# Patient Record
Sex: MALE | Race: WHITE | NOT HISPANIC OR LATINO | Employment: OTHER | URBAN - NONMETROPOLITAN AREA
[De-identification: names, ages, dates, MRNs, and addresses within clinical notes are randomized per-mention and may not be internally consistent; named-entity substitution may affect disease eponyms.]

---

## 2017-01-03 ENCOUNTER — OFFICE VISIT (OUTPATIENT)
Dept: CARDIOLOGY | Facility: CLINIC | Age: 47
End: 2017-01-03

## 2017-01-03 VITALS — DIASTOLIC BLOOD PRESSURE: 78 MMHG | OXYGEN SATURATION: 96 % | SYSTOLIC BLOOD PRESSURE: 120 MMHG | HEART RATE: 60 BPM

## 2017-01-03 DIAGNOSIS — I10 ESSENTIAL HYPERTENSION: Primary | ICD-10-CM

## 2017-01-03 DIAGNOSIS — R55 SYNCOPE, UNSPECIFIED SYNCOPE TYPE: ICD-10-CM

## 2017-01-03 DIAGNOSIS — I47.20 VT (VENTRICULAR TACHYCARDIA) (HCC): ICD-10-CM

## 2017-01-03 DIAGNOSIS — I42.1 HYPERTROPHIC OBSTRUCTIVE CARDIOMYOPATHY (HCC): ICD-10-CM

## 2017-01-03 PROCEDURE — 99213 OFFICE O/P EST LOW 20 MIN: CPT | Performed by: INTERNAL MEDICINE

## 2017-01-03 RX ORDER — CHLORAL HYDRATE 500 MG
1000 CAPSULE ORAL 2 TIMES DAILY WITH MEALS
COMMUNITY
End: 2018-03-13 | Stop reason: ALTCHOICE

## 2017-01-03 NOTE — PROGRESS NOTES
Subjective:       Sergio Phan is a 46 y.o. male who here for follow up    CC  Follow-up for the cardiomyopathy*ventricular tachycardia  HPI  46-year-old white male with history of sudden cardiac death, and apical cardiomyopathy, AICD, had a pacemaker/AICD checked recently was found to have 2 episodes of nonsustained ventricular tachycardia, terminated on its own with no AICD shock    Patient denies any chest pains or tightness in chest no heaviness with a pressure sensation     Problem List Items Addressed This Visit        Cardiovascular and Mediastinum    Hypertension - Primary    Hypertrophic obstructive cardiomyopathy    Syncope    VT (ventricular tachycardia)        Previous treatments/evaluations include: ASA and beta blocker. Cardiac risk factors: advanced age (older than 55 for men, 65 for women) and hypertension.    The following portions of the patient's history were reviewed and updated as appropriate: allergies, current medications, past family history, past medical history, past social history, past surgical history and problem list.    Past Medical History   Diagnosis Date   • Anxiety    • Asthma    • Chest pain    • COPD (chronic obstructive pulmonary disease)    • Depression    • Diabetes mellitus      TYPE II   • Fatigue    • HOCM (hypertrophic obstructive cardiomyopathy)    • Hypertension    • Hypertriglyceridemia    • Pancreatitis    • Syncope     reports that he has been smoking Cigarettes.  He has a 17.50 pack-year smoking history. He has never used smokeless tobacco. He reports that he does not drink alcohol or use illicit drugs.  Family History   Problem Relation Age of Onset   • Heart block Mother    • Kidney disease Mother    • Diabetes Father    • Diabetes Maternal Grandmother    • Diabetes Maternal Grandfather    • COPD Maternal Grandfather    • Asthma Maternal Grandfather        Review of Systems  Constitutional: No wt loss, fever, fatigue  Gastrointestinal: No nausea, abdominal  pain  Behavioral/Psych: No insomnia or anxiety   Cardiovascular no cp  Objective:       Physical Exam             Physical Exam  Visit Vitals   • /78 (BP Location: Left arm, Patient Position: Sitting)   • Pulse 60   • SpO2 96%     General appearance: alert, appears stated age and cooperative, oriented x 3  Neck: no JVD and supple, symmetrical, trachea midline  Lungs: clear to auscultation bilaterally  Heart:Normal PMI,  S1, S2 normal, no murmur, rub or gallop  Extremities: normal range of motion, no cyanosis or edema,  Pulses: 2+ and symmetric  Skin: Skin color, texture, turgor normal. No rashes or lesions  Psych:  Pleasant and cooperative        Cardiographics  @Procedures    Echocardiogram:    · Left ventricular wall thickness is consistent with moderate-to-severe predominantly apical hypertrophy.  · All left ventricular wall segments contract normally.  · Left ventricular function is normal.  · Left ventricular function is normal. Calculated EF = 60%.  · There is no evidence of pericardial effusion.    Current Outpatient Prescriptions:   •  Omega-3 Fatty Acids (FISH OIL) 1000 MG capsule capsule, Take 1,000 mg by mouth 2 (Two) Times a Day With Meals., Disp: , Rfl:   •  albuterol (PROVENTIL HFA;VENTOLIN HFA) 108 (90 BASE) MCG/ACT inhaler, Inhale 2 puffs every 4 (four) hours as needed for wheezing., Disp: , Rfl:   •  albuterol (PROVENTIL) (2.5 MG/3ML) 0.083% nebulizer solution, Take 2.5 mg by nebulization every 4 (four) hours as needed for wheezing., Disp: , Rfl:   •  allopurinol (ZYLOPRIM) 300 MG tablet, 300 mg daily., Disp: , Rfl: 12  •  amLODIPine (NORVASC) 10 MG tablet, Take  by mouth daily., Disp: , Rfl:   •  aspirin 81 MG tablet, Take  by mouth daily., Disp: , Rfl:   •  atorvastatin (LIPITOR) 20 MG tablet, Take 60 mg by mouth daily., Disp: , Rfl:   •  budesonide-formoterol (SYMBICORT) 160-4.5 MCG/ACT inhaler, Inhale 2 puffs 2 (two) times a day., Disp: , Rfl:   •  calcitriol (ROCALTROL) 0.25 MCG capsule,  Take 0.25 mcg by mouth daily., Disp: , Rfl:   •  DULoxetine (CYMBALTA) 30 MG capsule, Take  by mouth daily., Disp: , Rfl:   •  FLUTICASONE FUROATE IN, Inhale 2 puffs daily., Disp: , Rfl:   •  furosemide (LASIX) 20 MG tablet, Take 20 mg by mouth 2 (two) times a day., Disp: , Rfl:   •  gabapentin (NEURONTIN) 800 MG tablet, Take 800 mg by mouth 3 (three) times a day., Disp: , Rfl:   •  glimepiride (AMARYL) 2 MG tablet, Take 2 mg by mouth every morning before breakfast., Disp: , Rfl:   •  hydrALAZINE (APRESOLINE) 50 MG tablet, Take 50 mg by mouth 4 (four) times a day., Disp: , Rfl:   •  lisinopril (PRINIVIL,ZESTRIL) 20 MG tablet, Take 1 tablet by mouth daily., Disp: 30 tablet, Rfl: 1  •  methocarbamol (ROBAXIN) 750 MG tablet, Take 750 mg by mouth 4 (four) times a day as needed for muscle spasms., Disp: , Rfl:   •  metoprolol tartrate (LOPRESSOR) 25 MG tablet, Take 1 tablet by mouth every 12 (twelve) hours., Disp: 60 tablet, Rfl: 1  •  omeprazole (PriLOSEC) 40 MG capsule, Take 1 capsule by mouth daily., Disp: 30 capsule, Rfl: 1  •  promethazine (PHENERGAN) 25 MG tablet, Take 25 mg by mouth every 6 (six) hours as needed for nausea or vomiting., Disp: , Rfl:   •  spironolactone (ALDACTONE) 50 MG tablet, Take 1 tablet by mouth daily., Disp: 30 tablet, Rfl: 1  •  tiotropium (SPIRIVA) 18 MCG per inhalation capsule, Place 1 capsule into inhaler and inhale 1 (one) time daily., Disp: , Rfl:   •  Vitamin E 400 UNITS tablet, Take 400 mg by mouth daily., Disp: , Rfl:    Assessment:        Patient Active Problem List   Diagnosis   • Cardiac defibrillator in place   • Dizziness   • Fatigue   • Hypertension   • Hypertrophic obstructive cardiomyopathy   • Hypertriglyceridemia   • Kidney disorder   • Type 2 diabetes mellitus   • Syncope   • Vitamin D deficiency   • Weakness   • Idiopathic acute pancreatitis               Plan:         46-year-old male with known history of apical cardiomyopathy with AICD has couple of nonsustained  ventricular tachycardia remains asymptomatic    Blood pressure well-controlled    ICD-10-CM ICD-9-CM   1. Essential hypertension I10 401.9   2. Hypertrophic obstructive cardiomyopathy I42.1 425.11   3. Syncope, unspecified syncope type R55 780.2   4. VT (ventricular tachycardia) I47.2 427.1   still have vt    See us 6 months    COUNSELING:    Sergio Clifton was given to patient for the following topics: diagnostic results, risk factor reductions, impressions, risks and benefits of treatment options and importance of treatment compliance .       SMOKING COUNSELING:    Ready to quit: No  Counseling given: Yes      EMR Dragon/Transcription disclaimer:   Much of this encounter note is an electronic transcription/translation of spoken language to printed text. The electronic translation of spoken language may permit erroneous, or at times, nonsensical words or phrases to be inadvertently transcribed; Although I have reviewed the note for such errors, some may still exist.

## 2017-02-13 ENCOUNTER — OFFICE (OUTPATIENT)
Dept: URBAN - METROPOLITAN AREA CLINIC 71 | Facility: CLINIC | Age: 47
End: 2017-02-13
Payer: MEDICARE

## 2017-02-13 ENCOUNTER — TRANSCRIBE ORDERS (OUTPATIENT)
Dept: ADMINISTRATIVE | Facility: HOSPITAL | Age: 47
End: 2017-02-13

## 2017-02-13 VITALS — WEIGHT: 220 LBS | DIASTOLIC BLOOD PRESSURE: 20 MMHG | SYSTOLIC BLOOD PRESSURE: 130 MMHG | HEART RATE: 64 BPM

## 2017-02-13 DIAGNOSIS — K21.00 REFLUX ESOPHAGITIS: ICD-10-CM

## 2017-02-13 DIAGNOSIS — K86.3 PSEUDOCYST OF PANCREAS: ICD-10-CM

## 2017-02-13 DIAGNOSIS — K21.0 GASTRO-ESOPHAGEAL REFLUX DISEASE WITH ESOPHAGITIS: ICD-10-CM

## 2017-02-13 DIAGNOSIS — E78.1 PURE HYPERGLYCERIDEMIA: ICD-10-CM

## 2017-02-13 DIAGNOSIS — K86.3 PSEUDOCYST OF PANCREAS: Primary | ICD-10-CM

## 2017-02-13 DIAGNOSIS — E78.1 HYPERTRIGLYCERIDEMIA: ICD-10-CM

## 2017-02-13 PROCEDURE — 99213 OFFICE O/P EST LOW 20 MIN: CPT

## 2017-02-22 ENCOUNTER — HOSPITAL ENCOUNTER (OUTPATIENT)
Dept: CT IMAGING | Facility: HOSPITAL | Age: 47
Discharge: HOME | End: 2017-02-22
Attending: INTERNAL MEDICINE | Admitting: INTERNAL MEDICINE

## 2017-02-22 DIAGNOSIS — E78.1 HYPERTRIGLYCERIDEMIA: ICD-10-CM

## 2017-02-22 DIAGNOSIS — K86.3 PSEUDOCYST OF PANCREAS: ICD-10-CM

## 2017-02-22 DIAGNOSIS — K21.00 REFLUX ESOPHAGITIS: ICD-10-CM

## 2017-02-22 PROCEDURE — 74150 CT ABDOMEN W/O CONTRAST: CPT

## 2017-03-19 ENCOUNTER — HOSPITAL ENCOUNTER (INPATIENT)
Facility: HOSPITAL | Age: 47
LOS: 7 days | Discharge: HOME HEALTH | End: 2017-03-26
Attending: EMERGENCY MEDICINE | Admitting: INTERNAL MEDICINE

## 2017-03-19 ENCOUNTER — APPOINTMENT (OUTPATIENT)
Dept: CT IMAGING | Facility: HOSPITAL | Age: 47
End: 2017-03-19

## 2017-03-19 DIAGNOSIS — K85.90 ACUTE PANCREATITIS, UNSPECIFIED COMPLICATION STATUS, UNSPECIFIED PANCREATITIS TYPE: Primary | ICD-10-CM

## 2017-03-19 LAB
ALBUMIN SERPL-MCNC: 4.1 G/DL (ref 3.5–5.2)
ALBUMIN/GLOB SERPL: 1.4 G/DL
ALP SERPL-CCNC: 71 U/L (ref 40–129)
ALT SERPL W P-5'-P-CCNC: 24 U/L (ref 5–41)
AMYLASE SERPL-CCNC: 52 U/L (ref 28–100)
ANION GAP SERPL CALCULATED.3IONS-SCNC: 16.7 MMOL/L
AST SERPL-CCNC: 16 U/L (ref 5–40)
BACTERIA UR QL AUTO: ABNORMAL /HPF
BASOPHILS # BLD AUTO: 0.03 10*3/MM3 (ref 0–0.2)
BASOPHILS NFR BLD AUTO: 0.3 % (ref 0–2)
BILIRUB SERPL-MCNC: 0.8 MG/DL (ref 0.2–1.2)
BILIRUB UR QL STRIP: ABNORMAL
BUN BLD-MCNC: 31 MG/DL (ref 6–20)
BUN/CREAT SERPL: 10.5 (ref 7–25)
CALCIUM SPEC-SCNC: 9.7 MG/DL (ref 8.6–10.5)
CHLORIDE SERPL-SCNC: 95 MMOL/L (ref 98–107)
CLARITY UR: CLEAR
CO2 SERPL-SCNC: 21.3 MMOL/L (ref 22–29)
COLOR UR: ABNORMAL
CREAT BLD-MCNC: 2.95 MG/DL (ref 0.76–1.27)
D-LACTATE SERPL-SCNC: 2.2 MMOL/L (ref 0.5–2)
D-LACTATE SERPL-SCNC: 2.4 MMOL/L (ref 0.5–2)
DEPRECATED RDW RBC AUTO: 39.5 FL (ref 37–54)
EOSINOPHIL # BLD AUTO: 0.03 10*3/MM3 (ref 0.1–0.3)
EOSINOPHIL NFR BLD AUTO: 0.3 % (ref 0–4)
ERYTHROCYTE [DISTWIDTH] IN BLOOD BY AUTOMATED COUNT: 13 % (ref 11.5–14.5)
GFR SERPL CREATININE-BSD FRML MDRD: 23 ML/MIN/1.73
GLOBULIN UR ELPH-MCNC: 3 GM/DL
GLUCOSE BLD-MCNC: 211 MG/DL (ref 65–99)
GLUCOSE UR STRIP-MCNC: ABNORMAL MG/DL
HCT VFR BLD AUTO: 51.1 % (ref 42–52)
HGB BLD-MCNC: 17.8 G/DL (ref 14–18)
HGB UR QL STRIP.AUTO: ABNORMAL
HYALINE CASTS UR QL AUTO: ABNORMAL /LPF
IMM GRANULOCYTES # BLD: 0.02 10*3/MM3 (ref 0–0.03)
IMM GRANULOCYTES NFR BLD: 0.2 % (ref 0–0.5)
KETONES UR QL STRIP: NEGATIVE
LEUKOCYTE ESTERASE UR QL STRIP.AUTO: NEGATIVE
LIPASE SERPL-CCNC: 69 U/L (ref 13–60)
LYMPHOCYTES # BLD AUTO: 0.96 10*3/MM3 (ref 0.6–4.8)
LYMPHOCYTES NFR BLD AUTO: 11.1 % (ref 20–45)
MCH RBC QN AUTO: 29.4 PG (ref 27–31)
MCHC RBC AUTO-ENTMCNC: 34.8 G/DL (ref 31–37)
MCV RBC AUTO: 84.3 FL (ref 80–94)
MONOCYTES # BLD AUTO: 0.5 10*3/MM3 (ref 0–1)
MONOCYTES NFR BLD AUTO: 5.8 % (ref 3–8)
NEUTROPHILS # BLD AUTO: 7.08 10*3/MM3 (ref 1.5–8.3)
NEUTROPHILS NFR BLD AUTO: 82.3 % (ref 45–70)
NITRITE UR QL STRIP: NEGATIVE
NRBC BLD MANUAL-RTO: 0 /100 WBC (ref 0–0)
PH UR STRIP.AUTO: 6 [PH] (ref 4.5–8)
PLATELET # BLD AUTO: 206 10*3/MM3 (ref 140–500)
PMV BLD AUTO: 11.1 FL (ref 7.4–10.4)
POTASSIUM BLD-SCNC: 4.3 MMOL/L (ref 3.5–5.2)
PROT SERPL-MCNC: 7.1 G/DL (ref 6–8.5)
PROT UR QL STRIP: ABNORMAL
RBC # BLD AUTO: 6.06 10*6/MM3 (ref 4.7–6.1)
RBC # UR: ABNORMAL /HPF
REF LAB TEST METHOD: ABNORMAL
SODIUM BLD-SCNC: 133 MMOL/L (ref 136–145)
SP GR UR STRIP: 1.02 (ref 1–1.03)
SQUAMOUS #/AREA URNS HPF: ABNORMAL /HPF
UROBILINOGEN UR QL STRIP: ABNORMAL
WBC CASTS #/AREA URNS LPF: ABNORMAL /LPF
WBC NRBC COR # BLD: 8.62 10*3/MM3 (ref 4.8–10.8)
WBC UR QL AUTO: ABNORMAL /HPF

## 2017-03-19 PROCEDURE — 80053 COMPREHEN METABOLIC PANEL: CPT | Performed by: EMERGENCY MEDICINE

## 2017-03-19 PROCEDURE — 74176 CT ABD & PELVIS W/O CONTRAST: CPT

## 2017-03-19 PROCEDURE — 83690 ASSAY OF LIPASE: CPT | Performed by: EMERGENCY MEDICINE

## 2017-03-19 PROCEDURE — 82962 GLUCOSE BLOOD TEST: CPT

## 2017-03-19 PROCEDURE — 99283 EMERGENCY DEPT VISIT LOW MDM: CPT

## 2017-03-19 PROCEDURE — 83605 ASSAY OF LACTIC ACID: CPT | Performed by: EMERGENCY MEDICINE

## 2017-03-19 PROCEDURE — 81001 URINALYSIS AUTO W/SCOPE: CPT | Performed by: EMERGENCY MEDICINE

## 2017-03-19 PROCEDURE — 25010000002 HYDROMORPHONE PER 4 MG: Performed by: EMERGENCY MEDICINE

## 2017-03-19 PROCEDURE — 85025 COMPLETE CBC W/AUTO DIFF WBC: CPT | Performed by: EMERGENCY MEDICINE

## 2017-03-19 PROCEDURE — 99284 EMERGENCY DEPT VISIT MOD MDM: CPT | Performed by: EMERGENCY MEDICINE

## 2017-03-19 PROCEDURE — 25010000002 ONDANSETRON PER 1 MG: Performed by: EMERGENCY MEDICINE

## 2017-03-19 PROCEDURE — 82150 ASSAY OF AMYLASE: CPT | Performed by: EMERGENCY MEDICINE

## 2017-03-19 PROCEDURE — 87086 URINE CULTURE/COLONY COUNT: CPT | Performed by: EMERGENCY MEDICINE

## 2017-03-19 RX ORDER — ONDANSETRON 2 MG/ML
4 INJECTION INTRAMUSCULAR; INTRAVENOUS ONCE
Status: COMPLETED | OUTPATIENT
Start: 2017-03-19 | End: 2017-03-19

## 2017-03-19 RX ORDER — IPRATROPIUM BROMIDE AND ALBUTEROL SULFATE 2.5; .5 MG/3ML; MG/3ML
3 SOLUTION RESPIRATORY (INHALATION)
Status: DISCONTINUED | OUTPATIENT
Start: 2017-03-20 | End: 2017-03-20

## 2017-03-19 RX ORDER — PANTOPRAZOLE SODIUM 40 MG/10ML
40 INJECTION, POWDER, LYOPHILIZED, FOR SOLUTION INTRAVENOUS
Status: DISCONTINUED | OUTPATIENT
Start: 2017-03-20 | End: 2017-03-26 | Stop reason: HOSPADM

## 2017-03-19 RX ORDER — BUDESONIDE AND FORMOTEROL FUMARATE DIHYDRATE 80; 4.5 UG/1; UG/1
2 AEROSOL RESPIRATORY (INHALATION)
Status: DISCONTINUED | OUTPATIENT
Start: 2017-03-20 | End: 2017-03-26 | Stop reason: HOSPADM

## 2017-03-19 RX ORDER — ONDANSETRON 2 MG/ML
4 INJECTION INTRAMUSCULAR; INTRAVENOUS EVERY 6 HOURS PRN
Status: DISCONTINUED | OUTPATIENT
Start: 2017-03-19 | End: 2017-03-26 | Stop reason: HOSPADM

## 2017-03-19 RX ORDER — DEXTROSE AND SODIUM CHLORIDE 5; .45 G/100ML; G/100ML
100 INJECTION, SOLUTION INTRAVENOUS CONTINUOUS
Status: DISCONTINUED | OUTPATIENT
Start: 2017-03-20 | End: 2017-03-20

## 2017-03-19 RX ORDER — ACETAMINOPHEN 650 MG/1
650 SUPPOSITORY RECTAL EVERY 4 HOURS PRN
Status: DISCONTINUED | OUTPATIENT
Start: 2017-03-19 | End: 2017-03-26 | Stop reason: HOSPADM

## 2017-03-19 RX ADMIN — HYDROMORPHONE HYDROCHLORIDE 1 MG: 1 INJECTION, SOLUTION INTRAMUSCULAR; INTRAVENOUS; SUBCUTANEOUS at 20:50

## 2017-03-19 RX ADMIN — ONDANSETRON 4 MG: 2 INJECTION, SOLUTION INTRAMUSCULAR; INTRAVENOUS at 18:47

## 2017-03-19 RX ADMIN — HYDROMORPHONE HYDROCHLORIDE 1 MG: 1 INJECTION, SOLUTION INTRAMUSCULAR; INTRAVENOUS; SUBCUTANEOUS at 18:50

## 2017-03-19 RX ADMIN — SODIUM CHLORIDE 1000 ML: 9 INJECTION, SOLUTION INTRAVENOUS at 18:45

## 2017-03-19 NOTE — ED PROVIDER NOTES
Subjective   History of Present Illness  History of Present Illness    Chief complaint: Abdominal pain    Location: Epigastric and right upper quadrant    Quality/Severity:  Severe, sharp    Timing/Onset/Duration: Gradual onset since Thursday night    Modifying Factors: Eating makes it worse, not eating seems to make it better    Associated Symptoms: The patient denies any headache.  The patient's had a subjective fever.  No chills.  No cough sore throat earache or nasal congestion.  No chest pain or shortness of breath.  No diarrhea.  No burning when he urinates.    Narrative: This 46-year-old white male presents with abdominal pain that started Thursday night.  It was gradual in onset and is getting worse.  The patient decreased by mouth intake on Friday and Saturday, eating bouillon only, and still has worsening pain.  Patient had a subjective fever.  No chills.  No cough sore throat earache or nasal congestion.  No chest pain or shortness of breath.  No diarrhea or burning when he urinates.  Patient has a history of pancreatitis related to increasing triglycerides.  Patient's had a cholecystectomy, hernia repair, defibrillator pacemaker placed, and an appendectomy.  Patient developed pancreatitis after cardiac arrest in June 2015.    PCP:  Helder García    GI:  Daren      Review of Systems   Constitutional: Positive for fever. Negative for chills.   HENT: Negative for ear pain and sore throat.    Eyes: Negative for discharge and redness.   Respiratory: Negative for cough, chest tightness, shortness of breath, wheezing and stridor.    Cardiovascular: Negative for chest pain.   Gastrointestinal: Positive for abdominal pain, nausea and vomiting. Negative for blood in stool, constipation and diarrhea.   Genitourinary: Negative for decreased urine volume, dysuria, flank pain, frequency, hematuria and urgency.   Musculoskeletal: Negative for arthralgias, back pain, neck pain and neck stiffness.   Skin: Negative for  rash.   Neurological: Negative for dizziness, speech difficulty, weakness, light-headedness, numbness and headaches.   Hematological: Negative for adenopathy.   Psychiatric/Behavioral: Negative.  Negative for agitation and confusion.        Medication List      ASK your doctor about these medications          * albuterol (2.5 MG/3ML) 0.083% nebulizer solution   Commonly known as:  PROVENTIL       * albuterol 108 (90 BASE) MCG/ACT inhaler   Commonly known as:  PROVENTIL HFA;VENTOLIN HFA       allopurinol 300 MG tablet   Commonly known as:  ZYLOPRIM       amLODIPine 10 MG tablet   Commonly known as:  NORVASC       aspirin 81 MG tablet       atorvastatin 20 MG tablet   Commonly known as:  LIPITOR       budesonide-formoterol 160-4.5 MCG/ACT inhaler   Commonly known as:  SYMBICORT       calcitriol 0.25 MCG capsule   Commonly known as:  ROCALTROL       DULoxetine 30 MG capsule   Commonly known as:  CYMBALTA       fish oil 1000 MG capsule capsule       FLUTICASONE FUROATE IN       furosemide 20 MG tablet   Commonly known as:  LASIX       gabapentin 800 MG tablet   Commonly known as:  NEURONTIN       glimepiride 2 MG tablet   Commonly known as:  AMARYL       hydrALAZINE 50 MG tablet   Commonly known as:  APRESOLINE       lisinopril 20 MG tablet   Commonly known as:  PRINIVIL,ZESTRIL   Take 1 tablet by mouth daily.       methocarbamol 750 MG tablet   Commonly known as:  ROBAXIN       metoprolol tartrate 25 MG tablet   Commonly known as:  LOPRESSOR   Take 1 tablet by mouth every 12 (twelve) hours.       omeprazole 40 MG capsule   Commonly known as:  priLOSEC   Take 1 capsule by mouth daily.       promethazine 25 MG tablet   Commonly known as:  PHENERGAN       spironolactone 50 MG tablet   Commonly known as:  ALDACTONE   Take 1 tablet by mouth daily.       tiotropium 18 MCG per inhalation capsule   Commonly known as:  SPIRIVA       Vitamin E 400 UNITS tablet       * Notice:  This list has 2 medication(s) that are the same as  other   medications prescribed for you. Read the directions carefully, and ask   your doctor or other care provider to review them with you.        Past Medical History   Diagnosis Date   • Anxiety    • Asthma    • Chest pain    • COPD (chronic obstructive pulmonary disease)    • Depression    • Diabetes mellitus      TYPE II   • Fatigue    • HOCM (hypertrophic obstructive cardiomyopathy)    • Hypertension    • Hypertriglyceridemia    • Pancreatitis    • Syncope        No Known Allergies    Past Surgical History   Procedure Laterality Date   • Cardiac catheterization     • Insert / replace / remove pacemaker       [facility]   • Cholecystectomy     • Laparoscopic appendectomy     • Cardiac defibrillator placement     • Other surgical history       NO REMOVAL OF APPENDIX  PATIENT HAS A APPENDIX   • Umbilical hernia repair     • Endoscopy N/A 8/30/2016     Procedure: ESOPHAGOGASTRODUODENOSCOPY;  Surgeon: Irineo Mercedes MD;  Location: [facility];  Service:        Family History   Problem Relation Age of Onset   • Heart block Mother    • Kidney disease Mother    • Diabetes Father    • Diabetes Maternal Grandmother    • Diabetes Maternal Grandfather    • COPD Maternal Grandfather    • Asthma Maternal Grandfather        Social History     Social History   • Marital status:      Spouse name: N/A   • Number of children: N/A   • Years of education: N/A     Social History Main Topics   • Smoking status: Current Some Day Smoker     Packs/day: 0.50     Years: 35.00     Types: Cigarettes   • Smokeless tobacco: Never Used      Comment: 1/2 pack per week    • Alcohol use No   • Drug use: No   • Sexual activity: Defer     Other Topics Concern   • None     Social History Narrative           Objective   Physical Exam   Constitutional: He is oriented to person, place, and time. He appears well-developed and well-nourished. No distress.   ED Triage Vitals:  Temp: 98 °F (36.7 °C) (03/19/17 1751)  Heart Rate: 93 (03/19/17  1751)  Resp: 18 (03/19/17 1751)  BP: 139/94 (03/19/17 1751)  SpO2: 100 % (03/19/17 1751)  Temp src: Oral (03/19/17 1751)  Heart Rate Source: n/a  Patient Position: Sitting (03/19/17 1751)  BP Location: Right arm (03/19/17 1751)  FiO2 (%): n/a    The patient's vitals were reviewed by me.  Unless otherwise noted they are within normal limits.     HENT:   Head: Normocephalic and atraumatic.   Right Ear: External ear normal.   Left Ear: External ear normal.   Nose: Nose normal.   Dry mucous membranes   Eyes: Conjunctivae and EOM are normal. Pupils are equal, round, and reactive to light. Right eye exhibits no discharge. Left eye exhibits no discharge.   Neck: Normal range of motion. Neck supple. No JVD present. No tracheal deviation present. No thyromegaly present.   Cardiovascular: Normal rate, regular rhythm, normal heart sounds and intact distal pulses.  Exam reveals no gallop and no friction rub.    No murmur heard.  Pulmonary/Chest: Effort normal and breath sounds normal. No stridor. No respiratory distress. He has no wheezes. He has no rales. He exhibits no tenderness.   Abdominal: Soft. Bowel sounds are normal. He exhibits no distension and no mass. There is tenderness (moderate right upper quadrant and mild epigastric pain.). There is no rebound and no guarding. No hernia.   Musculoskeletal: Normal range of motion. He exhibits no edema or deformity.   Lymphadenopathy:     He has no cervical adenopathy.   Neurological: He is alert and oriented to person, place, and time.   Skin: Skin is warm and dry. No rash noted. He is not diaphoretic. No erythema. No pallor.   Psychiatric: His behavior is normal.   Nursing note and vitals reviewed.      Procedures         ED Course  ED Course   Comment By Time   The urinalysis was reviewed by me.  The urine microscopic shows 3-5 RBCs, 3-5 WBCs, 1+ bacteria.  The neutrophil percentage is 82%.  The venous lactate is 2.4.  The lipase is 69.  The serum glucose is 211.  The BUN 31.   The creatinine is 2.95.  The sodium is 133.  The chloride's 95.  The CO2 is 21.3.  The GFR is 23.  Is greater than 300 protein in the urine.  Laboratory values are otherwise unremarkable. Ramesh Arroyo MD 03/19 2137      11:11 PM, 03/19/17:  Patient was reassessed.  He does not complain of any pain currently.  His vital signs were reviewed and are stable.  Abdominal exam: Soft nontender no masses positive bowel sounds.    11:12 PM, 03/19/17:  Patient's diagnosis of acute on chronic pancreatitis was discussed with him.  I will be to bring the patient in for gut rest.  He will have his pain managed.  All of his questions were answered the patient will be admitted in stable condition.    11:12 PM, 03/19/17:  I spoke with , on-call for the hospitalist, he will admit the patient.            MDM  No orders to display     Labs Reviewed   CBC WITH AUTO DIFFERENTIAL - Abnormal; Notable for the following:        Result Value    MPV 11.1 (*)     Neutrophil % 82.3 (*)     Lymphocyte % 11.1 (*)     Eosinophils, Absolute 0.03 (*)     All other components within normal limits   COMPREHENSIVE METABOLIC PANEL   URINALYSIS W/ CULTURE IF INDICATED   LACTIC ACID, PLASMA   AMYLASE   LIPASE   CBC AND DIFFERENTIAL    Narrative:     The following orders were created for panel order CBC & Differential.  Procedure                               Abnormality         Status                     ---------                               -----------         ------                     CBC Auto Differential[[id number]]         Abnormal            Final result                 Please view results for these tests on the individual orders.     Ct Abdomen Without Contrast    Result Date: 2/22/2017  Narrative: INDICATION: Pancreatic pseudocyst. Reflux esophagitis. Hypertriglyceridemia. Chronic pancreatitis.  TECHNIQUE: CT of the abdomen without contrast. Coronal and sagittal reconstructions were obtained.  Radiation dose reduction techniques were  utilized, including automated exposure control and exposure modulation based on body size.  COMPARISON: CT abdomen and pelvis dated 08/24/2016  FINDINGS: Abdomen: The pancreatic pseudocysts along the anterior aspect of the pancreatic neck and pancreatic body has resolved. A pseudocyst along the undersurface of the greater curvature the stomach decreased measuring 2.2 x 1.6 cm, compared to 3.5 x 2.8 cm previously.  The pancreatic head and uncinate process is mildly edematous. Correlate for any evidence of an acute pancreatitis. There are some small peripancreatic lymph nodes which are fairly similar to the prior study.  There is background hepatic steatosis. The gallbladder surgically absent. No intrahepatic or extrahepatic biliary dilatation.  The spleen is normal in size. The adrenal glands and kidneys are unchanged. The left kidney is asymmetrically atrophic. There is areas of renal cortical scarring in the right kidney. No hydronephrosis. The bowel is not dilated. The appendix is normal. There are scattered colonic diverticula.  The abdominal aorta is normal in caliber.  No acute osseous abnormalities      Impression:  1. Mildly edematous pancreatic head and uncinate process. Please correlate any evidence of an acute pancreatitis. 2. The pancreatic pseudocyst along the anterior aspect of the pancreatic neck and body has resolved. A small pseudocyst along the greater curvature of the stomach has significantly improved. 3. Hepatic steatosis.  This report was finalized on 2/22/2017 10:38 AM by Dr. Reyes Copeland MD.        Final diagnoses:   None         ED Medications:  Medications   HYDROmorphone (DILAUDID) injection 1 mg (not administered)   ondansetron (ZOFRAN) injection 4 mg (not administered)   sodium chloride 0.9 % bolus 1,000 mL (not administered)       New Medications:     Medication List      ASK your doctor about these medications          * albuterol (2.5 MG/3ML) 0.083% nebulizer solution   Commonly known  as:  PROVENTIL       * albuterol 108 (90 BASE) MCG/ACT inhaler   Commonly known as:  PROVENTIL HFA;VENTOLIN HFA       allopurinol 300 MG tablet   Commonly known as:  ZYLOPRIM       amLODIPine 10 MG tablet   Commonly known as:  NORVASC       aspirin 81 MG tablet       atorvastatin 20 MG tablet   Commonly known as:  LIPITOR       budesonide-formoterol 160-4.5 MCG/ACT inhaler   Commonly known as:  SYMBICORT       calcitriol 0.25 MCG capsule   Commonly known as:  ROCALTROL       DULoxetine 30 MG capsule   Commonly known as:  CYMBALTA       fish oil 1000 MG capsule capsule       FLUTICASONE FUROATE IN       furosemide 20 MG tablet   Commonly known as:  LASIX       gabapentin 800 MG tablet   Commonly known as:  NEURONTIN       glimepiride 2 MG tablet   Commonly known as:  AMARYL       hydrALAZINE 50 MG tablet   Commonly known as:  APRESOLINE       lisinopril 20 MG tablet   Commonly known as:  PRINIVIL,ZESTRIL   Take 1 tablet by mouth daily.       methocarbamol 750 MG tablet   Commonly known as:  ROBAXIN       metoprolol tartrate 25 MG tablet   Commonly known as:  LOPRESSOR   Take 1 tablet by mouth every 12 (twelve) hours.       omeprazole 40 MG capsule   Commonly known as:  priLOSEC   Take 1 capsule by mouth daily.       promethazine 25 MG tablet   Commonly known as:  PHENERGAN       spironolactone 50 MG tablet   Commonly known as:  ALDACTONE   Take 1 tablet by mouth daily.       tiotropium 18 MCG per inhalation capsule   Commonly known as:  SPIRIVA       Vitamin E 400 UNITS tablet       * Notice:  This list has 2 medication(s) that are the same as other   medications prescribed for you. Read the directions carefully, and ask   your doctor or other care provider to review them with you.        Stopped Medications:     Medication List      ASK your doctor about these medications          * albuterol (2.5 MG/3ML) 0.083% nebulizer solution   Commonly known as:  PROVENTIL       * albuterol 108 (90 BASE) MCG/ACT inhaler    Commonly known as:  PROVENTIL HFA;VENTOLIN HFA       allopurinol 300 MG tablet   Commonly known as:  ZYLOPRIM       amLODIPine 10 MG tablet   Commonly known as:  NORVASC       aspirin 81 MG tablet       atorvastatin 20 MG tablet   Commonly known as:  LIPITOR       budesonide-formoterol 160-4.5 MCG/ACT inhaler   Commonly known as:  SYMBICORT       calcitriol 0.25 MCG capsule   Commonly known as:  ROCALTROL       DULoxetine 30 MG capsule   Commonly known as:  CYMBALTA       fish oil 1000 MG capsule capsule       FLUTICASONE FUROATE IN       furosemide 20 MG tablet   Commonly known as:  LASIX       gabapentin 800 MG tablet   Commonly known as:  NEURONTIN       glimepiride 2 MG tablet   Commonly known as:  AMARYL       hydrALAZINE 50 MG tablet   Commonly known as:  APRESOLINE       lisinopril 20 MG tablet   Commonly known as:  PRINIVIL,ZESTRIL   Take 1 tablet by mouth daily.       methocarbamol 750 MG tablet   Commonly known as:  ROBAXIN       metoprolol tartrate 25 MG tablet   Commonly known as:  LOPRESSOR   Take 1 tablet by mouth every 12 (twelve) hours.       omeprazole 40 MG capsule   Commonly known as:  priLOSEC   Take 1 capsule by mouth daily.       promethazine 25 MG tablet   Commonly known as:  PHENERGAN       spironolactone 50 MG tablet   Commonly known as:  ALDACTONE   Take 1 tablet by mouth daily.       tiotropium 18 MCG per inhalation capsule   Commonly known as:  SPIRIVA       Vitamin E 400 UNITS tablet       * Notice:  This list has 2 medication(s) that are the same as other   medications prescribed for you. Read the directions carefully, and ask   your doctor or other care provider to review them with you.          Final diagnoses:   Acute pancreatitis, unspecified complication status, unspecified pancreatitis type            Ramesh Arroyo MD  03/19/17 [id number]

## 2017-03-20 ENCOUNTER — ON CAMPUS - OUTPATIENT (OUTPATIENT)
Dept: URBAN - METROPOLITAN AREA HOSPITAL 27 | Facility: HOSPITAL | Age: 47
End: 2017-03-20
Payer: COMMERCIAL

## 2017-03-20 ENCOUNTER — ON CAMPUS - OUTPATIENT (OUTPATIENT)
Dept: URBAN - METROPOLITAN AREA HOSPITAL 28 | Facility: HOSPITAL | Age: 47
End: 2017-03-20
Payer: COMMERCIAL

## 2017-03-20 DIAGNOSIS — K85.90 ACUTE PANCREATITIS WITHOUT NECROSIS OR INFECTION, UNSPECIFIE: ICD-10-CM

## 2017-03-20 LAB
ALBUMIN SERPL-MCNC: 3.4 G/DL (ref 3.5–5.2)
ALBUMIN/GLOB SERPL: 1.3 G/DL
ALP SERPL-CCNC: 59 U/L (ref 40–129)
ALT SERPL W P-5'-P-CCNC: 19 U/L (ref 5–41)
AMYLASE SERPL-CCNC: 54 U/L (ref 28–100)
ANION GAP SERPL CALCULATED.3IONS-SCNC: 12 MMOL/L
AST SERPL-CCNC: 16 U/L (ref 5–40)
BASOPHILS # BLD AUTO: 0.02 10*3/MM3 (ref 0–0.2)
BASOPHILS NFR BLD AUTO: 0.3 % (ref 0–2)
BILIRUB SERPL-MCNC: 0.5 MG/DL (ref 0.2–1.2)
BUN BLD-MCNC: 32 MG/DL (ref 6–20)
BUN/CREAT SERPL: 11 (ref 7–25)
CALCIUM SPEC-SCNC: 8.8 MG/DL (ref 8.6–10.5)
CHLORIDE SERPL-SCNC: 101 MMOL/L (ref 98–107)
CHOLEST SERPL-MCNC: 119 MG/DL (ref 0–200)
CO2 SERPL-SCNC: 25 MMOL/L (ref 22–29)
CREAT BLD-MCNC: 2.92 MG/DL (ref 0.76–1.27)
DEPRECATED RDW RBC AUTO: 41.8 FL (ref 37–54)
EOSINOPHIL # BLD AUTO: 0.14 10*3/MM3 (ref 0.1–0.3)
EOSINOPHIL NFR BLD AUTO: 1.8 % (ref 0–4)
ERYTHROCYTE [DISTWIDTH] IN BLOOD BY AUTOMATED COUNT: 13.2 % (ref 11.5–14.5)
GFR SERPL CREATININE-BSD FRML MDRD: 23 ML/MIN/1.73
GLOBULIN UR ELPH-MCNC: 2.7 GM/DL
GLUCOSE BLD-MCNC: 151 MG/DL (ref 65–99)
GLUCOSE BLDC GLUCOMTR-MCNC: 152 MG/DL (ref 70–130)
GLUCOSE BLDC GLUCOMTR-MCNC: 168 MG/DL (ref 70–130)
GLUCOSE BLDC GLUCOMTR-MCNC: 171 MG/DL (ref 70–130)
GLUCOSE BLDC GLUCOMTR-MCNC: 175 MG/DL (ref 70–130)
HBA1C MFR BLD: 7.9 % (ref 4.8–5.6)
HCT VFR BLD AUTO: 46.2 % (ref 42–52)
HDLC SERPL-MCNC: 27 MG/DL (ref 40–60)
HGB BLD-MCNC: 15.4 G/DL (ref 14–18)
IMM GRANULOCYTES # BLD: 0.01 10*3/MM3 (ref 0–0.03)
IMM GRANULOCYTES NFR BLD: 0.1 % (ref 0–0.5)
LDLC SERPL CALC-MCNC: 26 MG/DL (ref 0–100)
LDLC/HDLC SERPL: 0.97 {RATIO}
LIPASE SERPL-CCNC: 90 U/L (ref 13–60)
LYMPHOCYTES # BLD AUTO: 1.76 10*3/MM3 (ref 0.6–4.8)
LYMPHOCYTES NFR BLD AUTO: 22.4 % (ref 20–45)
MCH RBC QN AUTO: 29.1 PG (ref 27–31)
MCHC RBC AUTO-ENTMCNC: 33.3 G/DL (ref 31–37)
MCV RBC AUTO: 87.2 FL (ref 80–94)
MONOCYTES # BLD AUTO: 0.86 10*3/MM3 (ref 0–1)
MONOCYTES NFR BLD AUTO: 11 % (ref 3–8)
NEUTROPHILS # BLD AUTO: 5.06 10*3/MM3 (ref 1.5–8.3)
NEUTROPHILS NFR BLD AUTO: 64.4 % (ref 45–70)
NRBC BLD MANUAL-RTO: 0 /100 WBC (ref 0–0)
PLATELET # BLD AUTO: 175 10*3/MM3 (ref 140–500)
PMV BLD AUTO: 11.3 FL (ref 7.4–10.4)
POTASSIUM BLD-SCNC: 4.1 MMOL/L (ref 3.5–5.2)
PROT SERPL-MCNC: 6.1 G/DL (ref 6–8.5)
RBC # BLD AUTO: 5.3 10*6/MM3 (ref 4.7–6.1)
SODIUM BLD-SCNC: 138 MMOL/L (ref 136–145)
TRIGL SERPL-MCNC: 329 MG/DL (ref 0–150)
TROPONIN T SERPL-MCNC: 0.01 NG/ML (ref 0–0.03)
TROPONIN T SERPL-MCNC: 0.02 NG/ML (ref 0–0.03)
TROPONIN T SERPL-MCNC: <0.01 NG/ML (ref 0–0.03)
TSH SERPL DL<=0.05 MIU/L-ACNC: 1.66 MIU/ML (ref 0.27–4.2)
VLDLC SERPL-MCNC: 65.8 MG/DL (ref 8–32)
WBC NRBC COR # BLD: 7.85 10*3/MM3 (ref 4.8–10.8)

## 2017-03-20 PROCEDURE — 94799 UNLISTED PULMONARY SVC/PX: CPT

## 2017-03-20 PROCEDURE — 99203 OFFICE O/P NEW LOW 30 MIN: CPT

## 2017-03-20 PROCEDURE — 80061 LIPID PANEL: CPT | Performed by: INTERNAL MEDICINE

## 2017-03-20 PROCEDURE — 99214 OFFICE O/P EST MOD 30 MIN: CPT

## 2017-03-20 PROCEDURE — 82962 GLUCOSE BLOOD TEST: CPT

## 2017-03-20 PROCEDURE — 82150 ASSAY OF AMYLASE: CPT | Performed by: INTERNAL MEDICINE

## 2017-03-20 PROCEDURE — 84443 ASSAY THYROID STIM HORMONE: CPT | Performed by: NURSE PRACTITIONER

## 2017-03-20 PROCEDURE — 25010000002 HYDROMORPHONE PER 4 MG: Performed by: INTERNAL MEDICINE

## 2017-03-20 PROCEDURE — 83690 ASSAY OF LIPASE: CPT | Performed by: INTERNAL MEDICINE

## 2017-03-20 PROCEDURE — 80053 COMPREHEN METABOLIC PANEL: CPT | Performed by: INTERNAL MEDICINE

## 2017-03-20 PROCEDURE — 84484 ASSAY OF TROPONIN QUANT: CPT | Performed by: INTERNAL MEDICINE

## 2017-03-20 PROCEDURE — 94640 AIRWAY INHALATION TREATMENT: CPT

## 2017-03-20 PROCEDURE — 93005 ELECTROCARDIOGRAM TRACING: CPT | Performed by: NURSE PRACTITIONER

## 2017-03-20 PROCEDURE — 93010 ELECTROCARDIOGRAM REPORT: CPT | Performed by: INTERNAL MEDICINE

## 2017-03-20 PROCEDURE — 25010000002 ONDANSETRON PER 1 MG: Performed by: INTERNAL MEDICINE

## 2017-03-20 PROCEDURE — 83036 HEMOGLOBIN GLYCOSYLATED A1C: CPT | Performed by: INTERNAL MEDICINE

## 2017-03-20 PROCEDURE — 99222 1ST HOSP IP/OBS MODERATE 55: CPT

## 2017-03-20 PROCEDURE — 86301 IMMUNOASSAY TUMOR CA 19-9: CPT | Performed by: INTERNAL MEDICINE

## 2017-03-20 PROCEDURE — 99221 1ST HOSP IP/OBS SF/LOW 40: CPT | Performed by: NURSE PRACTITIONER

## 2017-03-20 PROCEDURE — 82787 IGG 1 2 3 OR 4 EACH: CPT | Performed by: INTERNAL MEDICINE

## 2017-03-20 PROCEDURE — 85025 COMPLETE CBC W/AUTO DIFF WBC: CPT | Performed by: INTERNAL MEDICINE

## 2017-03-20 RX ORDER — NICOTINE 21 MG/24HR
1 PATCH, TRANSDERMAL 24 HOURS TRANSDERMAL EVERY 24 HOURS
Status: DISCONTINUED | OUTPATIENT
Start: 2017-03-20 | End: 2017-03-26 | Stop reason: HOSPADM

## 2017-03-20 RX ORDER — ROSUVASTATIN CALCIUM 40 MG/1
40 TABLET, COATED ORAL DAILY
COMMUNITY

## 2017-03-20 RX ORDER — ERGOCALCIFEROL (VITAMIN D2) 10 MCG
400 TABLET ORAL WEEKLY
COMMUNITY
End: 2018-08-21 | Stop reason: ALTCHOICE

## 2017-03-20 RX ORDER — IPRATROPIUM BROMIDE AND ALBUTEROL SULFATE 2.5; .5 MG/3ML; MG/3ML
3 SOLUTION RESPIRATORY (INHALATION) EVERY 4 HOURS PRN
Status: DISCONTINUED | OUTPATIENT
Start: 2017-03-20 | End: 2017-03-26 | Stop reason: HOSPADM

## 2017-03-20 RX ORDER — SODIUM CHLORIDE 9 MG/ML
75 INJECTION, SOLUTION INTRAVENOUS CONTINUOUS
Status: DISCONTINUED | OUTPATIENT
Start: 2017-03-20 | End: 2017-03-25

## 2017-03-20 RX ORDER — NICOTINE POLACRILEX 4 MG
15 LOZENGE BUCCAL
Status: DISCONTINUED | OUTPATIENT
Start: 2017-03-20 | End: 2017-03-26 | Stop reason: HOSPADM

## 2017-03-20 RX ORDER — DEXTROSE AND SODIUM CHLORIDE 5; .45 G/100ML; G/100ML
INJECTION, SOLUTION INTRAVENOUS
Status: COMPLETED
Start: 2017-03-20 | End: 2017-03-20

## 2017-03-20 RX ORDER — HYDRALAZINE HYDROCHLORIDE 20 MG/ML
20 INJECTION INTRAMUSCULAR; INTRAVENOUS EVERY 6 HOURS PRN
Status: DISCONTINUED | OUTPATIENT
Start: 2017-03-20 | End: 2017-03-22

## 2017-03-20 RX ORDER — DEXTROSE MONOHYDRATE 25 G/50ML
25 INJECTION, SOLUTION INTRAVENOUS
Status: DISCONTINUED | OUTPATIENT
Start: 2017-03-20 | End: 2017-03-26 | Stop reason: HOSPADM

## 2017-03-20 RX ORDER — FENOFIBRATE 145 MG/1
145 TABLET, COATED ORAL DAILY
COMMUNITY
End: 2017-03-26 | Stop reason: HOSPADM

## 2017-03-20 RX ORDER — ENALAPRILAT 2.5 MG/2ML
1.25 INJECTION INTRAVENOUS EVERY 6 HOURS
Status: DISCONTINUED | OUTPATIENT
Start: 2017-03-20 | End: 2017-03-20

## 2017-03-20 RX ORDER — BISOPROLOL FUMARATE 10 MG/1
10 TABLET, FILM COATED ORAL DAILY
COMMUNITY

## 2017-03-20 RX ADMIN — ONDANSETRON 4 MG: 2 INJECTION, SOLUTION INTRAMUSCULAR; INTRAVENOUS at 08:32

## 2017-03-20 RX ADMIN — DEXTROSE AND SODIUM CHLORIDE 100 ML/HR: 5; .45 INJECTION, SOLUTION INTRAVENOUS at 00:17

## 2017-03-20 RX ADMIN — IPRATROPIUM BROMIDE AND ALBUTEROL SULFATE 3 ML: .5; 3 SOLUTION RESPIRATORY (INHALATION) at 20:20

## 2017-03-20 RX ADMIN — PANTOPRAZOLE SODIUM 40 MG: 40 INJECTION, POWDER, FOR SOLUTION INTRAVENOUS at 06:21

## 2017-03-20 RX ADMIN — BUDESONIDE AND FORMOTEROL FUMARATE DIHYDRATE 2 PUFF: 80; 4.5 AEROSOL RESPIRATORY (INHALATION) at 20:18

## 2017-03-20 RX ADMIN — HYDROMORPHONE HYDROCHLORIDE 1 MG: 1 INJECTION, SOLUTION INTRAMUSCULAR; INTRAVENOUS; SUBCUTANEOUS at 13:29

## 2017-03-20 RX ADMIN — BUDESONIDE AND FORMOTEROL FUMARATE DIHYDRATE 2 PUFF: 80; 4.5 AEROSOL RESPIRATORY (INHALATION) at 08:13

## 2017-03-20 RX ADMIN — DEXTROSE AND SODIUM CHLORIDE 100 ML/HR: 5; 450 INJECTION, SOLUTION INTRAVENOUS at 00:17

## 2017-03-20 RX ADMIN — HYDROMORPHONE HYDROCHLORIDE 1 MG: 1 INJECTION, SOLUTION INTRAMUSCULAR; INTRAVENOUS; SUBCUTANEOUS at 17:33

## 2017-03-20 RX ADMIN — NICOTINE 1 PATCH: 14 PATCH, EXTENDED RELEASE TRANSDERMAL at 11:43

## 2017-03-20 RX ADMIN — HYDROMORPHONE HYDROCHLORIDE 1 MG: 1 INJECTION, SOLUTION INTRAMUSCULAR; INTRAVENOUS; SUBCUTANEOUS at 21:50

## 2017-03-20 RX ADMIN — HYDROMORPHONE HYDROCHLORIDE 1 MG: 1 INJECTION, SOLUTION INTRAMUSCULAR; INTRAVENOUS; SUBCUTANEOUS at 00:18

## 2017-03-20 RX ADMIN — IPRATROPIUM BROMIDE AND ALBUTEROL SULFATE 3 ML: .5; 3 SOLUTION RESPIRATORY (INHALATION) at 11:52

## 2017-03-20 RX ADMIN — SODIUM CHLORIDE 100 ML/HR: 9 INJECTION, SOLUTION INTRAVENOUS at 20:20

## 2017-03-20 RX ADMIN — METOPROLOL TARTRATE 2.5 MG: 5 INJECTION INTRAVENOUS at 11:42

## 2017-03-20 RX ADMIN — HYDROMORPHONE HYDROCHLORIDE 1 MG: 1 INJECTION, SOLUTION INTRAMUSCULAR; INTRAVENOUS; SUBCUTANEOUS at 08:36

## 2017-03-20 RX ADMIN — METOPROLOL TARTRATE 2.5 MG: 5 INJECTION INTRAVENOUS at 17:31

## 2017-03-20 RX ADMIN — IPRATROPIUM BROMIDE AND ALBUTEROL SULFATE 3 ML: .5; 3 SOLUTION RESPIRATORY (INHALATION) at 08:05

## 2017-03-20 RX ADMIN — ONDANSETRON 4 MG: 2 INJECTION, SOLUTION INTRAMUSCULAR; INTRAVENOUS at 17:27

## 2017-03-20 RX ADMIN — IPRATROPIUM BROMIDE AND ALBUTEROL SULFATE 3 ML: .5; 3 SOLUTION RESPIRATORY (INHALATION) at 15:44

## 2017-03-20 RX ADMIN — ONDANSETRON 4 MG: 2 INJECTION, SOLUTION INTRAMUSCULAR; INTRAVENOUS at 00:28

## 2017-03-20 RX ADMIN — ENALAPRILAT 1.25 MG: 2.5 INJECTION INTRAVENOUS at 06:30

## 2017-03-20 RX ADMIN — HYDROMORPHONE HYDROCHLORIDE 1 MG: 1 INJECTION, SOLUTION INTRAMUSCULAR; INTRAVENOUS; SUBCUTANEOUS at 04:26

## 2017-03-20 NOTE — CONSULTS
Patient Care Team:  ROBSON Dimas as PCP - General  ROBSON Dimas as PCP - Family Medicine    CHIEF COMPLAINT: pancreatitis    HISTORY OF PRESENT ILLNESS:    47yo WM  w CKD and history of AMI/Pacer/AICD at time of Mary in 2015. Has been followed since last episode of Pancreatitis 8/2016 and his CT ois improved however acute cahanges and symptoms of pain and Nausea vomiting argue for AP, however his labs aree normal when viewed from his CKD and mild elevation of his Lipase. Will check  as well as IgG 4 and will pursue OP EUS since MRI is precluded by his pacer.      Past Medical History   Diagnosis Date   • Anxiety    • Asthma    • Chest pain    • COPD (chronic obstructive pulmonary disease)    • Depression    • Diabetes mellitus      TYPE II   • Fatigue    • Gout    • HOCM (hypertrophic obstructive cardiomyopathy)    • Hypertension    • Hypertriglyceridemia    • Pancreatitis    • Syncope      Past Surgical History   Procedure Laterality Date   • Cardiac catheterization     • Insert / replace / remove pacemaker       [facility]   • Cholecystectomy     • Laparoscopic appendectomy     • Cardiac defibrillator placement     • Other surgical history       NO REMOVAL OF APPENDIX  PATIENT HAS A APPENDIX   • Umbilical hernia repair     • Endoscopy N/A 8/30/2016     Procedure: ESOPHAGOGASTRODUODENOSCOPY;  Surgeon: Irineo Mercedes MD;  Location: [facility];  Service:      Family History   Problem Relation Age of Onset   • Heart block Mother    • Kidney disease Mother    • Diabetes Father    • Diabetes Maternal Grandmother    • Diabetes Maternal Grandfather    • COPD Maternal Grandfather    • Asthma Maternal Grandfather      Social History   Substance Use Topics   • Smoking status: Current Some Day Smoker     Packs/day: 0.25     Years: 35.00     Types: Cigarettes   • Smokeless tobacco: Never Used      Comment: 1/2 pack per week    • Alcohol use No     Prescriptions Prior to Admission   Medication Sig  Dispense Refill Last Dose   • albuterol (PROVENTIL HFA;VENTOLIN HFA) 108 (90 BASE) MCG/ACT inhaler Inhale 2 puffs every 4 (four) hours as needed for wheezing.      • allopurinol (ZYLOPRIM) 300 MG tablet 300 mg daily.  12    • amLODIPine (NORVASC) 10 MG tablet Take  by mouth daily.   8/23/2016 at Unknown time   • aspirin 81 MG tablet Take  by mouth daily.   Past Week at Unknown time   • atorvastatin (LIPITOR) 20 MG tablet Take 60 mg by mouth daily.   Past Week at Unknown time   • bisoprolol (ZEBeta) 5 MG tablet Take 5 mg by mouth Daily.      • budesonide-formoterol (SYMBICORT) 160-4.5 MCG/ACT inhaler Inhale 2 puffs 2 (two) times a day.   Past Week at Unknown time   • calcitriol (ROCALTROL) 0.25 MCG capsule Take 0.25 mcg by mouth daily.   Past Week at Unknown time   • DULoxetine (CYMBALTA) 30 MG capsule Take  by mouth daily.   Past Week at Unknown time   • Ergocalciferol (VITAMIN D2) 400 UNITS tablet Take 400 Units by mouth 1 (One) Time Per Week.      • fenofibrate (TRICOR) 145 MG tablet Take 145 mg by mouth Daily.      • FLUTICASONE FUROATE IN Inhale 2 puffs daily.   Past Week at Unknown time   • furosemide (LASIX) 20 MG tablet Take 20 mg by mouth 2 (two) times a day.   Past Week at Unknown time   • gabapentin (NEURONTIN) 800 MG tablet Take 800 mg by mouth 3 (three) times a day.   Past Week at Unknown time   • glimepiride (AMARYL) 2 MG tablet Take 2 mg by mouth 2 (Two) Times a Day Before Meals.   8/23/2016 at Unknown time   • hydrALAZINE (APRESOLINE) 50 MG tablet Take 50 mg by mouth 4 (four) times a day.      • lisinopril (PRINIVIL,ZESTRIL) 20 MG tablet Take 1 tablet by mouth daily. 30 tablet 1    • methocarbamol (ROBAXIN) 750 MG tablet Take 750 mg by mouth 3 (Three) Times a Day.      • metoprolol tartrate (LOPRESSOR) 25 MG tablet Take 1 tablet by mouth every 12 (twelve) hours. 60 tablet 1    • Omega-3 Fatty Acids (FISH OIL) 1000 MG capsule capsule Take 1,000 mg by mouth 2 (Two) Times a Day With Meals.      •  "omeprazole (PriLOSEC) 40 MG capsule Take 1 capsule by mouth daily. 30 capsule 1    • promethazine (PHENERGAN) 25 MG tablet Take 25 mg by mouth every 6 (six) hours as needed for nausea or vomiting.   8/24/2016 at Unknown time   • tiotropium (SPIRIVA) 18 MCG per inhalation capsule Place 1 capsule into inhaler and inhale 1 (one) time daily.   Past Week at Unknown time   • albuterol (PROVENTIL) (2.5 MG/3ML) 0.083% nebulizer solution Take 2.5 mg by nebulization every 4 (four) hours as needed for wheezing.      • spironolactone (ALDACTONE) 50 MG tablet Take 1 tablet by mouth daily. 30 tablet 1    • Vitamin E 400 UNITS tablet Take 400 mg by mouth daily.   Past Week at Unknown time     Allergies:  Review of patient's allergies indicates no known allergies.    REVIEW OF SYSTEMS:  Please see the above history of present illness for pertinent positives and negatives.  The remainder of the patient's systems have been reviewed and are negative.     Vital Signs  Temp:  [97.1 °F (36.2 °C)-98.7 °F (37.1 °C)] 97.1 °F (36.2 °C)  Heart Rate:  [61-93] 67  Resp:  [18-20] 20  BP: (119-167)/() 119/75    Flowsheet Rows         First Filed Value    Admission Height  72\" (182.9 cm) Documented at 03/19/2017 1751    Admission Weight  218 lb (98.9 kg) Documented at 03/19/2017 1751           Physical Exam:  Physical Exam   Constitutional: Patient appears well-developed and well-nourished and in no acute distress   HEENT:   Head: Normocephalic and atraumatic.   Eyes:  Pupils are equal, round, and reactive to light. EOM are intact. Sclera are anicteric and non-injected.  Mouth and Throat: Patient has moist mucous membranes. Oropharynx is clear of any erythema or exudate.     Neck: Neck supple. No JVD present. No thyromegaly present. No lymphadenopathy present.  Cardiovascular: Regular rate, regular rhythm, S1 normal and S2 normal.  Exam reveals no gallop and no friction rub.  No murmur heard.  Pulmonary/Chest: Lungs are clear to auscultation " bilaterally. No respiratory distress. No wheezes. No rhonchi. No rales.   Abdominal: Soft. Bowel sounds are normal. No distension and no mass. There is no hepatosplenomegaly. There is no tenderness.   Musculoskeletal: Normal Muscle tone  Extremities: No edema. Pulses are palpable in all 4 extremities.  Neurological: Patient is alert and oriented to person, place, and time. Cranial nerves II-XII are grossly intact with no focal deficits.  Skin: Skin is warm. No rash noted. Nails show no clubbing.  No cyanosis or erythema.     Results Review:    I reviewed the patient's new clinical results.  Lab Results (most recent)     Procedure Component Value Units Date/Time    CBC & Differential [[id number]] Collected:  03/19/17 1815    Specimen:  Blood Updated:  03/19/17 1824    Narrative:       The following orders were created for panel order CBC & Differential.  Procedure                               Abnormality         Status                     ---------                               -----------         ------                     CBC Auto Differential[[id number]]         Abnormal            Final result                 Please view results for these tests on the individual orders.    CBC Auto Differential [[id number]]  (Abnormal) Collected:  03/19/17 1815    Specimen:  Blood Updated:  03/19/17 1824     WBC 8.62 10*3/mm3      RBC 6.06 10*6/mm3      Hemoglobin 17.8 g/dL      Hematocrit 51.1 %      MCV 84.3 fL      MCH 29.4 pg      MCHC 34.8 g/dL      RDW 13.0 %      RDW-SD 39.5 fl      MPV 11.1 (H) fL      Platelets 206 10*3/mm3      Neutrophil % 82.3 (H) %      Lymphocyte % 11.1 (L) %      Monocyte % 5.8 %      Eosinophil % 0.3 %      Basophil % 0.3 %      Immature Grans % 0.2 %      Neutrophils, Absolute 7.08 10*3/mm3      Lymphocytes, Absolute 0.96 10*3/mm3      Monocytes, Absolute 0.50 10*3/mm3      Eosinophils, Absolute 0.03 (L) 10*3/mm3      Basophils, Absolute 0.03 10*3/mm3      Immature Grans, Absolute 0.02 10*3/mm3       nRBC 0.0 /100 WBC     Comprehensive Metabolic Panel [[id number]]  (Abnormal) Collected:  03/19/17 1815    Specimen:  Blood Updated:  03/19/17 1843     Glucose 211 (H) mg/dL      BUN 31 (H) mg/dL      Creatinine 2.95 (H) mg/dL      Sodium 133 (L) mmol/L      Potassium 4.3 mmol/L      Chloride 95 (L) mmol/L      CO2 21.3 (L) mmol/L      Calcium 9.7 mg/dL      Total Protein 7.1 g/dL      Albumin 4.10 g/dL      ALT (SGPT) 24 U/L      AST (SGOT) 16 U/L      Alkaline Phosphatase 71 U/L      Total Bilirubin 0.8 mg/dL      eGFR Non African Amer 23 (L) mL/min/1.73      Globulin 3.0 gm/dL      A/G Ratio 1.4 g/dL      BUN/Creatinine Ratio 10.5      Anion Gap 16.7 mmol/L     Amylase [[id number]]  (Normal) Collected:  03/19/17 1815    Specimen:  Blood Updated:  03/19/17 1843     Amylase 52 U/L     Lipase [[id number]]  (Abnormal) Collected:  03/19/17 1815    Specimen:  Blood Updated:  03/19/17 1843     Lipase 69 (H) U/L     Lactic Acid, Plasma [[id number]]  (Abnormal) Collected:  03/19/17 1815    Specimen:  Blood Updated:  03/19/17 1844     Lactate 2.4 (C) mmol/L     Urinalysis With / Culture If Indicated [[id number]]  (Abnormal) Collected:  03/19/17 2028    Specimen:  Urine from Urine, Clean Catch Updated:  03/19/17 2037     Color, UA Dark Yellow (A)      Appearance, UA Clear      pH, UA 6.0      Specific Gravity, UA 1.025      Glucose,  mg/dL (1+) (A)      Ketones, UA Negative      Bilirubin, UA Small (1+) (A)      Blood, UA Trace (A)      Protein, UA >=300 mg/dL (3+) (A)      Leuk Esterase, UA Negative      Nitrite, UA Negative      Urobilinogen, UA 0.2 E.U./dL     Urinalysis, Microscopic Only [[id number]]  (Abnormal) Collected:  03/19/17 2028    Specimen:  Urine from Urine, Clean Catch Updated:  03/19/17 2049     RBC, UA 3-5 (A) /HPF      WBC, UA 3-5 (A) /HPF      Bacteria, UA 1+ (A) /HPF      Squamous Epithelial Cells, UA 0-2 /HPF      Hyaline Casts, UA None Seen /LPF      WBC Casts 0-2 /LPF      Methodology Manual Light  Microscopy     Lactate Acid, Reflex [[id number]]  (Abnormal) Collected:  03/19/17 2232    Specimen:  Blood Updated:  03/19/17 2257     Lactate 2.2 (C) mmol/L     POC Glucose Fingerstick [[id number]]  (Abnormal) Collected:  03/19/17 2350    Specimen:  Blood Updated:  03/20/17 0000     Glucose 168 (H) mg/dL     Narrative:       Meter: [id number] : [id number] Book Kady    Troponin [[id number]]  (Normal) Collected:  03/20/17 0006    Specimen:  Blood Updated:  03/20/17 0053     Troponin T 0.017 ng/mL     Narrative:       Troponin T Reference Ranges:  Less than 0.03 ng/mL:    Negative for AMI  0.03 to 0.09 ng/mL:      Indeterminant for AMI  Greater than 0.09 ng/mL: Positive for AMI    Hemoglobin A1c [[id number]]  (Abnormal) Collected:  03/20/17 0615    Specimen:  Blood Updated:  03/20/17 0653     Hemoglobin A1C 7.90 (H) %     Narrative:       Hemoglobin A1C Ranges:    Increased Risk for Diabetes  5.7% to 6.4%  Diabetes                     >= 6.5%  Diabetic Goal                < 7.0%    CBC & Differential [[id number]] Collected:  03/20/17 0615    Specimen:  Blood Updated:  03/20/17 0655    Narrative:       The following orders were created for panel order CBC & Differential.  Procedure                               Abnormality         Status                     ---------                               -----------         ------                     CBC Auto Differential[[id number]]         Abnormal            Final result                 Please view results for these tests on the individual orders.    CBC Auto Differential [[id number]]  (Abnormal) Collected:  03/20/17 0615    Specimen:  Blood Updated:  03/20/17 0655     WBC 7.85 10*3/mm3      RBC 5.30 10*6/mm3      Hemoglobin 15.4 g/dL      Hematocrit 46.2 %      MCV 87.2 fL      MCH 29.1 pg      MCHC 33.3 g/dL      RDW 13.2 %      RDW-SD 41.8 fl      MPV 11.3 (H) fL      Platelets 175 10*3/mm3      Neutrophil % 64.4 %      Lymphocyte % 22.4 %      Monocyte % 11.0 (H) %       Eosinophil % 1.8 %      Basophil % 0.3 %      Immature Grans % 0.1 %      Neutrophils, Absolute 5.06 10*3/mm3      Lymphocytes, Absolute 1.76 10*3/mm3      Monocytes, Absolute 0.86 10*3/mm3      Eosinophils, Absolute 0.14 10*3/mm3      Basophils, Absolute 0.02 10*3/mm3      Immature Grans, Absolute 0.01 10*3/mm3      nRBC 0.0 /100 WBC     Comprehensive Metabolic Panel [[id number]]  (Abnormal) Collected:  03/20/17 0615    Specimen:  Blood Updated:  03/20/17 0706     Glucose 151 (H) mg/dL      BUN 32 (H) mg/dL      Creatinine 2.92 (H) mg/dL      Sodium 138 mmol/L      Potassium 4.1 mmol/L      Chloride 101 mmol/L      CO2 25.0 mmol/L      Calcium 8.8 mg/dL      Total Protein 6.1 g/dL      Albumin 3.40 (L) g/dL      ALT (SGPT) 19 U/L      AST (SGOT) 16 U/L      Alkaline Phosphatase 59 U/L      Total Bilirubin 0.5 mg/dL      eGFR Non African Amer 23 (L) mL/min/1.73      Globulin 2.7 gm/dL      A/G Ratio 1.3 g/dL      BUN/Creatinine Ratio 11.0      Anion Gap 12.0 mmol/L     Amylase [[id number]]  (Normal) Collected:  03/20/17 0615    Specimen:  Blood Updated:  03/20/17 0706     Amylase 54 U/L     Lipid Panel [[id number]]  (Abnormal) Collected:  03/20/17 0615    Specimen:  Blood Updated:  03/20/17 0706     Total Cholesterol 119 mg/dL      Triglycerides 329 (H) mg/dL      HDL Cholesterol 27 (L) mg/dL      LDL Cholesterol  26 mg/dL      VLDL Cholesterol 65.8 (H) mg/dL      LDL/HDL Ratio 0.97     Troponin [[id number]]  (Normal) Collected:  03/20/17 0615    Specimen:  Blood Updated:  03/20/17 0709     Troponin T <0.010 ng/mL     Narrative:       Troponin T Reference Ranges:  Less than 0.03 ng/mL:    Negative for AMI  0.03 to 0.09 ng/mL:      Indeterminant for AMI  Greater than 0.09 ng/mL: Positive for AMI    POC Glucose Fingerstick [[id number]]  (Abnormal) Collected:  03/20/17 0710    Specimen:  Blood Updated:  03/20/17 0717     Glucose 152 (H) mg/dL     Narrative:       Meter: [id number] : [id number] Reagan Costello  [[id number]]  (Abnormal) Collected:  03/20/17 0615    Specimen:  Blood Updated:  03/20/17 0726     Lipase 90 (H) U/L     POC Glucose Fingerstick [[id number]]  (Abnormal) Collected:  03/20/17 1112    Specimen:  Blood Updated:  03/20/17 1118     Glucose 171 (H) mg/dL     Narrative:       Meter: [id number] : [id number] Kirk Kristie    Urine Culture [[id number]]  (Normal) Collected:  03/19/17 2028    Specimen:  Urine from Urine, Clean Catch Updated:  03/20/17 1124     Urine Culture Culture in progress     TSH [[id number]] Collected:  03/20/17 0615    Specimen:  Blood Updated:  03/20/17 1125    Troponin [[id number]]  (Normal) Collected:  03/20/17 1201    Specimen:  Blood Updated:  03/20/17 1234     Troponin T 0.015 ng/mL     Narrative:       Troponin T Reference Ranges:  Less than 0.03 ng/mL:    Negative for AMI  0.03 to 0.09 ng/mL:      Indeterminant for AMI  Greater than 0.09 ng/mL: Positive for AMI    Cancer Antigen 19-9 [[id number]] Collected:  03/20/17 0615    Specimen:  Blood Updated:  03/20/17 1238          Imaging Results (most recent)     Procedure Component Value Units Date/Time    CT Abdomen Pelvis Without Contrast [[id number]] Collected:  03/20/17 0919     Updated:  03/20/17 0957    Narrative:       CT ABDOMEN AND PELVIS WITHOUT CONTRAST 03/19/2017 AT 2219 HOURS     HISTORY: Upper abdominal pain for 4 days with nausea and vomiting. Renal  failure (GFR of 23). Previous cholecystectomy, umbilical hernia repair.  Previous pancreatitis.     COMPARISON: CT abdomen and pelvis 02/22/2017.     PROCEDURE: 5 mm noncontrast axial images through the abdomen and pelvis.  Enteric contrast only was administered. Sagittal and coronal reformatted  images were obtained.     Radiation dose reduction techniques were utilized including automated  exposure control and exposure modulation based on body size.     ABDOMEN FINDINGS: There is abnormal thickening and inflammatory type  stranding surrounding the pancreatic head and involving the  adjacent  duodenum. Findings are thought to represent changes of acute  pancreatitis with secondary reactive duodenitis. There are multiple  shotty lymph nodes surrounding the pancreatic head and neck which are  favored to be benign and reactive, one of the dominant cysts lying  anterior to the uncinate process measuring nearly 11.6 x 19.6 mm  compared to 17.1 x 1.4 mm on 02/22/2017. No drainable fluid collection  or pseudocyst is identified. No abnormal pancreatic ductal dilation is  seen on this noncontrast study.     Liver is markedly and diffusely steatotic. Cholecystectomy changes are  present but no biliary dilation is identified. The spleen, adrenals are  normal. The left kidney is atrophic. There is multifocal right renal  cortical scarring. No shadowing renal stone or hydronephrosis is  evident.     Diverticular changes are scattered throughout the colon, without  convincing CT evidence of acute diverticulitis. The appendix is normal.     Lung bases are free of consolidation. Pacemaker lead is noted. Benign  calcified granuloma in the right lower lobe.     PELVIS FINDINGS: Urinary bladder, prostate and rectum normal. No pelvic  adenopathy or free fluid is seen.     No acute osseous abnormalities are identified.       Impression:       1. Findings consistent with acute pancreatitis of the pancreatic head  with secondary reactive duodenitis in the adjacent second duodenal  segment.  2. No drainable fluid collection or pseudocyst is identified.  3. Diffuse hepatic steatosis.  4. Cholecystectomy.  5. Left renal atrophy with multifocal right renal cortical scarring,  unchanged.  6. Normal appendix.  7. Preliminary report was provided by Dr. Montana on 03/19/2017 at 2250  hours.      This report was finalized on 3/20/2017 9:55 AM by Dr. Estefani Colby MD.           reviewed    ECG/EMG Results (most recent)     Procedure Component Value Units Date/Time    ECG 12 Lead [[id number]] Collected:  03/20/17 3711      Updated:  03/20/17 1108    Narrative:       RR Interval= 870 ms  TN Interval= 164 ms  QRSD Interval= 100 ms  QT Interval= 432 ms  QTc Interval= 463 ms  Heart Rate= 69 ms  P Axis= 43 deg  QRS Axis= -27 deg  T Wave Axis= 131 deg  I: 40 Axis= -13 deg  T: 40 Axis= -34 deg  ST Axis= 157 deg  SINUS RHYTHM  LVH WITH SECONDARY REPOLARIZATION ABNORMALITY  Electronically Signed by:  Date and Time of Study: 2017-03-20 11:02:47        reviewed    Assessment/Plan     Acute Pancreatitis  CKD  Gastritis/Esophagitis(EGD 2/2016)    Would hold TRICOR and will check  ( as well as IgG 4 ) Also will make plans for EUS as an OP.    I discussed the patients findings and my recommendations with patient.     Irineo Mercedes MD  03/20/17  12:49 PM    Time: 10 min prior to procedure.

## 2017-03-20 NOTE — CONSULTS
"Adult Nutrition  Assessment/PES    Patient Name:  Sergio Phan  YOB: 1970  MRN: [id number]  Admit Date:  3/19/2017    Assessment Date:  3/20/2017        Reason for Assessment       03/20/17 1445    Reason for Assessment    Reason For Assessment/Visit nurse/nurse practitioner consult    Identified At Risk By Screening Criteria MST SCORE 2+    Cardiac HTN    Gastrointestinal Pancreatitis, acute    Renal CKD              Nutrition/Diet History       03/20/17 1445    Nutrition/Diet History    Typical Food/Fluid Intake Pt & female at bedside states watching phos at home due to Kidney, not really worried about \"fat\" but does cook lower fat, doesn't count CHO but avoids concentrated sweets. Female states keeps below 190 gm/day.             Anthropometrics       03/20/17 1450    Anthropometrics    RD Documented Current Weight  96.2 kg (212 lb 1.3 oz)    Anthropometrics (Special Considerations)    RD Calculated BMI (kg/m2) 28.7    Usual Body Weight (UBW)    % Weight Loss  2 %    Weight Loss Time Frame since August 2016    Body Mass Index (BMI)    BMI Grade 25 - 29.9 - overweight            Labs/Tests/Procedures/Meds       03/20/17 1450    Labs/Tests/Procedures/Meds    Labs/Tests Review Reviewed;Pancreatic enzymes;Glucose;Creat;BUN;Hgb A1C;GFR    Medication Review Reviewed, pertinent;Anticoag;Insulin;Antiemetic              Estimated/Assessed Needs       03/20/17 1451    Calculation Measurements    Weight Used For Calculations 96.2 kg (212 lb 1.3 oz)    Height Used for Calculations 1.829 m (6' 0.01\")    Estimated/Assessed Energy Needs    Energy Need Method [facility]    Age 46    RMR ([facility] Equation) 1875.13    Total estimated needs ([facility]) 2250 kcal     Estimated Kcal Range  2250 kcal    250 gm CHO, 45% kcal     Estimated/Assessed Protein Needs    Weight Used for Protein Calculation 96.2 kg (212 lb 1.3 oz)    Protein (gm/kg) 1.0    1.0 Gm Protein (gm) 96.2    Estimated Protein " Range 96 gm     Estimated/Assessed Fluid Needs    Fluid Need Method RDA method    RDA Method (mL)  2250            Nutrition Prescription Ordered       03/20/17 1502    Nutrition Prescription PO    Current PO Diet NPO;Sips/ice chips            Evaluation of Received Nutrient/Fluid Intake       03/20/17 1503    Evaluation of Received Nutrient/Fluid Intake    Nutrition Delivered Fluid Evaluation    Fluid Intake Evaluation    IV Fluid (mL) 1000    Total Fluid Intake (mL) 1000    % Fluid Needs 44%    PO Evaluation    % PO Intake NPO              Problem/Interventions:        Problem 1       03/20/17 1505    Nutrition Diagnoses Problem 1    Problem 1 Altered GI Function    Etiology (related to) Medical Diagnosis    Gastrointestinal Gastritis;Pancreatitis, acute    Signs/Symptoms (evidenced by) NPO                    Intervention Goal       03/20/17 1507    Intervention Goal    General Meet nutritional needs for age/condition    PO Establish PO;PO intake (%)    PO Intake % 50 %            Nutrition Intervention       03/20/17 1507    Nutrition Intervention    RD/Tech Action Await begin PO;Follow Tx progress            Nutrition Prescription       03/20/17 1507    Nutrition Prescription PO    PO Prescription Begin/change diet    Begin/Change Diet to Clear Liquid    Other Orders    Other Advance diet as tolerated with goal Low Fat, Consistent CHO, low Phos (per home diet)            Education/Evaluation       03/20/17 1508    Education    Education Other (comment)   Follows low phos diet at home due to kidney, briefly reviewed low fat. Will shwetha for [facility].    Monitor/Evaluation    Monitor Per protocol;I&O;PO intake;Pertinent labs;Weight;Symptoms        Comments:  Advance diet as clinically indicated clears to goal Low Fat Consistent CHO low Phos (per home diet)  Will cont to follow and monitor.     Electronically signed by:  Radha Flores RD  03/20/17 3:09 PM

## 2017-03-20 NOTE — H&P
[facility] HOSPITALIST     ROBSON Hicks    CHIEF COMPLAINT: abdominal pain    HISTORY OF PRESENT ILLNESS:  The patient is a 46 YOM who presented through the ER secondary to abdominal pain that began on 3/17/17 at which point the patient began limiting his oral intake because of his known h/o pancreatitis with pseudocyst previously in 8/2016. He attempted to eat a cracker and when he did, his pain became severe on 3/19/17 and he presented to the ER. He reports this episode is not as severe as previously. He has not vomited but has been nauseated.     Since admission with NPO status and hydration, the patient reports his pain is very minimal and he has had no further nausea. He reports slight hunger. He reports compliance with all his medications prescribed.     Denies f/c/cough/soa/v/d/chest pain/recent illness/sick exposures/change in bowel or bladder habits/no weight change/bloody emesis or bloody stools/change in medications or any other new concerns.    Past Medical History   Diagnosis Date   • Anxiety    • Asthma    • Chest pain    • COPD (chronic obstructive pulmonary disease)    • Depression    • Diabetes mellitus      TYPE II   • Fatigue    • Gout    • HOCM (hypertrophic obstructive cardiomyopathy)    • Hypertension    • Hypertriglyceridemia    • Pancreatitis    • Syncope      Past Surgical History   Procedure Laterality Date   • Cardiac catheterization     • Insert / replace / remove pacemaker       [facility]   • Cholecystectomy     • Laparoscopic appendectomy     • Cardiac defibrillator placement     • Other surgical history       NO REMOVAL OF APPENDIX  PATIENT HAS A APPENDIX   • Umbilical hernia repair     • Endoscopy N/A 8/30/2016     Procedure: ESOPHAGOGASTRODUODENOSCOPY;  Surgeon: Irineo Mercedes MD;  Location: [facility];  Service:      Family History   Problem Relation Age of Onset   • Heart block Mother    • Kidney disease Mother    • Diabetes Father    • Diabetes  Maternal Grandmother    • Diabetes Maternal Grandfather    • COPD Maternal Grandfather    • Asthma Maternal Grandfather      Social History   Substance Use Topics   • Smoking status: Current Some Day Smoker     Packs/day: 0.25     Years: 35.00     Types: Cigarettes   • Smokeless tobacco: Never Used      Comment: 1/2 pack per week    • Alcohol use No     Prescriptions Prior to Admission   Medication Sig Dispense Refill Last Dose   • albuterol (PROVENTIL HFA;VENTOLIN HFA) 108 (90 BASE) MCG/ACT inhaler Inhale 2 puffs every 4 (four) hours as needed for wheezing.      • allopurinol (ZYLOPRIM) 300 MG tablet 300 mg daily.  12    • amLODIPine (NORVASC) 10 MG tablet Take  by mouth daily.   8/23/2016 at Unknown time   • aspirin 81 MG tablet Take  by mouth daily.   Past Week at Unknown time   • atorvastatin (LIPITOR) 20 MG tablet Take 60 mg by mouth daily.   Past Week at Unknown time   • bisoprolol (ZEBeta) 5 MG tablet Take 5 mg by mouth Daily.      • budesonide-formoterol (SYMBICORT) 160-4.5 MCG/ACT inhaler Inhale 2 puffs 2 (two) times a day.   Past Week at Unknown time   • calcitriol (ROCALTROL) 0.25 MCG capsule Take 0.25 mcg by mouth daily.   Past Week at Unknown time   • DULoxetine (CYMBALTA) 30 MG capsule Take  by mouth daily.   Past Week at Unknown time   • Ergocalciferol (VITAMIN D2) 400 UNITS tablet Take 400 Units by mouth 1 (One) Time Per Week.      • fenofibrate (TRICOR) 145 MG tablet Take 145 mg by mouth Daily.      • FLUTICASONE FUROATE IN Inhale 2 puffs daily.   Past Week at Unknown time   • furosemide (LASIX) 20 MG tablet Take 20 mg by mouth 2 (two) times a day.   Past Week at Unknown time   • gabapentin (NEURONTIN) 800 MG tablet Take 800 mg by mouth 3 (three) times a day.   Past Week at Unknown time   • glimepiride (AMARYL) 2 MG tablet Take 2 mg by mouth 2 (Two) Times a Day Before Meals.   8/23/2016 at Unknown time   • hydrALAZINE (APRESOLINE) 50 MG tablet Take 50 mg by mouth 4 (four) times a day.      •  "lisinopril (PRINIVIL,ZESTRIL) 20 MG tablet Take 1 tablet by mouth daily. 30 tablet 1    • methocarbamol (ROBAXIN) 750 MG tablet Take 750 mg by mouth 3 (Three) Times a Day.      • metoprolol tartrate (LOPRESSOR) 25 MG tablet Take 1 tablet by mouth every 12 (twelve) hours. 60 tablet 1    • Omega-3 Fatty Acids (FISH OIL) 1000 MG capsule capsule Take 1,000 mg by mouth 2 (Two) Times a Day With Meals.      • omeprazole (PriLOSEC) 40 MG capsule Take 1 capsule by mouth daily. 30 capsule 1    • promethazine (PHENERGAN) 25 MG tablet Take 25 mg by mouth every 6 (six) hours as needed for nausea or vomiting.   8/24/2016 at Unknown time   • tiotropium (SPIRIVA) 18 MCG per inhalation capsule Place 1 capsule into inhaler and inhale 1 (one) time daily.   Past Week at Unknown time   • albuterol (PROVENTIL) (2.5 MG/3ML) 0.083% nebulizer solution Take 2.5 mg by nebulization every 4 (four) hours as needed for wheezing.      • spironolactone (ALDACTONE) 50 MG tablet Take 1 tablet by mouth daily. 30 tablet 1    • Vitamin E 400 UNITS tablet Take 400 mg by mouth daily.   Past Week at Unknown time     Allergies:  Review of patient's allergies indicates no known allergies.    REVIEW OF SYSTEMS:  Please see the above history of present illness for pertinent positives and negatives.  The remainder of the patient's systems have been reviewed and are negative.     Vital Signs  Temp:  [97.1 °F (36.2 °C)-98.7 °F (37.1 °C)] 97.1 °F (36.2 °C)  Heart Rate:  [61-93] 67  Resp:  [18-20] 20  BP: (119-167)/() 119/75  Oxygen Therapy  SpO2: 96 %  Pulse Oximetry Type: Intermittent  O2 Device: room air}  Body mass index is 28.75 kg/(m^2).  Flowsheet Rows         First Filed Value    Admission Height  72\" (182.9 cm) Documented at 03/19/2017 1751    Admission Weight  218 lb (98.9 kg) Documented at 03/19/2017 1751             Physical Exam:  Physical Exam   Constitutional: Patient appears well-developed and well-nourished and in no acute distress   HEENT: "   Head: Normocephalic and atraumatic.   Eyes:  Pupils are equal, round, and reactive to light. EOM are intact. Sclera are anicteric and non-injected.  Mouth and Throat: Patient has moist mucous membranes. Oropharynx is clear of any erythema or exudate.     Neck: Neck supple. No JVD present. No thyromegaly present. No lymphadenopathy present.  Cardiovascular: Regular rate, regular rhythm, S1 normal and S2 normal.  Exam reveals no gallop and no friction rub.  No murmur heard.  Pulmonary/Chest: Lungs are clear to auscultation bilaterally. No respiratory distress. No wheezes. No rhonchi. No rales.   Abdominal: Soft. Bowel sounds are normal. No distension and no mass. There is no hepatosplenomegaly. There is minimal tenderness epigastric and RUQ.   Musculoskeletal: Normal Muscle tone  Extremities: No edema. Pulses are palpable in all 4 extremities.  Neurological: Patient is alert and oriented to person, place, and time. Cranial nerves II-XII are grossly intact with no focal deficits.  Skin: Skin is warm. No rash noted. Nails show no clubbing.  No cyanosis or erythema.     Results Review:    I reviewed the patient's new clinical results.  Lab Results (most recent)     Procedure Component Value Units Date/Time    CBC & Differential [[id number]] Collected:  03/19/17 1815    Specimen:  Blood Updated:  03/19/17 1824    Narrative:       The following orders were created for panel order CBC & Differential.  Procedure                               Abnormality         Status                     ---------                               -----------         ------                     CBC Auto Differential[[id number]]         Abnormal            Final result                 Please view results for these tests on the individual orders.    CBC Auto Differential [[id number]]  (Abnormal) Collected:  03/19/17 1815    Specimen:  Blood Updated:  03/19/17 1824     WBC 8.62 10*3/mm3      RBC 6.06 10*6/mm3      Hemoglobin 17.8 g/dL      Hematocrit  51.1 %      MCV 84.3 fL      MCH 29.4 pg      MCHC 34.8 g/dL      RDW 13.0 %      RDW-SD 39.5 fl      MPV 11.1 (H) fL      Platelets 206 10*3/mm3      Neutrophil % 82.3 (H) %      Lymphocyte % 11.1 (L) %      Monocyte % 5.8 %      Eosinophil % 0.3 %      Basophil % 0.3 %      Immature Grans % 0.2 %      Neutrophils, Absolute 7.08 10*3/mm3      Lymphocytes, Absolute 0.96 10*3/mm3      Monocytes, Absolute 0.50 10*3/mm3      Eosinophils, Absolute 0.03 (L) 10*3/mm3      Basophils, Absolute 0.03 10*3/mm3      Immature Grans, Absolute 0.02 10*3/mm3      nRBC 0.0 /100 WBC     Comprehensive Metabolic Panel [[id number]]  (Abnormal) Collected:  03/19/17 1815    Specimen:  Blood Updated:  03/19/17 1843     Glucose 211 (H) mg/dL      BUN 31 (H) mg/dL      Creatinine 2.95 (H) mg/dL      Sodium 133 (L) mmol/L      Potassium 4.3 mmol/L      Chloride 95 (L) mmol/L      CO2 21.3 (L) mmol/L      Calcium 9.7 mg/dL      Total Protein 7.1 g/dL      Albumin 4.10 g/dL      ALT (SGPT) 24 U/L      AST (SGOT) 16 U/L      Alkaline Phosphatase 71 U/L      Total Bilirubin 0.8 mg/dL      eGFR Non African Amer 23 (L) mL/min/1.73      Globulin 3.0 gm/dL      A/G Ratio 1.4 g/dL      BUN/Creatinine Ratio 10.5      Anion Gap 16.7 mmol/L     Amylase [[id number]]  (Normal) Collected:  03/19/17 1815    Specimen:  Blood Updated:  03/19/17 1843     Amylase 52 U/L     Lipase [[id number]]  (Abnormal) Collected:  03/19/17 1815    Specimen:  Blood Updated:  03/19/17 1843     Lipase 69 (H) U/L     Lactic Acid, Plasma [[id number]]  (Abnormal) Collected:  03/19/17 1815    Specimen:  Blood Updated:  03/19/17 1844     Lactate 2.4 (C) mmol/L     Urinalysis With / Culture If Indicated [[id number]]  (Abnormal) Collected:  03/19/17 2028    Specimen:  Urine from Urine, Clean Catch Updated:  03/19/17 2037     Color, UA Dark Yellow (A)      Appearance, UA Clear      pH, UA 6.0      Specific Gravity, UA 1.025      Glucose,  mg/dL (1+) (A)      Ketones, UA Negative       Bilirubin, UA Small (1+) (A)      Blood, UA Trace (A)      Protein, UA >=300 mg/dL (3+) (A)      Leuk Esterase, UA Negative      Nitrite, UA Negative      Urobilinogen, UA 0.2 E.U./dL     Urine Culture [[id number]] Collected:  03/19/17 2028    Specimen:  Urine from Urine, Clean Catch Updated:  03/19/17 2049    Urinalysis, Microscopic Only [[id number]]  (Abnormal) Collected:  03/19/17 2028    Specimen:  Urine from Urine, Clean Catch Updated:  03/19/17 2049     RBC, UA 3-5 (A) /HPF      WBC, UA 3-5 (A) /HPF      Bacteria, UA 1+ (A) /HPF      Squamous Epithelial Cells, UA 0-2 /HPF      Hyaline Casts, UA None Seen /LPF      WBC Casts 0-2 /LPF      Methodology Manual Light Microscopy     Lactate Acid, Reflex [[id number]]  (Abnormal) Collected:  03/19/17 2232    Specimen:  Blood Updated:  03/19/17 2257     Lactate 2.2 (C) mmol/L     POC Glucose Fingerstick [[id number]]  (Abnormal) Collected:  03/19/17 2350    Specimen:  Blood Updated:  03/20/17 0000     Glucose 168 (H) mg/dL     Narrative:       Meter: [id number] : [id number] Alicia Kady    Troponin [[id number]]  (Normal) Collected:  03/20/17 0006    Specimen:  Blood Updated:  03/20/17 0053     Troponin T 0.017 ng/mL     Narrative:       Troponin T Reference Ranges:  Less than 0.03 ng/mL:    Negative for AMI  0.03 to 0.09 ng/mL:      Indeterminant for AMI  Greater than 0.09 ng/mL: Positive for AMI    Hemoglobin A1c [[id number]]  (Abnormal) Collected:  03/20/17 0615    Specimen:  Blood Updated:  03/20/17 0653     Hemoglobin A1C 7.90 (H) %     Narrative:       Hemoglobin A1C Ranges:    Increased Risk for Diabetes  5.7% to 6.4%  Diabetes                     >= 6.5%  Diabetic Goal                < 7.0%    CBC & Differential [[id number]] Collected:  03/20/17 0615    Specimen:  Blood Updated:  03/20/17 0655    Narrative:       The following orders were created for panel order CBC & Differential.  Procedure                               Abnormality         Status                      ---------                               -----------         ------                     CBC Auto Differential[[id number]]         Abnormal            Final result                 Please view results for these tests on the individual orders.    CBC Auto Differential [[id number]]  (Abnormal) Collected:  03/20/17 0615    Specimen:  Blood Updated:  03/20/17 0655     WBC 7.85 10*3/mm3      RBC 5.30 10*6/mm3      Hemoglobin 15.4 g/dL      Hematocrit 46.2 %      MCV 87.2 fL      MCH 29.1 pg      MCHC 33.3 g/dL      RDW 13.2 %      RDW-SD 41.8 fl      MPV 11.3 (H) fL      Platelets 175 10*3/mm3      Neutrophil % 64.4 %      Lymphocyte % 22.4 %      Monocyte % 11.0 (H) %      Eosinophil % 1.8 %      Basophil % 0.3 %      Immature Grans % 0.1 %      Neutrophils, Absolute 5.06 10*3/mm3      Lymphocytes, Absolute 1.76 10*3/mm3      Monocytes, Absolute 0.86 10*3/mm3      Eosinophils, Absolute 0.14 10*3/mm3      Basophils, Absolute 0.02 10*3/mm3      Immature Grans, Absolute 0.01 10*3/mm3      nRBC 0.0 /100 WBC     Comprehensive Metabolic Panel [[id number]]  (Abnormal) Collected:  03/20/17 0615    Specimen:  Blood Updated:  03/20/17 0706     Glucose 151 (H) mg/dL      BUN 32 (H) mg/dL      Creatinine 2.92 (H) mg/dL      Sodium 138 mmol/L      Potassium 4.1 mmol/L      Chloride 101 mmol/L      CO2 25.0 mmol/L      Calcium 8.8 mg/dL      Total Protein 6.1 g/dL      Albumin 3.40 (L) g/dL      ALT (SGPT) 19 U/L      AST (SGOT) 16 U/L      Alkaline Phosphatase 59 U/L      Total Bilirubin 0.5 mg/dL      eGFR Non African Amer 23 (L) mL/min/1.73      Globulin 2.7 gm/dL      A/G Ratio 1.3 g/dL      BUN/Creatinine Ratio 11.0      Anion Gap 12.0 mmol/L     Amylase [[id number]]  (Normal) Collected:  03/20/17 0615    Specimen:  Blood Updated:  03/20/17 0706     Amylase 54 U/L     Lipid Panel [[id number]]  (Abnormal) Collected:  03/20/17 0615    Specimen:  Blood Updated:  03/20/17 0706     Total Cholesterol 119 mg/dL      Triglycerides 329 (H) mg/dL       HDL Cholesterol 27 (L) mg/dL      LDL Cholesterol  26 mg/dL      VLDL Cholesterol 65.8 (H) mg/dL      LDL/HDL Ratio 0.97     Troponin [[id number]]  (Normal) Collected:  03/20/17 0615    Specimen:  Blood Updated:  03/20/17 0709     Troponin T <0.010 ng/mL     Narrative:       Troponin T Reference Ranges:  Less than 0.03 ng/mL:    Negative for AMI  0.03 to 0.09 ng/mL:      Indeterminant for AMI  Greater than 0.09 ng/mL: Positive for AMI    POC Glucose Fingerstick [[id number]]  (Abnormal) Collected:  03/20/17 0710    Specimen:  Blood Updated:  03/20/17 0717     Glucose 152 (H) mg/dL     Narrative:       Meter: [id number] : [id number] Reagan Flowers    Lipase [[id number]]  (Abnormal) Collected:  03/20/17 0615    Specimen:  Blood Updated:  03/20/17 0726     Lipase 90 (H) U/L           Imaging Results (most recent)     Procedure Component Value Units Date/Time    CT Abdomen Pelvis Without Contrast [[id number]] Collected:  03/20/17 0919     Updated:  03/20/17 0957    Narrative:       CT ABDOMEN AND PELVIS WITHOUT CONTRAST 03/19/2017 AT 2219 HOURS     HISTORY: Upper abdominal pain for 4 days with nausea and vomiting. Renal  failure (GFR of 23). Previous cholecystectomy, umbilical hernia repair.  Previous pancreatitis.     COMPARISON: CT abdomen and pelvis 02/22/2017.     PROCEDURE: 5 mm noncontrast axial images through the abdomen and pelvis.  Enteric contrast only was administered. Sagittal and coronal reformatted  images were obtained.     Radiation dose reduction techniques were utilized including automated  exposure control and exposure modulation based on body size.     ABDOMEN FINDINGS: There is abnormal thickening and inflammatory type  stranding surrounding the pancreatic head and involving the adjacent  duodenum. Findings are thought to represent changes of acute  pancreatitis with secondary reactive duodenitis. There are multiple  shotty lymph nodes surrounding the pancreatic head and neck which are  favored to be  benign and reactive, one of the dominant cysts lying  anterior to the uncinate process measuring nearly 11.6 x 19.6 mm  compared to 17.1 x 1.4 mm on 02/22/2017. No drainable fluid collection  or pseudocyst is identified. No abnormal pancreatic ductal dilation is  seen on this noncontrast study.     Liver is markedly and diffusely steatotic. Cholecystectomy changes are  present but no biliary dilation is identified. The spleen, adrenals are  normal. The left kidney is atrophic. There is multifocal right renal  cortical scarring. No shadowing renal stone or hydronephrosis is  evident.     Diverticular changes are scattered throughout the colon, without  convincing CT evidence of acute diverticulitis. The appendix is normal.     Lung bases are free of consolidation. Pacemaker lead is noted. Benign  calcified granuloma in the right lower lobe.     PELVIS FINDINGS: Urinary bladder, prostate and rectum normal. No pelvic  adenopathy or free fluid is seen.     No acute osseous abnormalities are identified.       Impression:       1. Findings consistent with acute pancreatitis of the pancreatic head  with secondary reactive duodenitis in the adjacent second duodenal  segment.  2. No drainable fluid collection or pseudocyst is identified.  3. Diffuse hepatic steatosis.  4. Cholecystectomy.  5. Left renal atrophy with multifocal right renal cortical scarring,  unchanged.  6. Normal appendix.  7. Preliminary report was provided by Dr. Montana on 03/19/2017 at 2250  hours.      This report was finalized on 3/20/2017 9:55 AM by Dr. Estefani Colby MD.           Report reviewed    ECG/EMG Results (most recent)     None          Assessment/Plan     1. Acute pancreatitis, reactive duodenitis with h/o pseudocyst: patient of Dr. Mercedes-consulted  EGD during last admit 8/2016-gastritis/esophagitis/normal duodenum  Triglycerides elevated, lipase slightly elevated, lactate slightly elevated  NPO/IVFs/Pain/nausea  control  Monitor    2. Acute kidney injury on chronic kidney disease stage 4:   Baseline creatinine approx 2.4, currently 2.92  Change IVFs to normal saline at 100 ml/hr  Received IV bolus in ER previously  Monitor    3. H/O HOCM with AICD, h/o VT, h/o syncope: managed outpatient by Dr. Rubin  IV metoprolol scheduled, IV hydralazine prn  Off all home orals secondary to number 1  Monitor closely    4. Hypertension: BP elevated this am, medications as per number 3  Off home amlodipine 10 mg/hydralazine 50 mg qid/lisinopril 20 mg/metoprolol tart 25 mg q 12 hrs  Monitor     5. CAD with Hyperlipidemia/hypertriglyceridemia:   Previously on fish oil/tricor/lipitor  Resume once off NPO  Triglycerides higher than previous admit 8/2016/LDL lower    6. DM2: A1C 7.9%  accuchecks q 6 hours while NPO, SSI low dose  Off home glimepiride 2 mg bid  Monitor    7. COPD: no acute issues on symbicort bid and duonebs qid    8. GERD/gastritis: IV protonix     9. Tobacco abuse: nicotine patch ordered    10. Anxiety/depression: no acute issues off home cymbalta 30 mg while NPO    11. Diabetic neuropathy  Resume home gabapentin 800 mg tid as able    12. Vitamin D Deficiency: no acute issues off supplement    13. Left renal atrophy/right renal scarring: followed outpatient by nephrology  Per CT unchanged since previous CT    14. Hepatic steatosis: no acute issues, noted per CT  F/U ROBSON Hicks and Dr. Mercedes    I discussed the patients findings and my recommendations with patient.    ROBSON Sanchez  03/20/17  12:38 PM

## 2017-03-21 ENCOUNTER — ON CAMPUS - OUTPATIENT (OUTPATIENT)
Dept: URBAN - METROPOLITAN AREA HOSPITAL 28 | Facility: HOSPITAL | Age: 47
End: 2017-03-21
Payer: COMMERCIAL

## 2017-03-21 LAB
ANION GAP SERPL CALCULATED.3IONS-SCNC: 12.3 MMOL/L
BACTERIA SPEC AEROBE CULT: NO GROWTH
BASOPHILS # BLD AUTO: 0.03 10*3/MM3 (ref 0–0.2)
BASOPHILS NFR BLD AUTO: 0.6 % (ref 0–2)
BUN BLD-MCNC: 27 MG/DL (ref 6–20)
BUN/CREAT SERPL: 10.2 (ref 7–25)
CALCIUM SPEC-SCNC: 8.5 MG/DL (ref 8.6–10.5)
CANCER AG19-9 SERPL-ACNC: 1 U/ML (ref 0–35)
CHLORIDE SERPL-SCNC: 104 MMOL/L (ref 98–107)
CO2 SERPL-SCNC: 21.7 MMOL/L (ref 22–29)
CREAT BLD-MCNC: 2.64 MG/DL (ref 0.76–1.27)
D-LACTATE SERPL-SCNC: 1.6 MMOL/L (ref 0.5–2)
DEPRECATED RDW RBC AUTO: 41.3 FL (ref 37–54)
EOSINOPHIL # BLD AUTO: 0.11 10*3/MM3 (ref 0.1–0.3)
EOSINOPHIL NFR BLD AUTO: 2.1 % (ref 0–4)
ERYTHROCYTE [DISTWIDTH] IN BLOOD BY AUTOMATED COUNT: 13.1 % (ref 11.5–14.5)
GFR SERPL CREATININE-BSD FRML MDRD: 26 ML/MIN/1.73
GLUCOSE BLD-MCNC: 111 MG/DL (ref 65–99)
GLUCOSE BLDC GLUCOMTR-MCNC: 119 MG/DL (ref 70–130)
GLUCOSE BLDC GLUCOMTR-MCNC: 126 MG/DL (ref 70–130)
GLUCOSE BLDC GLUCOMTR-MCNC: 132 MG/DL (ref 70–130)
GLUCOSE BLDC GLUCOMTR-MCNC: 140 MG/DL (ref 70–130)
HCT VFR BLD AUTO: 40.9 % (ref 42–52)
HCT VFR BLD AUTO: 41.2 % (ref 42–52)
HGB BLD-MCNC: 13.4 G/DL (ref 14–18)
HGB BLD-MCNC: 13.9 G/DL (ref 14–18)
IMM GRANULOCYTES # BLD: 0.01 10*3/MM3 (ref 0–0.03)
IMM GRANULOCYTES NFR BLD: 0.2 % (ref 0–0.5)
LIPASE SERPL-CCNC: 65 U/L (ref 13–60)
LYMPHOCYTES # BLD AUTO: 1.41 10*3/MM3 (ref 0.6–4.8)
LYMPHOCYTES NFR BLD AUTO: 26.5 % (ref 20–45)
MCH RBC QN AUTO: 28.7 PG (ref 27–31)
MCHC RBC AUTO-ENTMCNC: 32.8 G/DL (ref 31–37)
MCV RBC AUTO: 87.6 FL (ref 80–94)
MONOCYTES # BLD AUTO: 0.49 10*3/MM3 (ref 0–1)
MONOCYTES NFR BLD AUTO: 9.2 % (ref 3–8)
NEUTROPHILS # BLD AUTO: 3.28 10*3/MM3 (ref 1.5–8.3)
NEUTROPHILS NFR BLD AUTO: 61.4 % (ref 45–70)
NRBC BLD MANUAL-RTO: 0 /100 WBC (ref 0–0)
PLATELET # BLD AUTO: 111 10*3/MM3 (ref 140–500)
PMV BLD AUTO: 11.1 FL (ref 7.4–10.4)
POTASSIUM BLD-SCNC: 3.6 MMOL/L (ref 3.5–5.2)
RBC # BLD AUTO: 4.67 10*6/MM3 (ref 4.7–6.1)
SODIUM BLD-SCNC: 138 MMOL/L (ref 136–145)
WBC NRBC COR # BLD: 5.33 10*3/MM3 (ref 4.8–10.8)

## 2017-03-21 PROCEDURE — 85025 COMPLETE CBC W/AUTO DIFF WBC: CPT | Performed by: NURSE PRACTITIONER

## 2017-03-21 PROCEDURE — 82962 GLUCOSE BLOOD TEST: CPT

## 2017-03-21 PROCEDURE — 83690 ASSAY OF LIPASE: CPT | Performed by: NURSE PRACTITIONER

## 2017-03-21 PROCEDURE — 25010000002 HYDRALAZINE PER 20 MG: Performed by: NURSE PRACTITIONER

## 2017-03-21 PROCEDURE — 99232 SBSQ HOSP IP/OBS MODERATE 35: CPT

## 2017-03-21 PROCEDURE — 85014 HEMATOCRIT: CPT | Performed by: INTERNAL MEDICINE

## 2017-03-21 PROCEDURE — 25010000002 HYDROMORPHONE PER 4 MG: Performed by: NURSE PRACTITIONER

## 2017-03-21 PROCEDURE — 25010000002 ONDANSETRON PER 1 MG: Performed by: INTERNAL MEDICINE

## 2017-03-21 PROCEDURE — 25010000002 ONDANSETRON PER 1 MG: Performed by: NURSE PRACTITIONER

## 2017-03-21 PROCEDURE — 25010000002 PROMETHAZINE PER 50 MG: Performed by: NURSE PRACTITIONER

## 2017-03-21 PROCEDURE — 85018 HEMOGLOBIN: CPT | Performed by: INTERNAL MEDICINE

## 2017-03-21 PROCEDURE — 80048 BASIC METABOLIC PNL TOTAL CA: CPT | Performed by: NURSE PRACTITIONER

## 2017-03-21 PROCEDURE — 83605 ASSAY OF LACTIC ACID: CPT | Performed by: NURSE PRACTITIONER

## 2017-03-21 PROCEDURE — 25010000002 HYDROMORPHONE PER 4 MG: Performed by: INTERNAL MEDICINE

## 2017-03-21 PROCEDURE — 99232 SBSQ HOSP IP/OBS MODERATE 35: CPT | Performed by: INTERNAL MEDICINE

## 2017-03-21 PROCEDURE — 99213 OFFICE O/P EST LOW 20 MIN: CPT

## 2017-03-21 PROCEDURE — 94799 UNLISTED PULMONARY SVC/PX: CPT

## 2017-03-21 RX ORDER — SODIUM CHLORIDE 9 MG/ML
INJECTION, SOLUTION INTRAVENOUS
Status: DISPENSED
Start: 2017-03-21 | End: 2017-03-22

## 2017-03-21 RX ADMIN — SODIUM CHLORIDE, PRESERVATIVE FREE 6.25 MG: 5 INJECTION INTRAVENOUS at 14:33

## 2017-03-21 RX ADMIN — METOPROLOL TARTRATE 2.5 MG: 5 INJECTION INTRAVENOUS at 06:00

## 2017-03-21 RX ADMIN — METOPROLOL TARTRATE 2.5 MG: 5 INJECTION INTRAVENOUS at 00:18

## 2017-03-21 RX ADMIN — PANTOPRAZOLE SODIUM 40 MG: 40 INJECTION, POWDER, FOR SOLUTION INTRAVENOUS at 06:12

## 2017-03-21 RX ADMIN — HYDROMORPHONE HYDROCHLORIDE 1 MG: 1 INJECTION, SOLUTION INTRAMUSCULAR; INTRAVENOUS; SUBCUTANEOUS at 06:20

## 2017-03-21 RX ADMIN — HYDROMORPHONE HYDROCHLORIDE 1 MG: 1 INJECTION, SOLUTION INTRAMUSCULAR; INTRAVENOUS; SUBCUTANEOUS at 22:51

## 2017-03-21 RX ADMIN — METOPROLOL TARTRATE 2.5 MG: 5 INJECTION INTRAVENOUS at 18:20

## 2017-03-21 RX ADMIN — BUDESONIDE AND FORMOTEROL FUMARATE DIHYDRATE 2 PUFF: 80; 4.5 AEROSOL RESPIRATORY (INHALATION) at 08:55

## 2017-03-21 RX ADMIN — ONDANSETRON 4 MG: 2 INJECTION, SOLUTION INTRAMUSCULAR; INTRAVENOUS at 18:17

## 2017-03-21 RX ADMIN — HYDROMORPHONE HYDROCHLORIDE 1 MG: 1 INJECTION, SOLUTION INTRAMUSCULAR; INTRAVENOUS; SUBCUTANEOUS at 01:49

## 2017-03-21 RX ADMIN — HYDROMORPHONE HYDROCHLORIDE 1 MG: 1 INJECTION, SOLUTION INTRAMUSCULAR; INTRAVENOUS; SUBCUTANEOUS at 18:33

## 2017-03-21 RX ADMIN — HYDROMORPHONE HYDROCHLORIDE 1 MG: 1 INJECTION, SOLUTION INTRAMUSCULAR; INTRAVENOUS; SUBCUTANEOUS at 14:29

## 2017-03-21 RX ADMIN — ONDANSETRON 4 MG: 2 INJECTION, SOLUTION INTRAMUSCULAR; INTRAVENOUS at 01:49

## 2017-03-21 RX ADMIN — METOPROLOL TARTRATE 2.5 MG: 5 INJECTION INTRAVENOUS at 12:18

## 2017-03-21 RX ADMIN — HYDRALAZINE HYDROCHLORIDE 20 MG: 20 INJECTION INTRAMUSCULAR; INTRAVENOUS at 21:00

## 2017-03-21 RX ADMIN — NICOTINE 1 PATCH: 14 PATCH, EXTENDED RELEASE TRANSDERMAL at 12:22

## 2017-03-21 RX ADMIN — BUDESONIDE AND FORMOTEROL FUMARATE DIHYDRATE 2 PUFF: 80; 4.5 AEROSOL RESPIRATORY (INHALATION) at 21:32

## 2017-03-21 RX ADMIN — ONDANSETRON 4 MG: 2 INJECTION, SOLUTION INTRAMUSCULAR; INTRAVENOUS at 09:44

## 2017-03-21 RX ADMIN — HYDROMORPHONE HYDROCHLORIDE 1 MG: 1 INJECTION, SOLUTION INTRAMUSCULAR; INTRAVENOUS; SUBCUTANEOUS at 12:16

## 2017-03-21 RX ADMIN — HYDROMORPHONE HYDROCHLORIDE 1 MG: 1 INJECTION, SOLUTION INTRAMUSCULAR; INTRAVENOUS; SUBCUTANEOUS at 10:08

## 2017-03-21 RX ADMIN — HYDROMORPHONE HYDROCHLORIDE 1 MG: 1 INJECTION, SOLUTION INTRAMUSCULAR; INTRAVENOUS; SUBCUTANEOUS at 21:00

## 2017-03-21 RX ADMIN — SODIUM CHLORIDE, PRESERVATIVE FREE 6.25 MG: 5 INJECTION INTRAVENOUS at 20:59

## 2017-03-21 NOTE — PROGRESS NOTES
GI Daily Progress Note    Assessment/Plan:    Active Problems:    Acute pancreatitis       LOS: 1 day     Sergio Phan is a 46 y.o. male who was admitted with Pancreatitis. He reports his symptoms are worsening. Pt had a bad day, was baseline early this AM but had fairly acute increase in pain this AM has had to increase his pain and nausea meds but also had mottling of his Right flank noted by nursing c/w Gray-Etienne's sign of pancreatic hemorrhage    Subjective:    Patient expresses abdominal pain, vomiting and Nausea  Patient denies diarrhea    Objective:    Vital signs in last 24 hours:  Temp:  [97.4 °F (36.3 °C)-98.7 °F (37.1 °C)] 97.9 °F (36.6 °C)  Heart Rate:  [77-93] 90  Resp:  [16-20] 16  BP: (137-175)/(91-98) 175/98    Intake/Output last 3 shifts:  I/O last 3 completed shifts:  In: 3218.3 [P.O.:240; I.V.:2978.3]  Out: 2725 [Urine:2725]  Intake/Output this shift:        Results from last 7 days  Lab Units 03/21/17  0409 03/20/17  0615 03/19/17  1815   WBC 10*3/mm3 5.33 7.85 8.62   HEMOGLOBIN g/dL 13.4* 15.4 17.8   HEMATOCRIT % 40.9* 46.2 51.1   PLATELETS 10*3/mm3 111* 175 206       Results from last 7 days  Lab Units 03/21/17  0409 03/20/17  0615 03/19/17  1815   SODIUM mmol/L 138 138 133*   POTASSIUM mmol/L 3.6 4.1 4.3   CHLORIDE mmol/L 104 101 95*   TOTAL CO2 mmol/L 21.7* 25.0 21.3*   BUN mg/dL 27* 32* 31*   CREATININE mg/dL 2.64* 2.92* 2.95*   CALCIUM mg/dL 8.5* 8.8 9.7   BILIRUBIN mg/dL  --  0.5 0.8   ALK PHOS U/L  --  59 71   ALT (SGPT) U/L  --  19 24   AST (SGOT) U/L  --  16 16   GLUCOSE mg/dL 111* 151* 211*       0  Lab Value Date/Time   LIPASE 65 (H) 03/21/2017 0409   LIPASE 90 (H) 03/20/2017 0615   LIPASE 69 (H) 03/19/2017 1815   LIPASE 84 (H) 09/01/2016 0618   LIPASE 94 (H) 08/30/2016 0400   LIPASE 98 (H) 08/28/2016 0421   LIPASE 105 (H) 08/26/2016 0626   LIPASE 70 (H) 08/25/2016 0431   LIPASE 90 (H) 08/24/2016 1913   LIPASE 96 (H) 07/03/2015 0327   LIPASE 423 (H) 07/02/2015 0428   LIPASE  103 (H) 06/30/2015 0434   LIPASE 191 (H) 06/27/2015 0427   LIPASE 144 (H) 06/24/2015 0248   LIPASE 147 (H) 06/23/2015 0532       Physical Exam:Abdomen  Sounds Quiet (No audible sounds)   Distension Soft and Distended   Tenderness Moderately Tender, R flank mottling brownish   Acute Pancreatitis  CKD  Gastritis/Esophagitis(EGD 2/2016)    Concern for pancreatic hemorrhage will check an H/H and consider CT if its sig decreased, will increase his fluids.

## 2017-03-21 NOTE — PLAN OF CARE
Problem: Patient Care Overview (Adult)  Goal: Plan of Care Review  Outcome: Ongoing (interventions implemented as appropriate)    03/21/17 1541   Coping/Psychosocial Response Interventions   Plan Of Care Reviewed With patient;spouse   Patient Care Overview   Progress progress toward functional goals as expected   Outcome Evaluation   Outcome Summary/Follow up Plan Patient had increased pain and nausea today. Changed pain med to Q2 and added phenergan. Patient tolerated meds well       Goal: Adult Individualization and Mutuality  Outcome: Ongoing (interventions implemented as appropriate)  Goal: Discharge Needs Assessment  Outcome: Ongoing (interventions implemented as appropriate)    Problem: Pain, Acute (Adult)  Goal: Identify Related Risk Factors and Signs and Symptoms  Outcome: Ongoing (interventions implemented as appropriate)  Goal: Acceptable Pain Control/Comfort Level  Outcome: Ongoing (interventions implemented as appropriate)    Problem: Fall Risk (Adult)  Goal: Identify Related Risk Factors and Signs and Symptoms  Outcome: Outcome(s) achieved Date Met:  03/21/17  Goal: Absence of Falls  Outcome: Outcome(s) achieved Date Met:  03/21/17

## 2017-03-21 NOTE — PLAN OF CARE
Problem: Patient Care Overview (Adult)  Goal: Plan of Care Review  Outcome: Ongoing (interventions implemented as appropriate)    03/21/17 0437   Coping/Psychosocial Response Interventions   Plan Of Care Reviewed With patient   Patient Care Overview   Progress improving       Goal: Adult Individualization and Mutuality  Outcome: Ongoing (interventions implemented as appropriate)    03/21/17 0437   Individualization   Patient Specific Preferences none voiced at this time       Goal: Discharge Needs Assessment  Outcome: Ongoing (interventions implemented as appropriate)    03/21/17 0437   Discharge Needs Assessment   Concerns To Be Addressed denies needs/concerns at this time   Readmission Within The Last 30 Days no previous admission in last 30 days   Self-Care   Equipment Currently Used at Home cane, straight;walker, standard;wheelchair;glucometer;hospital bed   Living Environment   Transportation Available family or friend will provide         Problem: Pain, Acute (Adult)  Goal: Identify Related Risk Factors and Signs and Symptoms  Outcome: Ongoing (interventions implemented as appropriate)    03/21/17 0437   Pain, Acute   Related Risk Factors (Acute Pain) persistent pain   Signs and Symptoms (Acute Pain) verbalization of pain descriptors       Goal: Acceptable Pain Control/Comfort Level  Outcome: Ongoing (interventions implemented as appropriate)    03/21/17 0437   Pain, Acute (Adult)   Acceptable Pain Control/Comfort Level making progress toward outcome         Problem: Fall Risk (Adult)  Goal: Identify Related Risk Factors and Signs and Symptoms  Outcome: Ongoing (interventions implemented as appropriate)    03/21/17 0437   Fall Risk   Fall Risk: Related Risk Factors age-related changes;gait/mobility problems;history of falls;fear of falling;fatigue/slow reaction   Fall Risk: Signs and Symptoms presence of risk factors       Goal: Absence of Falls  Outcome: Ongoing (interventions implemented as appropriate)     03/21/17 0437   Fall Risk (Adult)   Absence of Falls making progress toward outcome

## 2017-03-22 ENCOUNTER — APPOINTMENT (OUTPATIENT)
Dept: CT IMAGING | Facility: HOSPITAL | Age: 47
End: 2017-03-22

## 2017-03-22 LAB
ALBUMIN SERPL-MCNC: 3.3 G/DL (ref 3.5–5.2)
ALBUMIN/GLOB SERPL: 1.1 G/DL
ALP SERPL-CCNC: 60 U/L (ref 40–129)
ALT SERPL W P-5'-P-CCNC: 14 U/L (ref 5–41)
ANION GAP SERPL CALCULATED.3IONS-SCNC: 14.9 MMOL/L
AST SERPL-CCNC: 16 U/L (ref 5–40)
BASOPHILS # BLD AUTO: 0.02 10*3/MM3 (ref 0–0.2)
BASOPHILS NFR BLD AUTO: 0.3 % (ref 0–2)
BILIRUB SERPL-MCNC: 0.8 MG/DL (ref 0.2–1.2)
BUN BLD-MCNC: 19 MG/DL (ref 6–20)
BUN/CREAT SERPL: 8.2 (ref 7–25)
CALCIUM SPEC-SCNC: 8.8 MG/DL (ref 8.6–10.5)
CHLORIDE SERPL-SCNC: 103 MMOL/L (ref 98–107)
CO2 SERPL-SCNC: 19.1 MMOL/L (ref 22–29)
CREAT BLD-MCNC: 2.31 MG/DL (ref 0.76–1.27)
DEPRECATED RDW RBC AUTO: 40.6 FL (ref 37–54)
EOSINOPHIL # BLD AUTO: 0.07 10*3/MM3 (ref 0.1–0.3)
EOSINOPHIL NFR BLD AUTO: 1 % (ref 0–4)
ERYTHROCYTE [DISTWIDTH] IN BLOOD BY AUTOMATED COUNT: 13 % (ref 11.5–14.5)
GFR SERPL CREATININE-BSD FRML MDRD: 30 ML/MIN/1.73
GLOBULIN UR ELPH-MCNC: 2.9 GM/DL
GLUCOSE BLD-MCNC: 138 MG/DL (ref 65–99)
GLUCOSE BLDC GLUCOMTR-MCNC: 132 MG/DL (ref 70–130)
GLUCOSE BLDC GLUCOMTR-MCNC: 134 MG/DL (ref 70–130)
GLUCOSE BLDC GLUCOMTR-MCNC: 138 MG/DL (ref 70–130)
GLUCOSE BLDC GLUCOMTR-MCNC: 145 MG/DL (ref 70–130)
HCT VFR BLD AUTO: 44 % (ref 42–52)
HGB BLD-MCNC: 14.9 G/DL (ref 14–18)
IGG4 SER-MCNC: <1 MG/DL (ref 1–291)
IMM GRANULOCYTES # BLD: 0.03 10*3/MM3 (ref 0–0.03)
IMM GRANULOCYTES NFR BLD: 0.4 % (ref 0–0.5)
LIPASE SERPL-CCNC: 75 U/L (ref 13–60)
LYMPHOCYTES # BLD AUTO: 0.77 10*3/MM3 (ref 0.6–4.8)
LYMPHOCYTES NFR BLD AUTO: 11.1 % (ref 20–45)
MCH RBC QN AUTO: 29.2 PG (ref 27–31)
MCHC RBC AUTO-ENTMCNC: 33.9 G/DL (ref 31–37)
MCV RBC AUTO: 86.3 FL (ref 80–94)
MONOCYTES # BLD AUTO: 0.61 10*3/MM3 (ref 0–1)
MONOCYTES NFR BLD AUTO: 8.8 % (ref 3–8)
NEUTROPHILS # BLD AUTO: 5.46 10*3/MM3 (ref 1.5–8.3)
NEUTROPHILS NFR BLD AUTO: 78.4 % (ref 45–70)
NRBC BLD MANUAL-RTO: 0 /100 WBC (ref 0–0)
PLATELET # BLD AUTO: 140 10*3/MM3 (ref 140–500)
PMV BLD AUTO: 11.3 FL (ref 7.4–10.4)
POTASSIUM BLD-SCNC: 4 MMOL/L (ref 3.5–5.2)
PROT SERPL-MCNC: 6.2 G/DL (ref 6–8.5)
RBC # BLD AUTO: 5.1 10*6/MM3 (ref 4.7–6.1)
SODIUM BLD-SCNC: 137 MMOL/L (ref 136–145)
WBC NRBC COR # BLD: 6.96 10*3/MM3 (ref 4.8–10.8)

## 2017-03-22 PROCEDURE — 99231 SBSQ HOSP IP/OBS SF/LOW 25: CPT

## 2017-03-22 PROCEDURE — 80053 COMPREHEN METABOLIC PANEL: CPT | Performed by: INTERNAL MEDICINE

## 2017-03-22 PROCEDURE — 94799 UNLISTED PULMONARY SVC/PX: CPT

## 2017-03-22 PROCEDURE — 25010000002 PROMETHAZINE PER 50 MG: Performed by: NURSE PRACTITIONER

## 2017-03-22 PROCEDURE — 82962 GLUCOSE BLOOD TEST: CPT

## 2017-03-22 PROCEDURE — 99232 SBSQ HOSP IP/OBS MODERATE 35: CPT | Performed by: NURSE PRACTITIONER

## 2017-03-22 PROCEDURE — 85025 COMPLETE CBC W/AUTO DIFF WBC: CPT | Performed by: INTERNAL MEDICINE

## 2017-03-22 PROCEDURE — 99213 OFFICE O/P EST LOW 20 MIN: CPT

## 2017-03-22 PROCEDURE — 25010000002 ONDANSETRON PER 1 MG: Performed by: NURSE PRACTITIONER

## 2017-03-22 PROCEDURE — 83690 ASSAY OF LIPASE: CPT | Performed by: INTERNAL MEDICINE

## 2017-03-22 PROCEDURE — 74176 CT ABD & PELVIS W/O CONTRAST: CPT

## 2017-03-22 PROCEDURE — 25010000002 HYDROMORPHONE PER 4 MG: Performed by: NURSE PRACTITIONER

## 2017-03-22 RX ORDER — ENALAPRILAT 2.5 MG/2ML
1.25 INJECTION INTRAVENOUS EVERY 6 HOURS PRN
Status: DISCONTINUED | OUTPATIENT
Start: 2017-03-22 | End: 2017-03-24

## 2017-03-22 RX ORDER — HYDRALAZINE HYDROCHLORIDE 50 MG/1
50 TABLET, FILM COATED ORAL 4 TIMES DAILY
Status: DISCONTINUED | OUTPATIENT
Start: 2017-03-22 | End: 2017-03-26 | Stop reason: HOSPADM

## 2017-03-22 RX ADMIN — HYDROMORPHONE HYDROCHLORIDE 1 MG: 1 INJECTION, SOLUTION INTRAMUSCULAR; INTRAVENOUS; SUBCUTANEOUS at 23:38

## 2017-03-22 RX ADMIN — BUDESONIDE AND FORMOTEROL FUMARATE DIHYDRATE 2 PUFF: 80; 4.5 AEROSOL RESPIRATORY (INHALATION) at 08:49

## 2017-03-22 RX ADMIN — HYDROMORPHONE HYDROCHLORIDE 1 MG: 1 INJECTION, SOLUTION INTRAMUSCULAR; INTRAVENOUS; SUBCUTANEOUS at 00:58

## 2017-03-22 RX ADMIN — ONDANSETRON 4 MG: 2 INJECTION, SOLUTION INTRAMUSCULAR; INTRAVENOUS at 00:58

## 2017-03-22 RX ADMIN — HYDROMORPHONE HYDROCHLORIDE 1 MG: 1 INJECTION, SOLUTION INTRAMUSCULAR; INTRAVENOUS; SUBCUTANEOUS at 05:05

## 2017-03-22 RX ADMIN — ENALAPRILAT 1.25 MG: 2.5 INJECTION INTRAVENOUS at 23:38

## 2017-03-22 RX ADMIN — SODIUM CHLORIDE, PRESERVATIVE FREE 6.25 MG: 5 INJECTION INTRAVENOUS at 17:06

## 2017-03-22 RX ADMIN — ONDANSETRON 4 MG: 2 INJECTION, SOLUTION INTRAMUSCULAR; INTRAVENOUS at 13:48

## 2017-03-22 RX ADMIN — ONDANSETRON 4 MG: 2 INJECTION, SOLUTION INTRAMUSCULAR; INTRAVENOUS at 06:53

## 2017-03-22 RX ADMIN — METOPROLOL TARTRATE 2.5 MG: 5 INJECTION INTRAVENOUS at 11:45

## 2017-03-22 RX ADMIN — METOPROLOL TARTRATE 2.5 MG: 5 INJECTION INTRAVENOUS at 05:06

## 2017-03-22 RX ADMIN — HYDROMORPHONE HYDROCHLORIDE 1 MG: 1 INJECTION, SOLUTION INTRAMUSCULAR; INTRAVENOUS; SUBCUTANEOUS at 13:48

## 2017-03-22 RX ADMIN — METOPROLOL TARTRATE 2.5 MG: 5 INJECTION INTRAVENOUS at 00:00

## 2017-03-22 RX ADMIN — METOPROLOL TARTRATE 25 MG: 25 TABLET ORAL at 14:33

## 2017-03-22 RX ADMIN — HYDRALAZINE HYDROCHLORIDE 50 MG: 50 TABLET ORAL at 17:10

## 2017-03-22 RX ADMIN — SODIUM CHLORIDE, PRESERVATIVE FREE 6.25 MG: 5 INJECTION INTRAVENOUS at 23:16

## 2017-03-22 RX ADMIN — NICOTINE 1 PATCH: 14 PATCH, EXTENDED RELEASE TRANSDERMAL at 11:30

## 2017-03-22 RX ADMIN — ENALAPRILAT 1.25 MG: 2.5 INJECTION INTRAVENOUS at 14:35

## 2017-03-22 RX ADMIN — HYDROMORPHONE HYDROCHLORIDE 1 MG: 1 INJECTION, SOLUTION INTRAMUSCULAR; INTRAVENOUS; SUBCUTANEOUS at 09:52

## 2017-03-22 RX ADMIN — HYDRALAZINE HYDROCHLORIDE 50 MG: 50 TABLET ORAL at 21:09

## 2017-03-22 RX ADMIN — SODIUM CHLORIDE, PRESERVATIVE FREE 6.25 MG: 5 INJECTION INTRAVENOUS at 12:34

## 2017-03-22 RX ADMIN — HYDROMORPHONE HYDROCHLORIDE 1 MG: 1 INJECTION, SOLUTION INTRAMUSCULAR; INTRAVENOUS; SUBCUTANEOUS at 19:46

## 2017-03-22 RX ADMIN — SODIUM CHLORIDE 150 ML/HR: 9 INJECTION, SOLUTION INTRAVENOUS at 09:49

## 2017-03-22 RX ADMIN — HYDROMORPHONE HYDROCHLORIDE 1 MG: 1 INJECTION, SOLUTION INTRAMUSCULAR; INTRAVENOUS; SUBCUTANEOUS at 06:53

## 2017-03-22 RX ADMIN — SODIUM CHLORIDE 150 ML/HR: 9 INJECTION, SOLUTION INTRAVENOUS at 23:23

## 2017-03-22 RX ADMIN — METOPROLOL TARTRATE 25 MG: 25 TABLET ORAL at 21:09

## 2017-03-22 RX ADMIN — PANTOPRAZOLE SODIUM 40 MG: 40 INJECTION, POWDER, FOR SOLUTION INTRAVENOUS at 05:05

## 2017-03-22 RX ADMIN — BUDESONIDE AND FORMOTEROL FUMARATE DIHYDRATE 2 PUFF: 80; 4.5 AEROSOL RESPIRATORY (INHALATION) at 20:52

## 2017-03-22 RX ADMIN — ONDANSETRON 4 MG: 2 INJECTION, SOLUTION INTRAMUSCULAR; INTRAVENOUS at 19:46

## 2017-03-22 RX ADMIN — SODIUM CHLORIDE 150 ML/HR: 9 INJECTION, SOLUTION INTRAVENOUS at 01:04

## 2017-03-22 RX ADMIN — SODIUM CHLORIDE 150 ML/HR: 9 INJECTION, SOLUTION INTRAVENOUS at 17:08

## 2017-03-22 RX ADMIN — HYDRALAZINE HYDROCHLORIDE 50 MG: 50 TABLET ORAL at 12:42

## 2017-03-22 RX ADMIN — ONDANSETRON 4 MG: 2 INJECTION, SOLUTION INTRAMUSCULAR; INTRAVENOUS at 09:52

## 2017-03-22 NOTE — NURSING NOTE
Called Dr. Mercedes and informed him of patients H & H results. No new orders. Will continue to monitor.

## 2017-03-22 NOTE — PROGRESS NOTES
GI Daily Progress Note    Assessment/Plan:    Active Problems:    Acute pancreatitis       LOS: 3 days     Sergio Phan is a 46 y.o. male who was admitted with pancreatitis. He reports his symptoms are improving with treatment. Reviewed his labs and all are stable to improved supporting no hemorrhage nor necrosis. CT ordered and reviewed no GOO no pseudocyst nor hemorrhage    Subjective:    Patient expresses abdominal pain and Nausea  Patient denies vomiting and diarrhea    Objective:    Vital signs in last 24 hours:  Temp:  [97.9 °F (36.6 °C)-98.9 °F (37.2 °C)] 97.9 °F (36.6 °C)  Heart Rate:  [77-97] 91  Resp:  [16-18] 18  BP: (150-200)/() 150/92    Intake/Output last 3 shifts:  I/O last 3 completed shifts:  In: 2995.8 [I.V.:2995.8]  Out: 3725 [Urine:3725]  Intake/Output this shift:        Results from last 7 days  Lab Units 03/22/17  0437   WBC 10*3/mm3 6.96   HEMOGLOBIN g/dL 14.9   HEMATOCRIT % 44.0   PLATELETS 10*3/mm3 140       Results from last 7 days  Lab Units 03/22/17  0436   SODIUM mmol/L 137   POTASSIUM mmol/L 4.0   CHLORIDE mmol/L 103   TOTAL CO2 mmol/L 19.1*   BUN mg/dL 19   CREATININE mg/dL 2.31*   CALCIUM mg/dL 8.8   BILIRUBIN mg/dL 0.8   ALK PHOS U/L 60   ALT (SGPT) U/L 14   AST (SGOT) U/L 16   GLUCOSE mg/dL 138*         0  Lab Value Date/Time   LIPASE 75 (H) 03/22/2017 0436   LIPASE 65 (H) 03/21/2017 0409   LIPASE 90 (H) 03/20/2017 0615   LIPASE 69 (H) 03/19/2017 1815   LIPASE 84 (H) 09/01/2016 0618   LIPASE 94 (H) 08/30/2016 0400   LIPASE 98 (H) 08/28/2016 0421   LIPASE 105 (H) 08/26/2016 0626   LIPASE 70 (H) 08/25/2016 0431   LIPASE 90 (H) 08/24/2016 1913   LIPASE 96 (H) 07/03/2015 0327   LIPASE 423 (H) 07/02/2015 0428   LIPASE 103 (H) 06/30/2015 0434   LIPASE 191 (H) 06/27/2015 0427   LIPASE 144 (H) 06/24/2015 0248   LIPASE 147 (H) 06/23/2015 0532         Physical Exam:Abdomen  Sounds Normal Active Bowel Sounds   Distension Soft   Tenderness Moderately Tender      Pancreatitis  Hypercholesterolemia    Overall making progress and will continue supportive care.

## 2017-03-22 NOTE — PROGRESS NOTES
"    HOSPITALIST SERVICES  @ [facility] CHARLINE DOMINGO            HOSPITALIST TEAM   PROGRESS NOTE      Patient Care Team:  ROBSON Dimas as PCP - General  ROBSON Dimas as PCP - Family Medicine        Chief Complaint:        Worsening abdominal pain since 3/17      Subjective:      Mr. Sergio Phan is a 46 YOM who is known to have chronic pancreatitis, presented through the ER secondary to abdominal pain that began on 3/17/17. His amylase and lipase were normal but CT scan abdomen was positive for acute pancreatitis of head of pancreas and resolution of pseudocysts.       Interval History and ROS:     Patient States his rt sided abdominal pain continues and is not getting better  Patient Complaints: as above and has nausea  Patient Denies:  No chest pain or shortness of breath, or vomiting  History taken from: Patient      Objective    Vital Signs  Temp:  [97.4 °F (36.3 °C)-98.7 °F (37.1 °C)] 98.3 °F (36.8 °C)  Heart Rate:  [77-93] 90  Resp:  [16-20] 16  BP: (137-180)/(91-98) 180/98    Flowsheet Rows         First Filed Value    Admission Height  72\" (182.9 cm) Documented at 03/19/2017 1751    Admission Weight  218 lb (98.9 kg) Documented at 03/19/2017 1751              Physical Exam:    Vital signs: Ht 72\" Wt 212 lbs HR 77 /97 Temp 36.8 C Pain 6/10, O2 sat 95% on RA  General: Patient appears stated age and in some pain, but not acutely distressed. Laying in bed. Well-nourished.  HEENT: Normocephalic, PERRL   Lymph nodes: Non-tender, no palpable masses  Neck: Supple. No thyromegaly or increased JVD.  Cardiovascular: Regular rate and rhythm. Normal S1, S2. No M/G/R  Chest: Clear to auscultation. No rhonchi or rales.   Abdominal: Obese. Tender in RUQ and Rt Flank, nondistended.  Neurologic: No focal neurological deficits.   Musculoskeletal: Unremarkable.       Results Review:     I reviewed the patient's new clinical results.    Lab Results (last 24 hours)     Procedure Component Value Units " Date/Time    POC Glucose Fingerstick [[id number]]  (Abnormal) Collected:  03/21/17 0007    Specimen:  Blood Updated:  03/21/17 0014     Glucose 140 (H) mg/dL     Narrative:       Meter: [id number] : [id number] Alicia Hillman    Lactic Acid, Plasma [[id number]]  (Normal) Collected:  03/21/17 0409    Specimen:  Blood Updated:  03/21/17 0443     Lactate 1.6 mmol/L     Basic Metabolic Panel [[id number]]  (Abnormal) Collected:  03/21/17 0409    Specimen:  Blood Updated:  03/21/17 0451     Glucose 111 (H) mg/dL      BUN 27 (H) mg/dL      Creatinine 2.64 (H) mg/dL      Sodium 138 mmol/L      Potassium 3.6 mmol/L      Chloride 104 mmol/L      CO2 21.7 (L) mmol/L      Calcium 8.5 (L) mg/dL      eGFR Non African Amer 26 (L) mL/min/1.73      BUN/Creatinine Ratio 10.2      Anion Gap 12.3 mmol/L     Narrative:       GFR Normal >60  Chronic Kidney Disease <60  Kidney Failure <15    Lipase [[id number]]  (Abnormal) Collected:  03/21/17 0409    Specimen:  Blood Updated:  03/21/17 0453     Lipase 65 (H) U/L     CBC & Differential [[id number]] Collected:  03/21/17 0409    Specimen:  Blood Updated:  03/21/17 0540    Narrative:       The following orders were created for panel order CBC & Differential.  Procedure                               Abnormality         Status                     ---------                               -----------         ------                     CBC Auto Differential[[id number]]         Abnormal            Final result                 Please view results for these tests on the individual orders.    CBC Auto Differential [[id number]]  (Abnormal) Collected:  03/21/17 0409    Specimen:  Blood Updated:  03/21/17 0540     WBC 5.33 10*3/mm3      RBC 4.67 (L) 10*6/mm3      Hemoglobin 13.4 (L) g/dL      Hematocrit 40.9 (L) %      MCV 87.6 fL      MCH 28.7 pg      MCHC 32.8 g/dL      RDW 13.1 %      RDW-SD 41.3 fl      MPV 11.1 (H) fL      Platelets 111 (L) 10*3/mm3      Neutrophil % 61.4 %      Lymphocyte % 26.5 %       Monocyte % 9.2 (H) %      Eosinophil % 2.1 %      Basophil % 0.6 %      Immature Grans % 0.2 %      Neutrophils, Absolute 3.28 10*3/mm3      Lymphocytes, Absolute 1.41 10*3/mm3      Monocytes, Absolute 0.49 10*3/mm3      Eosinophils, Absolute 0.11 10*3/mm3      Basophils, Absolute 0.03 10*3/mm3      Immature Grans, Absolute 0.01 10*3/mm3      nRBC 0.0 /100 WBC     POC Glucose Fingerstick [[id number]]  (Normal) Collected:  03/21/17 0606    Specimen:  Blood Updated:  03/21/17 0613     Glucose 126 mg/dL     Narrative:       Meter: [id number] : [id number] Alicia Kady    Urine Culture [[id number]]  (Normal) Collected:  03/19/17 2028    Specimen:  Urine from Urine, Clean Catch Updated:  03/21/17 0731     Urine Culture No growth     Cancer Antigen 19-9 [[id number]] Collected:  03/20/17 0615    Specimen:  Blood Updated:  03/21/17 0930     CA 19-9 1 U/mL       Roche ECLIA methodology       Narrative:       Performed at:  [address] [place], [place] [id number]  : Matti Romero PhD, Phone:  [phone]    POC Glucose Fingerstick [[id number]]  (Normal) Collected:  03/21/17 1133    Specimen:  Blood Updated:  03/21/17 1139     Glucose 119 mg/dL     Narrative:       Meter: [id number] : [id number] Alfred Wilcox    POC Glucose Fingerstick [[id number]]  (Abnormal) Collected:  03/21/17 1832    Specimen:  Blood Updated:  03/21/17 1840     Glucose 132 (H) mg/dL     Narrative:       Meter: [id number] : [id number] Alfred Wilcox    Hemoglobin & Hematocrit, Blood [[id number]]  (Abnormal) Collected:  03/21/17 1939    Specimen:  Blood Updated:  03/21/17 1945     Hemoglobin 13.9 (L) g/dL      Hematocrit 41.2 (L) %           Imaging Results (last 24 hours)     ** No results found for the last 24 hours. **          Xray not reviewed personally by physician.      ECG not reviewed personally by physician  ECG/EMG Results (most recent)     Procedure Component Value Units Date/Time    ECG 12 Lead [[id number]]  Collected:  03/20/17 1102     Updated:  03/20/17 1250    Narrative:       RR Interval= 870 ms  AZ Interval= 164 ms  QRSD Interval= 100 ms  QT Interval= 432 ms  QTc Interval= 463 ms  Heart Rate= 69 ms  P Axis= 43 deg  QRS Axis= -27 deg  T Wave Axis= 131 deg  I: 40 Axis= -13 deg  T: 40 Axis= -34 deg  ST Axis= 157 deg  SINUS RHYTHM  LVH WITH SECONDARY REPOLARIZATION ABNORMALITY  NO SIGNIFICANT CHANGE FROM PREVIOUS ECG  Electronically Signed by:  Lucas PhillipsFELIX) ([facility]) 20-Mar-2017 12:45:50  Date and Time of Study: 2017-03-20 11:02:47          Medication Review:   I have reviewed the patient's current medication list    Current Facility-Administered Medications:   •  acetaminophen (TYLENOL) suppository 650 mg, 650 mg, Rectal, Q4H PRN, Fercho Parra MD  •  budesonide-formoterol (SYMBICORT) 80-4.5 MCG/ACT inhaler 2 puff, 2 puff, Inhalation, BID - RT, Fercho Parra MD, 2 puff at 03/21/17 2132  •  dextrose (D50W) solution 25 g, 25 g, Intravenous, Q15 Min PRN, Fercho Parra MD  •  dextrose (GLUTOSE) oral gel 15 g, 15 g, Oral, Q15 Min PRN, Fercho Parra MD  •  enoxaparin (LOVENOX) syringe 40 mg, 40 mg, Subcutaneous, Nightly, Fercho Parra MD, 40 mg at 03/20/17 0017  •  glucagon (GLUCAGEN) injection 1 mg, 1 mg, Subcutaneous, Q15 Min PRN, Fercho Parra MD  •  hydrALAZINE (APRESOLINE) injection 20 mg, 20 mg, Intravenous, Q6H PRN, Melanie Pollard, APRN, 20 mg at 03/21/17 2100  •  HYDROmorphone (DILAUDID) injection 1 mg, 1 mg, Intravenous, Q2H PRN, Melanie SAN [place], APRN, 1 mg at 03/21/17 2251  •  insulin aspart (novoLOG) injection 0-7 Units, 0-7 Units, Subcutaneous, 4x Daily AC & at Bedtime, Fercho Parra MD, 0 Units at 03/20/17 0015  •  ipratropium-albuterol (DUO-NEB) nebulizer solution 3 mL, 3 mL, Nebulization, Q4H PRN, Geronimo Maria MD, 3 mL at 03/20/17 2020  •  metoprolol (LOPRESSOR) injection 2.5 mg, 2.5 mg, Intravenous, Q6H, ROBSON Dean, 2.5 mg at 03/21/17 1820  •  nicotine (NICODERM CQ) 14  MG/24HR patch 1 patch, 1 patch, Transdermal, Q24H, Melanie R Turney, APRN, 1 patch at 03/21/17 1222  •  ondansetron (ZOFRAN) injection 4 mg, 4 mg, Intravenous, Q6H PRN, Melanie R Atul, APRN, 4 mg at 03/21/17 1817  •  pantoprazole (PROTONIX) injection 40 mg, 40 mg, Intravenous, Q AM, Fercho Parra MD, 40 mg at 03/21/17 0612  •  promethazine (PHENERGAN) IV syringe 6.25 mg, 6.25 mg, Intravenous, Q6H PRN, Melanie R Turney, APRN, 6.25 mg at 03/21/17 2059  •  sodium chloride 0.9 % infusion  - ADS Override Pull, , , ,   •  sodium chloride 0.9 % infusion, 150 mL/hr, Intravenous, Continuous, Irineo Mercedes MD, Last Rate: 150 mL/hr at 03/21/17 2051, 150 mL/hr at 03/21/17 2051      Assessment/Plan       ASSESSMENT AND PLAN:       SUMMARY:    ?   PROPHYLAXIS:   -DVT Prophylaxis: On SCD; Will D/C Lovenox due to suspicion of pancreatic hemorrhage  -Osborne Catheter: Not indicated at this time    NUTRITION AND FLUIDS:  -Diet/ Nutrition: NPO    -Fluid Status/Electrolytes: NS 1 L 150 mL/Hr      SOCIAL ISSUES:    -Behavioral/ Agitation Issues: NONE   -Social Issues: Lives at home with his family.       THERAPEUTIC:    -ANTIBIOTICS: N/A  -PAIN MANAGEMENT: Inj Dilaudid              PLAN:    -Labs and diagnostic tests reviewed: Gluc 134, Na 138, K 3.6, Creat 2.64, Amylase 1.6, Lipase 65, WBC 5.33, Hb 13.9, Plt 111    -Diagnostic tests reviewed: NONE done in last 24 hours    -Consultations: As per Dr. Mercedes    -Any new recommendations: Dr Mercedes may consider repeating CT scan abdomen    -New Labs ordered: CBC and CMP for AM    -New diagnostic tests ordered: As per Dr. Mercedes    -Any changes in medications: N/A    -Discharge planning issues: Patient should be able to go back home once ready for discharge    -Placement issues: N/A    -Patient is clinically and hemodynamically stable    -To continue current management and supportive care    -Will follow patient closely    -Nothing new to add for right  now          PRIMARY DIAGNOSES:    1. Acute pancreatitis, reactive duodenitis with h/o pseudocyst: patient of Dr. 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 Will D/C Inj Lovenox  Monitor     2. Acute kidney injury on chronic kidney disease stage 4:   Baseline creatinine approx 2.4, currently 2.64  Normal saline at 100 ml/hr  Monitor     3. H/O HOCM with AICD, h/o VT, h/o syncope: managed outpatient by Dr. Rubin  IV metoprolol scheduled, IV hydralazine prn  Off all home orals secondary to number 1  Monitor closely     4. Hypertension: BP elevated at 177/97. On Inj Metoprolol.    Off home amlodipine 10 mg/hydralazine 50 mg qid/lisinopril 20 mg/metoprolol tart 25 mg q 12 hrs  Monitor      5. CAD with Hyperlipidemia/hypertriglyceridemia:   Previously on fish oil/tricor/lipitor  Resume once off NPO  Triglycerides higher than previous admit 8/2016/LDL lower     6. DM2: A1C 7.9%  accuchecks q 6 hours while NPO, SSI low dose  Off home glimepiride 2 mg bid  Monitor     7. COPD: no acute issues on symbicort bid and duonebs qid     8. GERD/gastritis: IV protonix      9. Tobacco abuse: nicotine patch ordered     10. Anxiety/depression: no acute issues off home cymbalta 30 mg while NPO     11. Diabetic neuropathy  Resume home gabapentin 800 mg tid as able     12. Vitamin D Deficiency: no acute issues off supplement     13. Left renal atrophy/right renal scarring: followed outpatient by nephrology  Per CT unchanged since previous CT     14. Hepatic steatosis: no acute issues, noted per CT  F/U ROBSON Hicks and Dr. Mercedes  ?     SECONDARY DIAGNOSES:  ?     As above      SURGICAL DIAGNOSES:    As per Problem List      Plan for disposition: Should be able to go home once ready for  discharge    Fercho Parra MD  03/21/17  11:08 PM            Fercho Parra M.D., FACP  Internal Medicine/ Hospitalist          Time:       EMR Dragon/Transcription disclaimer:      Much of this encounter note is an electronic transcription/translation of spoken language to printed text. The electronic translation of spoken language may permit erroneous, or at times, nonsensical words or phrases to be inadvertently transcribed; Although I have reviewed the note for such errors, some may still exist.

## 2017-03-22 NOTE — PLAN OF CARE
Problem: Patient Care Overview (Adult)  Goal: Plan of Care Review  Outcome: Ongoing (interventions implemented as appropriate)    03/22/17 0530   Coping/Psychosocial Response Interventions   Plan Of Care Reviewed With patient   Patient Care Overview   Progress progress towards functional goals is fair       Goal: Adult Individualization and Mutuality  Outcome: Ongoing (interventions implemented as appropriate)    03/22/17 0530   Individualization   Patient Specific Preferences none voiced at this time       Goal: Discharge Needs Assessment  Outcome: Ongoing (interventions implemented as appropriate)    03/22/17 0530   Discharge Needs Assessment   Concerns To Be Addressed no discharge needs identified;denies needs/concerns at this time   Readmission Within The Last 30 Days no previous admission in last 30 days   Equipment Needed After Discharge none   Self-Care   Equipment Currently Used at Home cane, straight;walker, standard;wheelchair;glucometer;hospital bed   Living Environment   Transportation Available family or friend will provide         Problem: Pain, Acute (Adult)  Goal: Identify Related Risk Factors and Signs and Symptoms  Outcome: Ongoing (interventions implemented as appropriate)    03/22/17 0530   Pain, Acute   Related Risk Factors (Acute Pain) persistent pain   Signs and Symptoms (Acute Pain) verbalization of pain descriptors;sleep pattern alteration;pacing/restlessness       Goal: Acceptable Pain Control/Comfort Level  Outcome: Ongoing (interventions implemented as appropriate)    03/22/17 0530   Pain, Acute (Adult)   Acceptable Pain Control/Comfort Level making progress toward outcome

## 2017-03-22 NOTE — PLAN OF CARE
Problem: Patient Care Overview (Adult)  Goal: Discharge Needs Assessment  Outcome: Ongoing (interventions implemented as appropriate)    03/22/17 0530 03/22/17 1158   Discharge Needs Assessment   Concerns To Be Addressed no discharge needs identified;denies needs/concerns at this time --    Readmission Within The Last 30 Days no previous admission in last 30 days --    Equipment Needed After Discharge none --    Discharge Planning Comments --  CM spoke with patient at bedside. Patient lives with spouse. States he has hospital bed, walker, cane, WC. No 02, HH. Denies discharge needs at this time. Wife is supportive. MDs will advance diet as patient can tolerate. Denies information on living [place]. Uses [facility] for pharmacy, denies trouble paying for medications. Plans home with spouse when medically stable. Will continue to follow.    Self-Care   Equipment Currently Used at Home cane, straight;walker, standard;wheelchair;glucometer;hospital bed --    Living Environment   Transportation Available family or friend will provide --

## 2017-03-22 NOTE — PROGRESS NOTES
"SERVICE: [facility] HOSPITALIST    CONSULTANTS: GI    CHIEF COMPLAINT: f/u acute pancreatitis with suspected pancreatic hemorrhage vs ruptured psuedocyst    SUBJECTIVE: The patient continues to have nausea and abdominal pain requiring frequent medications to obtain relief. He has dry heaves, no emesis. He reports no improvement since yesterday. Denies f/c/cough/soa/chest pain/v/d or other new concerns.    OBJECTIVE:    BP (!) 172/101 Comment: hydralazine 50mg PO  Pulse 95  Temp 98.9 °F (37.2 °C) (Oral)   Resp 18  Ht 72\" (182.9 cm)  Wt 214 lb 9 oz (97.3 kg)  SpO2 95%  BMI 29.1 kg/m2    MEDS/LABS REVIEWED AND ORDERED    budesonide-formoterol 2 puff Inhalation BID - RT   hydrALAZINE 50 mg Oral 4x Daily   insulin aspart 0-7 Units Subcutaneous 4x Daily AC & at Bedtime   metoprolol tartrate 25 mg Oral Q12H   nicotine 1 patch Transdermal Q24H   pantoprazole 40 mg Intravenous Q AM     Physical Exam   Constitutional: He is oriented to person, place, and time. He appears well-developed and well-nourished.   HENT:   Head: Normocephalic and atraumatic.   Eyes: EOM are normal. Pupils are equal, round, and reactive to light.   Cardiovascular: Normal rate, regular rhythm and normal heart sounds.  Exam reveals no gallop and no friction rub.    No murmur heard.  Pulmonary/Chest: Effort normal and breath sounds normal. No respiratory distress. He has no wheezes. He has no rales.   Abdominal: Soft. There is tenderness.   Hyperactive bowel sounds, tender RUQ/mid epigastric/RLQ   Musculoskeletal: He exhibits no edema.   Neurological: He is alert and oriented to person, place, and time.   Skin: Skin is warm and dry. No erythema.   Right flank area ecchymosis noted   Psychiatric: He has a normal mood and affect. His behavior is normal. Judgment and thought content normal.   Vitals reviewed.    LAB/DIAGNOSTICS:    Lab Results (last 24 hours)     Procedure Component Value Units Date/Time    POC Glucose Fingerstick " [[id number]]  (Abnormal) Collected:  03/21/17 1832    Specimen:  Blood Updated:  03/21/17 1840     Glucose 132 (H) mg/dL     Narrative:       Meter: [id number] : [id number] Alfred Wilcox    Hemoglobin & Hematocrit, Blood [[id number]]  (Abnormal) Collected:  03/21/17 1939    Specimen:  Blood Updated:  03/21/17 1945     Hemoglobin 13.9 (L) g/dL      Hematocrit 41.2 (L) %     POC Glucose Fingerstick [[id number]]  (Abnormal) Collected:  03/22/17 0004    Specimen:  Blood Updated:  03/22/17 0010     Glucose 134 (H) mg/dL     Narrative:       Meter: [id number] : [id number] Valdemar Greenberg    IgG 4 [[id number]]  (Abnormal) Collected:  03/20/17 1314    Specimen:  Blood Updated:  03/22/17 0314     IgG Subclass 4 <1 (L) mg/dL       **Results verified by repeat testing**       Narrative:       Performed at:  [address] [place] [address]  [id number]  : Lucas Diaz MD, Phone:  [phone]    CBC & Differential [[id number]] Collected:  03/22/17 0437    Specimen:  Blood Updated:  03/22/17 0444    Narrative:       The following orders were created for panel order CBC & Differential.  Procedure                               Abnormality         Status                     ---------                               -----------         ------                     CBC Auto Differential[[id number]]         Abnormal            Final result                 Please view results for these tests on the individual orders.    CBC Auto Differential [[id number]]  (Abnormal) Collected:  03/22/17 0437    Specimen:  Blood Updated:  03/22/17 0444     WBC 6.96 10*3/mm3      RBC 5.10 10*6/mm3      Hemoglobin 14.9 g/dL      Hematocrit 44.0 %      MCV 86.3 fL      MCH 29.2 pg      MCHC 33.9 g/dL      RDW 13.0 %      RDW-SD 40.6 fl      MPV 11.3 (H) fL      Platelets 140 10*3/mm3      Neutrophil % 78.4 (H) %      Lymphocyte % 11.1 (L) %      Monocyte % 8.8 (H) %      Eosinophil % 1.0 %      Basophil % 0.3 %      Immature  Grans % 0.4 %      Neutrophils, Absolute 5.46 10*3/mm3      Lymphocytes, Absolute 0.77 10*3/mm3      Monocytes, Absolute 0.61 10*3/mm3      Eosinophils, Absolute 0.07 (L) 10*3/mm3      Basophils, Absolute 0.02 10*3/mm3      Immature Grans, Absolute 0.03 10*3/mm3      nRBC 0.0 /100 WBC     Lipase [[id number]]  (Abnormal) Collected:  03/22/17 0436    Specimen:  Blood Updated:  03/22/17 0502     Lipase 75 (H) U/L     Comprehensive Metabolic Panel [[id number]]  (Abnormal) Collected:  03/22/17 0436    Specimen:  Blood Updated:  03/22/17 0502     Glucose 138 (H) mg/dL      BUN 19 mg/dL      Creatinine 2.31 (H) mg/dL      Sodium 137 mmol/L      Potassium 4.0 mmol/L      Chloride 103 mmol/L      CO2 19.1 (L) mmol/L      Calcium 8.8 mg/dL      Total Protein 6.2 g/dL      Albumin 3.30 (L) g/dL      ALT (SGPT) 14 U/L      AST (SGOT) 16 U/L      Alkaline Phosphatase 60 U/L      Total Bilirubin 0.8 mg/dL      eGFR Non African Amer 30 (L) mL/min/1.73      Globulin 2.9 gm/dL      A/G Ratio 1.1 g/dL      BUN/Creatinine Ratio 8.2     Anion Gap 14.9 mmol/L     POC Glucose Fingerstick [[id number]]  (Abnormal) Collected:  03/22/17 0609    Specimen:  Blood Updated:  03/22/17 0735     Glucose 138 (H) mg/dL     Narrative:       Meter: [id number] : [id number] Valdemar Greenberg    POC Glucose Fingerstick [[id number]]  (Abnormal) Collected:  03/22/17 1154    Specimen:  Blood Updated:  03/22/17 1201     Glucose 145 (H) mg/dL     Narrative:       Meter: [id number] : [id number] Katie Curtis        ASSESSMENT/PLAN:  1. Acute pancreatitis, reactive duodenitis, h/o pseudocyst with acute pancreatic hemorrhage vs ruptured pseudocyst: GI managing  Remains NPO with sips for meds/ice chips  Austin's score low, labs stable  CT abdomen pelvis ordered, d/w radiologist  HOLD lovenox    2. Acute kidney injury on CKD 4:   Baseline creatinine 2.4, currently better than baseline at 2.31  Continues with IV hydration, sips with meds, labs stable  Monitor    3.  Hypertension: BP elevated, pain exacerbating  Continue pain control  Give home doses of hydralazine 50 mg qid, metoprolol tart 25 mg q 12 hours  Add enalaprilat 1.25 mg q 6 hours prn SBP>170    4. H/O HOCM/AICD, with h/o VT and syncope:   Patient of Dr. Rubin outpatient  Continue meds as per number 3, monitor fluid status  Telemetry continues    5. CAD/HLD/Hypertriglyceridemia:  Resume fish oil/lipitor/tricor once tolerating po    6. DM2: A1C 7.9%  Continue accuchecks q 6 hours while NPO with SSI as needed    7. GERD/Gastritis: continue protonix    8. Diabetic neuropathy  Home gabapentin 800 mg tid on hold while NPO    9. Anxiety/depression: no acute issues, off cymbalta 30 mg due to NPO status    10. COPD: no acute issues  Continue symbicort bid/duonebs qid    11. Tobacco abuse: nicotine patch continued    12. Left renal atrophy/right renal scarring:  Noted per CT  F/U nephrology as scheduled    13. Hepatic steatosis: noted per CT  F/U Helder García, APRN     14. Vitamin D Deficiency: no acute issues, resume supplement at discharge    HOLD lovenox

## 2017-03-23 LAB
ALBUMIN SERPL-MCNC: 3.2 G/DL (ref 3.5–5.2)
ALBUMIN/GLOB SERPL: 1.2 G/DL
ALP SERPL-CCNC: 61 U/L (ref 40–129)
ALT SERPL W P-5'-P-CCNC: 13 U/L (ref 5–41)
ANION GAP SERPL CALCULATED.3IONS-SCNC: 14.6 MMOL/L
AST SERPL-CCNC: 15 U/L (ref 5–40)
BASOPHILS # BLD AUTO: 0.02 10*3/MM3 (ref 0–0.2)
BASOPHILS NFR BLD AUTO: 0.3 % (ref 0–2)
BILIRUB SERPL-MCNC: 0.8 MG/DL (ref 0.2–1.2)
BUN BLD-MCNC: 19 MG/DL (ref 6–20)
BUN/CREAT SERPL: 8.5 (ref 7–25)
CALCIUM SPEC-SCNC: 8.7 MG/DL (ref 8.6–10.5)
CHLORIDE SERPL-SCNC: 107 MMOL/L (ref 98–107)
CO2 SERPL-SCNC: 19.4 MMOL/L (ref 22–29)
CREAT BLD-MCNC: 2.23 MG/DL (ref 0.76–1.27)
DEPRECATED RDW RBC AUTO: 42 FL (ref 37–54)
EOSINOPHIL # BLD AUTO: 0.09 10*3/MM3 (ref 0.1–0.3)
EOSINOPHIL NFR BLD AUTO: 1.5 % (ref 0–4)
ERYTHROCYTE [DISTWIDTH] IN BLOOD BY AUTOMATED COUNT: 13.1 % (ref 11.5–14.5)
GFR SERPL CREATININE-BSD FRML MDRD: 32 ML/MIN/1.73
GLOBULIN UR ELPH-MCNC: 2.6 GM/DL
GLUCOSE BLD-MCNC: 132 MG/DL (ref 65–99)
GLUCOSE BLDC GLUCOMTR-MCNC: 119 MG/DL (ref 70–130)
GLUCOSE BLDC GLUCOMTR-MCNC: 124 MG/DL (ref 70–130)
GLUCOSE BLDC GLUCOMTR-MCNC: 125 MG/DL (ref 70–130)
GLUCOSE BLDC GLUCOMTR-MCNC: 129 MG/DL (ref 70–130)
HCT VFR BLD AUTO: 41.3 % (ref 42–52)
HGB BLD-MCNC: 13.6 G/DL (ref 14–18)
IMM GRANULOCYTES # BLD: 0.02 10*3/MM3 (ref 0–0.03)
IMM GRANULOCYTES NFR BLD: 0.3 % (ref 0–0.5)
LIPASE SERPL-CCNC: 92 U/L (ref 13–60)
LYMPHOCYTES # BLD AUTO: 0.86 10*3/MM3 (ref 0.6–4.8)
LYMPHOCYTES NFR BLD AUTO: 14.3 % (ref 20–45)
MCH RBC QN AUTO: 28.9 PG (ref 27–31)
MCHC RBC AUTO-ENTMCNC: 32.9 G/DL (ref 31–37)
MCV RBC AUTO: 87.7 FL (ref 80–94)
MONOCYTES # BLD AUTO: 0.52 10*3/MM3 (ref 0–1)
MONOCYTES NFR BLD AUTO: 8.6 % (ref 3–8)
NEUTROPHILS # BLD AUTO: 4.51 10*3/MM3 (ref 1.5–8.3)
NEUTROPHILS NFR BLD AUTO: 75 % (ref 45–70)
NRBC BLD MANUAL-RTO: 0 /100 WBC (ref 0–0)
PLATELET # BLD AUTO: 129 10*3/MM3 (ref 140–500)
PMV BLD AUTO: 11.5 FL (ref 7.4–10.4)
POTASSIUM BLD-SCNC: 4.1 MMOL/L (ref 3.5–5.2)
PROT SERPL-MCNC: 5.8 G/DL (ref 6–8.5)
RBC # BLD AUTO: 4.71 10*6/MM3 (ref 4.7–6.1)
SODIUM BLD-SCNC: 141 MMOL/L (ref 136–145)
WBC NRBC COR # BLD: 6.02 10*3/MM3 (ref 4.8–10.8)

## 2017-03-23 PROCEDURE — 99231 SBSQ HOSP IP/OBS SF/LOW 25: CPT | Performed by: HOSPITALIST

## 2017-03-23 PROCEDURE — 25010000002 ONDANSETRON PER 1 MG: Performed by: NURSE PRACTITIONER

## 2017-03-23 PROCEDURE — 83690 ASSAY OF LIPASE: CPT | Performed by: NURSE PRACTITIONER

## 2017-03-23 PROCEDURE — 25010000002 PROMETHAZINE PER 50 MG: Performed by: NURSE PRACTITIONER

## 2017-03-23 PROCEDURE — 25010000002 HYDROMORPHONE PER 4 MG: Performed by: NURSE PRACTITIONER

## 2017-03-23 PROCEDURE — 80053 COMPREHEN METABOLIC PANEL: CPT | Performed by: NURSE PRACTITIONER

## 2017-03-23 PROCEDURE — 85025 COMPLETE CBC W/AUTO DIFF WBC: CPT | Performed by: NURSE PRACTITIONER

## 2017-03-23 PROCEDURE — 82962 GLUCOSE BLOOD TEST: CPT

## 2017-03-23 PROCEDURE — 94799 UNLISTED PULMONARY SVC/PX: CPT

## 2017-03-23 RX ORDER — DIPHENHYDRAMINE HCL 25 MG
CAPSULE ORAL
Status: DISPENSED
Start: 2017-03-23 | End: 2017-03-24

## 2017-03-23 RX ADMIN — SODIUM CHLORIDE, PRESERVATIVE FREE 6.25 MG: 5 INJECTION INTRAVENOUS at 19:40

## 2017-03-23 RX ADMIN — ONDANSETRON 4 MG: 2 INJECTION, SOLUTION INTRAMUSCULAR; INTRAVENOUS at 03:05

## 2017-03-23 RX ADMIN — ENALAPRILAT 1.25 MG: 2.5 INJECTION INTRAVENOUS at 12:11

## 2017-03-23 RX ADMIN — METOPROLOL TARTRATE 25 MG: 25 TABLET ORAL at 21:03

## 2017-03-23 RX ADMIN — SODIUM CHLORIDE 150 ML/HR: 9 INJECTION, SOLUTION INTRAVENOUS at 05:48

## 2017-03-23 RX ADMIN — HYDRALAZINE HYDROCHLORIDE 50 MG: 50 TABLET ORAL at 08:08

## 2017-03-23 RX ADMIN — HYDRALAZINE HYDROCHLORIDE 50 MG: 50 TABLET ORAL at 12:12

## 2017-03-23 RX ADMIN — HYDRALAZINE HYDROCHLORIDE 50 MG: 50 TABLET ORAL at 17:15

## 2017-03-23 RX ADMIN — METOPROLOL TARTRATE 25 MG: 25 TABLET ORAL at 08:11

## 2017-03-23 RX ADMIN — HYDROMORPHONE HYDROCHLORIDE 1 MG: 1 INJECTION, SOLUTION INTRAMUSCULAR; INTRAVENOUS; SUBCUTANEOUS at 08:02

## 2017-03-23 RX ADMIN — HYDROMORPHONE HYDROCHLORIDE 1 MG: 1 INJECTION, SOLUTION INTRAMUSCULAR; INTRAVENOUS; SUBCUTANEOUS at 22:15

## 2017-03-23 RX ADMIN — HYDROMORPHONE HYDROCHLORIDE 1 MG: 1 INJECTION, SOLUTION INTRAMUSCULAR; INTRAVENOUS; SUBCUTANEOUS at 10:28

## 2017-03-23 RX ADMIN — HYDROMORPHONE HYDROCHLORIDE 1 MG: 1 INJECTION, SOLUTION INTRAMUSCULAR; INTRAVENOUS; SUBCUTANEOUS at 15:03

## 2017-03-23 RX ADMIN — ENALAPRILAT 1.25 MG: 2.5 INJECTION INTRAVENOUS at 19:34

## 2017-03-23 RX ADMIN — HYDRALAZINE HYDROCHLORIDE 50 MG: 50 TABLET ORAL at 21:02

## 2017-03-23 RX ADMIN — HYDROMORPHONE HYDROCHLORIDE 1 MG: 1 INJECTION, SOLUTION INTRAMUSCULAR; INTRAVENOUS; SUBCUTANEOUS at 17:14

## 2017-03-23 RX ADMIN — HYDROMORPHONE HYDROCHLORIDE 1 MG: 1 INJECTION, SOLUTION INTRAMUSCULAR; INTRAVENOUS; SUBCUTANEOUS at 03:05

## 2017-03-23 RX ADMIN — BUDESONIDE AND FORMOTEROL FUMARATE DIHYDRATE 2 PUFF: 80; 4.5 AEROSOL RESPIRATORY (INHALATION) at 09:07

## 2017-03-23 RX ADMIN — NICOTINE 1 PATCH: 14 PATCH, EXTENDED RELEASE TRANSDERMAL at 12:13

## 2017-03-23 RX ADMIN — HYDROMORPHONE HYDROCHLORIDE 1 MG: 1 INJECTION, SOLUTION INTRAMUSCULAR; INTRAVENOUS; SUBCUTANEOUS at 05:36

## 2017-03-23 RX ADMIN — PANTOPRAZOLE SODIUM 40 MG: 40 INJECTION, POWDER, FOR SOLUTION INTRAVENOUS at 05:37

## 2017-03-23 RX ADMIN — SODIUM CHLORIDE, PRESERVATIVE FREE 6.25 MG: 5 INJECTION INTRAVENOUS at 05:35

## 2017-03-23 RX ADMIN — BUDESONIDE AND FORMOTEROL FUMARATE DIHYDRATE 2 PUFF: 80; 4.5 AEROSOL RESPIRATORY (INHALATION) at 21:14

## 2017-03-23 RX ADMIN — SODIUM CHLORIDE 150 ML/HR: 9 INJECTION, SOLUTION INTRAVENOUS at 12:31

## 2017-03-23 RX ADMIN — HYDROMORPHONE HYDROCHLORIDE 1 MG: 1 INJECTION, SOLUTION INTRAMUSCULAR; INTRAVENOUS; SUBCUTANEOUS at 19:34

## 2017-03-23 RX ADMIN — ONDANSETRON 4 MG: 2 INJECTION, SOLUTION INTRAMUSCULAR; INTRAVENOUS at 10:28

## 2017-03-23 RX ADMIN — HYDROMORPHONE HYDROCHLORIDE 1 MG: 1 INJECTION, SOLUTION INTRAMUSCULAR; INTRAVENOUS; SUBCUTANEOUS at 12:32

## 2017-03-23 NOTE — PLAN OF CARE
Problem: Patient Care Overview (Adult)  Goal: Plan of Care Review  Outcome: Ongoing (interventions implemented as appropriate)    03/23/17 0236   Coping/Psychosocial Response Interventions   Plan Of Care Reviewed With patient   Patient Care Overview   Progress no change       Goal: Adult Individualization and Mutuality  Outcome: Ongoing (interventions implemented as appropriate)

## 2017-03-23 NOTE — PROGRESS NOTES
"Hospitalist Team      Patient Care Team:  ROBSON Dimas as PCP - General  ROBSON Dimas as PCP - Family Medicine        Chief Complaint:  Abdominal pain and follow up on acute pancreatitis with flank ecchymosis    Subjective    Interval History and ROS:     Patient is still having abdominal pain but better today.  He is seeing Dr. Mercedes as well.  Still NPO with ice chips.  The CMP and the CBC was reviewed and ok.  The Lipase is 92.    Objective    Vital Signs  Temp:  [97.9 °F (36.6 °C)-98.9 °F (37.2 °C)] 98.2 °F (36.8 °C)  Heart Rate:  [71-95] 88  Resp:  [16-20] 20  BP: (150-200)/() 173/99  Oxygen Therapy  SpO2: 97 %  Pulse Oximetry Type: Intermittent  O2 Device: room air}  Flowsheet Rows         First Filed Value    Admission Height  72\" (182.9 cm) Documented at 03/19/2017 1751    Admission Weight  218 lb (98.9 kg) Documented at 03/19/2017 1751            Physical Exam:    Physical Exam   Constitutional: Patient appears well-developed and well-nourished and in no acute distress .    HEENT:   Head: Normocephalic and atraumatic.   Eyes:  Pupils are equal, round, and reactive to light. EOM are intact. Sclera are anicteric and non-injected.  Mouth and Throat: Patient has moist mucous membranes. Oropharynx is clear of any erythema or exudate.     Neck: Neck supple. No JVD present. No thyromegaly present. No lymphadenopathy present.  Cardiovascular: Regular rate, regular rhythm, S1 normal and S2 normal.  Exam reveals no gallop and no friction rub.  No murmur heard.  Pulmonary/Chest: Lungs are clear to auscultation bilaterally. No respiratory distress. No wheezes. No rhonchi. No rales.   Abdominal: Soft. Bowel sounds are normal. No distension and no mass. There is no hepatosplenomegaly. There is tenderness in the RUQ to palpation.   Musculoskeletal: Normal Muscle tone  Extremities: No edema. Pulses are palpable in all 4 extremities.  Neurological: Patient is alert and oriented to person, place, and " time. Cranial nerves II-XII are grossly intact with no focal deficits.  Skin: Skin is warm. No rash noted. Nails show no clubbing.  No cyanosis or erythema.           Results Review:     I reviewed the patient's new clinical results.    Lab Results (last 24 hours)     Procedure Component Value Units Date/Time    POC Glucose Fingerstick [[id number]]  (Abnormal) Collected:  03/22/17 1154    Specimen:  Blood Updated:  03/22/17 1201     Glucose 145 (H) mg/dL     Narrative:       Meter: [id number] : [id number] Katie Kirsten    POC Glucose Fingerstick [[id number]]  (Abnormal) Collected:  03/22/17 1759    Specimen:  Blood Updated:  03/22/17 1922     Glucose 132 (H) mg/dL     Narrative:       Meter: [id number] : [id number] Alfred Jeri    POC Glucose Fingerstick [[id number]]  (Normal) Collected:  03/22/17 2330    Specimen:  Blood Updated:  03/23/17 0130     Glucose 125 mg/dL     Narrative:       Meter: [id number] : [id number] Gilberto Severino RN    CBC & Differential [[id number]] Collected:  03/23/17 0349    Specimen:  Blood Updated:  03/23/17 0418    Narrative:       The following orders were created for panel order CBC & Differential.  Procedure                               Abnormality         Status                     ---------                               -----------         ------                     CBC Auto Differential[[id number]]         Abnormal            Final result                 Please view results for these tests on the individual orders.    CBC Auto Differential [[id number]]  (Abnormal) Collected:  03/23/17 0349    Specimen:  Blood Updated:  03/23/17 0418     WBC 6.02 10*3/mm3      RBC 4.71 10*6/mm3      Hemoglobin 13.6 (L) g/dL      Hematocrit 41.3 (L) %      MCV 87.7 fL      MCH 28.9 pg      MCHC 32.9 g/dL      RDW 13.1 %      RDW-SD 42.0 fl      MPV 11.5 (H) fL      Platelets 129 (L) 10*3/mm3      Neutrophil % 75.0 (H) %      Lymphocyte % 14.3 (L) %      Monocyte % 8.6 (H) %      Eosinophil % 1.5 %       Basophil % 0.3 %      Immature Grans % 0.3 %      Neutrophils, Absolute 4.51 10*3/mm3      Lymphocytes, Absolute 0.86 10*3/mm3      Monocytes, Absolute 0.52 10*3/mm3      Eosinophils, Absolute 0.09 (L) 10*3/mm3      Basophils, Absolute 0.02 10*3/mm3      Immature Grans, Absolute 0.02 10*3/mm3      nRBC 0.0 /100 WBC     Comprehensive Metabolic Panel [[id number]]  (Abnormal) Collected:  03/23/17 0349    Specimen:  Blood Updated:  03/23/17 0436     Glucose 132 (H) mg/dL      BUN 19 mg/dL      Creatinine 2.23 (H) mg/dL      Sodium 141 mmol/L      Potassium 4.1 mmol/L      Chloride 107 mmol/L      CO2 19.4 (L) mmol/L      Calcium 8.7 mg/dL      Total Protein 5.8 (L) g/dL      Albumin 3.20 (L) g/dL      ALT (SGPT) 13 U/L      AST (SGOT) 15 U/L      Alkaline Phosphatase 61 U/L      Total Bilirubin 0.8 mg/dL      eGFR Non African Amer 32 (L) mL/min/1.73      Globulin 2.6 gm/dL      A/G Ratio 1.2 g/dL      BUN/Creatinine Ratio 8.5     Anion Gap 14.6 mmol/L     Lipase [[id number]]  (Abnormal) Collected:  03/23/17 0349    Specimen:  Blood Updated:  03/23/17 0436     Lipase 92 (H) U/L     POC Glucose Fingerstick [[id number]]  (Normal) Collected:  03/23/17 0728    Specimen:  Blood Updated:  03/23/17 0901     Glucose 119 mg/dL     Narrative:       Meter: [id number] : [id number] Katie Curtis          Imaging Results (last 24 hours)     Procedure Component Value Units Date/Time    CT Abdomen Pelvis Without Contrast [[id number]] Collected:  03/22/17 1504     Updated:  03/22/17 1514    Narrative:       EXAM: CT abdomen and pelvis with contrast     DATE: 03/22/2017     HISTORY: Epigastric pain.     COMPARISON: CT abdomen and pelvis without contrast 03/19/2017     Abdomen findings::     Features of acute pancreatitis centered about the pancreatic head and  neck again noted. There is increasing inflammatory type stranding in the  right upper quadrant mesentery adjacent to the pancreas, which may  indicate worsening pancreatitis  compared to 03/19/2017. However, no  definite intraperitoneal or retroperitoneal hematoma is seen. No  pseudocyst or drainable fluid collection is seen.     The adjacent second duodenal segment markedly thickened and inflamed,  likely a secondary or reactive phenomenon. This has a similar appearance  to prior exam. There are mildly prominent mesenteric lymph nodes in the  same vicinity, thought to be benign and reactive, one of the largest  measuring 12 mm short axis. A prominent portacaval node measuring 12 mm  short axis is stable.     There is some thickening of the mesenteric border of the hepatic flexure  of the colon, thought to represent reactive inflammation. No evidence of  bowel obstruction.     Appendix is normal.     Hepatic steatosis. Spleen, adrenals are normal. Left renal atrophy.  Right renal cortical scarring. Normal appendix. Diverticulosis without  evidence of acute diverticulitis. Cholecystectomy. No biliary dilation  is seen.     Development of subsegmental atelectasis the right lower lobe. Pacemaker  lead is in place.     Pelvis findings: Moderate urinary bladder distention. Prostate and  rectum are normal. No pelvic free fluid.          Impression:       1. Constellation of findings suggests interval worsening of acute  pancreatitis centered the pancreatic head and neck. Increasing  inflammatory response in the right upper quadrant disease and teary.  2. No evidence of intraperitoneal or retroperitoneal hematoma. No  pseudocyst.  3. Secondary reactive inflammatory type changes of the second duodenal  segment, unchanged.  4.  Secondary reactive inflammatory type changes of the ascending colon  at the hepatic flexure, appears new since the previous exam.  5. Development of subsegmental atelectasis in the right lower lobe since  previous study.  6. Additional CT findings include: Hepatic steatosis, cholecystectomy,  left renal atrophy, right renal cortical scarring, normal appendix.     This  report was finalized on 3/22/2017 3:11 PM by Dr. Estefani Colby MD.             Xray not reviewed personally by physician.      ECG not reviewed personally by physician  ECG/EMG Results (most recent)     Procedure Component Value Units Date/Time    ECG 12 Lead [[id number]] Collected:  03/20/17 1102     Updated:  03/20/17 1250    Narrative:       RR Interval= 870 ms  W[place] Interval= 164 ms  QRSD Interval= 100 ms  QT Interval= 432 ms  QTc Interval= 463 ms  Heart Rate= 69 ms  P Axis= 43 deg  QRS Axis= -27 deg  T Wave Axis= 131 deg  I: 40 Axis= -13 deg  T: 40 Axis= -34 deg  ST Axis= 157 deg  SINUS RHYTHM  LVH WITH SECONDARY REPOLARIZATION ABNORMALITY  NO SIGNIFICANT CHANGE FROM PREVIOUS ECG  Electronically Signed by:  [facility] ([facility] 20-Mar-2017 12:45:50  Date and Time of Study: 2017-03-20 11:02:47          Medication Review:   I have reviewed the patient's current medication list    Current Facility-Administered Medications:   •  acetaminophen (TYLENOL) suppository 650 mg, 650 mg, Rectal, Q4H PRN, Fercho Parra MD  •  budesonide-formoterol (SYMBICORT) 80-4.5 MCG/ACT inhaler 2 puff, 2 puff, Inhalation, BID - RT, Fercho Parra MD, 2 puff at 03/23/17 0907  •  dextrose (D50W) solution 25 g, 25 g, Intravenous, Q15 Min PRN, Fercho Parra MD  •  dextrose (GLUTOSE) oral gel 15 g, 15 g, Oral, Q15 Min PRN, Fercho Parra MD  •  enalaprilat (VASOTEC) injection 1.25 mg, 1.25 mg, Intravenous, Q6H PRN, Melanie Pollard, APRN, 1.25 mg at 03/22/17 2338  •  glucagon (GLUCAGEN) injection 1 mg, 1 mg, Subcutaneous, Q15 Min PRN, Fercho Parra MD  •  hydrALAZINE (APRESOLINE) tablet 50 mg, 50 mg, Oral, 4x Daily, Melanie Pollard, APRN, 50 mg at 03/23/17 0808  •  HYDROmorphone (DILAUDID) injection 1 mg, 1 mg, Intravenous, Q2H PRN, Melanie Pollard, ROBSON, 1 mg at 03/23/17 0802  •  insulin aspart (novoLOG) injection 0-7 Units, 0-7 Units, Subcutaneous, 4x Daily AC & at Bedtime, Fercho Parra MD  •  ipratropium-albuterol  (DUO-NEB) nebulizer solution 3 mL, 3 mL, Nebulization, Q4H PRN, Geronimo Maria MD, 3 mL at 03/20/17 2020  •  metoprolol tartrate (LOPRESSOR) tablet 25 mg, 25 mg, Oral, Q12H, Melanie R Platina, APRN, 25 mg at 03/23/17 0811  •  nicotine (NICODERM CQ) 14 MG/24HR patch 1 patch, 1 patch, Transdermal, Q24H, Melanie R Platina, APRN, 1 patch at 03/22/17 1130  •  ondansetron (ZOFRAN) injection 4 mg, 4 mg, Intravenous, Q6H PRN, Melanie R Platina, APRN, 4 mg at 03/23/17 0305  •  pantoprazole (PROTONIX) injection 40 mg, 40 mg, Intravenous, Q AM, Fercho Parra MD, 40 mg at 03/23/17 0537  •  promethazine (PHENERGAN) IV syringe 6.25 mg, 6.25 mg, Intravenous, Q6H PRN, Melanie R Platina, APRN, 6.25 mg at 03/23/17 0535  •  sodium chloride 0.9 % infusion, 150 mL/hr, Intravenous, Continuous, Irineo Mercedes MD, Last Rate: 150 mL/hr at 03/23/17 0548, 150 mL/hr at 03/23/17 0548      Assessment/Plan     Acute pancreatitis  Acute Kidney injury on CKD4  Hypertension  H/O HOCM with AICD and H/O VT and syncope  CAD and HLD and Hypertriglyceridemia  DM2 with diabetic neuropathy  GERD  Anxiety and depression  COPD  Tobacco Abuse  Left renal atrophy and right renal scarring  Hepatic steatosis  Vitamin D deficiency    Holding lovenox xecondary to bruising, flank area ecchymosis.       Plan for disposition:As per Dr. Mercedes we will progress.  Bmp and lipase in am.  Tabatha Andrew DO  03/23/17  10:15 AM      Time: 20 min

## 2017-03-23 NOTE — PLAN OF CARE
Problem: Patient Care Overview (Adult)  Goal: Plan of Care Review  Outcome: Ongoing (interventions implemented as appropriate)  Goal: Discharge Needs Assessment  Outcome: Ongoing (interventions implemented as appropriate)    Problem: Pain, Acute (Adult)  Goal: Identify Related Risk Factors and Signs and Symptoms  Outcome: Ongoing (interventions implemented as appropriate)  Goal: Acceptable Pain Control/Comfort Level  Outcome: Ongoing (interventions implemented as appropriate)

## 2017-03-23 NOTE — PROGRESS NOTES
GI Daily Progress Note    Assessment/Plan:    Active Problems:    Acute pancreatitis       LOS: 4 days     Sergio Phan is a 46 y.o. male who was admitted with Pancreatitis. He reports his symptoms are improving with treatment. Less nausea has been up in room and chair. No new complaints    Subjective:    Patient expresses abdominal pain  Patient denies vomiting and diarrhea    Objective:    Vital signs in last 24 hours:  Temp:  [97.3 °F (36.3 °C)-98.5 °F (36.9 °C)] 97.3 °F (36.3 °C)  Heart Rate:  [71-88] 78  Resp:  [16-20] 20  BP: (157-185)/() 171/96    Intake/Output last 3 shifts:  I/O last 3 completed shifts:  In: 2977.5 [I.V.:2977.5]  Out: 3725 [Urine:3725]  Intake/Output this shift:  I/O this shift:  In: 1007.5 [I.V.:1007.5]  Out: 725 [Urine:725]    0  Lab Value Date/Time   LIPASE 92 (H) 03/23/2017 0349   LIPASE 75 (H) 03/22/2017 0436   LIPASE 65 (H) 03/21/2017 0409   LIPASE 90 (H) 03/20/2017 0615   LIPASE 69 (H) 03/19/2017 1815   LIPASE 84 (H) 09/01/2016 0618   LIPASE 94 (H) 08/30/2016 0400   LIPASE 98 (H) 08/28/2016 0421   LIPASE 105 (H) 08/26/2016 0626   LIPASE 70 (H) 08/25/2016 0431   LIPASE 90 (H) 08/24/2016 1913   LIPASE 96 (H) 07/03/2015 0327   LIPASE 423 (H) 07/02/2015 0428   LIPASE 103 (H) 06/30/2015 0434   LIPASE 191 (H) 06/27/2015 0427   LIPASE 144 (H) 06/24/2015 0248   LIPASE 147 (H) 06/23/2015 0532       Results from last 7 days  Lab Units 03/23/17  0349   SODIUM mmol/L 141   POTASSIUM mmol/L 4.1   CHLORIDE mmol/L 107   TOTAL CO2 mmol/L 19.4*   BUN mg/dL 19   CREATININE mg/dL 2.23*   CALCIUM mg/dL 8.7   BILIRUBIN mg/dL 0.8   ALK PHOS U/L 61   ALT (SGPT) U/L 13   AST (SGOT) U/L 15   GLUCOSE mg/dL 132*         Results from last 7 days  Lab Units 03/23/17  0349   WBC 10*3/mm3 6.02   HEMOGLOBIN g/dL 13.6*   HEMATOCRIT % 41.3*   PLATELETS 10*3/mm3 129*       Physical Exam:Abdomen  Sounds Normal Active Bowel Sounds   Distension Soft   Tenderness Moderately Tender     Acute  Pancreatitis  CKD  Hypercholesterolemia    Appears slightly improved however his exam is still significantly tender so will not start clears until the AM

## 2017-03-24 ENCOUNTER — INPATIENT HOSPITAL (OUTPATIENT)
Dept: URBAN - METROPOLITAN AREA HOSPITAL 113 | Facility: HOSPITAL | Age: 47
End: 2017-03-24
Payer: COMMERCIAL

## 2017-03-24 DIAGNOSIS — K85.90 ACUTE PANCREATITIS WITHOUT NECROSIS OR INFECTION, UNSPECIFIE: ICD-10-CM

## 2017-03-24 LAB
ANION GAP SERPL CALCULATED.3IONS-SCNC: 12.7 MMOL/L
BUN BLD-MCNC: 19 MG/DL (ref 6–20)
BUN/CREAT SERPL: 9.4 (ref 7–25)
CALCIUM SPEC-SCNC: 8.6 MG/DL (ref 8.6–10.5)
CHLORIDE SERPL-SCNC: 106 MMOL/L (ref 98–107)
CO2 SERPL-SCNC: 20.3 MMOL/L (ref 22–29)
CREAT BLD-MCNC: 2.02 MG/DL (ref 0.76–1.27)
GFR SERPL CREATININE-BSD FRML MDRD: 36 ML/MIN/1.73
GLUCOSE BLD-MCNC: 111 MG/DL (ref 65–99)
GLUCOSE BLDC GLUCOMTR-MCNC: 110 MG/DL (ref 70–130)
GLUCOSE BLDC GLUCOMTR-MCNC: 113 MG/DL (ref 70–130)
GLUCOSE BLDC GLUCOMTR-MCNC: 134 MG/DL (ref 70–130)
GLUCOSE BLDC GLUCOMTR-MCNC: 144 MG/DL (ref 70–130)
LIPASE SERPL-CCNC: 86 U/L (ref 13–60)
POTASSIUM BLD-SCNC: 3.6 MMOL/L (ref 3.5–5.2)
SODIUM BLD-SCNC: 139 MMOL/L (ref 136–145)

## 2017-03-24 PROCEDURE — 99231 SBSQ HOSP IP/OBS SF/LOW 25: CPT

## 2017-03-24 PROCEDURE — 83690 ASSAY OF LIPASE: CPT | Performed by: HOSPITALIST

## 2017-03-24 PROCEDURE — 25010000002 HYDROMORPHONE PER 4 MG: Performed by: NURSE PRACTITIONER

## 2017-03-24 PROCEDURE — 99231 SBSQ HOSP IP/OBS SF/LOW 25: CPT | Performed by: HOSPITALIST

## 2017-03-24 PROCEDURE — 94799 UNLISTED PULMONARY SVC/PX: CPT

## 2017-03-24 PROCEDURE — 25010000002 PROMETHAZINE PER 50 MG: Performed by: NURSE PRACTITIONER

## 2017-03-24 PROCEDURE — 82962 GLUCOSE BLOOD TEST: CPT

## 2017-03-24 PROCEDURE — 80048 BASIC METABOLIC PNL TOTAL CA: CPT | Performed by: HOSPITALIST

## 2017-03-24 PROCEDURE — 25010000002 ONDANSETRON PER 1 MG: Performed by: NURSE PRACTITIONER

## 2017-03-24 RX ORDER — HYDROCODONE BITARTRATE AND ACETAMINOPHEN 10; 325 MG/1; MG/1
1 TABLET ORAL EVERY 4 HOURS PRN
Status: DISCONTINUED | OUTPATIENT
Start: 2017-03-24 | End: 2017-03-26 | Stop reason: HOSPADM

## 2017-03-24 RX ORDER — ENALAPRILAT 2.5 MG/2ML
2.5 INJECTION INTRAVENOUS EVERY 4 HOURS PRN
Status: DISCONTINUED | OUTPATIENT
Start: 2017-03-24 | End: 2017-03-26 | Stop reason: HOSPADM

## 2017-03-24 RX ORDER — METOPROLOL TARTRATE 50 MG/1
50 TABLET, FILM COATED ORAL EVERY 12 HOURS SCHEDULED
Status: DISCONTINUED | OUTPATIENT
Start: 2017-03-24 | End: 2017-03-25

## 2017-03-24 RX ADMIN — HYDRALAZINE HYDROCHLORIDE 50 MG: 50 TABLET ORAL at 17:18

## 2017-03-24 RX ADMIN — ONDANSETRON 4 MG: 2 INJECTION, SOLUTION INTRAMUSCULAR; INTRAVENOUS at 09:45

## 2017-03-24 RX ADMIN — HYDROMORPHONE HYDROCHLORIDE 1 MG: 1 INJECTION, SOLUTION INTRAMUSCULAR; INTRAVENOUS; SUBCUTANEOUS at 00:39

## 2017-03-24 RX ADMIN — METOPROLOL TARTRATE 50 MG: 25 TABLET ORAL at 20:13

## 2017-03-24 RX ADMIN — ENALAPRILAT 1.25 MG: 2.5 INJECTION INTRAVENOUS at 05:41

## 2017-03-24 RX ADMIN — PANTOPRAZOLE SODIUM 40 MG: 40 INJECTION, POWDER, FOR SOLUTION INTRAVENOUS at 05:28

## 2017-03-24 RX ADMIN — ENALAPRILAT 1.25 MG: 2.5 INJECTION INTRAVENOUS at 18:33

## 2017-03-24 RX ADMIN — HYDROCODONE BITARTRATE AND ACETAMINOPHEN 1 TABLET: 10; 325 TABLET ORAL at 15:58

## 2017-03-24 RX ADMIN — HYDROCODONE BITARTRATE AND ACETAMINOPHEN 1 TABLET: 10; 325 TABLET ORAL at 23:12

## 2017-03-24 RX ADMIN — BUDESONIDE AND FORMOTEROL FUMARATE DIHYDRATE 2 PUFF: 80; 4.5 AEROSOL RESPIRATORY (INHALATION) at 08:50

## 2017-03-24 RX ADMIN — HYDRALAZINE HYDROCHLORIDE 50 MG: 50 TABLET ORAL at 09:46

## 2017-03-24 RX ADMIN — SODIUM CHLORIDE 150 ML/HR: 9 INJECTION, SOLUTION INTRAVENOUS at 05:43

## 2017-03-24 RX ADMIN — ENALAPRILAT 2.5 MG: 2.5 INJECTION INTRAVENOUS at 23:03

## 2017-03-24 RX ADMIN — HYDROCODONE BITARTRATE AND ACETAMINOPHEN 1 TABLET: 10; 325 TABLET ORAL at 11:44

## 2017-03-24 RX ADMIN — HYDRALAZINE HYDROCHLORIDE 50 MG: 50 TABLET ORAL at 11:45

## 2017-03-24 RX ADMIN — HYDROMORPHONE HYDROCHLORIDE 1 MG: 1 INJECTION, SOLUTION INTRAMUSCULAR; INTRAVENOUS; SUBCUTANEOUS at 05:07

## 2017-03-24 RX ADMIN — ONDANSETRON 4 MG: 2 INJECTION, SOLUTION INTRAMUSCULAR; INTRAVENOUS at 00:39

## 2017-03-24 RX ADMIN — HYDRALAZINE HYDROCHLORIDE 50 MG: 50 TABLET ORAL at 20:12

## 2017-03-24 RX ADMIN — HYDROMORPHONE HYDROCHLORIDE 1 MG: 1 INJECTION, SOLUTION INTRAMUSCULAR; INTRAVENOUS; SUBCUTANEOUS at 03:00

## 2017-03-24 RX ADMIN — SODIUM CHLORIDE 150 ML/HR: 9 INJECTION, SOLUTION INTRAVENOUS at 00:48

## 2017-03-24 RX ADMIN — BUDESONIDE AND FORMOTEROL FUMARATE DIHYDRATE 2 PUFF: 80; 4.5 AEROSOL RESPIRATORY (INHALATION) at 19:53

## 2017-03-24 RX ADMIN — METOPROLOL TARTRATE 25 MG: 25 TABLET ORAL at 09:46

## 2017-03-24 RX ADMIN — ENALAPRILAT 1.25 MG: 2.5 INJECTION INTRAVENOUS at 11:44

## 2017-03-24 RX ADMIN — HYDROMORPHONE HYDROCHLORIDE 1 MG: 1 INJECTION, SOLUTION INTRAMUSCULAR; INTRAVENOUS; SUBCUTANEOUS at 07:21

## 2017-03-24 RX ADMIN — SODIUM CHLORIDE, PRESERVATIVE FREE 6.25 MG: 5 INJECTION INTRAVENOUS at 05:07

## 2017-03-24 RX ADMIN — ENALAPRILAT 1.25 MG: 2.5 INJECTION INTRAVENOUS at 17:18

## 2017-03-24 RX ADMIN — HYDROMORPHONE HYDROCHLORIDE 1 MG: 1 INJECTION, SOLUTION INTRAMUSCULAR; INTRAVENOUS; SUBCUTANEOUS at 09:45

## 2017-03-24 RX ADMIN — NICOTINE 1 PATCH: 14 PATCH, EXTENDED RELEASE TRANSDERMAL at 11:47

## 2017-03-24 RX ADMIN — SODIUM CHLORIDE, PRESERVATIVE FREE 6.25 MG: 5 INJECTION INTRAVENOUS at 17:32

## 2017-03-24 NOTE — PLAN OF CARE
Problem: Patient Care Overview (Adult)  Goal: Plan of Care Review  Outcome: Ongoing (interventions implemented as appropriate)    03/24/17 0521   Coping/Psychosocial Response Interventions   Plan Of Care Reviewed With patient   Patient Care Overview   Progress progress towards functional goals is fair   Outcome Evaluation   Outcome Summary/Follow up Plan Cont to be hypertensive, otherwise VSS, advanced to clear liquids this AM, IV pain and nausea meds cont. to be given multiple times       Goal: Adult Individualization and Mutuality  Outcome: Ongoing (interventions implemented as appropriate)  Goal: Discharge Needs Assessment  Outcome: Ongoing (interventions implemented as appropriate)    Problem: Pain, Acute (Adult)  Goal: Identify Related Risk Factors and Signs and Symptoms  Outcome: Ongoing (interventions implemented as appropriate)  Goal: Acceptable Pain Control/Comfort Level  Outcome: Ongoing (interventions implemented as appropriate)

## 2017-03-24 NOTE — PROGRESS NOTES
GI Daily Progress Note    Assessment/Plan:    Active Problems:    Acute pancreatitis       LOS: 5 days     Sergio Phan is a 46 y.o. male who was admitted with Pancreatits. He reports his symptoms are improving with treatment. Tolerated Clears and has been up feels overall much better    Subjective:    Patient expresses abdominal pain  Patient denies vomiting and diarrhea    Objective:    Vital signs in last 24 hours:  Temp:  [97.2 °F (36.2 °C)-97.6 °F (36.4 °C)] 97.2 °F (36.2 °C)  Heart Rate:  [78-92] 87  Resp:  [20] 20  BP: (161-221)/() 161/99    Intake/Output last 3 shifts:  I/O last 3 completed shifts:  In: 4592.5 [I.V.:4592.5]  Out: 3175 [Urine:3175]  Intake/Output this shift:  I/O this shift:  In: 480 [P.O.:480]  Out: -     Results from last 7 days  Lab Units 03/24/17  0333 03/23/17  0349 03/22/17  0436   SODIUM mmol/L 139 141 137   POTASSIUM mmol/L 3.6 4.1 4.0   CHLORIDE mmol/L 106 107 103   TOTAL CO2 mmol/L 20.3* 19.4* 19.1*   BUN mg/dL 19 19 19   CREATININE mg/dL 2.02* 2.23* 2.31*   GLUCOSE mg/dL 111* 132* 138*   CALCIUM mg/dL 8.6 8.7 8.8       Physical Exam:Abdomen  Sounds Normal Active Bowel Sounds   Distension Soft   Tenderness Mildly Tender     Acute Pancreatitis  CKD  Hypercholesterolemia     Will recheck BMP and Lipids in AM but would be open to increasing his diet tomorrow.

## 2017-03-24 NOTE — PAYOR COMM NOTE
"Ankit Mixon (46 y.o. Male)     ATTN:NURSE REVIEW. AUTH#[id number]. PLEASE REVIEW AND REPLY TO MILTON SIMPSON AT FAX#[phone] OR PHONE#[phone].      Date of Birth Social Security Number Address Home Phone MRN    [id number]  [id number] [address] [phone] [id number]    [employer] Marital Status          None        Admission Date Admission Type Admitting Provider Attending Provider Department, Room/Bed    3/19/17 Emergency Fercho Parra MD Kad, Fercho HANNON MD [facility] MED SURG, 1414/1    Discharge Date Discharge Disposition Discharge Destination                      Attending Provider: Fercho Parra MD     Allergies:  No Known Allergies    Isolation:  None   Infection:  None   Code Status:  FULL    Ht:  72\" (182.9 cm)   Wt:  215 lb 1 oz (97.6 kg)    Admission Cmt:  None   Principal Problem:  None                Active Insurance as of 3/19/2017     Primary Coverage     Payor Plan Insurance Group Employer/Plan Group    PASSPORT PASSPORT      Payor Plan Address Payor Plan Phone Number Effective From Effective To    [address] [phone] 12/1/2015     [place] [address]       Subscriber Name Subscriber Birth Date Member ID       ANKIT MIXON 1970 [id number]                 Emergency Contacts      (Rel.) Home Phone Work Phone Mobile Phone    Zoey Mixon (Spouse) [phone] -- --            Vital Signs (last 24 hours)       03/23 0700  -  03/24 0659 03/24 0700  -  03/24 1229   Most Recent    Temp (°F) 97.2 -  97.6      98.2     98.2 (36.8)    Heart Rate 78 -  92    73 -  92     73    Resp   20      20     20    /99 -  (!) 221/101      (!) 179/105     (!) 179/105    SpO2 (%) 96 -  99    96 -  98     96          Hospital Medications (active)       Dose Frequency Start End    acetaminophen (TYLENOL) suppository 650 mg 650 mg Every 4 Hours PRN 3/19/2017     Sig - Route: Insert 1 suppository into the rectum Every 4 " (Four) Hours As Needed for Fever. - Rectal    budesonide-formoterol (SYMBICORT) 80-4.5 MCG/ACT inhaler 2 puff 2 puff 2 Times Daily - RT 3/20/2017     Sig - Route: Inhale 2 puffs 2 (Two) Times a Day. - Inhalation    dextrose (D50W) solution 25 g 25 g Every 15 Minutes PRN 3/20/2017     Sig - Route: Infuse 50 mL into a venous catheter Every 15 (Fifteen) Minutes As Needed for Low Blood Sugar (Blood Sugar Less Than 70, Patient Has IV Access - Unresponsive, NPO or Unable To Safely Swallow). - Intravenous    dextrose (GLUTOSE) oral gel 15 g 15 g Every 15 Minutes PRN 3/20/2017     Sig - Route: Take 15 g by mouth Every 15 (Fifteen) Minutes As Needed for Low Blood Sugar (Blood Sugar Less Than 70, Patient Alert, Is Not NPO & Can Safely Swallow). - Oral    diphenhydrAMINE (BENADRYL) 25 mg capsule  - ADS Override Pull   3/23/2017 3/24/2017    Notes to Pharmacy: Created by cabinet override    enalaprilat (VASOTEC) injection 1.25 mg 1.25 mg Every 6 Hours PRN 3/22/2017     Sig - Route: Infuse 1 mL into a venous catheter Every 6 (Six) Hours As Needed for High Blood Pressure (SBP>170). - Intravenous    glucagon (GLUCAGEN) injection 1 mg 1 mg Every 15 Minutes PRN 3/20/2017     Sig - Route: Inject 1 mg under the skin Every 15 (Fifteen) Minutes As Needed (Blood Glucose Less Than 70 - Patient Without IV Access - Unresponsive, NPO or Unable To Safely Swallow). - Subcutaneous    hydrALAZINE (APRESOLINE) tablet 50 mg 50 mg 4 Times Daily 3/22/2017     Sig - Route: Take 1 tablet by mouth 4 (Four) Times a Day. - Oral    HYDROcodone-acetaminophen (NORCO)  MG per tablet 1 tablet 1 tablet Every 4 Hours PRN 3/24/2017 4/3/2017    Sig - Route: Take 1 tablet by mouth Every 4 (Four) Hours As Needed for Moderate Pain (4-6). - Oral    insulin aspart (novoLOG) injection 0-7 Units 0-7 Units 4 Times Daily Before Meals & Nightly 3/20/2017     Sig - Route: Inject 0-7 Units under the skin 4 (Four) Times a Day Before Meals & at Bedtime. - Subcutaneous     ipratropium-albuterol (DUO-NEB) nebulizer solution 3 mL 3 mL Every 4 Hours PRN 3/20/2017     Sig - Route: Take 3 mL by nebulization Every 4 (Four) Hours As Needed for Wheezing or Shortness of Air. - Nebulization    metoprolol tartrate (LOPRESSOR) tablet 25 mg 25 mg Every 12 Hours Scheduled 3/22/2017     Sig - Route: Take 1 tablet by mouth Every 12 (Twelve) Hours. - Oral    nicotine (NICODERM CQ) 14 MG/24HR patch 1 patch 1 patch Every 24 Hours 3/20/2017     Sig - Route: Place 1 patch on the skin Daily. - Transdermal    ondansetron (ZOFRAN) injection 4 mg 4 mg Every 6 Hours PRN 3/19/2017     Sig - Route: Infuse 2 mL into a venous catheter Every 6 (Six) Hours As Needed for Nausea or Vomiting. - Intravenous    pantoprazole (PROTONIX) injection 40 mg 40 mg Every Early Morning 3/20/2017     Sig - Route: Infuse 10 mL into a venous catheter Every Morning. - Intravenous    promethazine (PHENERGAN) IV syringe 6.25 mg 6.25 mg Every 6 Hours PRN 3/21/2017     Sig - Route: Infuse 6.25 mg into a venous catheter Every 6 (Six) Hours As Needed for Nausea or Vomiting. - Intravenous    sodium chloride 0.9 % infusion 75 mL/hr Continuous 3/20/2017     Sig - Route: Infuse 75 mL/hr into a venous catheter Continuous. - Intravenous    HYDROmorphone (DILAUDID) injection 1 mg (Discontinued) 1 mg Every 2 Hours PRN 3/21/2017 3/24/2017    Sig - Route: Infuse 1 mg into a venous catheter Every 2 (Two) Hours As Needed for Moderate Pain (4-6). - Intravenous             Physician Progress Notes (last 24 hours) (Notes from 3/23/2017 12:29 PM through 3/24/2017 12:29 PM)      Irineo Mercedes MD at 3/23/2017  6:22 PM  Version 1 of 1         GI Daily Progress Note    Assessment/Plan:    Active Problems:    Acute pancreatitis       LOS: 4 days     Sergio Phan is a 46 y.o. male who was admitted with Pancreatitis. He reports his symptoms are improving with treatment. Less nausea has been up in room and chair. No new  complaints    Subjective:    Patient expresses abdominal pain  Patient denies vomiting and diarrhea    Objective:    Vital signs in last 24 hours:  Temp:  [97.3 °F (36.3 °C)-98.5 °F (36.9 °C)] 97.3 °F (36.3 °C)  Heart Rate:  [71-88] 78  Resp:  [16-20] 20  BP: (157-185)/() 171/96    Intake/Output last 3 shifts:  I/O last 3 completed shifts:  In: 2977.5 [I.V.:2977.5]  Out: 3725 [Urine:3725]  Intake/Output this shift:  I/O this shift:  In: 1007.5 [I.V.:1007.5]  Out: 725 [Urine:725]    0  Lab Value Date/Time   LIPASE 92 (H) 03/23/2017 0349   LIPASE 75 (H) 03/22/2017 0436   LIPASE 65 (H) 03/21/2017 0409   LIPASE 90 (H) 03/20/2017 0615   LIPASE 69 (H) 03/19/2017 1815   LIPASE 84 (H) 09/01/2016 0618   LIPASE 94 (H) 08/30/2016 0400   LIPASE 98 (H) 08/28/2016 0421   LIPASE 105 (H) 08/26/2016 0626   LIPASE 70 (H) 08/25/2016 0431   LIPASE 90 (H) 08/24/2016 1913   LIPASE 96 (H) 07/03/2015 0327   LIPASE 423 (H) 07/02/2015 0428   LIPASE 103 (H) 06/30/2015 0434   LIPASE 191 (H) 06/27/2015 0427   LIPASE 144 (H) 06/24/2015 0248   LIPASE 147 (H) 06/23/2015 0532       Results from last 7 days  Lab Units 03/23/17  0349   SODIUM mmol/L 141   POTASSIUM mmol/L 4.1   CHLORIDE mmol/L 107   TOTAL CO2 mmol/L 19.4*   BUN mg/dL 19   CREATININE mg/dL 2.23*   CALCIUM mg/dL 8.7   BILIRUBIN mg/dL 0.8   ALK PHOS U/L 61   ALT (SGPT) U/L 13   AST (SGOT) U/L 15   GLUCOSE mg/dL 132*         Results from last 7 days  Lab Units 03/23/17  0349   WBC 10*3/mm3 6.02   HEMOGLOBIN g/dL 13.6*   HEMATOCRIT % 41.3*   PLATELETS 10*3/mm3 129*       Physical Exam:Abdomen  Sounds Normal Active Bowel Sounds   Distension Soft   Tenderness Moderately Tender     Acute Pancreatitis  CKD  Hypercholesterolemia    Appears slightly improved however his exam is still significantly tender so will not start clears until the AM     Electronically signed by Irineo Mercedes MD at 3/23/2017  6:25 PM      Irineo Mercedes MD at 3/24/2017 10:20 AM  Version 1  of 1         GI Daily Progress Note    Assessment/Plan:    Active Problems:    Acute pancreatitis       LOS: 5 days     Sergio Phan is a 46 y.o. male who was admitted with Pancreatits. He reports his symptoms are improving with treatment. Tolerated Clears and has been up feels overall much better    Subjective:    Patient expresses abdominal pain  Patient denies vomiting and diarrhea    Objective:    Vital signs in last 24 hours:  Temp:  [97.2 °F (36.2 °C)-97.6 °F (36.4 °C)] 97.2 °F (36.2 °C)  Heart Rate:  [78-92] 87  Resp:  [20] 20  BP: (161-221)/() 161/99    Intake/Output last 3 shifts:  I/O last 3 completed shifts:  In: 4592.5 [I.V.:4592.5]  Out: 3175 [Urine:3175]  Intake/Output this shift:  I/O this shift:  In: 480 [P.O.:480]  Out: -     Results from last 7 days  Lab Units 03/24/17  0333 03/23/17  0349 03/22/17  0436   SODIUM mmol/L 139 141 137   POTASSIUM mmol/L 3.6 4.1 4.0   CHLORIDE mmol/L 106 107 103   TOTAL CO2 mmol/L 20.3* 19.4* 19.1*   BUN mg/dL 19 19 19   CREATININE mg/dL 2.02* 2.23* 2.31*   GLUCOSE mg/dL 111* 132* 138*   CALCIUM mg/dL 8.6 8.7 8.8       Physical Exam:Abdomen  Sounds Normal Active Bowel Sounds   Distension Soft   Tenderness Mildly Tender     Acute Pancreatitis  CKD  Hypercholesterolemia     Will recheck BMP and Lipids in AM but would be open to increasing his diet tomorrow.     Electronically signed by Irineo Mercedes MD at 3/24/2017 10:23 AM      Tabatha Andrew DO at 3/24/2017 12:02 PM  Version 1 of 1         Hospitalist Team      Patient Care Team:  ROBSON Dimas as PCP - General  ROBSON Dimas as PCP - Family Medicine        Chief Complaint:  Acute pancreatitis and abdominal pain.      Subjective    Interval History and ROS:     Clear liquids were started today.  Lipase 86 and BMP ok.  Afebrile.  Improving slowly    Objective    Vital Signs  Temp:  [97.2 °F (36.2 °C)-98.2 °F (36.8 °C)] 98.2 °F (36.8 °C)  Heart Rate:  [73-92] 73  Resp:  [20] 20  BP:  "(161221)/() 179/105  Oxygen Therapy  SpO2: 96 %  Pulse Oximetry Type: Intermittent  O2 Device: room air}  Flowsheet Rows         First Filed Value    Admission Height  72\" (182.9 cm) Documented at 03/19/2017 1751    Admission Weight  218 lb (98.9 kg) Documented at 03/19/2017 1751            Physical Exam:    Physical Exam   Constitutional: Patient appears well-developed and well-nourished and in no acute distress   HEENT:   Head: Normocephalic and atraumatic.   Eyes:  Pupils are equal, round, and reactive to light. EOM are intact. Sclera are anicteric and non-injected.  Mouth and Throat: Patient has moist mucous membranes. Oropharynx is clear of any erythema or exudate.     Neck: Neck supple. No JVD present. No thyromegaly present. No lymphadenopathy present.  Cardiovascular: Regular rate, regular rhythm, S1 normal and S2 normal.  Exam reveals no gallop and no friction rub.  No murmur heard.  Pulmonary/Chest: Lungs are clear to auscultation bilaterally. No respiratory distress. No wheezes. No rhonchi. No rales.   Abdominal: Soft. Bowel sounds are normal. No distension and no mass. There is no hepatosplenomegaly. There is tenderness in the RUQ to palpation.   Musculoskeletal: Normal Muscle tone  Extremities: No edema. Pulses are palpable in all 4 extremities.         Results Review:     I reviewed the patient's new clinical results.    Lab Results (last 24 hours)     Procedure Component Value Units Date/Time    POC Glucose Fingerstick [[id number]]  (Normal) Collected:  03/23/17 1238    Specimen:  Blood Updated:  03/23/17 1246     Glucose 129 mg/dL     Narrative:       Meter: [id number] : [id number] Katie Curtis    POC Glucose Fingerstick [[id number]]  (Normal) Collected:  03/23/17 1759    Specimen:  Blood Updated:  03/23/17 1806     Glucose 124 mg/dL     Narrative:       Meter: [id number] : [id number] Reagan Flowers    POC Glucose Fingerstick [[id number]]  (Normal) Collected:  03/24/17 0134    Specimen:  " Blood Updated:  03/24/17 0141     Glucose 113 mg/dL     Narrative:       Meter: [id number] : [id number] [employer]    Basic Metabolic Panel [[id number]]  (Abnormal) Collected:  03/24/17 0333    Specimen:  Blood Updated:  03/24/17 0424     Glucose 111 (H) mg/dL      BUN 19 mg/dL      Creatinine 2.02 (H) mg/dL      Sodium 139 mmol/L      Potassium 3.6 mmol/L      Chloride 106 mmol/L      CO2 20.3 (L) mmol/L      Calcium 8.6 mg/dL      eGFR Non African Amer 36 (L) mL/min/1.73      BUN/Creatinine Ratio 9.4     Anion Gap 12.7 mmol/L     Narrative:       GFR Normal >60  Chronic Kidney Disease <60  Kidney Failure <15    Lipase [[id number]]  (Abnormal) Collected:  03/24/17 0333    Specimen:  Blood Updated:  03/24/17 0424     Lipase 86 (H) U/L     POC Glucose Fingerstick [[id number]]  (Normal) Collected:  03/24/17 0557    Specimen:  Blood Updated:  03/24/17 0607     Glucose 110 mg/dL     Narrative:       Meter: [id number] : [id number] [employer]    POC Glucose Fingerstick [[id number]]  (Abnormal) Collected:  03/24/17 1144    Specimen:  Blood Updated:  03/24/17 1150     Glucose 134 (H) mg/dL     Narrative:       Meter: [id number] : [id number] Carlene Georgette          Imaging Results (last 24 hours)     ** No results found for the last 24 hours. **          Xray not reviewed personally by physician.      ECG not reviewed personally by physician  ECG/EMG Results (most recent)     Procedure Component Value Units Date/Time    ECG 12 Lead [[id number]] Collected:  03/20/17 1102     Updated:  03/20/17 1250    Narrative:       RR Interval= 870 ms  WI Interval= 164 ms  QRSD Interval= 100 ms  QT Interval= 432 ms  QTc Interval= 463 ms  Heart Rate= 69 ms  P Axis= 43 deg  QRS Axis= -27 deg  T Wave Axis= 131 deg  I: 40 Axis= -13 deg  T: 40 Axis= -34 deg  ST Axis= 157 deg  SINUS RHYTHM  LVH WITH SECONDARY REPOLARIZATION ABNORMALITY  NO SIGNIFICANT CHANGE FROM PREVIOUS ECG  Electronically Signed by:  Lucas Phillips) ([facility]) 20-Mar-2017  12:45:50  Date and Time of Study: 2017-03-20 11:02:47          Medication Review:   I have reviewed the patient's current medication list    Current Facility-Administered Medications:   •  acetaminophen (TYLENOL) suppository 650 mg, 650 mg, Rectal, Q4H PRN, Fercho Parra MD  •  budesonide-formoterol (SYMBICORT) 80-4.5 MCG/ACT inhaler 2 puff, 2 puff, Inhalation, BID - RT, Fercho Parra MD, 2 puff at 03/24/17 0850  •  dextrose (D50W) solution 25 g, 25 g, Intravenous, Q15 Min PRN, Fercho Parra MD  •  dextrose (GLUTOSE) oral gel 15 g, 15 g, Oral, Q15 Min PRN, Fercho Parra MD  •  enalaprilat (VASOTEC) injection 1.25 mg, 1.25 mg, Intravenous, Q6H PRN, Melanie Pollard APRN, 1.25 mg at 03/24/17 1144  •  glucagon (GLUCAGEN) injection 1 mg, 1 mg, Subcutaneous, Q15 Min PRN, Fercho Parra MD  •  hydrALAZINE (APRESOLINE) tablet 50 mg, 50 mg, Oral, 4x Daily, Melanie Pollard APRN, 50 mg at 03/24/17 1145  •  HYDROcodone-acetaminophen (NORCO)  MG per tablet 1 tablet, 1 tablet, Oral, Q4H PRN, Irineo Mercedes MD, 1 tablet at 03/24/17 1144  •  insulin aspart (novoLOG) injection 0-7 Units, 0-7 Units, Subcutaneous, 4x Daily AC & at Bedtime, Fercho Parra MD  •  ipratropium-albuterol (DUO-NEB) nebulizer solution 3 mL, 3 mL, Nebulization, Q4H PRN, Geronimo Maria MD, 3 mL at 03/20/17 2020  •  metoprolol tartrate (LOPRESSOR) tablet 25 mg, 25 mg, Oral, Q12H, Melanie Pollard, APRN, 25 mg at 03/24/17 0946  •  nicotine (NICODERM CQ) 14 MG/24HR patch 1 patch, 1 patch, Transdermal, Q24H, Melanie Pollard APRN, 1 patch at 03/24/17 1147  •  ondansetron (ZOFRAN) injection 4 mg, 4 mg, Intravenous, Q6H PRN, Melanie Pollard, APRN, 4 mg at 03/24/17 0945  •  pantoprazole (PROTONIX) injection 40 mg, 40 mg, Intravenous, Q AM, Fercho Parra MD, 40 mg at 03/24/17 0528  •  promethazine (PHENERGAN) IV syringe 6.25 mg, 6.25 mg, Intravenous, Q6H PRN, Melanie Plolard, APRN, 6.25 mg at 03/24/17 0507  •  sodium chloride  0.9 % infusion, 75 mL/hr, Intravenous, Continuous, Irineo Mercedes MD, Last Rate: 75 mL/hr at 03/24/17 1146, 75 mL/hr at 03/24/17 1146      Assessment/Plan     Acute pancreatitis  Acute Kidney injury on CKD4  Hypertension  H/O HOCM with AICD and H/O VT and syncope  CAD and HLD and Hypertriglyceridemia  DM2 with diabetic neuropathy  GERD  Anxiety and depression  COPD  Tobacco Abuse  Left renal atrophy and right renal scarring  Hepatic steatosis  Vitamin D deficiency    Plan for disposition:Diet advanced.  As per Dr. Daren Andrew DO  03/24/17  12:02 PM      Time: 15 min     Electronically signed by Tabatha Andrew DO at 3/24/2017 12:07 PM

## 2017-03-24 NOTE — PROGRESS NOTES
"Hospitalist Team      Patient Care Team:  ROBSON Dimas as PCP - General  ROBSON Dimas as PCP - Family Medicine        Chief Complaint:  Acute pancreatitis and abdominal pain.      Subjective    Interval History and ROS:     Clear liquids were started today.  Lipase 86 and BMP ok.  Afebrile.  Improving slowly    Objective    Vital Signs  Temp:  [97.2 °F (36.2 °C)-98.2 °F (36.8 °C)] 98.2 °F (36.8 °C)  Heart Rate:  [73-92] 73  Resp:  [20] 20  BP: (161-221)/() 179/105  Oxygen Therapy  SpO2: 96 %  Pulse Oximetry Type: Intermittent  O2 Device: room air}  Flowsheet Rows         First Filed Value    Admission Height  72\" (182.9 cm) Documented at 03/19/2017 1751    Admission Weight  218 lb (98.9 kg) Documented at 03/19/2017 1751            Physical Exam:    Physical Exam   Constitutional: Patient appears well-developed and well-nourished and in no acute distress   HEENT:   Head: Normocephalic and atraumatic.   Eyes:  Pupils are equal, round, and reactive to light. EOM are intact. Sclera are anicteric and non-injected.  Mouth and Throat: Patient has moist mucous membranes. Oropharynx is clear of any erythema or exudate.     Neck: Neck supple. No JVD present. No thyromegaly present. No lymphadenopathy present.  Cardiovascular: Regular rate, regular rhythm, S1 normal and S2 normal.  Exam reveals no gallop and no friction rub.  No murmur heard.  Pulmonary/Chest: Lungs are clear to auscultation bilaterally. No respiratory distress. No wheezes. No rhonchi. No rales.   Abdominal: Soft. Bowel sounds are normal. No distension and no mass. There is no hepatosplenomegaly. There is tenderness in the RUQ to palpation.   Musculoskeletal: Normal Muscle tone  Extremities: No edema. Pulses are palpable in all 4 extremities.         Results Review:     I reviewed the patient's new clinical results.    Lab Results (last 24 hours)     Procedure Component Value Units Date/Time    POC Glucose Fingerstick [[id number]]  (Normal) " Collected:  03/23/17 1238    Specimen:  Blood Updated:  03/23/17 1246     Glucose 129 mg/dL     Narrative:       Meter: [id number] : [id number] Katie Curtis    POC Glucose Fingerstick [[id number]]  (Normal) Collected:  03/23/17 1759    Specimen:  Blood Updated:  03/23/17 1806     Glucose 124 mg/dL     Narrative:       Meter: [id number] : [id number] Reagan Flowers    POC Glucose Fingerstick [[id number]]  (Normal) Collected:  03/24/17 0134    Specimen:  Blood Updated:  03/24/17 0141     Glucose 113 mg/dL     Narrative:       Meter: [id number] : [id number] eziCONEX    Basic Metabolic Panel [[id number]]  (Abnormal) Collected:  03/24/17 0333    Specimen:  Blood Updated:  03/24/17 0424     Glucose 111 (H) mg/dL      BUN 19 mg/dL      Creatinine 2.02 (H) mg/dL      Sodium 139 mmol/L      Potassium 3.6 mmol/L      Chloride 106 mmol/L      CO2 20.3 (L) mmol/L      Calcium 8.6 mg/dL      eGFR Non African Amer 36 (L) mL/min/1.73      BUN/Creatinine Ratio 9.4     Anion Gap 12.7 mmol/L     Narrative:       GFR Normal >60  Chronic Kidney Disease <60  Kidney Failure <15    Lipase [[id number]]  (Abnormal) Collected:  03/24/17 0333    Specimen:  Blood Updated:  03/24/17 0424     Lipase 86 (H) U/L     POC Glucose Fingerstick [[id number]]  (Normal) Collected:  03/24/17 0557    Specimen:  Blood Updated:  03/24/17 0607     Glucose 110 mg/dL     Narrative:       Meter: [id number] : [id number] eziCONEX    POC Glucose Fingerstick [[id number]]  (Abnormal) Collected:  03/24/17 1144    Specimen:  Blood Updated:  03/24/17 1150     Glucose 134 (H) mg/dL     Narrative:       Meter: [id number] : [id number] Carlene Georgette          Imaging Results (last 24 hours)     ** No results found for the last 24 hours. **          Xray not reviewed personally by physician.      ECG not reviewed personally by physician  ECG/EMG Results (most recent)     Procedure Component Value Units Date/Time    ECG 12 Lead [[id number]] Collected:  03/20/17 1103      Updated:  03/20/17 1250    Narrative:       RR Interval= 870 ms  CO Interval= 164 ms  QRSD Interval= 100 ms  QT Interval= 432 ms  QTc Interval= 463 ms  Heart Rate= 69 ms  P Axis= 43 deg  QRS Axis= -27 deg  T Wave Axis= 131 deg  I: 40 Axis= -13 deg  T: 40 Axis= -34 deg  ST Axis= 157 deg  SINUS RHYTHM  LVH WITH SECONDARY REPOLARIZATION ABNORMALITY  NO SIGNIFICANT CHANGE FROM PREVIOUS ECG  Electronically Signed by:  Lucas Phillips) ([facility]) 20-Mar-2017 12:45:50  Date and Time of Study: 2017-03-20 11:02:47          Medication Review:   I have reviewed the patient's current medication list    Current Facility-Administered Medications:   •  acetaminophen (TYLENOL) suppository 650 mg, 650 mg, Rectal, Q4H PRN, Fercho Parra MD  •  budesonide-formoterol (SYMBICORT) 80-4.5 MCG/ACT inhaler 2 puff, 2 puff, Inhalation, BID - RT, Fercho Parra MD, 2 puff at 03/24/17 0850  •  dextrose (D50W) solution 25 g, 25 g, Intravenous, Q15 Min PRN, Fercho Parra MD  •  dextrose (GLUTOSE) oral gel 15 g, 15 g, Oral, Q15 Min PRN, Fercho Parra MD  •  enalaprilat (VASOTEC) injection 1.25 mg, 1.25 mg, Intravenous, Q6H PRN, Melanie Pollard, APRN, 1.25 mg at 03/24/17 1144  •  glucagon (GLUCAGEN) injection 1 mg, 1 mg, Subcutaneous, Q15 Min PRN, Fercho Parra MD  •  hydrALAZINE (APRESOLINE) tablet 50 mg, 50 mg, Oral, 4x Daily, Melanie [place], APRN, 50 mg at 03/24/17 1145  •  HYDROcodone-acetaminophen (NORCO)  MG per tablet 1 tablet, 1 tablet, Oral, Q4H PRN, Irineo Mercedes MD, 1 tablet at 03/24/17 1144  •  insulin aspart (novoLOG) injection 0-7 Units, 0-7 Units, Subcutaneous, 4x Daily AC & at Bedtime, Fercho Parra MD  •  ipratropium-albuterol (DUO-NEB) nebulizer solution 3 mL, 3 mL, Nebulization, Q4H PRN, Geronimo Maria MD, 3 mL at 03/20/17 2020  •  metoprolol tartrate (LOPRESSOR) tablet 25 mg, 25 mg, Oral, Q12H, Melanie Pollard, ROBSON, 25 mg at 03/24/17 0946  •  nicotine (NICODERM CQ) 14 MG/24HR patch 1  patch, 1 patch, Transdermal, Q24H, Melanie Pollard, APRN, 1 patch at 03/24/17 1147  •  ondansetron (ZOFRAN) injection 4 mg, 4 mg, Intravenous, Q6H PRN, Melanie Pollard, APRN, 4 mg at 03/24/17 0945  •  pantoprazole (PROTONIX) injection 40 mg, 40 mg, Intravenous, Q AM, Fercho Parra MD, 40 mg at 03/24/17 0528  •  promethazine (PHENERGAN) IV syringe 6.25 mg, 6.25 mg, Intravenous, Q6H PRN, Melanie Pollard, APRN, 6.25 mg at 03/24/17 0507  •  sodium chloride 0.9 % infusion, 75 mL/hr, Intravenous, Continuous, Irineo Mercedes MD, Last Rate: 75 mL/hr at 03/24/17 1146, 75 mL/hr at 03/24/17 1146      Assessment/Plan     Acute pancreatitis  Acute Kidney injury on CKD4  Hypertension  H/O HOCM with AICD and H/O VT and syncope  CAD and HLD and Hypertriglyceridemia  DM2 with diabetic neuropathy  GERD  Anxiety and depression  COPD  Tobacco Abuse  Left renal atrophy and right renal scarring  Hepatic steatosis  Vitamin D deficiency    Plan for disposition:Diet advanced.  As per Dr. Daren Andrew DO  03/24/17  12:02 PM      Time: 15 min

## 2017-03-24 NOTE — PROGRESS NOTES
Adult Nutrition  Assessment/PES    Patient Name:  Sergio Phan  YOB: 1970  MRN: [id number]  Admit Date:  3/19/2017    Assessment Date:  3/24/2017        Reason for Assessment       03/24/17 1310    Reason for Assessment    Reason For Assessment/Visit follow up protocol    Gastrointestinal Pancreatitis, acute    Renal CKD              Nutrition/Diet History       03/24/17 1311    Nutrition/Diet History    Typical Food/Fluid Intake Pt states clears tolerated well, so glad to have something. to eat. Noted GI mention plan advance diet tomm.             Anthropometrics       03/24/17 1315    Anthropometrics    RD Documented Current Weight  97.6 kg (215 lb 2.7 oz)    Anthropometrics (Special Considerations)    RD Calculated BMI (kg/m2) 29.1    Body Mass Index (BMI)    BMI Grade 25 - 29.9 - overweight            Labs/Tests/Procedures/Meds       03/24/17 1316    Labs/Tests/Procedures/Meds    Labs/Tests Review Reviewed;Glucose;Creat    Medication Review Reviewed, pertinent;Insulin;Antiemetic                Nutrition Prescription Ordered       03/24/17 1316    Nutrition Prescription PO    Current PO Diet Clear Liquid            Evaluation of Received Nutrient/Fluid Intake       03/24/17 1316    Fluid Intake Evaluation    Oral Fluid (mL) 480    Total Fluid Intake (mL) 480    % Fluid Needs 21    PO Evaluation    Number of Meals 1    % PO Intake 100%               Problem/Interventions:        Problem 1       03/24/17 1318    Nutrition Diagnoses Problem 1    Problem 1 Altered GI Function    Gastrointestinal Pancreatitis, acute    Signs/Symptoms (evidenced by) Clear Liquid Diet                    Intervention Goal       03/24/17 1318    Intervention Goal    General Meet nutritional needs for age/condition    PO Tolerate PO;PO intake (%)    PO Intake % 75 %            Nutrition Intervention       03/24/17 1318    Nutrition Intervention    RD/Tech Action Await begin PO;Encourage intake;Follow Tx progress             Nutrition Prescription       03/24/17 1319    Nutrition Prescription PO    PO Prescription Begin/change diet   Advance to low fat diet as tolerated Consistent CHO            Education/Evaluation       03/24/17 1319    Education    Education Other (comment)   Pt aware of Clears. Will follow.     Monitor/Evaluation    Monitor Per protocol;I&O;PO intake;Pertinent labs;Weight        Comments:   Advance diet as indicated, if unable to advance in next 3 days will need to consider nutrition support. Will follow.     Electronically signed by:  Radha Flores RD  03/24/17 1:20 PM

## 2017-03-24 NOTE — PLAN OF CARE
Problem: Patient Care Overview (Adult)  Goal: Discharge Needs Assessment  Outcome: Ongoing (interventions implemented as appropriate)    03/22/17 0530 03/24/17 1877   Discharge Needs Assessment   Concerns To Be Addressed no discharge needs identified;denies needs/concerns at this time --    Readmission Within The Last 30 Days no previous admission in last 30 days --    Equipment Needed After Discharge none --    Discharge Planning Comments --  CM spoke with patient at bedside. Patient does not work. Discussed use of  agency and he willing to do this. [facility] agency of choice. Contacted [facility] to see if they will accept patient. He will get back with CM. Will continue to follow.    Self-Care   Equipment Currently Used at Home cane, straight;walker, standard;wheelchair;glucometer;hospital bed --    Living Environment   Transportation Available family or friend will provide --

## 2017-03-24 NOTE — DISCHARGE INSTR - OTHER ORDERS
[facility] services have been arranged for you.  Please contact them upon your arrival home to start your services.  [phone]

## 2017-03-25 LAB
ANION GAP SERPL CALCULATED.3IONS-SCNC: 11.4 MMOL/L
BUN BLD-MCNC: 15 MG/DL (ref 6–20)
BUN/CREAT SERPL: 7.1 (ref 7–25)
CALCIUM SPEC-SCNC: 8.5 MG/DL (ref 8.6–10.5)
CHLORIDE SERPL-SCNC: 102 MMOL/L (ref 98–107)
CHOLEST SERPL-MCNC: 113 MG/DL (ref 0–200)
CO2 SERPL-SCNC: 24.6 MMOL/L (ref 22–29)
CREAT BLD-MCNC: 2.12 MG/DL (ref 0.76–1.27)
GFR SERPL CREATININE-BSD FRML MDRD: 34 ML/MIN/1.73
GLUCOSE BLD-MCNC: 109 MG/DL (ref 65–99)
GLUCOSE BLDC GLUCOMTR-MCNC: 131 MG/DL (ref 70–130)
GLUCOSE BLDC GLUCOMTR-MCNC: 138 MG/DL (ref 70–130)
GLUCOSE BLDC GLUCOMTR-MCNC: 143 MG/DL (ref 70–130)
GLUCOSE BLDC GLUCOMTR-MCNC: 149 MG/DL (ref 70–130)
HDLC SERPL-MCNC: 36 MG/DL (ref 40–60)
LDLC SERPL CALC-MCNC: 34 MG/DL (ref 0–100)
LDLC/HDLC SERPL: 0.94 {RATIO}
POTASSIUM BLD-SCNC: 3.3 MMOL/L (ref 3.5–5.2)
SODIUM BLD-SCNC: 138 MMOL/L (ref 136–145)
TRIGL SERPL-MCNC: 215 MG/DL (ref 0–150)
VLDLC SERPL-MCNC: 43 MG/DL (ref 8–32)

## 2017-03-25 PROCEDURE — 94799 UNLISTED PULMONARY SVC/PX: CPT

## 2017-03-25 PROCEDURE — 80048 BASIC METABOLIC PNL TOTAL CA: CPT | Performed by: INTERNAL MEDICINE

## 2017-03-25 PROCEDURE — 99232 SBSQ HOSP IP/OBS MODERATE 35: CPT | Performed by: HOSPITALIST

## 2017-03-25 PROCEDURE — 25010000002 ONDANSETRON PER 1 MG: Performed by: NURSE PRACTITIONER

## 2017-03-25 PROCEDURE — 82962 GLUCOSE BLOOD TEST: CPT

## 2017-03-25 PROCEDURE — 80061 LIPID PANEL: CPT | Performed by: INTERNAL MEDICINE

## 2017-03-25 PROCEDURE — 99232 SBSQ HOSP IP/OBS MODERATE 35: CPT | Performed by: INTERNAL MEDICINE

## 2017-03-25 RX ORDER — BISOPROLOL FUMARATE 5 MG/1
5 TABLET, FILM COATED ORAL
Status: DISCONTINUED | OUTPATIENT
Start: 2017-03-25 | End: 2017-03-26 | Stop reason: HOSPADM

## 2017-03-25 RX ORDER — LISINOPRIL 20 MG/1
20 TABLET ORAL
Status: DISCONTINUED | OUTPATIENT
Start: 2017-03-25 | End: 2017-03-26 | Stop reason: HOSPADM

## 2017-03-25 RX ORDER — AMLODIPINE BESYLATE 5 MG/1
10 TABLET ORAL
Status: DISCONTINUED | OUTPATIENT
Start: 2017-03-25 | End: 2017-03-26 | Stop reason: HOSPADM

## 2017-03-25 RX ADMIN — HYDRALAZINE HYDROCHLORIDE 50 MG: 50 TABLET ORAL at 20:18

## 2017-03-25 RX ADMIN — SODIUM CHLORIDE 75 ML/HR: 9 INJECTION, SOLUTION INTRAVENOUS at 02:40

## 2017-03-25 RX ADMIN — HYDROCODONE BITARTRATE AND ACETAMINOPHEN 1 TABLET: 10; 325 TABLET ORAL at 06:31

## 2017-03-25 RX ADMIN — METOPROLOL TARTRATE 50 MG: 25 TABLET ORAL at 08:25

## 2017-03-25 RX ADMIN — HYDROCODONE BITARTRATE AND ACETAMINOPHEN 1 TABLET: 10; 325 TABLET ORAL at 16:55

## 2017-03-25 RX ADMIN — AMLODIPINE BESYLATE 10 MG: 5 TABLET ORAL at 19:35

## 2017-03-25 RX ADMIN — LISINOPRIL 20 MG: 20 TABLET ORAL at 19:35

## 2017-03-25 RX ADMIN — BUDESONIDE AND FORMOTEROL FUMARATE DIHYDRATE 2 PUFF: 80; 4.5 AEROSOL RESPIRATORY (INHALATION) at 09:40

## 2017-03-25 RX ADMIN — HYDRALAZINE HYDROCHLORIDE 50 MG: 50 TABLET ORAL at 16:57

## 2017-03-25 RX ADMIN — ONDANSETRON 4 MG: 2 INJECTION, SOLUTION INTRAMUSCULAR; INTRAVENOUS at 02:56

## 2017-03-25 RX ADMIN — NICOTINE 1 PATCH: 14 PATCH, EXTENDED RELEASE TRANSDERMAL at 10:49

## 2017-03-25 RX ADMIN — ENALAPRILAT 2.5 MG: 2.5 INJECTION INTRAVENOUS at 18:14

## 2017-03-25 RX ADMIN — HYDRALAZINE HYDROCHLORIDE 50 MG: 50 TABLET ORAL at 10:55

## 2017-03-25 RX ADMIN — HYDROCODONE BITARTRATE AND ACETAMINOPHEN 1 TABLET: 10; 325 TABLET ORAL at 11:24

## 2017-03-25 RX ADMIN — HYDRALAZINE HYDROCHLORIDE 50 MG: 50 TABLET ORAL at 08:25

## 2017-03-25 RX ADMIN — ENALAPRILAT 2.5 MG: 2.5 INJECTION INTRAVENOUS at 02:50

## 2017-03-25 RX ADMIN — PANTOPRAZOLE SODIUM 40 MG: 40 INJECTION, POWDER, FOR SOLUTION INTRAVENOUS at 06:25

## 2017-03-25 RX ADMIN — BUDESONIDE AND FORMOTEROL FUMARATE DIHYDRATE 2 PUFF: 80; 4.5 AEROSOL RESPIRATORY (INHALATION) at 19:31

## 2017-03-25 RX ADMIN — ENALAPRILAT 2.5 MG: 2.5 INJECTION INTRAVENOUS at 06:25

## 2017-03-25 RX ADMIN — BISOPROLOL FUMARATE 5 MG: 5 TABLET, COATED ORAL at 21:44

## 2017-03-25 NOTE — PLAN OF CARE
Problem: Patient Care Overview (Adult)  Goal: Plan of Care Review  Outcome: Ongoing (interventions implemented as appropriate)    03/24/17 8414   Coping/Psychosocial Response Interventions   Plan Of Care Reviewed With patient   Outcome Evaluation   Outcome Summary/Follow up Plan continue home inhalers

## 2017-03-25 NOTE — PROGRESS NOTES
Gastroenterology Progress Note       Subjective:    He is tolerating clear liquids and denies any abdominal pain.  He is ambulating some.  No nausea or vomiting. He states his right arm or his IV displaced is causing him some discomfort.  Per the nurse last night he refused to have it taken out if another needed to be placed.    Objective:    Vital Signs  Temp:  [97.2 °F (36.2 °C)-98.1 °F (36.7 °C)] 98.1 °F (36.7 °C)  Heart Rate:  [64-79] 64  Resp:  [16-20] 20  BP: (166-189)/() 176/103  Body mass index is 29.17 kg/(m^2).    Intake/Output Summary (Last 24 hours) at 03/25/17 1109  Last data filed at 03/25/17 0921   Gross per 24 hour   Intake             1720 ml   Output             1150 ml   Net              570 ml            Physical Exam:   General: patient awake, alert and cooperative   Skin: warm and dry, not jaundiced   Cardiovascular: regular rhythm and rate, no murmurs auscultated   Pulm: clear to auscultation bilaterally, regular and unlabored   Abdomen: soft, nontender, nondistended; normal bowel sounds   Extremities: no rash or edema   Neurologic: Normal mood and behavior     Results Review:     I reviewed the patient's new clinical results.        Results from last 7 days  Lab Units 03/23/17  0349   WBC 10*3/mm3 6.02   HEMOGLOBIN g/dL 13.6*   HEMATOCRIT % 41.3*   PLATELETS 10*3/mm3 129*       Results from last 7 days  Lab Units 03/25/17  0329  03/23/17  0349   SODIUM mmol/L 138  < > 141   POTASSIUM mmol/L 3.3*  < > 4.1   CHLORIDE mmol/L 102  < > 107   TOTAL CO2 mmol/L 24.6  < > 19.4*   BUN mg/dL 15  < > 19   CREATININE mg/dL 2.12*  < > 2.23*   CALCIUM mg/dL 8.5*  < > 8.7   BILIRUBIN mg/dL  --   --  0.8   ALK PHOS U/L  --   --  61   ALT (SGPT) U/L  --   --  13   AST (SGOT) U/L  --   --  15   GLUCOSE mg/dL 109*  < > 132*   < > = values in this interval not displayed.      PT/INR:  No results found for: PROTIME/No results found for: INR    Calcium Calcium   Date Value Ref Range Status   03/25/2017 8.5  (L) 8.6 - 10.5 mg/dL Final   03/24/2017 8.6 8.6 - 10.5 mg/dL Final   03/23/2017 8.7 8.6 - 10.5 mg/dL Final      Magnesium  AST  ALT  Bilirubin, Total  AlkPhos  Albumin    Amylase  Lipase    Radiology: No results found for: MG  No components found for: AST.*  No components found for: ALT.*  No components found for: BILIRUBIN, TOTAL.*    No components found for: ALKPHOS.*  No components found for: ALBUMIN.*      No components found for: AMYLASE.*  No components found for: LIPASE.*      Imaging Results (most recent)     Procedure Component Value Units Date/Time    CT Abdomen Pelvis Without Contrast [[id number]] Collected:  03/20/17 0919     Updated:  03/20/17 0957    Narrative:       CT ABDOMEN AND PELVIS WITHOUT CONTRAST 03/19/2017 AT 2219 HOURS     HISTORY: Upper abdominal pain for 4 days with nausea and vomiting. Renal  failure (GFR of 23). Previous cholecystectomy, umbilical hernia repair.  Previous pancreatitis.     COMPARISON: CT abdomen and pelvis 02/22/2017.     PROCEDURE: 5 mm noncontrast axial images through the abdomen and pelvis.  Enteric contrast only was administered. Sagittal and coronal reformatted  images were obtained.     Radiation dose reduction techniques were utilized including automated  exposure control and exposure modulation based on body size.     ABDOMEN FINDINGS: There is abnormal thickening and inflammatory type  stranding surrounding the pancreatic head and involving the adjacent  duodenum. Findings are thought to represent changes of acute  pancreatitis with secondary reactive duodenitis. There are multiple  shotty lymph nodes surrounding the pancreatic head and neck which are  favored to be benign and reactive, one of the dominant cysts lying  anterior to the uncinate process measuring nearly 11.6 x 19.6 mm  compared to 17.1 x 1.4 mm on 02/22/2017. No drainable fluid collection  or pseudocyst is identified. No abnormal pancreatic ductal dilation is  seen on this noncontrast study.      Liver is markedly and diffusely steatotic. Cholecystectomy changes are  present but no biliary dilation is identified. The spleen, adrenals are  normal. The left kidney is atrophic. There is multifocal right renal  cortical scarring. No shadowing renal stone or hydronephrosis is  evident.     Diverticular changes are scattered throughout the colon, without  convincing CT evidence of acute diverticulitis. The appendix is normal.     Lung bases are free of consolidation. Pacemaker lead is noted. Benign  calcified granuloma in the right lower lobe.     PELVIS FINDINGS: Urinary bladder, prostate and rectum normal. No pelvic  adenopathy or free fluid is seen.     No acute osseous abnormalities are identified.       Impression:       1. Findings consistent with acute pancreatitis of the pancreatic head  with secondary reactive duodenitis in the adjacent second duodenal  segment.  2. No drainable fluid collection or pseudocyst is identified.  3. Diffuse hepatic steatosis.  4. Cholecystectomy.  5. Left renal atrophy with multifocal right renal cortical scarring,  unchanged.  6. Normal appendix.  7. Preliminary report was provided by Dr. Montana on 03/19/2017 at 2250  hours.      This report was finalized on 3/20/2017 9:55 AM by Dr. Estefani Colby MD.       CT Abdomen Pelvis Without Contrast [[id number]] Collected:  03/22/17 1504     Updated:  03/22/17 1514    Narrative:       EXAM: CT abdomen and pelvis with contrast     DATE: 03/22/2017     HISTORY: Epigastric pain.     COMPARISON: CT abdomen and pelvis without contrast 03/19/2017     Abdomen findings::     Features of acute pancreatitis centered about the pancreatic head and  neck again noted. There is increasing inflammatory type stranding in the  right upper quadrant mesentery adjacent to the pancreas, which may  indicate worsening pancreatitis compared to 03/19/2017. However, no  definite intraperitoneal or retroperitoneal hematoma is seen. No  pseudocyst or drainable  fluid collection is seen.     The adjacent second duodenal segment markedly thickened and inflamed,  likely a secondary or reactive phenomenon. This has a similar appearance  to prior exam. There are mildly prominent mesenteric lymph nodes in the  same vicinity, thought to be benign and reactive, one of the largest  measuring 12 mm short axis. A prominent portacaval node measuring 12 mm  short axis is stable.     There is some thickening of the mesenteric border of the hepatic flexure  of the colon, thought to represent reactive inflammation. No evidence of  bowel obstruction.     Appendix is normal.     Hepatic steatosis. Spleen, adrenals are normal. Left renal atrophy.  Right renal cortical scarring. Normal appendix. Diverticulosis without  evidence of acute diverticulitis. Cholecystectomy. No biliary dilation  is seen.     Development of subsegmental atelectasis the right lower lobe. Pacemaker  lead is in place.     Pelvis findings: Moderate urinary bladder distention. Prostate and  rectum are normal. No pelvic free fluid.          Impression:       1. Constellation of findings suggests interval worsening of acute  pancreatitis centered the pancreatic head and neck. Increasing  inflammatory response in the right upper quadrant disease and teary.  2. No evidence of intraperitoneal or retroperitoneal hematoma. No  pseudocyst.  3. Secondary reactive inflammatory type changes of the second duodenal  segment, unchanged.  4.  Secondary reactive inflammatory type changes of the ascending colon  at the hepatic flexure, appears new since the previous exam.  5. Development of subsegmental atelectasis in the right lower lobe since  previous study.  6. Additional CT findings include: Hepatic steatosis, cholecystectomy,  left renal atrophy, right renal cortical scarring, normal appendix.     This report was finalized on 3/22/2017 3:11 PM by Dr. Estefani Colby MD.                Assessment/Plan     Acute pancreatitis  clinically improving will advance diet to full liquids.  His lipid panel from this morning is reviewed and noted.  His triglycerides are around 200.  If okay with the hospitalist service will discontinue his IV fluids. Creatinine appears to be slowly improving.        Mahnaz Collins MD  03/25/17  11:09 AM    Time:

## 2017-03-25 NOTE — PROGRESS NOTES
"Hospitalist Team      Patient Care Team:  ROBSON Dimas as PCP - General  ROBSON Dimas as PCP - Family Medicine        Chief Complaint: Follow-up Pancreatitis and HTN    Subjective    Feels great!  Tolerating clears.  Ambulating in hallways w/o difficulty.  Denies chest pain and dyspnea.    Objective    Vital Signs  Temp:  [97 °F (36.1 °C)-98.1 °F (36.7 °C)] 97 °F (36.1 °C)  Heart Rate:  [64-76] 68  Resp:  [16-20] 19  BP: (176-189)/() 181/107  Oxygen Therapy  SpO2: 99 %  Pulse Oximetry Type: Intermittent  O2 Device: room air}    Flowsheet Rows         First Filed Value    Admission Height  72\" (182.9 cm) Documented at 03/19/2017 1751    Admission Weight  218 lb (98.9 kg) Documented at 03/19/2017 1751            Physical Exam:    General Appearance:    Alert, cooperative, in no acute distress   Head:    Normocephalic, without obvious abnormality, atraumatic   Eyes:            Lids and lashes normal, conjunctivae and sclerae normal, no   icterus, no pallor, corneas clear, PERRLA   Ears:    Ears appear intact with no abnormalities noted   Throat:   No oral lesions, no thrush, oral mucosa moist   Neck:   No adenopathy, supple, trachea midline, no thyromegaly, no   carotid bruit, no JVD   Back:     No kyphosis present, no scoliosis present, no skin lesions,      erythema or scars, no tenderness to percussion or                   palpation,   range of motion normal   Lungs:     Clear to auscultation,respirations regular, even and                  unlabored    Heart:    Regular rhythm and normal rate, normal S1 and S2, no            murmur, no gallop, no rub, no click   Chest Wall:    No abnormalities observed   Abdomen:     Normal bowel sounds, no masses, no organomegaly, soft        non-tender, non-distended, no guarding, no rebound                tenderness   Rectal:     Deferred   Extremities:   Moves all extremities well, no edema, no cyanosis, no             redness   Pulses:   Pulses palpable and " equal bilaterally   Skin:   No bleeding, bruising or rash   Lymph nodes:   No palpable adenopathy   Neurologic:   Cranial nerves 2 - 12 grossly intact, sensation intact, DTR       present and equal bilaterally       Results Review:     I reviewed the patient's new clinical results.    Lab Results (last 24 hours)     Procedure Component Value Units Date/Time    Lipid Panel [[id number]]  (Abnormal) Collected:  03/25/17 0329    Specimen:  Blood Updated:  03/25/17 0409     Total Cholesterol 113 mg/dL      Triglycerides 215 (H) mg/dL      HDL Cholesterol 36 (L) mg/dL      LDL Cholesterol  34 mg/dL      VLDL Cholesterol 43 (H) mg/dL      LDL/HDL Ratio 0.94    Basic Metabolic Panel [[id number]]  (Abnormal) Collected:  03/25/17 0329    Specimen:  Blood Updated:  03/25/17 0409     Glucose 109 (H) mg/dL      BUN 15 mg/dL      Creatinine 2.12 (H) mg/dL      Sodium 138 mmol/L      Potassium 3.3 (L) mmol/L      Chloride 102 mmol/L      CO2 24.6 mmol/L      Calcium 8.5 (L) mg/dL      eGFR Non African Amer 34 (L) mL/min/1.73      BUN/Creatinine Ratio 7.1     Anion Gap 11.4 mmol/L     Narrative:       GFR Normal >60  Chronic Kidney Disease <60  Kidney Failure <15    POC Glucose Fingerstick [[id number]]  (Abnormal) Collected:  03/25/17 0815    Specimen:  Blood Updated:  03/25/17 0821     Glucose 131 (H) mg/dL     Narrative:       Meter: [id number] : [id number] [employer] Lakeisha Byrnes    POC Glucose Fingerstick [[id number]]  (Abnormal) Collected:  03/25/17 1130    Specimen:  Blood Updated:  03/25/17 1137     Glucose 149 (H) mg/dL     Narrative:       Meter: [id number] : [id number]    POC Glucose Fingerstick [[id number]]  (Abnormal) Collected:  03/25/17 1645    Specimen:  Blood Updated:  03/25/17 1651     Glucose 143 (H) mg/dL     Narrative:       Meter: [id number] : [id number] [employer] Lakeisha Byrnes          Imaging Results (last 24 hours)     ** No results found for the last 24 hours. **            Medication Review:   I have  reviewed the patient's current medication list    Current Facility-Administered Medications:   •  acetaminophen (TYLENOL) suppository 650 mg, 650 mg, Rectal, Q4H PRN, Fercho Parra MD  •  amLODIPine (NORVASC) tablet 10 mg, 10 mg, Oral, Q24H, Geronimo Maria MD  •  budesonide-formoterol (SYMBICORT) 80-4.5 MCG/ACT inhaler 2 puff, 2 puff, Inhalation, BID - RT, Fercho Parra MD, 2 puff at 03/25/17 0940  •  dextrose (D50W) solution 25 g, 25 g, Intravenous, Q15 Min PRN, Fercho Parra MD  •  dextrose (GLUTOSE) oral gel 15 g, 15 g, Oral, Q15 Min PRN, Fercho Parra MD  •  enalaprilat (VASOTEC) injection 2.5 mg, 2.5 mg, Intravenous, Q4H PRN, Serge Street MD, 2.5 mg at 03/25/17 0625  •  glucagon (GLUCAGEN) injection 1 mg, 1 mg, Subcutaneous, Q15 Min PRN, Fercho Parra MD  •  hydrALAZINE (APRESOLINE) tablet 50 mg, 50 mg, Oral, 4x Daily, Melanie Pollard, APRN, 50 mg at 03/25/17 1657  •  HYDROcodone-acetaminophen (NORCO)  MG per tablet 1 tablet, 1 tablet, Oral, Q4H PRN, Irineo Mercedes MD, 1 tablet at 03/25/17 1655  •  insulin aspart (novoLOG) injection 0-7 Units, 0-7 Units, Subcutaneous, 4x Daily AC & at Bedtime, Fercho Parra MD  •  ipratropium-albuterol (DUO-NEB) nebulizer solution 3 mL, 3 mL, Nebulization, Q4H PRN, Geronimo Maria MD, 3 mL at 03/20/17 2020  •  lisinopril (PRINIVIL,ZESTRIL) tablet 20 mg, 20 mg, Oral, Q24H, Geronimo Maria MD  •  metoprolol tartrate (LOPRESSOR) tablet 50 mg, 50 mg, Oral, Q12H, Serge Street MD, 50 mg at 03/25/17 0825  •  nicotine (NICODERM CQ) 14 MG/24HR patch 1 patch, 1 patch, Transdermal, Q24H, ROBSON Dean, 1 patch at 03/25/17 1049  •  ondansetron (ZOFRAN) injection 4 mg, 4 mg, Intravenous, Q6H PRN, Melanie Pollard, APRN, 4 mg at 03/25/17 0256  •  pantoprazole (PROTONIX) injection 40 mg, 40 mg, Intravenous, Q AM, Fercho Parra MD, 40 mg at 03/25/17 0625  •  promethazine (PHENERGAN) IV syringe 6.25 mg, 6.25 mg, Intravenous, Q6H PRN,  Melanie Pollard, APRN, 6.25 mg at 03/24/17 1732  •  sodium chloride 0.9 % infusion, 75 mL/hr, Intravenous, Continuous, Irineo Mercedes MD, Last Rate: 75 mL/hr at 03/25/17 0240, 75 mL/hr at 03/25/17 0240      Assessment/Plan     1.  Acute Pancreatitis:  Resolving.  Exam improved and tolerating clears.  Will advance breakfast in the AM.  TG's OK.    2.  HTN:  Not at goal.  I've resumed part of his home regimen.  Will follow.    3.  CKD IV:  Creatinine near baseline.    4.  DM Type II:  AM and bedsides at goal.  No change, but will watch closely as diet advanced.    Plan for disposition: D/C in AM    Geronimo Maria MD  03/25/17  6:14 PM

## 2017-03-25 NOTE — NURSING NOTE
Pt i/v site amber, Nurse try to changed the i/v site, pt refused  Said he will tell the doctor to discontinue the i/v fluid

## 2017-03-26 VITALS
BODY MASS INDEX: 29.13 KG/M2 | HEIGHT: 72 IN | TEMPERATURE: 98.1 F | RESPIRATION RATE: 16 BRPM | SYSTOLIC BLOOD PRESSURE: 149 MMHG | DIASTOLIC BLOOD PRESSURE: 96 MMHG | WEIGHT: 215.06 LBS | HEART RATE: 70 BPM | OXYGEN SATURATION: 99 %

## 2017-03-26 LAB
GLUCOSE BLDC GLUCOMTR-MCNC: 134 MG/DL (ref 70–130)
GLUCOSE BLDC GLUCOMTR-MCNC: 166 MG/DL (ref 70–130)

## 2017-03-26 PROCEDURE — 94799 UNLISTED PULMONARY SVC/PX: CPT

## 2017-03-26 PROCEDURE — 99231 SBSQ HOSP IP/OBS SF/LOW 25: CPT | Performed by: INTERNAL MEDICINE

## 2017-03-26 PROCEDURE — 99239 HOSP IP/OBS DSCHRG MGMT >30: CPT | Performed by: HOSPITALIST

## 2017-03-26 PROCEDURE — 82962 GLUCOSE BLOOD TEST: CPT

## 2017-03-26 RX ORDER — NICOTINE 21 MG/24HR
1 PATCH, TRANSDERMAL 24 HOURS TRANSDERMAL EVERY 24 HOURS
Qty: 14 PATCH | Refills: 0 | Status: SHIPPED | OUTPATIENT
Start: 2017-03-26 | End: 2018-03-13 | Stop reason: ALTCHOICE

## 2017-03-26 RX ORDER — GLIPIZIDE 10 MG/1
10 TABLET ORAL
Qty: 60 TABLET | Refills: 0 | Status: SHIPPED | OUTPATIENT
Start: 2017-03-26 | End: 2017-07-17

## 2017-03-26 RX ORDER — BUDESONIDE AND FORMOTEROL FUMARATE DIHYDRATE 80; 4.5 UG/1; UG/1
2 AEROSOL RESPIRATORY (INHALATION)
Refills: 12
Start: 2017-03-26 | End: 2020-01-01

## 2017-03-26 RX ADMIN — PANTOPRAZOLE SODIUM 40 MG: 40 INJECTION, POWDER, FOR SOLUTION INTRAVENOUS at 06:13

## 2017-03-26 RX ADMIN — LISINOPRIL 20 MG: 20 TABLET ORAL at 10:50

## 2017-03-26 RX ADMIN — HYDRALAZINE HYDROCHLORIDE 50 MG: 50 TABLET ORAL at 10:50

## 2017-03-26 RX ADMIN — HYDROCODONE BITARTRATE AND ACETAMINOPHEN 1 TABLET: 10; 325 TABLET ORAL at 03:49

## 2017-03-26 RX ADMIN — BUDESONIDE AND FORMOTEROL FUMARATE DIHYDRATE 2 PUFF: 80; 4.5 AEROSOL RESPIRATORY (INHALATION) at 09:19

## 2017-03-26 RX ADMIN — NICOTINE 1 PATCH: 14 PATCH, EXTENDED RELEASE TRANSDERMAL at 10:53

## 2017-03-26 RX ADMIN — AMLODIPINE BESYLATE 10 MG: 5 TABLET ORAL at 10:50

## 2017-03-26 NOTE — PROGRESS NOTES
Gastroenterology Progress Note        Chief Complaint:  Abdominal pain    Subjective     Interval History:     No abdominal pain, he did tolerate his diet with no pain and is planning on going home today.  Review of Systems:    All ROS was reviewed and negative    Objective     Vital Signs  Temp:  [97 °F (36.1 °C)-98.6 °F (37 °C)] 98.1 °F (36.7 °C)  Heart Rate:  [56-77] 70  Resp:  [16-20] 16  BP: (149-181)/() 149/96  Body mass index is 29.17 kg/(m^2).    Intake/Output Summary (Last 24 hours) at 03/26/17 1124  Last data filed at 03/26/17 0919   Gross per 24 hour   Intake              600 ml   Output              500 ml   Net              100 ml     I/O this shift:  In: 360 [P.O.:360]  Out: 500 [Urine:500]       Physical Exam:   General: patient awake, alert and cooperative   Eyes: Normal lids and lashes, no scleral icterus   Neck: supple, normal ROM   Skin: warm and dry, not jaundiced   Cardiovascular: regular rhythm and rate, no murmurs auscultated   Pulm: clear to auscultation bilaterally, regular and unlabored   Abdomen: soft, nontender, nondistended; normal bowel sounds   Rectal: deferred   Extremities: no rash or edema   Neurologic: Normal mood and behavior     Results Review:     I reviewed the patient's new clinical results.      WBC No results found for: WBCS   HGB No results found for: HGB   HCT No results found for: HCT   Platlets No results found for: LABPLAT     PT/INR:  No results found for: PROTIME/No results found for: INR    Sodium Sodium   Date Value Ref Range Status   03/25/2017 138 136 - 145 mmol/L Final   03/24/2017 139 136 - 145 mmol/L Final      Potassium Potassium   Date Value Ref Range Status   03/25/2017 3.3 (L) 3.5 - 5.2 mmol/L Final   03/24/2017 3.6 3.5 - 5.2 mmol/L Final      Chloride Chloride   Date Value Ref Range Status   03/25/2017 102 98 - 107 mmol/L Final   03/24/2017 106 98 - 107 mmol/L Final      Bicarbonate No results found for: PLASMABICARB   BUN BUN   Date Value Ref  Range Status   03/25/2017 15 6 - 20 mg/dL Final   03/24/2017 19 6 - 20 mg/dL Final      Creatinine Creatinine   Date Value Ref Range Status   03/25/2017 2.12 (H) 0.76 - 1.27 mg/dL Final   03/24/2017 2.02 (H) 0.76 - 1.27 mg/dL Final      Calcium Calcium   Date Value Ref Range Status   03/25/2017 8.5 (L) 8.6 - 10.5 mg/dL Final   03/24/2017 8.6 8.6 - 10.5 mg/dL Final      Magnesium  AST  ALT  Bilirubin, Total  AlkPhos  Albumin    Amylase  Lipase    Radiology: No results found for: MG  No components found for: AST.*  No components found for: ALT.*  No components found for: BILIRUBIN, TOTAL.*    No components found for: ALKPHOS.*  No components found for: ALBUMIN.*      No components found for: AMYLASE.*  No components found for: LIPASE.*            Imaging Results (most recent)     Procedure Component Value Units Date/Time    CT Abdomen Pelvis Without Contrast [[id number]] Collected:  03/20/17 0919     Updated:  03/20/17 0957    Narrative:       CT ABDOMEN AND PELVIS WITHOUT CONTRAST 03/19/2017 AT 2219 HOURS     HISTORY: Upper abdominal pain for 4 days with nausea and vomiting. Renal  failure (GFR of 23). Previous cholecystectomy, umbilical hernia repair.  Previous pancreatitis.     COMPARISON: CT abdomen and pelvis 02/22/2017.     PROCEDURE: 5 mm noncontrast axial images through the abdomen and pelvis.  Enteric contrast only was administered. Sagittal and coronal reformatted  images were obtained.     Radiation dose reduction techniques were utilized including automated  exposure control and exposure modulation based on body size.     ABDOMEN FINDINGS: There is abnormal thickening and inflammatory type  stranding surrounding the pancreatic head and involving the adjacent  duodenum. Findings are thought to represent changes of acute  pancreatitis with secondary reactive duodenitis. There are multiple  shotty lymph nodes surrounding the pancreatic head and neck which are  favored to be benign and reactive, one of the  dominant cysts lying  anterior to the uncinate process measuring nearly 11.6 x 19.6 mm  compared to 17.1 x 1.4 mm on 02/22/2017. No drainable fluid collection  or pseudocyst is identified. No abnormal pancreatic ductal dilation is  seen on this noncontrast study.     Liver is markedly and diffusely steatotic. Cholecystectomy changes are  present but no biliary dilation is identified. The spleen, adrenals are  normal. The left kidney is atrophic. There is multifocal right renal  cortical scarring. No shadowing renal stone or hydronephrosis is  evident.     Diverticular changes are scattered throughout the colon, without  convincing CT evidence of acute diverticulitis. The appendix is normal.     Lung bases are free of consolidation. Pacemaker lead is noted. Benign  calcified granuloma in the right lower lobe.     PELVIS FINDINGS: Urinary bladder, prostate and rectum normal. No pelvic  adenopathy or free fluid is seen.     No acute osseous abnormalities are identified.       Impression:       1. Findings consistent with acute pancreatitis of the pancreatic head  with secondary reactive duodenitis in the adjacent second duodenal  segment.  2. No drainable fluid collection or pseudocyst is identified.  3. Diffuse hepatic steatosis.  4. Cholecystectomy.  5. Left renal atrophy with multifocal right renal cortical scarring,  unchanged.  6. Normal appendix.  7. Preliminary report was provided by Dr. Montana on 03/19/2017 at 2250  hours.      This report was finalized on 3/20/2017 9:55 AM by Dr. Estefani Colby MD.       CT Abdomen Pelvis Without Contrast [[id number]] Collected:  03/22/17 1504     Updated:  03/22/17 1514    Narrative:       EXAM: CT abdomen and pelvis with contrast     DATE: 03/22/2017     HISTORY: Epigastric pain.     COMPARISON: CT abdomen and pelvis without contrast 03/19/2017     Abdomen findings::     Features of acute pancreatitis centered about the pancreatic head and  neck again noted. There is  increasing inflammatory type stranding in the  right upper quadrant mesentery adjacent to the pancreas, which may  indicate worsening pancreatitis compared to 03/19/2017. However, no  definite intraperitoneal or retroperitoneal hematoma is seen. No  pseudocyst or drainable fluid collection is seen.     The adjacent second duodenal segment markedly thickened and inflamed,  likely a secondary or reactive phenomenon. This has a similar appearance  to prior exam. There are mildly prominent mesenteric lymph nodes in the  same vicinity, thought to be benign and reactive, one of the largest  measuring 12 mm short axis. A prominent portacaval node measuring 12 mm  short axis is stable.     There is some thickening of the mesenteric border of the hepatic flexure  of the colon, thought to represent reactive inflammation. No evidence of  bowel obstruction.     Appendix is normal.     Hepatic steatosis. Spleen, adrenals are normal. Left renal atrophy.  Right renal cortical scarring. Normal appendix. Diverticulosis without  evidence of acute diverticulitis. Cholecystectomy. No biliary dilation  is seen.     Development of subsegmental atelectasis the right lower lobe. Pacemaker  lead is in place.     Pelvis findings: Moderate urinary bladder distention. Prostate and  rectum are normal. No pelvic free fluid.          Impression:       1. Constellation of findings suggests interval worsening of acute  pancreatitis centered the pancreatic head and neck. Increasing  inflammatory response in the right upper quadrant disease and teary.  2. No evidence of intraperitoneal or retroperitoneal hematoma. No  pseudocyst.  3. Secondary reactive inflammatory type changes of the second duodenal  segment, unchanged.  4.  Secondary reactive inflammatory type changes of the ascending colon  at the hepatic flexure, appears new since the previous exam.  5. Development of subsegmental atelectasis in the right lower lobe since  previous study.  6.  Additional CT findings include: Hepatic steatosis, cholecystectomy,  left renal atrophy, right renal cortical scarring, normal appendix.     This report was finalized on 3/22/2017 3:11 PM by Dr. Estefani Colby MD.                                               Assessment/Plan     Patient Active Problem List   Diagnosis Code   • Cardiac defibrillator in place Z95.810   • Dizziness R42   • Fatigue R53.83   • Hypertension I10   • Hypertrophic obstructive cardiomyopathy I42.1   • Hypertriglyceridemia E78.1   • Kidney disorder N28.9   • Type 2 diabetes mellitus E11.9   • Syncope R55   • Vitamin D deficiency E55.9   • Weakness R53.1   • Idiopathic acute pancreatitis K85.00   • VT (ventricular tachycardia) I47.2   • Acute pancreatitis K85.90       Acute pancreatitis likely due to tricor, off this and will follow up with pcp for lipid management. Plans are for d/c today as he is doing well        Mahnaz Collins MD  03/26/17  11:24 AM    Time:

## 2017-03-26 NOTE — PLAN OF CARE
Problem: Patient Care Overview (Adult)  Goal: Plan of Care Review  Outcome: Ongoing (interventions implemented as appropriate)    03/25/17 2065   Coping/Psychosocial Response Interventions   Plan Of Care Reviewed With patient   Patient Care Overview   Progress improving         Problem: Pain, Acute (Adult)  Goal: Identify Related Risk Factors and Signs and Symptoms  Outcome: Ongoing (interventions implemented as appropriate)

## 2017-03-26 NOTE — DISCHARGE SUMMARY
Sergio Phan  1970  [id number]        Hospitalists Discharge Summary    Date of Admission: 3/19/2017  Date of Discharge:  3/26/2017    Primary Discharge Diagnoses:    1.  Acute Pancreatitis  2.  Reactive Duodenitis    Secondary Discharge Diagnoses:     1.  Chronic Kidney Disease, Stage IV  2.  Hypertension  3.  Hx/O HOCM/AICD, with hx/o VT and syncope:   4 . CAD  5.  DM2: A1C 7.9%  6.  GERD/Gastritis   7.  Diabetic neuropathy  8.  Anxiety/depression  9.  COPD  10. Tobacco abuse  11. Hepatic steatosis  12. Vitamin D Deficiency  13. Left renal atrophy/right renal scarring     PCP  Patient Care Team:  ROBSON Dimas as PCP - General  ROBSON Dimas as PCP - Family Medicine    Consults:   Consults     Date and Time Order Name Status Description    3/20/2017 1035 Inpatient Consult to Gastroenterology      3/20/2017 0002 Inpatient Consult to Gastroenterology Completed           Operations and Procedures Performed:       Ct Abdomen Pelvis Without Contrast    Result Date: 3/22/2017  Narrative: EXAM: CT abdomen and pelvis with contrast  DATE: 03/22/2017  HISTORY: Epigastric pain.  COMPARISON: CT abdomen and pelvis without contrast 03/19/2017  Abdomen findings::  Features of acute pancreatitis centered about the pancreatic head and neck again noted. There is increasing inflammatory type stranding in the right upper quadrant mesentery adjacent to the pancreas, which may indicate worsening pancreatitis compared to 03/19/2017. However, no definite intraperitoneal or retroperitoneal hematoma is seen. No pseudocyst or drainable fluid collection is seen.  The adjacent second duodenal segment markedly thickened and inflamed, likely a secondary or reactive phenomenon. This has a similar appearance to prior exam. There are mildly prominent mesenteric lymph nodes in the same vicinity, thought to be benign and reactive, one of the largest measuring 12 mm short axis. A prominent portacaval node measuring 12 mm short  axis is stable.  There is some thickening of the mesenteric border of the hepatic flexure of the colon, thought to represent reactive inflammation. No evidence of bowel obstruction.  Appendix is normal.  Hepatic steatosis. Spleen, adrenals are normal. Left renal atrophy. Right renal cortical scarring. Normal appendix. Diverticulosis without evidence of acute diverticulitis. Cholecystectomy. No biliary dilation is seen.  Development of subsegmental atelectasis the right lower lobe. Pacemaker lead is in place.  Pelvis findings: Moderate urinary bladder distention. Prostate and rectum are normal. No pelvic free fluid.       Impression: 1. Constellation of findings suggests interval worsening of acute pancreatitis centered the pancreatic head and neck. Increasing inflammatory response in the right upper quadrant disease and teary. 2. No evidence of intraperitoneal or retroperitoneal hematoma. No pseudocyst. 3. Secondary reactive inflammatory type changes of the second duodenal segment, unchanged. 4.  Secondary reactive inflammatory type changes of the ascending colon at the hepatic flexure, appears new since the previous exam. 5. Development of subsegmental atelectasis in the right lower lobe since previous study. 6. Additional CT findings include: Hepatic steatosis, cholecystectomy, left renal atrophy, right renal cortical scarring, normal appendix.  This report was finalized on 3/22/2017 3:11 PM by Dr. Estefani Colby MD.      Ct Abdomen Pelvis Without Contrast    Result Date: 3/20/2017  Narrative: CT ABDOMEN AND PELVIS WITHOUT CONTRAST 03/19/2017 AT 2219 HOURS  HISTORY: Upper abdominal pain for 4 days with nausea and vomiting. Renal failure (GFR of 23). Previous cholecystectomy, umbilical hernia repair. Previous pancreatitis.  COMPARISON: CT abdomen and pelvis 02/22/2017.  PROCEDURE: 5 mm noncontrast axial images through the abdomen and pelvis. Enteric contrast only was administered. Sagittal and coronal reformatted  images were obtained.  Radiation dose reduction techniques were utilized including automated exposure control and exposure modulation based on body size.  ABDOMEN FINDINGS: There is abnormal thickening and inflammatory type stranding surrounding the pancreatic head and involving the adjacent duodenum. Findings are thought to represent changes of acute pancreatitis with secondary reactive duodenitis. There are multiple shotty lymph nodes surrounding the pancreatic head and neck which are favored to be benign and reactive, one of the dominant cysts lying anterior to the uncinate process measuring nearly 11.6 x 19.6 mm compared to 17.1 x 1.4 mm on 02/22/2017. No drainable fluid collection or pseudocyst is identified. No abnormal pancreatic ductal dilation is seen on this noncontrast study.  Liver is markedly and diffusely steatotic. Cholecystectomy changes are present but no biliary dilation is identified. The spleen, adrenals are normal. The left kidney is atrophic. There is multifocal right renal cortical scarring. No shadowing renal stone or hydronephrosis is evident.  Diverticular changes are scattered throughout the colon, without convincing CT evidence of acute diverticulitis. The appendix is normal.  Lung bases are free of consolidation. Pacemaker lead is noted. Benign calcified granuloma in the right lower lobe.  PELVIS FINDINGS: Urinary bladder, prostate and rectum normal. No pelvic adenopathy or free fluid is seen.  No acute osseous abnormalities are identified.      Impression: 1. Findings consistent with acute pancreatitis of the pancreatic head with secondary reactive duodenitis in the adjacent second duodenal segment. 2. No drainable fluid collection or pseudocyst is identified. 3. Diffuse hepatic steatosis. 4. Cholecystectomy. 5. Left renal atrophy with multifocal right renal cortical scarring, unchanged. 6. Normal appendix. 7. Preliminary report was provided by Dr. Montana on 03/19/2017 at 2250 hours.   This report was finalized on 3/20/2017 9:55 AM by Dr. Estefani Colby MD.        Allergies:  has No Known Allergies.      Discharge Medications:   Sergio Phan   Home Medication Instructions JOHNATHON:[id number]    Printed on:03/26/17 1101   Medication Information                      albuterol (PROVENTIL HFA;VENTOLIN HFA) 108 (90 BASE) MCG/ACT inhaler  Inhale 2 puffs every 4 (four) hours as needed for wheezing.             allopurinol (ZYLOPRIM) 300 MG tablet  300 mg daily.             amLODIPine (NORVASC) 10 MG tablet  Take  by mouth daily.             aspirin 81 MG tablet  Take  by mouth daily.             bisoprolol (ZEBeta) 5 MG tablet  Take 5 mg by mouth Daily.             budesonide-formoterol (SYMBICORT) 80-4.5 MCG/ACT inhaler  Inhale 2 puffs 2 (Two) Times a Day.             DULoxetine (CYMBALTA) 30 MG capsule  Take 60 mg by mouth Daily.             Ergocalciferol (VITAMIN D2) 400 UNITS tablet  Take 400 Units by mouth 1 (One) Time Per Week.             furosemide (LASIX) 20 MG tablet  Take 20 mg by mouth Daily.             gabapentin (NEURONTIN) 800 MG tablet  Take 800 mg by mouth 3 (three) times a day.             glipiZIDE (GLUCOTROL) 10 MG tablet  Take 1 tablet by mouth 2 (Two) Times a Day Before Meals.             hydrALAZINE (APRESOLINE) 50 MG tablet  Take 50 mg by mouth 4 (four) times a day.             lisinopril (PRINIVIL,ZESTRIL) 20 MG tablet  Take 1 tablet by mouth daily.             methocarbamol (ROBAXIN) 750 MG tablet  Take 750 mg by mouth 3 (Three) Times a Day.             nicotine (NICODERM CQ) 14 MG/24HR patch  Place 1 patch on the skin Daily.             Omega-3 Fatty Acids (FISH OIL) 1000 MG capsule capsule  Take 1,000 mg by mouth 2 (Two) Times a Day With Meals.             omeprazole (PriLOSEC) 40 MG capsule  Take 1 capsule by mouth daily.             promethazine (PHENERGAN) 25 MG tablet  Take 25 mg by mouth every 6 (six) hours as needed for nausea or vomiting.             rosuvastatin  (CRESTOR) 20 MG tablet  Take 40 mg by mouth Daily.             tiotropium (SPIRIVA) 18 MCG per inhalation capsule  Place 1 capsule into inhaler and inhale 1 (one) time daily.                 History of Present Illness (taken from H&P):  The patient is a 46 YOM who presented through the ER secondary to abdominal pain that began on 3/17/17 at which point the patient began limiting his oral intake because of his known h/o pancreatitis with pseudocyst previously in 8/2016. He attempted to eat a cracker and when he did, his pain became severe on 3/19/17 and he presented to the ER. He reports this episode is not as severe as previously. He has not vomited but has been nauseated.      Since admission with NPO status and hydration, the patient reports his pain is very minimal and he has had no further nausea. He reports slight hunger. He reports compliance with all his medications prescribed.      Denies f/c/cough/soa/v/d/chest pain/recent illness/sick exposures/change in bowel or bladder habits/no weight change/bloody emesis or bloody stools/change in medications or any other new concerns.    Hospital Course  Mr. Phan was admitted to the Med/Surg unit on telemetry and treated with IVF and pain meds.  He was seen in consultation by Dr. Mercedes of the Gastroenterology service.  His course was prolonged as his pain was difficult to get under control.  He did remain hypertensive even as his pain was controlled so IV therapy was initiated to help its control.  A repeat CT was performed because the was a question of possible pseudocyst rupture and/or hemorrhage.  Imaging did not demonstrate these concerns.  His Tricor was stopped.  His diet was slowly advanced and his oral medications slowly resumed.  His fluid balance was monitored quite closely, and he did not manifest any signs of acute decompensation.    At the time of discharge, Mr. Phan was ambulating in the halls without difficulty, and tolerating a regular  diet.    Last Lab Results:   Lab Results (most recent)     Procedure Component Value Units Date/Time    CBC & Differential [[id number]] Collected:  03/19/17 1815    Specimen:  Blood Updated:  03/19/17 1824    Narrative:       The following orders were created for panel order CBC & Differential.  Procedure                               Abnormality         Status                     ---------                               -----------         ------                     CBC Auto Differential[[id number]]         Abnormal            Final result                 Please view results for these tests on the individual orders.    CBC Auto Differential [[id number]]  (Abnormal) Collected:  03/19/17 1815    Specimen:  Blood Updated:  03/19/17 1824     WBC 8.62 10*3/mm3      RBC 6.06 10*6/mm3      Hemoglobin 17.8 g/dL      Hematocrit 51.1 %      MCV 84.3 fL      MCH 29.4 pg      MCHC 34.8 g/dL      RDW 13.0 %      RDW-SD 39.5 fl      MPV 11.1 (H) fL      Platelets 206 10*3/mm3      Neutrophil % 82.3 (H) %      Lymphocyte % 11.1 (L) %      Monocyte % 5.8 %      Eosinophil % 0.3 %      Basophil % 0.3 %      Immature Grans % 0.2 %      Neutrophils, Absolute 7.08 10*3/mm3      Lymphocytes, Absolute 0.96 10*3/mm3      Monocytes, Absolute 0.50 10*3/mm3      Eosinophils, Absolute 0.03 (L) 10*3/mm3      Basophils, Absolute 0.03 10*3/mm3      Immature Grans, Absolute 0.02 10*3/mm3      nRBC 0.0 /100 WBC     Comprehensive Metabolic Panel [[id number]]  (Abnormal) Collected:  03/19/17 1815    Specimen:  Blood Updated:  03/19/17 1843     Glucose 211 (H) mg/dL      BUN 31 (H) mg/dL      Creatinine 2.95 (H) mg/dL      Sodium 133 (L) mmol/L      Potassium 4.3 mmol/L      Chloride 95 (L) mmol/L      CO2 21.3 (L) mmol/L      Calcium 9.7 mg/dL      Total Protein 7.1 g/dL      Albumin 4.10 g/dL      ALT (SGPT) 24 U/L      AST (SGOT) 16 U/L      Alkaline Phosphatase 71 U/L      Total Bilirubin 0.8 mg/dL      eGFR Non African Amer 23 (L) mL/min/1.73       Globulin 3.0 gm/dL      A/G Ratio 1.4 g/dL      BUN/Creatinine Ratio 10.5     Anion Gap 16.7 mmol/L     Amylase [[id number]]  (Normal) Collected:  03/19/17 1815    Specimen:  Blood Updated:  03/19/17 1843     Amylase 52 U/L     Lipase [[id number]]  (Abnormal) Collected:  03/19/17 1815    Specimen:  Blood Updated:  03/19/17 1843     Lipase 69 (H) U/L     Lactic Acid, Plasma [[id number]]  (Abnormal) Collected:  03/19/17 1815    Specimen:  Blood Updated:  03/19/17 1844     Lactate 2.4 (C) mmol/L     Urinalysis With / Culture If Indicated [[id number]]  (Abnormal) Collected:  03/19/17 2028    Specimen:  Urine from Urine, Clean Catch Updated:  03/19/17 2037     Color, UA Dark Yellow (A)     Appearance, UA Clear     pH, UA 6.0     Specific Gravity, UA 1.025     Glucose,  mg/dL (1+) (A)     Ketones, UA Negative     Bilirubin, UA Small (1+) (A)     Blood, UA Trace (A)     Protein, UA >=300 mg/dL (3+) (A)     Leuk Esterase, UA Negative     Nitrite, UA Negative     Urobilinogen, UA 0.2 E.U./dL    Urinalysis, Microscopic Only [[id number]]  (Abnormal) Collected:  03/19/17 2028    Specimen:  Urine from Urine, Clean Catch Updated:  03/19/17 2049     RBC, UA 3-5 (A) /HPF      WBC, UA 3-5 (A) /HPF      Bacteria, UA 1+ (A) /HPF      Squamous Epithelial Cells, UA 0-2 /HPF      Hyaline Casts, UA None Seen /LPF      WBC Casts 0-2 /LPF      Methodology Manual Light Microscopy    Lactate Acid, Reflex [[id number]]  (Abnormal) Collected:  03/19/17 2232    Specimen:  Blood Updated:  03/19/17 2257     Lactate 2.2 (C) mmol/L     POC Glucose Fingerstick [[id number]]  (Abnormal) Collected:  03/19/17 2350    Specimen:  Blood Updated:  03/20/17 0000     Glucose 168 (H) mg/dL     Narrative:       Meter: [id number] : [id number] [facility]    Troponin [[id number]]  (Normal) Collected:  03/20/17 0006    Specimen:  Blood Updated:  03/20/17 0053     Troponin T 0.017 ng/mL     Narrative:       Troponin T Reference Ranges:  Less than 0.03 ng/mL:    Negative  for AMI  0.03 to 0.09 ng/mL:      Indeterminant for AMI  Greater than 0.09 ng/mL: Positive for AMI    Hemoglobin A1c [[id number]]  (Abnormal) Collected:  03/20/17 0615    Specimen:  Blood Updated:  03/20/17 0653     Hemoglobin A1C 7.90 (H) %     Narrative:       Hemoglobin A1C Ranges:    Increased Risk for Diabetes  5.7% to 6.4%  Diabetes                     >= 6.5%  Diabetic Goal                < 7.0%    CBC & Differential [[id number]] Collected:  03/20/17 0615    Specimen:  Blood Updated:  03/20/17 0655    Narrative:       The following orders were created for panel order CBC & Differential.  Procedure                               Abnormality         Status                     ---------                               -----------         ------                     CBC Auto Differential[[id number]]         Abnormal            Final result                 Please view results for these tests on the individual orders.    CBC Auto Differential [[id number]]  (Abnormal) Collected:  03/20/17 0615    Specimen:  Blood Updated:  03/20/17 0655     WBC 7.85 10*3/mm3      RBC 5.30 10*6/mm3      Hemoglobin 15.4 g/dL      Hematocrit 46.2 %      MCV 87.2 fL      MCH 29.1 pg      MCHC 33.3 g/dL      RDW 13.2 %      RDW-SD 41.8 fl      MPV 11.3 (H) fL      Platelets 175 10*3/mm3      Neutrophil % 64.4 %      Lymphocyte % 22.4 %      Monocyte % 11.0 (H) %      Eosinophil % 1.8 %      Basophil % 0.3 %      Immature Grans % 0.1 %      Neutrophils, Absolute 5.06 10*3/mm3      Lymphocytes, Absolute 1.76 10*3/mm3      Monocytes, Absolute 0.86 10*3/mm3      Eosinophils, Absolute 0.14 10*3/mm3      Basophils, Absolute 0.02 10*3/mm3      Immature Grans, Absolute 0.01 10*3/mm3      nRBC 0.0 /100 WBC     Comprehensive Metabolic Panel [[id number]]  (Abnormal) Collected:  03/20/17 0615    Specimen:  Blood Updated:  03/20/17 0706     Glucose 151 (H) mg/dL      BUN 32 (H) mg/dL      Creatinine 2.92 (H) mg/dL      Sodium 138 mmol/L      Potassium 4.1 mmol/L       Chloride 101 mmol/L      CO2 25.0 mmol/L      Calcium 8.8 mg/dL      Total Protein 6.1 g/dL      Albumin 3.40 (L) g/dL      ALT (SGPT) 19 U/L      AST (SGOT) 16 U/L      Alkaline Phosphatase 59 U/L      Total Bilirubin 0.5 mg/dL      eGFR Non African Amer 23 (L) mL/min/1.73      Globulin 2.7 gm/dL      A/G Ratio 1.3 g/dL      BUN/Creatinine Ratio 11.0     Anion Gap 12.0 mmol/L     Amylase [[id number]]  (Normal) Collected:  03/20/17 0615    Specimen:  Blood Updated:  03/20/17 0706     Amylase 54 U/L     Lipid Panel [[id number]]  (Abnormal) Collected:  03/20/17 0615    Specimen:  Blood Updated:  03/20/17 0706     Total Cholesterol 119 mg/dL      Triglycerides 329 (H) mg/dL      HDL Cholesterol 27 (L) mg/dL      LDL Cholesterol  26 mg/dL      VLDL Cholesterol 65.8 (H) mg/dL      LDL/HDL Ratio 0.97    Troponin [[id number]]  (Normal) Collected:  03/20/17 0615    Specimen:  Blood Updated:  03/20/17 0709     Troponin T <0.010 ng/mL     Narrative:       Troponin T Reference Ranges:  Less than 0.03 ng/mL:    Negative for AMI  0.03 to 0.09 ng/mL:      Indeterminant for AMI  Greater than 0.09 ng/mL: Positive for AMI    POC Glucose Fingerstick [[id number]]  (Abnormal) Collected:  03/20/17 0710    Specimen:  Blood Updated:  03/20/17 0717     Glucose 152 (H) mg/dL     Narrative:       Meter: [id number] : [id number] Reagan Flowers    Lipase [[id number]]  (Abnormal) Collected:  03/20/17 0615    Specimen:  Blood Updated:  03/20/17 0726     Lipase 90 (H) U/L     POC Glucose Fingerstick [[id number]]  (Abnormal) Collected:  03/20/17 1112    Specimen:  Blood Updated:  03/20/17 1118     Glucose 171 (H) mg/dL     Narrative:       Meter: [id number] : [id number] Reagan Flowers    Troponin [[id number]]  (Normal) Collected:  03/20/17 1201    Specimen:  Blood Updated:  03/20/17 1234     Troponin T 0.015 ng/mL     Narrative:       Troponin T Reference Ranges:  Less than 0.03 ng/mL:    Negative for AMI  0.03 to 0.09 ng/mL:      Indeterminant for  AMI  Greater than 0.09 ng/mL: Positive for AMI    TSH [[id number]]  (Normal) Collected:  03/20/17 0615    Specimen:  Blood Updated:  03/20/17 1353     TSH 1.660 mIU/mL     POC Glucose Fingerstick [[id number]]  (Abnormal) Collected:  03/20/17 1813    Specimen:  Blood Updated:  03/20/17 1820     Glucose 175 (H) mg/dL     Narrative:       Meter: [id number] : [id number] Bernieatul Wilcox    POC Glucose Fingerstick [[id number]]  (Abnormal) Collected:  03/21/17 0007    Specimen:  Blood Updated:  03/21/17 0014     Glucose 140 (H) mg/dL     Narrative:       Meter: [id number] : [id number] Alicia Kady    Lactic Acid, Plasma [[id number]]  (Normal) Collected:  03/21/17 0409    Specimen:  Blood Updated:  03/21/17 0443     Lactate 1.6 mmol/L     Basic Metabolic Panel [[id number]]  (Abnormal) Collected:  03/21/17 0409    Specimen:  Blood Updated:  03/21/17 0451     Glucose 111 (H) mg/dL      BUN 27 (H) mg/dL      Creatinine 2.64 (H) mg/dL      Sodium 138 mmol/L      Potassium 3.6 mmol/L      Chloride 104 mmol/L      CO2 21.7 (L) mmol/L      Calcium 8.5 (L) mg/dL      eGFR Non African Amer 26 (L) mL/min/1.73      BUN/Creatinine Ratio 10.2     Anion Gap 12.3 mmol/L     Narrative:       GFR Normal >60  Chronic Kidney Disease <60  Kidney Failure <15    Lipase [[id number]]  (Abnormal) Collected:  03/21/17 0409    Specimen:  Blood Updated:  03/21/17 0453     Lipase 65 (H) U/L     CBC & Differential [[id number]] Collected:  03/21/17 0409    Specimen:  Blood Updated:  03/21/17 0540    Narrative:       The following orders were created for panel order CBC & Differential.  Procedure                               Abnormality         Status                     ---------                               -----------         ------                     CBC Auto Differential[[id number]]         Abnormal            Final result                 Please view results for these tests on the individual orders.    CBC Auto Differential [[id number]]  (Abnormal)  Collected:  03/21/17 0409    Specimen:  Blood Updated:  03/21/17 0540     WBC 5.33 10*3/mm3      RBC 4.67 (L) 10*6/mm3      Hemoglobin 13.4 (L) g/dL      Hematocrit 40.9 (L) %      MCV 87.6 fL      MCH 28.7 pg      MCHC 32.8 g/dL      RDW 13.1 %      RDW-SD 41.3 fl      MPV 11.1 (H) fL      Platelets 111 (L) 10*3/mm3      Neutrophil % 61.4 %      Lymphocyte % 26.5 %      Monocyte % 9.2 (H) %      Eosinophil % 2.1 %      Basophil % 0.6 %      Immature Grans % 0.2 %      Neutrophils, Absolute 3.28 10*3/mm3      Lymphocytes, Absolute 1.41 10*3/mm3      Monocytes, Absolute 0.49 10*3/mm3      Eosinophils, Absolute 0.11 10*3/mm3      Basophils, Absolute 0.03 10*3/mm3      Immature Grans, Absolute 0.01 10*3/mm3      nRBC 0.0 /100 WBC     POC Glucose Fingerstick [[id number]]  (Normal) Collected:  03/21/17 0606    Specimen:  Blood Updated:  03/21/17 0613     Glucose 126 mg/dL     Narrative:       Meter: [id number] : [id number] Alicia Hillamn    Urine Culture [[id number]]  (Normal) Collected:  03/19/17 2028    Specimen:  Urine from Urine, Clean Catch Updated:  03/21/17 0731     Urine Culture No growth    Cancer Antigen 19-9 [[id number]] Collected:  03/20/17 0615    Specimen:  Blood Updated:  03/21/17 0930     CA 19-9 1 U/mL       Roche ECLIA methodology       Narrative:       Performed at:  [address] [place] [address]  [id number]  : Matti Romero PhD, Phone:  [phone]    POC Glucose Fingerstick [[id number]]  (Normal) Collected:  03/21/17 1133    Specimen:  Blood Updated:  03/21/17 1139     Glucose 119 mg/dL     Narrative:       Meter: [id number] : [id number] Alfred Wilcox    POC Glucose Fingerstick [[id number]]  (Abnormal) Collected:  03/21/17 1832    Specimen:  Blood Updated:  03/21/17 1840     Glucose 132 (H) mg/dL     Narrative:       Meter: [id number] : [id number] Alfred Wilcox    Hemoglobin & Hematocrit, Blood [[id number]]  (Abnormal) Collected:  03/21/17 1939    Specimen:  Blood  Updated:  03/21/17 1945     Hemoglobin 13.9 (L) g/dL      Hematocrit 41.2 (L) %     POC Glucose Fingerstick [[id number]]  (Abnormal) Collected:  03/22/17 0004    Specimen:  Blood Updated:  03/22/17 0010     Glucose 134 (H) mg/dL     Narrative:       Meter: [id number] : [id number] Valdemar Greenberg    IgG 4 [[id number]]  (Abnormal) Collected:  03/20/17 1314    Specimen:  Blood Updated:  03/22/17 0314     IgG Subclass 4 <1 (L) mg/dL       **Results verified by repeat testing**       Narrative:       Performed at:  [facility]  [id number]  : Lucas Diaz MD, Phone:  [phone]    CBC & Differential [[id number]] Collected:  03/22/17 0437    Specimen:  Blood Updated:  03/22/17 0444    Narrative:       The following orders were created for panel order CBC & Differential.  Procedure                               Abnormality         Status                     ---------                               -----------         ------                     CBC Auto Differential[[id number]]         Abnormal            Final result                 Please view results for these tests on the individual orders.    CBC Auto Differential [[id number]]  (Abnormal) Collected:  03/22/17 0437    Specimen:  Blood Updated:  03/22/17 0444     WBC 6.96 10*3/mm3      RBC 5.10 10*6/mm3      Hemoglobin 14.9 g/dL      Hematocrit 44.0 %      MCV 86.3 fL      MCH 29.2 pg      MCHC 33.9 g/dL      RDW 13.0 %      RDW-SD 40.6 fl      MPV 11.3 (H) fL      Platelets 140 10*3/mm3      Neutrophil % 78.4 (H) %      Lymphocyte % 11.1 (L) %      Monocyte % 8.8 (H) %      Eosinophil % 1.0 %      Basophil % 0.3 %      Immature Grans % 0.4 %      Neutrophils, Absolute 5.46 10*3/mm3      Lymphocytes, Absolute 0.77 10*3/mm3      Monocytes, Absolute 0.61 10*3/mm3      Eosinophils, Absolute 0.07 (L) 10*3/mm3      Basophils, Absolute 0.02 10*3/mm3      Immature Grans, Absolute 0.03 10*3/mm3      nRBC 0.0 /100 WBC     Lipase  [[id number]]  (Abnormal) Collected:  03/22/17 0436    Specimen:  Blood Updated:  03/22/17 0502     Lipase 75 (H) U/L     Comprehensive Metabolic Panel [[id number]]  (Abnormal) Collected:  03/22/17 0436    Specimen:  Blood Updated:  03/22/17 0502     Glucose 138 (H) mg/dL      BUN 19 mg/dL      Creatinine 2.31 (H) mg/dL      Sodium 137 mmol/L      Potassium 4.0 mmol/L      Chloride 103 mmol/L      CO2 19.1 (L) mmol/L      Calcium 8.8 mg/dL      Total Protein 6.2 g/dL      Albumin 3.30 (L) g/dL      ALT (SGPT) 14 U/L      AST (SGOT) 16 U/L      Alkaline Phosphatase 60 U/L      Total Bilirubin 0.8 mg/dL      eGFR Non African Amer 30 (L) mL/min/1.73      Globulin 2.9 gm/dL      A/G Ratio 1.1 g/dL      BUN/Creatinine Ratio 8.2     Anion Gap 14.9 mmol/L     POC Glucose Fingerstick [[id number]]  (Abnormal) Collected:  03/22/17 0609    Specimen:  Blood Updated:  03/22/17 0735     Glucose 138 (H) mg/dL     Narrative:       Meter: [id number] : [id number] Valdemar Greenberg    POC Glucose Fingerstick [[id number]]  (Abnormal) Collected:  03/22/17 1154    Specimen:  Blood Updated:  03/22/17 1201     Glucose 145 (H) mg/dL     Narrative:       Meter: [id number] : [id number] Katie Curtis    POC Glucose Fingerstick [[id number]]  (Abnormal) Collected:  03/22/17 1759    Specimen:  Blood Updated:  03/22/17 1922     Glucose 132 (H) mg/dL     Narrative:       Meter: [id number] : [id number] Alfred Wilcox    POC Glucose Fingerstick [[id number]]  (Normal) Collected:  03/22/17 2330    Specimen:  Blood Updated:  03/23/17 0130     Glucose 125 mg/dL     Narrative:       Meter: [id number] : [id number] Gilberto Severino RN    CBC & Differential [[id number]] Collected:  03/23/17 0349    Specimen:  Blood Updated:  03/23/17 0418    Narrative:       The following orders were created for panel order CBC & Differential.  Procedure                               Abnormality         Status                     ---------                                -----------         ------                     CBC Auto Differential[[id number]]         Abnormal            Final result                 Please view results for these tests on the individual orders.    CBC Auto Differential [[id number]]  (Abnormal) Collected:  03/23/17 0349    Specimen:  Blood Updated:  03/23/17 0418     WBC 6.02 10*3/mm3      RBC 4.71 10*6/mm3      Hemoglobin 13.6 (L) g/dL      Hematocrit 41.3 (L) %      MCV 87.7 fL      MCH 28.9 pg      MCHC 32.9 g/dL      RDW 13.1 %      RDW-SD 42.0 fl      MPV 11.5 (H) fL      Platelets 129 (L) 10*3/mm3      Neutrophil % 75.0 (H) %      Lymphocyte % 14.3 (L) %      Monocyte % 8.6 (H) %      Eosinophil % 1.5 %      Basophil % 0.3 %      Immature Grans % 0.3 %      Neutrophils, Absolute 4.51 10*3/mm3      Lymphocytes, Absolute 0.86 10*3/mm3      Monocytes, Absolute 0.52 10*3/mm3      Eosinophils, Absolute 0.09 (L) 10*3/mm3      Basophils, Absolute 0.02 10*3/mm3      Immature Grans, Absolute 0.02 10*3/mm3      nRBC 0.0 /100 WBC     Comprehensive Metabolic Panel [[id number]]  (Abnormal) Collected:  03/23/17 0349    Specimen:  Blood Updated:  03/23/17 0436     Glucose 132 (H) mg/dL      BUN 19 mg/dL      Creatinine 2.23 (H) mg/dL      Sodium 141 mmol/L      Potassium 4.1 mmol/L      Chloride 107 mmol/L      CO2 19.4 (L) mmol/L      Calcium 8.7 mg/dL      Total Protein 5.8 (L) g/dL      Albumin 3.20 (L) g/dL      ALT (SGPT) 13 U/L      AST (SGOT) 15 U/L      Alkaline Phosphatase 61 U/L      Total Bilirubin 0.8 mg/dL      eGFR Non African Amer 32 (L) mL/min/1.73      Globulin 2.6 gm/dL      A/G Ratio 1.2 g/dL      BUN/Creatinine Ratio 8.5     Anion Gap 14.6 mmol/L     Lipase [[id number]]  (Abnormal) Collected:  03/23/17 0349    Specimen:  Blood Updated:  03/23/17 0436     Lipase 92 (H) U/L     POC Glucose Fingerstick [[id number]]  (Normal) Collected:  03/23/17 0728    Specimen:  Blood Updated:  03/23/17 0901     Glucose 119 mg/dL     Narrative:       Meter: [id number] :  [id number] Katie Curtis    POC Glucose Fingerstick [[id number]]  (Normal) Collected:  03/23/17 1238    Specimen:  Blood Updated:  03/23/17 1246     Glucose 129 mg/dL     Narrative:       Meter: [id number] : [id number] Katie Curtis    POC Glucose Fingerstick [[id number]]  (Normal) Collected:  03/23/17 1759    Specimen:  Blood Updated:  03/23/17 1806     Glucose 124 mg/dL     Narrative:       Meter: [id number] : [id number] Reagan Rosalesey    POC Glucose Fingerstick [[id number]]  (Normal) Collected:  03/24/17 0134    Specimen:  Blood Updated:  03/24/17 0141     Glucose 113 mg/dL     Narrative:       Meter: [id number] : [id number] [employer]    Basic Metabolic Panel [[id number]]  (Abnormal) Collected:  03/24/17 0333    Specimen:  Blood Updated:  03/24/17 0424     Glucose 111 (H) mg/dL      BUN 19 mg/dL      Creatinine 2.02 (H) mg/dL      Sodium 139 mmol/L      Potassium 3.6 mmol/L      Chloride 106 mmol/L      CO2 20.3 (L) mmol/L      Calcium 8.6 mg/dL      eGFR Non African Amer 36 (L) mL/min/1.73      BUN/Creatinine Ratio 9.4     Anion Gap 12.7 mmol/L     Narrative:       GFR Normal >60  Chronic Kidney Disease <60  Kidney Failure <15    Lipase [[id number]]  (Abnormal) Collected:  03/24/17 0333    Specimen:  Blood Updated:  03/24/17 0424     Lipase 86 (H) U/L     POC Glucose Fingerstick [[id number]]  (Normal) Collected:  03/24/17 0557    Specimen:  Blood Updated:  03/24/17 0607     Glucose 110 mg/dL     Narrative:       Meter: [id number] : [id number] [employer]    POC Glucose Fingerstick [[id number]]  (Abnormal) Collected:  03/24/17 1144    Specimen:  Blood Updated:  03/24/17 1150     Glucose 134 (H) mg/dL     Narrative:       Meter: [id number] : [id number] Carlene Palomino    POC Glucose Fingerstick [[id number]]  (Abnormal) Collected:  03/24/17 1639    Specimen:  Blood Updated:  03/24/17 1645     Glucose 144 (H) mg/dL     Narrative:       Meter: [id number] : [id number] Reagan Flowers    Lipid Panel [[id number]]   (Abnormal) Collected:  03/25/17 0329    Specimen:  Blood Updated:  03/25/17 0409     Total Cholesterol 113 mg/dL      Triglycerides 215 (H) mg/dL      HDL Cholesterol 36 (L) mg/dL      LDL Cholesterol  34 mg/dL      VLDL Cholesterol 43 (H) mg/dL      LDL/HDL Ratio 0.94    Basic Metabolic Panel [[id number]]  (Abnormal) Collected:  03/25/17 0329    Specimen:  Blood Updated:  03/25/17 0409     Glucose 109 (H) mg/dL      BUN 15 mg/dL      Creatinine 2.12 (H) mg/dL      Sodium 138 mmol/L      Potassium 3.3 (L) mmol/L      Chloride 102 mmol/L      CO2 24.6 mmol/L      Calcium 8.5 (L) mg/dL      eGFR Non African Amer 34 (L) mL/min/1.73      BUN/Creatinine Ratio 7.1     Anion Gap 11.4 mmol/L     Narrative:       GFR Normal >60  Chronic Kidney Disease <60  Kidney Failure <15    POC Glucose Fingerstick [[id number]]  (Abnormal) Collected:  03/25/17 0815    Specimen:  Blood Updated:  03/25/17 0821     Glucose 131 (H) mg/dL     Narrative:       Meter: [id number] : [id number] [employer]    POC Glucose Fingerstick [[id number]]  (Abnormal) Collected:  03/25/17 1130    Specimen:  Blood Updated:  03/25/17 1137     Glucose 149 (H) mg/dL     Narrative:       Meter: [id number] : [id number] [employer]    POC Glucose Fingerstick [[id number]]  (Abnormal) Collected:  03/25/17 1645    Specimen:  Blood Updated:  03/25/17 1651     Glucose 143 (H) mg/dL     Narrative:       Meter: [id number] : [id number] [employer]    POC Glucose Fingerstick [[id number]]  (Abnormal) Collected:  03/25/17 2013    Specimen:  Blood Updated:  03/25/17 2020     Glucose 138 (H) mg/dL     Narrative:       Meter: [id number] : [id number] Montez Schaeffer    POC Glucose Fingerstick [[id number]]  (Abnormal) Collected:  03/26/17 0721    Specimen:  Blood Updated:  03/26/17 0748     Glucose 134 (H) mg/dL     Narrative:       Meter: [id number] : [id number] Aletha Walker PCA        Imaging Results (most recent)     Procedure Component Value Units  Date/Time    CT Abdomen Pelvis Without Contrast [[id number]] Collected:  03/20/17 0919     Updated:  03/20/17 0957    Narrative:       CT ABDOMEN AND PELVIS WITHOUT CONTRAST 03/19/2017 AT 2219 HOURS     HISTORY: Upper abdominal pain for 4 days with nausea and vomiting. Renal  failure (GFR of 23). Previous cholecystectomy, umbilical hernia repair.  Previous pancreatitis.     COMPARISON: CT abdomen and pelvis 02/22/2017.     PROCEDURE: 5 mm noncontrast axial images through the abdomen and pelvis.  Enteric contrast only was administered. Sagittal and coronal reformatted  images were obtained.     Radiation dose reduction techniques were utilized including automated  exposure control and exposure modulation based on body size.     ABDOMEN FINDINGS: There is abnormal thickening and inflammatory type  stranding surrounding the pancreatic head and involving the adjacent  duodenum. Findings are thought to represent changes of acute  pancreatitis with secondary reactive duodenitis. There are multiple  shotty lymph nodes surrounding the pancreatic head and neck which are  favored to be benign and reactive, one of the dominant cysts lying  anterior to the uncinate process measuring nearly 11.6 x 19.6 mm  compared to 17.1 x 1.4 mm on 02/22/2017. No drainable fluid collection  or pseudocyst is identified. No abnormal pancreatic ductal dilation is  seen on this noncontrast study.     Liver is markedly and diffusely steatotic. Cholecystectomy changes are  present but no biliary dilation is identified. The spleen, adrenals are  normal. The left kidney is atrophic. There is multifocal right renal  cortical scarring. No shadowing renal stone or hydronephrosis is  evident.     Diverticular changes are scattered throughout the colon, without  convincing CT evidence of acute diverticulitis. The appendix is normal.     Lung bases are free of consolidation. Pacemaker lead is noted. Benign  calcified granuloma in the right lower lobe.      PELVIS FINDINGS: Urinary bladder, prostate and rectum normal. No pelvic  adenopathy or free fluid is seen.     No acute osseous abnormalities are identified.       Impression:       1. Findings consistent with acute pancreatitis of the pancreatic head  with secondary reactive duodenitis in the adjacent second duodenal  segment.  2. No drainable fluid collection or pseudocyst is identified.  3. Diffuse hepatic steatosis.  4. Cholecystectomy.  5. Left renal atrophy with multifocal right renal cortical scarring,  unchanged.  6. Normal appendix.  7. Preliminary report was provided by Dr. Montana on 03/19/2017 at 2250  hours.      This report was finalized on 3/20/2017 9:55 AM by Dr. Estefani Colby MD.       CT Abdomen Pelvis Without Contrast [[id number]] Collected:  03/22/17 1504     Updated:  03/22/17 1514    Narrative:       EXAM: CT abdomen and pelvis with contrast     DATE: 03/22/2017     HISTORY: Epigastric pain.     COMPARISON: CT abdomen and pelvis without contrast 03/19/2017     Abdomen findings::     Features of acute pancreatitis centered about the pancreatic head and  neck again noted. There is increasing inflammatory type stranding in the  right upper quadrant mesentery adjacent to the pancreas, which may  indicate worsening pancreatitis compared to 03/19/2017. However, no  definite intraperitoneal or retroperitoneal hematoma is seen. No  pseudocyst or drainable fluid collection is seen.     The adjacent second duodenal segment markedly thickened and inflamed,  likely a secondary or reactive phenomenon. This has a similar appearance  to prior exam. There are mildly prominent mesenteric lymph nodes in the  same vicinity, thought to be benign and reactive, one of the largest  measuring 12 mm short axis. A prominent portacaval node measuring 12 mm  short axis is stable.     There is some thickening of the mesenteric border of the hepatic flexure  of the colon, thought to represent reactive inflammation. No  evidence of  bowel obstruction.     Appendix is normal.     Hepatic steatosis. Spleen, adrenals are normal. Left renal atrophy.  Right renal cortical scarring. Normal appendix. Diverticulosis without  evidence of acute diverticulitis. Cholecystectomy. No biliary dilation  is seen.     Development of subsegmental atelectasis the right lower lobe. Pacemaker  lead is in place.     Pelvis findings: Moderate urinary bladder distention. Prostate and  rectum are normal. No pelvic free fluid.          Impression:       1. Constellation of findings suggests interval worsening of acute  pancreatitis centered the pancreatic head and neck. Increasing  inflammatory response in the right upper quadrant disease and teary.  2. No evidence of intraperitoneal or retroperitoneal hematoma. No  pseudocyst.  3. Secondary reactive inflammatory type changes of the second duodenal  segment, unchanged.  4.  Secondary reactive inflammatory type changes of the ascending colon  at the hepatic flexure, appears new since the previous exam.  5. Development of subsegmental atelectasis in the right lower lobe since  previous study.  6. Additional CT findings include: Hepatic steatosis, cholecystectomy,  left renal atrophy, right renal cortical scarring, normal appendix.     This report was finalized on 3/22/2017 3:11 PM by Dr. Estefani Colby MD.             PROCEDURES      Condition on Discharge: Stable    Physical Exam at Discharge  Vital Signs  Temp:  [97 °F (36.1 °C)-98.6 °F (37 °C)] 98.1 °F (36.7 °C)  Heart Rate:  [56-77] 70  Resp:  [16-20] 16  BP: (149-181)/() 149/96    Physical Exam:  Physical Exam   Constitutional: Patient appears well-developed and well-nourished and in no acute distress   Cardiovascular: Regular rate, regular rhythm, S1 normal and S2 normal.  Exam reveals no gallop and no friction rub.  Pulmonary/Chest: Lungs are clear to auscultation bilaterally. No respiratory distress. No wheezes. No rhonchi. No rales.   Abdominal:  Soft. Bowel sounds are normal. No distension and no mass. There is no tenderness.   Musculoskeletal: Normal Muscle tone  Extremities: No edema. Radial pulses are palpable and equal.  Neurological: Patient is alert and oriented to person, place, and time. Cranial nerves II-XII are grossly intact.    Discharge Disposition  Stable    Visiting Nurse:    No     Home PT/OT:  No     Home Safety Evaluation:  No     DME  None    Discharge Diet:           Dietary Orders            Start     Ordered    03/26/17 0700  Diet Regular; Cardiac  Diet Effective Now     Question Answer Comment   Diet Texture / Consistency Regular    Common Modifiers Cardiac        03/25/17 1824          Activity at Discharge:  As tolerated      Follow-up Appointments  Future Appointments  Date Time Provider Department Center   7/18/2017 1:30 PM Noelle Rubin MD [facility] None     Additional Instructions for the Follow-ups that You Need to Schedule     Discharge Follow-up with PCP    As directed    Follow Up Details:  This week       Discharge Follow-up with Specialty    As directed    Specialty:  Keep current Cardiology follow-up appointment       Discharge Follow-up with Specialty    As directed    Specialty:  Dr. Mercedes   Follow Up:  2 Weeks                 Test Results Pending at Discharge  None     Geronimo Maria MD  03/26/17  11:08 AM    This discharge took greater than 30 minutes as I spent a great deal of time reviewing the patient's medications with him as his initial MAR was almost completely inaccurate with many duplication therapies as well as meds he no longer took.

## 2017-03-26 NOTE — DISCHARGE INSTR - APPOINTMENTS
You will need to call on Monday to make a follow-up appointment with Dr. García for this week. [phone]    You will need to call on Monday to make a follow-up appointment with Dr. Mercedes for 2 weeks from your discharge date. [phone]

## 2017-03-26 NOTE — PLAN OF CARE
Problem: Pain, Acute (Adult)  Goal: Acceptable Pain Control/Comfort Level  Outcome: Ongoing (interventions implemented as appropriate)    03/26/17 1122   Pain, Acute (Adult)   Acceptable Pain Control/Comfort Level making progress toward outcome

## 2017-05-16 ENCOUNTER — HOSPITAL ENCOUNTER (INPATIENT)
Facility: HOSPITAL | Age: 47
LOS: 4 days | Discharge: HOME | End: 2017-05-21
Attending: EMERGENCY MEDICINE | Admitting: INTERNAL MEDICINE

## 2017-05-16 DIAGNOSIS — K29.80 ACUTE DUODENITIS: ICD-10-CM

## 2017-05-16 DIAGNOSIS — K85.90 PANCREATITIS, RECURRENT: Primary | ICD-10-CM

## 2017-05-16 PROCEDURE — 99283 EMERGENCY DEPT VISIT LOW MDM: CPT

## 2017-05-16 RX ORDER — PANTOPRAZOLE SODIUM 40 MG/1
40 TABLET, DELAYED RELEASE ORAL 2 TIMES DAILY
COMMUNITY

## 2017-05-17 ENCOUNTER — APPOINTMENT (OUTPATIENT)
Dept: CT IMAGING | Facility: HOSPITAL | Age: 47
End: 2017-05-17

## 2017-05-17 PROBLEM — K85.90 PANCREATITIS, RECURRENT: Status: ACTIVE | Noted: 2017-05-17

## 2017-05-17 LAB
ALBUMIN SERPL-MCNC: 3.9 G/DL (ref 3.5–5.2)
ALBUMIN/GLOB SERPL: 1.4 G/DL
ALP SERPL-CCNC: 82 U/L (ref 40–129)
ALT SERPL W P-5'-P-CCNC: 10 U/L (ref 5–41)
AMYLASE SERPL-CCNC: 86 U/L (ref 28–100)
ANION GAP SERPL CALCULATED.3IONS-SCNC: 14.7 MMOL/L
APTT PPP: 32.1 SECONDS (ref 24.3–38.1)
AST SERPL-CCNC: 13 U/L (ref 5–40)
BACTERIA UR QL AUTO: ABNORMAL /HPF
BASOPHILS # BLD AUTO: 0.05 10*3/MM3 (ref 0–0.2)
BASOPHILS NFR BLD AUTO: 0.5 % (ref 0–2)
BILIRUB SERPL-MCNC: 0.5 MG/DL (ref 0.2–1.2)
BILIRUB UR QL STRIP: NEGATIVE
BUN BLD-MCNC: 23 MG/DL (ref 6–20)
BUN/CREAT SERPL: 11.9 (ref 7–25)
CALCIUM SPEC-SCNC: 9.6 MG/DL (ref 8.6–10.5)
CHLORIDE SERPL-SCNC: 101 MMOL/L (ref 98–107)
CLARITY UR: CLEAR
CO2 SERPL-SCNC: 23.3 MMOL/L (ref 22–29)
COLOR UR: YELLOW
CREAT BLD-MCNC: 1.93 MG/DL (ref 0.76–1.27)
D-LACTATE SERPL-SCNC: 1.1 MMOL/L (ref 0.5–2)
DEPRECATED RDW RBC AUTO: 41.6 FL (ref 37–54)
EOSINOPHIL # BLD AUTO: 0.15 10*3/MM3 (ref 0.1–0.3)
EOSINOPHIL NFR BLD AUTO: 1.6 % (ref 0–4)
ERYTHROCYTE [DISTWIDTH] IN BLOOD BY AUTOMATED COUNT: 13.6 % (ref 11.5–14.5)
GFR SERPL CREATININE-BSD FRML MDRD: 38 ML/MIN/1.73
GLOBULIN UR ELPH-MCNC: 2.7 GM/DL
GLUCOSE BLD-MCNC: 102 MG/DL (ref 65–99)
GLUCOSE BLDC GLUCOMTR-MCNC: 130 MG/DL (ref 70–130)
GLUCOSE BLDC GLUCOMTR-MCNC: 157 MG/DL (ref 70–130)
GLUCOSE BLDC GLUCOMTR-MCNC: 166 MG/DL (ref 70–130)
GLUCOSE BLDC GLUCOMTR-MCNC: 174 MG/DL (ref 70–130)
GLUCOSE UR STRIP-MCNC: NEGATIVE MG/DL
HCT VFR BLD AUTO: 49.5 % (ref 42–52)
HGB BLD-MCNC: 17 G/DL (ref 14–18)
HGB UR QL STRIP.AUTO: ABNORMAL
HYALINE CASTS UR QL AUTO: ABNORMAL /LPF
IMM GRANULOCYTES # BLD: 0.03 10*3/MM3 (ref 0–0.03)
IMM GRANULOCYTES NFR BLD: 0.3 % (ref 0–0.5)
INR PPP: 1.03 (ref 0.9–1.1)
KETONES UR QL STRIP: NEGATIVE
LEUKOCYTE ESTERASE UR QL STRIP.AUTO: NEGATIVE
LIPASE SERPL-CCNC: 89 U/L (ref 13–60)
LYMPHOCYTES # BLD AUTO: 1.64 10*3/MM3 (ref 0.6–4.8)
LYMPHOCYTES NFR BLD AUTO: 17.5 % (ref 20–45)
MAGNESIUM SERPL-MCNC: 2.3 MG/DL (ref 1.7–2.5)
MCH RBC QN AUTO: 29.2 PG (ref 27–31)
MCHC RBC AUTO-ENTMCNC: 34.3 G/DL (ref 31–37)
MCV RBC AUTO: 85.1 FL (ref 80–94)
MONOCYTES # BLD AUTO: 0.56 10*3/MM3 (ref 0–1)
MONOCYTES NFR BLD AUTO: 6 % (ref 3–8)
MUCOUS THREADS URNS QL MICRO: ABNORMAL /HPF
NEUTROPHILS # BLD AUTO: 6.95 10*3/MM3 (ref 1.5–8.3)
NEUTROPHILS NFR BLD AUTO: 74.1 % (ref 45–70)
NITRITE UR QL STRIP: NEGATIVE
NRBC BLD MANUAL-RTO: 0 /100 WBC (ref 0–0)
PH UR STRIP.AUTO: 6 [PH] (ref 4.5–8)
PLATELET # BLD AUTO: 213 10*3/MM3 (ref 140–500)
PMV BLD AUTO: 11.7 FL (ref 7.4–10.4)
POTASSIUM BLD-SCNC: 3.9 MMOL/L (ref 3.5–5.2)
PROT SERPL-MCNC: 6.6 G/DL (ref 6–8.5)
PROT UR QL STRIP: ABNORMAL
PROTHROMBIN TIME: 13.5 SECONDS (ref 12.1–15)
RBC # BLD AUTO: 5.82 10*6/MM3 (ref 4.7–6.1)
RBC # UR: ABNORMAL /HPF
REF LAB TEST METHOD: ABNORMAL
SODIUM BLD-SCNC: 139 MMOL/L (ref 136–145)
SP GR UR STRIP: 1.02 (ref 1–1.03)
SQUAMOUS #/AREA URNS HPF: ABNORMAL /HPF
UROBILINOGEN UR QL STRIP: ABNORMAL
WBC NRBC COR # BLD: 9.38 10*3/MM3 (ref 4.8–10.8)
WBC UR QL AUTO: ABNORMAL /HPF

## 2017-05-17 PROCEDURE — 94799 UNLISTED PULMONARY SVC/PX: CPT

## 2017-05-17 PROCEDURE — 83735 ASSAY OF MAGNESIUM: CPT | Performed by: EMERGENCY MEDICINE

## 2017-05-17 PROCEDURE — 25010000002 ONDANSETRON PER 1 MG: Performed by: EMERGENCY MEDICINE

## 2017-05-17 PROCEDURE — 25010000002 HYDROMORPHONE PER 4 MG: Performed by: INTERNAL MEDICINE

## 2017-05-17 PROCEDURE — 82150 ASSAY OF AMYLASE: CPT | Performed by: EMERGENCY MEDICINE

## 2017-05-17 PROCEDURE — 74176 CT ABD & PELVIS W/O CONTRAST: CPT

## 2017-05-17 PROCEDURE — 85025 COMPLETE CBC W/AUTO DIFF WBC: CPT | Performed by: EMERGENCY MEDICINE

## 2017-05-17 PROCEDURE — 80053 COMPREHEN METABOLIC PANEL: CPT | Performed by: EMERGENCY MEDICINE

## 2017-05-17 PROCEDURE — 63710000001 INSULIN ASPART PER 5 UNITS: Performed by: INTERNAL MEDICINE

## 2017-05-17 PROCEDURE — 83690 ASSAY OF LIPASE: CPT | Performed by: EMERGENCY MEDICINE

## 2017-05-17 PROCEDURE — 82962 GLUCOSE BLOOD TEST: CPT

## 2017-05-17 PROCEDURE — 25010000002 HYDRALAZINE PER 20 MG: Performed by: EMERGENCY MEDICINE

## 2017-05-17 PROCEDURE — 25010000002 HYDROMORPHONE PER 4 MG: Performed by: EMERGENCY MEDICINE

## 2017-05-17 PROCEDURE — 25010000002 ONDANSETRON PER 1 MG

## 2017-05-17 PROCEDURE — 85610 PROTHROMBIN TIME: CPT | Performed by: EMERGENCY MEDICINE

## 2017-05-17 PROCEDURE — 83605 ASSAY OF LACTIC ACID: CPT | Performed by: EMERGENCY MEDICINE

## 2017-05-17 PROCEDURE — 99284 EMERGENCY DEPT VISIT MOD MDM: CPT | Performed by: EMERGENCY MEDICINE

## 2017-05-17 PROCEDURE — 81001 URINALYSIS AUTO W/SCOPE: CPT | Performed by: EMERGENCY MEDICINE

## 2017-05-17 PROCEDURE — 99222 1ST HOSP IP/OBS MODERATE 55: CPT | Performed by: INTERNAL MEDICINE

## 2017-05-17 PROCEDURE — 85730 THROMBOPLASTIN TIME PARTIAL: CPT | Performed by: EMERGENCY MEDICINE

## 2017-05-17 RX ORDER — NICOTINE 21 MG/24HR
1 PATCH, TRANSDERMAL 24 HOURS TRANSDERMAL EVERY 24 HOURS
Status: DISCONTINUED | OUTPATIENT
Start: 2017-05-17 | End: 2017-05-21 | Stop reason: HOSPADM

## 2017-05-17 RX ORDER — PANTOPRAZOLE SODIUM 40 MG/10ML
40 INJECTION, POWDER, LYOPHILIZED, FOR SOLUTION INTRAVENOUS
Status: DISCONTINUED | OUTPATIENT
Start: 2017-05-17 | End: 2017-05-19

## 2017-05-17 RX ORDER — ACETAMINOPHEN 650 MG/1
650 SUPPOSITORY RECTAL EVERY 4 HOURS PRN
Status: DISCONTINUED | OUTPATIENT
Start: 2017-05-17 | End: 2017-05-21 | Stop reason: HOSPADM

## 2017-05-17 RX ORDER — DEXTROSE MONOHYDRATE 25 G/50ML
25 INJECTION, SOLUTION INTRAVENOUS
Status: DISCONTINUED | OUTPATIENT
Start: 2017-05-17 | End: 2017-05-21 | Stop reason: HOSPADM

## 2017-05-17 RX ORDER — NITROGLYCERIN 0.4 MG/1
0.4 TABLET SUBLINGUAL
Status: DISCONTINUED | OUTPATIENT
Start: 2017-05-17 | End: 2017-05-21 | Stop reason: HOSPADM

## 2017-05-17 RX ORDER — IPRATROPIUM BROMIDE AND ALBUTEROL SULFATE 2.5; .5 MG/3ML; MG/3ML
3 SOLUTION RESPIRATORY (INHALATION)
Status: DISCONTINUED | OUTPATIENT
Start: 2017-05-17 | End: 2017-05-18

## 2017-05-17 RX ORDER — NICOTINE POLACRILEX 4 MG
15 LOZENGE BUCCAL
Status: DISCONTINUED | OUTPATIENT
Start: 2017-05-17 | End: 2017-05-21 | Stop reason: HOSPADM

## 2017-05-17 RX ORDER — SODIUM CHLORIDE 9 MG/ML
125 INJECTION, SOLUTION INTRAVENOUS CONTINUOUS
Status: DISCONTINUED | OUTPATIENT
Start: 2017-05-17 | End: 2017-05-17

## 2017-05-17 RX ORDER — ONDANSETRON 2 MG/ML
4 INJECTION INTRAMUSCULAR; INTRAVENOUS EVERY 4 HOURS PRN
Status: DISCONTINUED | OUTPATIENT
Start: 2017-05-17 | End: 2017-05-21 | Stop reason: HOSPADM

## 2017-05-17 RX ORDER — DEXTROSE AND SODIUM CHLORIDE 5; .45 G/100ML; G/100ML
100 INJECTION, SOLUTION INTRAVENOUS CONTINUOUS
Status: DISCONTINUED | OUTPATIENT
Start: 2017-05-17 | End: 2017-05-19

## 2017-05-17 RX ORDER — HYDRALAZINE HYDROCHLORIDE 20 MG/ML
20 INJECTION INTRAMUSCULAR; INTRAVENOUS ONCE
Status: COMPLETED | OUTPATIENT
Start: 2017-05-17 | End: 2017-05-17

## 2017-05-17 RX ORDER — BUDESONIDE AND FORMOTEROL FUMARATE DIHYDRATE 80; 4.5 UG/1; UG/1
2 AEROSOL RESPIRATORY (INHALATION)
Status: DISCONTINUED | OUTPATIENT
Start: 2017-05-17 | End: 2017-05-21 | Stop reason: HOSPADM

## 2017-05-17 RX ORDER — ONDANSETRON 2 MG/ML
4 INJECTION INTRAMUSCULAR; INTRAVENOUS ONCE
Status: COMPLETED | OUTPATIENT
Start: 2017-05-17 | End: 2017-05-17

## 2017-05-17 RX ORDER — ENALAPRILAT 2.5 MG/2ML
1.25 INJECTION INTRAVENOUS EVERY 6 HOURS
Status: DISCONTINUED | OUTPATIENT
Start: 2017-05-17 | End: 2017-05-19

## 2017-05-17 RX ORDER — SODIUM CHLORIDE 0.9 % (FLUSH) 0.9 %
10 SYRINGE (ML) INJECTION AS NEEDED
Status: DISCONTINUED | OUTPATIENT
Start: 2017-05-17 | End: 2017-05-21 | Stop reason: HOSPADM

## 2017-05-17 RX ORDER — DEXTROSE AND SODIUM CHLORIDE 5; .45 G/100ML; G/100ML
INJECTION, SOLUTION INTRAVENOUS
Status: COMPLETED
Start: 2017-05-17 | End: 2017-05-17

## 2017-05-17 RX ORDER — ONDANSETRON 2 MG/ML
INJECTION INTRAMUSCULAR; INTRAVENOUS
Status: COMPLETED
Start: 2017-05-17 | End: 2017-05-17

## 2017-05-17 RX ADMIN — HYDROMORPHONE HYDROCHLORIDE 1 MG: 1 INJECTION, SOLUTION INTRAMUSCULAR; INTRAVENOUS; SUBCUTANEOUS at 00:46

## 2017-05-17 RX ADMIN — HYDROMORPHONE HYDROCHLORIDE 1 MG: 1 INJECTION, SOLUTION INTRAMUSCULAR; INTRAVENOUS; SUBCUTANEOUS at 17:42

## 2017-05-17 RX ADMIN — INSULIN ASPART 2 UNITS: 100 INJECTION, SOLUTION INTRAVENOUS; SUBCUTANEOUS at 17:35

## 2017-05-17 RX ADMIN — HYDROMORPHONE HYDROCHLORIDE 1 MG: 1 INJECTION, SOLUTION INTRAMUSCULAR; INTRAVENOUS; SUBCUTANEOUS at 02:21

## 2017-05-17 RX ADMIN — HYDROMORPHONE HYDROCHLORIDE 1 MG: 1 INJECTION, SOLUTION INTRAMUSCULAR; INTRAVENOUS; SUBCUTANEOUS at 06:25

## 2017-05-17 RX ADMIN — IPRATROPIUM BROMIDE AND ALBUTEROL SULFATE 3 ML: .5; 3 SOLUTION RESPIRATORY (INHALATION) at 07:34

## 2017-05-17 RX ADMIN — IPRATROPIUM BROMIDE AND ALBUTEROL SULFATE 3 ML: .5; 3 SOLUTION RESPIRATORY (INHALATION) at 11:38

## 2017-05-17 RX ADMIN — DEXTROSE AND SODIUM CHLORIDE 100 ML/HR: 5; 450 INJECTION, SOLUTION INTRAVENOUS at 17:21

## 2017-05-17 RX ADMIN — ENALAPRILAT 1.25 MG: 2.5 INJECTION INTRAVENOUS at 17:17

## 2017-05-17 RX ADMIN — ENALAPRILAT 1.25 MG: 2.5 INJECTION INTRAVENOUS at 06:30

## 2017-05-17 RX ADMIN — DEXTROSE AND SODIUM CHLORIDE 100 ML/HR: 5; 450 INJECTION, SOLUTION INTRAVENOUS at 07:15

## 2017-05-17 RX ADMIN — HYDROMORPHONE HYDROCHLORIDE 1 MG: 1 INJECTION, SOLUTION INTRAMUSCULAR; INTRAVENOUS; SUBCUTANEOUS at 14:48

## 2017-05-17 RX ADMIN — ONDANSETRON 4 MG: 2 INJECTION, SOLUTION INTRAMUSCULAR; INTRAVENOUS at 22:22

## 2017-05-17 RX ADMIN — HYDRALAZINE HYDROCHLORIDE 20 MG: 20 INJECTION INTRAMUSCULAR; INTRAVENOUS at 02:19

## 2017-05-17 RX ADMIN — SODIUM CHLORIDE 125 ML/HR: 9 INJECTION, SOLUTION INTRAVENOUS at 00:46

## 2017-05-17 RX ADMIN — IPRATROPIUM BROMIDE AND ALBUTEROL SULFATE 3 ML: .5; 3 SOLUTION RESPIRATORY (INHALATION) at 15:33

## 2017-05-17 RX ADMIN — NICOTINE 1 PATCH: 14 PATCH, EXTENDED RELEASE TRANSDERMAL at 06:28

## 2017-05-17 RX ADMIN — BUDESONIDE AND FORMOTEROL FUMARATE DIHYDRATE 2 PUFF: 80; 4.5 AEROSOL RESPIRATORY (INHALATION) at 20:09

## 2017-05-17 RX ADMIN — BUDESONIDE AND FORMOTEROL FUMARATE DIHYDRATE 2 PUFF: 80; 4.5 AEROSOL RESPIRATORY (INHALATION) at 07:41

## 2017-05-17 RX ADMIN — ONDANSETRON 4 MG: 2 INJECTION, SOLUTION INTRAMUSCULAR; INTRAVENOUS at 00:46

## 2017-05-17 RX ADMIN — IPRATROPIUM BROMIDE AND ALBUTEROL SULFATE 3 ML: .5; 3 SOLUTION RESPIRATORY (INHALATION) at 20:05

## 2017-05-17 RX ADMIN — HYDROMORPHONE HYDROCHLORIDE 1 MG: 1 INJECTION, SOLUTION INTRAMUSCULAR; INTRAVENOUS; SUBCUTANEOUS at 10:42

## 2017-05-17 RX ADMIN — PANTOPRAZOLE SODIUM 40 MG: 40 INJECTION, POWDER, FOR SOLUTION INTRAVENOUS at 06:31

## 2017-05-18 ENCOUNTER — INPATIENT HOSPITAL (OUTPATIENT)
Dept: URBAN - METROPOLITAN AREA HOSPITAL 27 | Facility: HOSPITAL | Age: 47
End: 2017-05-18
Payer: COMMERCIAL

## 2017-05-18 DIAGNOSIS — K85.90 ACUTE PANCREATITIS WITHOUT NECROSIS OR INFECTION, UNSPECIFIE: ICD-10-CM

## 2017-05-18 DIAGNOSIS — K29.80 DUODENITIS WITHOUT BLEEDING: ICD-10-CM

## 2017-05-18 LAB
ALBUMIN SERPL-MCNC: 3.2 G/DL (ref 3.5–5.2)
ALBUMIN/GLOB SERPL: 1.5 G/DL
ALP SERPL-CCNC: 69 U/L (ref 40–129)
ALT SERPL W P-5'-P-CCNC: 8 U/L (ref 5–41)
AMYLASE SERPL-CCNC: 61 U/L (ref 28–100)
ANION GAP SERPL CALCULATED.3IONS-SCNC: 14.1 MMOL/L
AST SERPL-CCNC: 9 U/L (ref 5–40)
BASOPHILS # BLD AUTO: 0.02 10*3/MM3 (ref 0–0.2)
BASOPHILS NFR BLD AUTO: 0.3 % (ref 0–2)
BILIRUB SERPL-MCNC: 0.5 MG/DL (ref 0.2–1.2)
BUN BLD-MCNC: 16 MG/DL (ref 6–20)
BUN/CREAT SERPL: 8.5 (ref 7–25)
CALCIUM SPEC-SCNC: 8.9 MG/DL (ref 8.6–10.5)
CHLORIDE SERPL-SCNC: 101 MMOL/L (ref 98–107)
CO2 SERPL-SCNC: 24.9 MMOL/L (ref 22–29)
CREAT BLD-MCNC: 1.88 MG/DL (ref 0.76–1.27)
DEPRECATED RDW RBC AUTO: 42.3 FL (ref 37–54)
EOSINOPHIL # BLD AUTO: 0.08 10*3/MM3 (ref 0.1–0.3)
EOSINOPHIL NFR BLD AUTO: 1.4 % (ref 0–4)
ERYTHROCYTE [DISTWIDTH] IN BLOOD BY AUTOMATED COUNT: 13.5 % (ref 11.5–14.5)
GFR SERPL CREATININE-BSD FRML MDRD: 39 ML/MIN/1.73
GLOBULIN UR ELPH-MCNC: 2.2 GM/DL
GLUCOSE BLD-MCNC: 149 MG/DL (ref 65–99)
GLUCOSE BLDC GLUCOMTR-MCNC: 116 MG/DL (ref 70–130)
GLUCOSE BLDC GLUCOMTR-MCNC: 144 MG/DL (ref 70–130)
GLUCOSE BLDC GLUCOMTR-MCNC: 151 MG/DL (ref 70–130)
GLUCOSE BLDC GLUCOMTR-MCNC: 165 MG/DL (ref 70–130)
HCT VFR BLD AUTO: 41.3 % (ref 42–52)
HGB BLD-MCNC: 13.9 G/DL (ref 14–18)
IMM GRANULOCYTES # BLD: 0.01 10*3/MM3 (ref 0–0.03)
IMM GRANULOCYTES NFR BLD: 0.2 % (ref 0–0.5)
LIPASE SERPL-CCNC: 63 U/L (ref 13–60)
LYMPHOCYTES # BLD AUTO: 1 10*3/MM3 (ref 0.6–4.8)
LYMPHOCYTES NFR BLD AUTO: 17.4 % (ref 20–45)
MCH RBC QN AUTO: 29 PG (ref 27–31)
MCHC RBC AUTO-ENTMCNC: 33.7 G/DL (ref 31–37)
MCV RBC AUTO: 86.2 FL (ref 80–94)
MONOCYTES # BLD AUTO: 0.4 10*3/MM3 (ref 0–1)
MONOCYTES NFR BLD AUTO: 6.9 % (ref 3–8)
NEUTROPHILS # BLD AUTO: 4.25 10*3/MM3 (ref 1.5–8.3)
NEUTROPHILS NFR BLD AUTO: 73.8 % (ref 45–70)
NRBC BLD MANUAL-RTO: 0 /100 WBC (ref 0–0)
PLATELET # BLD AUTO: 147 10*3/MM3 (ref 140–500)
PMV BLD AUTO: 11.2 FL (ref 7.4–10.4)
POTASSIUM BLD-SCNC: 3.8 MMOL/L (ref 3.5–5.2)
PROT SERPL-MCNC: 5.4 G/DL (ref 6–8.5)
RBC # BLD AUTO: 4.79 10*6/MM3 (ref 4.7–6.1)
SODIUM BLD-SCNC: 140 MMOL/L (ref 136–145)
WBC NRBC COR # BLD: 5.76 10*3/MM3 (ref 4.8–10.8)

## 2017-05-18 PROCEDURE — 25010000002 HYDROMORPHONE PER 4 MG: Performed by: INTERNAL MEDICINE

## 2017-05-18 PROCEDURE — 83690 ASSAY OF LIPASE: CPT | Performed by: INTERNAL MEDICINE

## 2017-05-18 PROCEDURE — 94799 UNLISTED PULMONARY SVC/PX: CPT

## 2017-05-18 PROCEDURE — 86301 IMMUNOASSAY TUMOR CA 19-9: CPT | Performed by: NURSE PRACTITIONER

## 2017-05-18 PROCEDURE — 25010000002 ONDANSETRON PER 1 MG: Performed by: INTERNAL MEDICINE

## 2017-05-18 PROCEDURE — 25010000002 HYDRALAZINE PER 20 MG: Performed by: NURSE PRACTITIONER

## 2017-05-18 PROCEDURE — 85025 COMPLETE CBC W/AUTO DIFF WBC: CPT | Performed by: INTERNAL MEDICINE

## 2017-05-18 PROCEDURE — 82150 ASSAY OF AMYLASE: CPT | Performed by: INTERNAL MEDICINE

## 2017-05-18 PROCEDURE — 63710000001 INSULIN ASPART PER 5 UNITS: Performed by: INTERNAL MEDICINE

## 2017-05-18 PROCEDURE — 82962 GLUCOSE BLOOD TEST: CPT

## 2017-05-18 PROCEDURE — 63710000001 INSULIN ASPART PER 5 UNITS: Performed by: NURSE PRACTITIONER

## 2017-05-18 PROCEDURE — 80053 COMPREHEN METABOLIC PANEL: CPT | Performed by: INTERNAL MEDICINE

## 2017-05-18 PROCEDURE — 99232 SBSQ HOSP IP/OBS MODERATE 35: CPT | Performed by: NURSE PRACTITIONER

## 2017-05-18 PROCEDURE — 99252 IP/OBS CONSLTJ NEW/EST SF 35: CPT | Performed by: SURGERY

## 2017-05-18 PROCEDURE — 99222 1ST HOSP IP/OBS MODERATE 55: CPT

## 2017-05-18 RX ORDER — HYDRALAZINE HYDROCHLORIDE 20 MG/ML
20 INJECTION INTRAMUSCULAR; INTRAVENOUS EVERY 6 HOURS
Status: DISCONTINUED | OUTPATIENT
Start: 2017-05-18 | End: 2017-05-19

## 2017-05-18 RX ORDER — ALBUTEROL SULFATE 2.5 MG/3ML
2.5 SOLUTION RESPIRATORY (INHALATION) EVERY 6 HOURS PRN
Status: DISCONTINUED | OUTPATIENT
Start: 2017-05-18 | End: 2017-05-21 | Stop reason: HOSPADM

## 2017-05-18 RX ADMIN — HYDROMORPHONE HYDROCHLORIDE 1 MG: 1 INJECTION, SOLUTION INTRAMUSCULAR; INTRAVENOUS; SUBCUTANEOUS at 07:56

## 2017-05-18 RX ADMIN — HYDRALAZINE HYDROCHLORIDE 20 MG: 20 INJECTION INTRAMUSCULAR; INTRAVENOUS at 23:41

## 2017-05-18 RX ADMIN — BUDESONIDE AND FORMOTEROL FUMARATE DIHYDRATE 2 PUFF: 80; 4.5 AEROSOL RESPIRATORY (INHALATION) at 19:15

## 2017-05-18 RX ADMIN — NICOTINE 1 PATCH: 14 PATCH, EXTENDED RELEASE TRANSDERMAL at 06:48

## 2017-05-18 RX ADMIN — ONDANSETRON 4 MG: 2 INJECTION, SOLUTION INTRAMUSCULAR; INTRAVENOUS at 14:20

## 2017-05-18 RX ADMIN — HYDROMORPHONE HYDROCHLORIDE 1 MG: 1 INJECTION, SOLUTION INTRAMUSCULAR; INTRAVENOUS; SUBCUTANEOUS at 04:29

## 2017-05-18 RX ADMIN — HYDROMORPHONE HYDROCHLORIDE 1 MG: 1 INJECTION, SOLUTION INTRAMUSCULAR; INTRAVENOUS; SUBCUTANEOUS at 14:19

## 2017-05-18 RX ADMIN — ONDANSETRON 4 MG: 2 INJECTION, SOLUTION INTRAMUSCULAR; INTRAVENOUS at 08:00

## 2017-05-18 RX ADMIN — HYDROMORPHONE HYDROCHLORIDE 1 MG: 1 INJECTION, SOLUTION INTRAMUSCULAR; INTRAVENOUS; SUBCUTANEOUS at 20:41

## 2017-05-18 RX ADMIN — INSULIN ASPART 2 UNITS: 100 INJECTION, SOLUTION INTRAVENOUS; SUBCUTANEOUS at 18:20

## 2017-05-18 RX ADMIN — HYDROMORPHONE HYDROCHLORIDE 1 MG: 1 INJECTION, SOLUTION INTRAMUSCULAR; INTRAVENOUS; SUBCUTANEOUS at 00:21

## 2017-05-18 RX ADMIN — DEXTROSE AND SODIUM CHLORIDE 100 ML/HR: 5; 450 INJECTION, SOLUTION INTRAVENOUS at 14:02

## 2017-05-18 RX ADMIN — ENALAPRILAT 1.25 MG: 2.5 INJECTION INTRAVENOUS at 00:21

## 2017-05-18 RX ADMIN — ENALAPRILAT 1.25 MG: 2.5 INJECTION INTRAVENOUS at 06:46

## 2017-05-18 RX ADMIN — IPRATROPIUM BROMIDE AND ALBUTEROL SULFATE 3 ML: .5; 3 SOLUTION RESPIRATORY (INHALATION) at 15:37

## 2017-05-18 RX ADMIN — HYDROMORPHONE HYDROCHLORIDE 1 MG: 1 INJECTION, SOLUTION INTRAMUSCULAR; INTRAVENOUS; SUBCUTANEOUS at 17:34

## 2017-05-18 RX ADMIN — IPRATROPIUM BROMIDE AND ALBUTEROL SULFATE 3 ML: .5; 3 SOLUTION RESPIRATORY (INHALATION) at 12:04

## 2017-05-18 RX ADMIN — ONDANSETRON 4 MG: 2 INJECTION, SOLUTION INTRAMUSCULAR; INTRAVENOUS at 20:41

## 2017-05-18 RX ADMIN — INSULIN ASPART 2 UNITS: 100 INJECTION, SOLUTION INTRAVENOUS; SUBCUTANEOUS at 12:51

## 2017-05-18 RX ADMIN — PANTOPRAZOLE SODIUM 40 MG: 40 INJECTION, POWDER, FOR SOLUTION INTRAVENOUS at 06:46

## 2017-05-18 RX ADMIN — ONDANSETRON 4 MG: 2 INJECTION, SOLUTION INTRAMUSCULAR; INTRAVENOUS at 04:28

## 2017-05-18 RX ADMIN — HYDRALAZINE HYDROCHLORIDE 20 MG: 20 INJECTION INTRAMUSCULAR; INTRAVENOUS at 17:27

## 2017-05-18 RX ADMIN — IPRATROPIUM BROMIDE AND ALBUTEROL SULFATE 3 ML: .5; 3 SOLUTION RESPIRATORY (INHALATION) at 07:49

## 2017-05-18 RX ADMIN — HYDROMORPHONE HYDROCHLORIDE 1 MG: 1 INJECTION, SOLUTION INTRAMUSCULAR; INTRAVENOUS; SUBCUTANEOUS at 11:24

## 2017-05-18 RX ADMIN — BUDESONIDE AND FORMOTEROL FUMARATE DIHYDRATE 2 PUFF: 80; 4.5 AEROSOL RESPIRATORY (INHALATION) at 07:55

## 2017-05-18 RX ADMIN — HYDROMORPHONE HYDROCHLORIDE 1 MG: 1 INJECTION, SOLUTION INTRAMUSCULAR; INTRAVENOUS; SUBCUTANEOUS at 23:41

## 2017-05-19 LAB
CANCER AG19-9 SERPL-ACNC: 1 U/ML (ref 0–35)
GLUCOSE BLDC GLUCOMTR-MCNC: 129 MG/DL (ref 70–130)
GLUCOSE BLDC GLUCOMTR-MCNC: 129 MG/DL (ref 70–130)
GLUCOSE BLDC GLUCOMTR-MCNC: 131 MG/DL (ref 70–130)
GLUCOSE BLDC GLUCOMTR-MCNC: 136 MG/DL (ref 70–130)

## 2017-05-19 PROCEDURE — 25010000002 HYDRALAZINE PER 20 MG: Performed by: NURSE PRACTITIONER

## 2017-05-19 PROCEDURE — 99232 SBSQ HOSP IP/OBS MODERATE 35: CPT | Performed by: NURSE PRACTITIONER

## 2017-05-19 PROCEDURE — 99231 SBSQ HOSP IP/OBS SF/LOW 25: CPT | Performed by: SURGERY

## 2017-05-19 PROCEDURE — 82962 GLUCOSE BLOOD TEST: CPT

## 2017-05-19 PROCEDURE — 99231 SBSQ HOSP IP/OBS SF/LOW 25: CPT

## 2017-05-19 PROCEDURE — 25010000002 LORAZEPAM PER 2 MG: Performed by: NURSE PRACTITIONER

## 2017-05-19 PROCEDURE — 25010000002 HYDROMORPHONE PER 4 MG: Performed by: INTERNAL MEDICINE

## 2017-05-19 PROCEDURE — 25010000002 ONDANSETRON PER 1 MG: Performed by: INTERNAL MEDICINE

## 2017-05-19 PROCEDURE — 94799 UNLISTED PULMONARY SVC/PX: CPT

## 2017-05-19 RX ORDER — LISINOPRIL 20 MG/1
20 TABLET ORAL
Status: DISCONTINUED | OUTPATIENT
Start: 2017-05-19 | End: 2017-05-21 | Stop reason: HOSPADM

## 2017-05-19 RX ORDER — HYDRALAZINE HYDROCHLORIDE 50 MG/1
50 TABLET, FILM COATED ORAL 4 TIMES DAILY
Status: DISCONTINUED | OUTPATIENT
Start: 2017-05-19 | End: 2017-05-21 | Stop reason: HOSPADM

## 2017-05-19 RX ORDER — AMLODIPINE BESYLATE 5 MG/1
10 TABLET ORAL
Status: DISCONTINUED | OUTPATIENT
Start: 2017-05-19 | End: 2017-05-21 | Stop reason: HOSPADM

## 2017-05-19 RX ORDER — DULOXETIN HYDROCHLORIDE 60 MG/1
60 CAPSULE, DELAYED RELEASE ORAL DAILY
Status: DISCONTINUED | OUTPATIENT
Start: 2017-05-19 | End: 2017-05-21 | Stop reason: HOSPADM

## 2017-05-19 RX ORDER — BISOPROLOL FUMARATE 5 MG/1
5 TABLET, FILM COATED ORAL
Status: DISCONTINUED | OUTPATIENT
Start: 2017-05-19 | End: 2017-05-21 | Stop reason: HOSPADM

## 2017-05-19 RX ORDER — LORAZEPAM 2 MG/ML
0.5 INJECTION INTRAMUSCULAR EVERY 4 HOURS PRN
Status: DISCONTINUED | OUTPATIENT
Start: 2017-05-19 | End: 2017-05-19

## 2017-05-19 RX ORDER — GABAPENTIN 400 MG/1
800 CAPSULE ORAL 3 TIMES DAILY
Status: DISCONTINUED | OUTPATIENT
Start: 2017-05-19 | End: 2017-05-21 | Stop reason: HOSPADM

## 2017-05-19 RX ORDER — ONDANSETRON 4 MG/1
4 TABLET, FILM COATED ORAL EVERY 6 HOURS PRN
Status: DISCONTINUED | OUTPATIENT
Start: 2017-05-19 | End: 2017-05-21 | Stop reason: HOSPADM

## 2017-05-19 RX ORDER — PANTOPRAZOLE SODIUM 40 MG/1
40 TABLET, DELAYED RELEASE ORAL
Status: DISCONTINUED | OUTPATIENT
Start: 2017-05-20 | End: 2017-05-21 | Stop reason: HOSPADM

## 2017-05-19 RX ADMIN — HYDROMORPHONE HYDROCHLORIDE 1 MG: 1 INJECTION, SOLUTION INTRAMUSCULAR; INTRAVENOUS; SUBCUTANEOUS at 09:07

## 2017-05-19 RX ADMIN — NICOTINE 1 PATCH: 14 PATCH, EXTENDED RELEASE TRANSDERMAL at 06:44

## 2017-05-19 RX ADMIN — HYDROMORPHONE HYDROCHLORIDE 1 MG: 1 INJECTION, SOLUTION INTRAMUSCULAR; INTRAVENOUS; SUBCUTANEOUS at 20:35

## 2017-05-19 RX ADMIN — HYDROMORPHONE HYDROCHLORIDE 1 MG: 1 INJECTION, SOLUTION INTRAMUSCULAR; INTRAVENOUS; SUBCUTANEOUS at 12:34

## 2017-05-19 RX ADMIN — HYDROMORPHONE HYDROCHLORIDE 1 MG: 1 INJECTION, SOLUTION INTRAMUSCULAR; INTRAVENOUS; SUBCUTANEOUS at 05:47

## 2017-05-19 RX ADMIN — DEXTROSE AND SODIUM CHLORIDE 100 ML/HR: 5; 450 INJECTION, SOLUTION INTRAVENOUS at 10:00

## 2017-05-19 RX ADMIN — PANTOPRAZOLE SODIUM 40 MG: 40 INJECTION, POWDER, FOR SOLUTION INTRAVENOUS at 06:24

## 2017-05-19 RX ADMIN — DULOXETINE HYDROCHLORIDE 60 MG: 60 CAPSULE, DELAYED RELEASE ORAL at 17:30

## 2017-05-19 RX ADMIN — HYDROMORPHONE HYDROCHLORIDE 1 MG: 1 INJECTION, SOLUTION INTRAMUSCULAR; INTRAVENOUS; SUBCUTANEOUS at 23:35

## 2017-05-19 RX ADMIN — TIOTROPIUM BROMIDE 1 CAPSULE: 18 CAPSULE ORAL; RESPIRATORY (INHALATION) at 09:14

## 2017-05-19 RX ADMIN — ONDANSETRON 4 MG: 2 INJECTION, SOLUTION INTRAMUSCULAR; INTRAVENOUS at 17:22

## 2017-05-19 RX ADMIN — BISOPROLOL FUMARATE 5 MG: 5 TABLET ORAL at 17:29

## 2017-05-19 RX ADMIN — ENALAPRILAT 1.25 MG: 2.5 INJECTION INTRAVENOUS at 12:37

## 2017-05-19 RX ADMIN — DEXTROSE AND SODIUM CHLORIDE 100 ML/HR: 5; 450 INJECTION, SOLUTION INTRAVENOUS at 00:18

## 2017-05-19 RX ADMIN — LORAZEPAM 0.5 MG: 2 INJECTION INTRAMUSCULAR; INTRAVENOUS at 09:50

## 2017-05-19 RX ADMIN — HYDRALAZINE HYDROCHLORIDE 20 MG: 20 INJECTION INTRAMUSCULAR; INTRAVENOUS at 12:44

## 2017-05-19 RX ADMIN — HYDROMORPHONE HYDROCHLORIDE 1 MG: 1 INJECTION, SOLUTION INTRAMUSCULAR; INTRAVENOUS; SUBCUTANEOUS at 02:45

## 2017-05-19 RX ADMIN — LISINOPRIL 20 MG: 20 TABLET ORAL at 17:22

## 2017-05-19 RX ADMIN — ONDANSETRON 4 MG: 2 INJECTION, SOLUTION INTRAMUSCULAR; INTRAVENOUS at 12:34

## 2017-05-19 RX ADMIN — AMLODIPINE BESYLATE 10 MG: 5 TABLET ORAL at 17:22

## 2017-05-19 RX ADMIN — HYDRALAZINE HYDROCHLORIDE 20 MG: 20 INJECTION INTRAMUSCULAR; INTRAVENOUS at 06:24

## 2017-05-19 RX ADMIN — ONDANSETRON 4 MG: 2 INJECTION, SOLUTION INTRAMUSCULAR; INTRAVENOUS at 02:45

## 2017-05-19 RX ADMIN — ONDANSETRON 4 MG: 2 INJECTION, SOLUTION INTRAMUSCULAR; INTRAVENOUS at 08:03

## 2017-05-19 RX ADMIN — LORAZEPAM 0.5 MG: 2 INJECTION INTRAMUSCULAR; INTRAVENOUS at 14:30

## 2017-05-19 RX ADMIN — HYDROMORPHONE HYDROCHLORIDE 1 MG: 1 INJECTION, SOLUTION INTRAMUSCULAR; INTRAVENOUS; SUBCUTANEOUS at 17:22

## 2017-05-19 RX ADMIN — ONDANSETRON 4 MG: 2 INJECTION, SOLUTION INTRAMUSCULAR; INTRAVENOUS at 21:33

## 2017-05-19 RX ADMIN — GABAPENTIN 800 MG: 400 CAPSULE ORAL at 17:30

## 2017-05-19 RX ADMIN — HYDRALAZINE HYDROCHLORIDE 50 MG: 50 TABLET ORAL at 17:22

## 2017-05-20 LAB
ALBUMIN SERPL-MCNC: 3.2 G/DL (ref 3.5–5.2)
ALBUMIN/GLOB SERPL: 1.5 G/DL
ALP SERPL-CCNC: 67 U/L (ref 40–129)
ALT SERPL W P-5'-P-CCNC: 12 U/L (ref 5–41)
ANION GAP SERPL CALCULATED.3IONS-SCNC: 12.2 MMOL/L
AST SERPL-CCNC: 16 U/L (ref 5–40)
BASOPHILS # BLD AUTO: 0.04 10*3/MM3 (ref 0–0.2)
BASOPHILS NFR BLD AUTO: 0.9 % (ref 0–2)
BILIRUB SERPL-MCNC: 0.9 MG/DL (ref 0.2–1.2)
BUN BLD-MCNC: 12 MG/DL (ref 6–20)
BUN/CREAT SERPL: 5.8 (ref 7–25)
CALCIUM SPEC-SCNC: 8.8 MG/DL (ref 8.6–10.5)
CHLORIDE SERPL-SCNC: 103 MMOL/L (ref 98–107)
CO2 SERPL-SCNC: 23.8 MMOL/L (ref 22–29)
CREAT BLD-MCNC: 2.08 MG/DL (ref 0.76–1.27)
DEPRECATED RDW RBC AUTO: 44.7 FL (ref 37–54)
EOSINOPHIL # BLD AUTO: 0.12 10*3/MM3 (ref 0.1–0.3)
EOSINOPHIL NFR BLD AUTO: 2.6 % (ref 0–4)
ERYTHROCYTE [DISTWIDTH] IN BLOOD BY AUTOMATED COUNT: 13.9 % (ref 11.5–14.5)
GFR SERPL CREATININE-BSD FRML MDRD: 34 ML/MIN/1.73
GLOBULIN UR ELPH-MCNC: 2.1 GM/DL
GLUCOSE BLD-MCNC: 109 MG/DL (ref 65–99)
GLUCOSE BLDC GLUCOMTR-MCNC: 100 MG/DL (ref 70–130)
GLUCOSE BLDC GLUCOMTR-MCNC: 103 MG/DL (ref 70–130)
GLUCOSE BLDC GLUCOMTR-MCNC: 104 MG/DL (ref 70–130)
GLUCOSE BLDC GLUCOMTR-MCNC: 107 MG/DL (ref 70–130)
HCT VFR BLD AUTO: 38.9 % (ref 42–52)
HGB BLD-MCNC: 12.9 G/DL (ref 14–18)
IMM GRANULOCYTES # BLD: 0.01 10*3/MM3 (ref 0–0.03)
IMM GRANULOCYTES NFR BLD: 0.2 % (ref 0–0.5)
LIPASE SERPL-CCNC: 61 U/L (ref 13–60)
LYMPHOCYTES # BLD AUTO: 1.26 10*3/MM3 (ref 0.6–4.8)
LYMPHOCYTES NFR BLD AUTO: 26.8 % (ref 20–45)
MCH RBC QN AUTO: 29.2 PG (ref 27–31)
MCHC RBC AUTO-ENTMCNC: 33.2 G/DL (ref 31–37)
MCV RBC AUTO: 88 FL (ref 80–94)
MONOCYTES # BLD AUTO: 0.45 10*3/MM3 (ref 0–1)
MONOCYTES NFR BLD AUTO: 9.6 % (ref 3–8)
NEUTROPHILS # BLD AUTO: 2.82 10*3/MM3 (ref 1.5–8.3)
NEUTROPHILS NFR BLD AUTO: 59.9 % (ref 45–70)
NRBC BLD MANUAL-RTO: 0 /100 WBC (ref 0–0)
PLATELET # BLD AUTO: 142 10*3/MM3 (ref 140–500)
PMV BLD AUTO: 11.5 FL (ref 7.4–10.4)
POTASSIUM BLD-SCNC: 4.1 MMOL/L (ref 3.5–5.2)
PROT SERPL-MCNC: 5.3 G/DL (ref 6–8.5)
RBC # BLD AUTO: 4.42 10*6/MM3 (ref 4.7–6.1)
SODIUM BLD-SCNC: 139 MMOL/L (ref 136–145)
WBC NRBC COR # BLD: 4.7 10*3/MM3 (ref 4.8–10.8)

## 2017-05-20 PROCEDURE — 94799 UNLISTED PULMONARY SVC/PX: CPT

## 2017-05-20 PROCEDURE — 99231 SBSQ HOSP IP/OBS SF/LOW 25: CPT | Performed by: NURSE PRACTITIONER

## 2017-05-20 PROCEDURE — 25010000002 HYDROMORPHONE PER 4 MG: Performed by: INTERNAL MEDICINE

## 2017-05-20 PROCEDURE — 85025 COMPLETE CBC W/AUTO DIFF WBC: CPT | Performed by: SURGERY

## 2017-05-20 PROCEDURE — 82962 GLUCOSE BLOOD TEST: CPT

## 2017-05-20 PROCEDURE — 25010000002 ONDANSETRON PER 1 MG: Performed by: INTERNAL MEDICINE

## 2017-05-20 PROCEDURE — 80053 COMPREHEN METABOLIC PANEL: CPT | Performed by: SURGERY

## 2017-05-20 PROCEDURE — 83690 ASSAY OF LIPASE: CPT | Performed by: SURGERY

## 2017-05-20 PROCEDURE — 99231 SBSQ HOSP IP/OBS SF/LOW 25: CPT | Performed by: SURGERY

## 2017-05-20 PROCEDURE — 99233 SBSQ HOSP IP/OBS HIGH 50: CPT | Performed by: FAMILY MEDICINE

## 2017-05-20 RX ADMIN — BUDESONIDE AND FORMOTEROL FUMARATE DIHYDRATE 2 PUFF: 80; 4.5 AEROSOL RESPIRATORY (INHALATION) at 21:29

## 2017-05-20 RX ADMIN — TIOTROPIUM BROMIDE 1 CAPSULE: 18 CAPSULE ORAL; RESPIRATORY (INHALATION) at 07:46

## 2017-05-20 RX ADMIN — GABAPENTIN 800 MG: 400 CAPSULE ORAL at 20:24

## 2017-05-20 RX ADMIN — AMLODIPINE BESYLATE 10 MG: 5 TABLET ORAL at 09:02

## 2017-05-20 RX ADMIN — HYDROMORPHONE HYDROCHLORIDE 1 MG: 1 INJECTION, SOLUTION INTRAMUSCULAR; INTRAVENOUS; SUBCUTANEOUS at 11:54

## 2017-05-20 RX ADMIN — BUDESONIDE AND FORMOTEROL FUMARATE DIHYDRATE 2 PUFF: 80; 4.5 AEROSOL RESPIRATORY (INHALATION) at 07:47

## 2017-05-20 RX ADMIN — HYDROMORPHONE HYDROCHLORIDE 1 MG: 1 INJECTION, SOLUTION INTRAMUSCULAR; INTRAVENOUS; SUBCUTANEOUS at 05:54

## 2017-05-20 RX ADMIN — HYDROMORPHONE HYDROCHLORIDE 1 MG: 1 INJECTION, SOLUTION INTRAMUSCULAR; INTRAVENOUS; SUBCUTANEOUS at 20:38

## 2017-05-20 RX ADMIN — GABAPENTIN 800 MG: 400 CAPSULE ORAL at 09:02

## 2017-05-20 RX ADMIN — NICOTINE 1 PATCH: 14 PATCH, EXTENDED RELEASE TRANSDERMAL at 05:53

## 2017-05-20 RX ADMIN — PANTOPRAZOLE SODIUM 40 MG: 40 TABLET, DELAYED RELEASE ORAL at 05:54

## 2017-05-20 RX ADMIN — HYDROMORPHONE HYDROCHLORIDE 1 MG: 1 INJECTION, SOLUTION INTRAMUSCULAR; INTRAVENOUS; SUBCUTANEOUS at 15:57

## 2017-05-20 RX ADMIN — HYDRALAZINE HYDROCHLORIDE 50 MG: 50 TABLET ORAL at 20:24

## 2017-05-20 RX ADMIN — HYDRALAZINE HYDROCHLORIDE 50 MG: 50 TABLET ORAL at 17:34

## 2017-05-20 RX ADMIN — HYDROMORPHONE HYDROCHLORIDE 1 MG: 1 INJECTION, SOLUTION INTRAMUSCULAR; INTRAVENOUS; SUBCUTANEOUS at 09:02

## 2017-05-20 RX ADMIN — HYDROMORPHONE HYDROCHLORIDE 1 MG: 1 INJECTION, SOLUTION INTRAMUSCULAR; INTRAVENOUS; SUBCUTANEOUS at 02:36

## 2017-05-20 RX ADMIN — BISOPROLOL FUMARATE 5 MG: 5 TABLET ORAL at 09:02

## 2017-05-20 RX ADMIN — ONDANSETRON 4 MG: 2 INJECTION, SOLUTION INTRAMUSCULAR; INTRAVENOUS at 02:37

## 2017-05-20 RX ADMIN — DULOXETINE HYDROCHLORIDE 60 MG: 60 CAPSULE, DELAYED RELEASE ORAL at 09:02

## 2017-05-20 RX ADMIN — GABAPENTIN 800 MG: 400 CAPSULE ORAL at 15:56

## 2017-05-20 RX ADMIN — HYDRALAZINE HYDROCHLORIDE 50 MG: 50 TABLET ORAL at 09:02

## 2017-05-20 RX ADMIN — HYDRALAZINE HYDROCHLORIDE 50 MG: 50 TABLET ORAL at 11:54

## 2017-05-20 RX ADMIN — LISINOPRIL 20 MG: 20 TABLET ORAL at 09:02

## 2017-05-20 RX ADMIN — ONDANSETRON 4 MG: 4 TABLET, FILM COATED ORAL at 09:02

## 2017-05-21 VITALS
OXYGEN SATURATION: 98 % | HEART RATE: 58 BPM | HEIGHT: 72 IN | DIASTOLIC BLOOD PRESSURE: 89 MMHG | TEMPERATURE: 98.2 F | SYSTOLIC BLOOD PRESSURE: 136 MMHG | BODY MASS INDEX: 27.03 KG/M2 | WEIGHT: 199.56 LBS | RESPIRATION RATE: 16 BRPM

## 2017-05-21 LAB
ALBUMIN SERPL-MCNC: 3.5 G/DL (ref 3.5–5.2)
ALBUMIN/GLOB SERPL: 1.4 G/DL
ALP SERPL-CCNC: 83 U/L (ref 40–129)
ALT SERPL W P-5'-P-CCNC: 14 U/L (ref 5–41)
ANION GAP SERPL CALCULATED.3IONS-SCNC: 12.9 MMOL/L
AST SERPL-CCNC: 17 U/L (ref 5–40)
BILIRUB SERPL-MCNC: 0.8 MG/DL (ref 0.2–1.2)
BUN BLD-MCNC: 17 MG/DL (ref 6–20)
BUN/CREAT SERPL: 7.5 (ref 7–25)
CALCIUM SPEC-SCNC: 9.3 MG/DL (ref 8.6–10.5)
CHLORIDE SERPL-SCNC: 101 MMOL/L (ref 98–107)
CO2 SERPL-SCNC: 25.1 MMOL/L (ref 22–29)
CREAT BLD-MCNC: 2.26 MG/DL (ref 0.76–1.27)
GFR SERPL CREATININE-BSD FRML MDRD: 31 ML/MIN/1.73
GLOBULIN UR ELPH-MCNC: 2.5 GM/DL
GLUCOSE BLD-MCNC: 162 MG/DL (ref 65–99)
GLUCOSE BLDC GLUCOMTR-MCNC: 97 MG/DL (ref 70–130)
LIPASE SERPL-CCNC: 80 U/L (ref 13–60)
POTASSIUM BLD-SCNC: 5.1 MMOL/L (ref 3.5–5.2)
PROT SERPL-MCNC: 6 G/DL (ref 6–8.5)
SODIUM BLD-SCNC: 139 MMOL/L (ref 136–145)

## 2017-05-21 PROCEDURE — 80053 COMPREHEN METABOLIC PANEL: CPT | Performed by: SURGERY

## 2017-05-21 PROCEDURE — 83690 ASSAY OF LIPASE: CPT | Performed by: SURGERY

## 2017-05-21 PROCEDURE — 99239 HOSP IP/OBS DSCHRG MGMT >30: CPT | Performed by: FAMILY MEDICINE

## 2017-05-21 PROCEDURE — 25010000002 HYDROMORPHONE PER 4 MG: Performed by: INTERNAL MEDICINE

## 2017-05-21 PROCEDURE — 82962 GLUCOSE BLOOD TEST: CPT

## 2017-05-21 PROCEDURE — 94799 UNLISTED PULMONARY SVC/PX: CPT

## 2017-05-21 RX ORDER — DULOXETIN HYDROCHLORIDE 30 MG/1
CAPSULE, DELAYED RELEASE ORAL
Status: DISCONTINUED
Start: 2017-05-21 | End: 2017-05-21 | Stop reason: HOSPADM

## 2017-05-21 RX ORDER — ALLOPURINOL 100 MG/1
300 TABLET ORAL DAILY
Qty: 30 TABLET | Refills: 0 | Status: SHIPPED | OUTPATIENT
Start: 2017-05-21 | End: 2018-04-20 | Stop reason: HOSPADM

## 2017-05-21 RX ADMIN — HYDROMORPHONE HYDROCHLORIDE 1 MG: 1 INJECTION, SOLUTION INTRAMUSCULAR; INTRAVENOUS; SUBCUTANEOUS at 07:42

## 2017-05-21 RX ADMIN — PANTOPRAZOLE SODIUM 40 MG: 40 TABLET, DELAYED RELEASE ORAL at 05:56

## 2017-05-21 RX ADMIN — AMLODIPINE BESYLATE 10 MG: 5 TABLET ORAL at 08:58

## 2017-05-21 RX ADMIN — GABAPENTIN 800 MG: 400 CAPSULE ORAL at 08:58

## 2017-05-21 RX ADMIN — NICOTINE 1 PATCH: 14 PATCH, EXTENDED RELEASE TRANSDERMAL at 05:54

## 2017-05-21 RX ADMIN — BISOPROLOL FUMARATE 5 MG: 5 TABLET ORAL at 08:58

## 2017-05-21 RX ADMIN — DULOXETINE HYDROCHLORIDE 60 MG: 60 CAPSULE, DELAYED RELEASE ORAL at 08:59

## 2017-05-21 RX ADMIN — BUDESONIDE AND FORMOTEROL FUMARATE DIHYDRATE 2 PUFF: 80; 4.5 AEROSOL RESPIRATORY (INHALATION) at 08:10

## 2017-05-21 RX ADMIN — LISINOPRIL 20 MG: 20 TABLET ORAL at 09:19

## 2017-05-21 RX ADMIN — HYDROMORPHONE HYDROCHLORIDE 1 MG: 1 INJECTION, SOLUTION INTRAMUSCULAR; INTRAVENOUS; SUBCUTANEOUS at 01:06

## 2017-05-21 RX ADMIN — HYDROMORPHONE HYDROCHLORIDE 1 MG: 1 INJECTION, SOLUTION INTRAMUSCULAR; INTRAVENOUS; SUBCUTANEOUS at 10:49

## 2017-05-21 RX ADMIN — HYDROMORPHONE HYDROCHLORIDE 1 MG: 1 INJECTION, SOLUTION INTRAMUSCULAR; INTRAVENOUS; SUBCUTANEOUS at 04:26

## 2017-05-21 RX ADMIN — TIOTROPIUM BROMIDE 1 CAPSULE: 18 CAPSULE ORAL; RESPIRATORY (INHALATION) at 08:10

## 2017-05-21 RX ADMIN — HYDRALAZINE HYDROCHLORIDE 50 MG: 50 TABLET ORAL at 08:58

## 2017-05-22 ENCOUNTER — HOSPITAL ENCOUNTER (EMERGENCY)
Facility: HOSPITAL | Age: 47
Discharge: HOME | End: 2017-05-23
Attending: EMERGENCY MEDICINE | Admitting: EMERGENCY MEDICINE

## 2017-05-22 VITALS
HEART RATE: 77 BPM | RESPIRATION RATE: 16 BRPM | OXYGEN SATURATION: 97 % | TEMPERATURE: 98.2 F | SYSTOLIC BLOOD PRESSURE: 172 MMHG | DIASTOLIC BLOOD PRESSURE: 117 MMHG

## 2017-05-22 DIAGNOSIS — K85.90 PANCREATITIS, RECURRENT: Primary | ICD-10-CM

## 2017-05-22 PROCEDURE — 96374 THER/PROPH/DIAG INJ IV PUSH: CPT

## 2017-05-22 PROCEDURE — 99284 EMERGENCY DEPT VISIT MOD MDM: CPT | Performed by: EMERGENCY MEDICINE

## 2017-05-22 PROCEDURE — 96376 TX/PRO/DX INJ SAME DRUG ADON: CPT

## 2017-05-22 PROCEDURE — 99283 EMERGENCY DEPT VISIT LOW MDM: CPT

## 2017-05-22 PROCEDURE — 25010000002 ONDANSETRON PER 1 MG: Performed by: EMERGENCY MEDICINE

## 2017-05-22 PROCEDURE — 96375 TX/PRO/DX INJ NEW DRUG ADDON: CPT

## 2017-05-22 PROCEDURE — 25010000002 HYDROMORPHONE PER 4 MG: Performed by: EMERGENCY MEDICINE

## 2017-05-22 RX ORDER — ONDANSETRON 2 MG/ML
4 INJECTION INTRAMUSCULAR; INTRAVENOUS ONCE
Status: COMPLETED | OUTPATIENT
Start: 2017-05-22 | End: 2017-05-23

## 2017-05-22 RX ORDER — SODIUM CHLORIDE 0.9 % (FLUSH) 0.9 %
10 SYRINGE (ML) INJECTION AS NEEDED
Status: DISCONTINUED | OUTPATIENT
Start: 2017-05-22 | End: 2017-05-23 | Stop reason: HOSPADM

## 2017-05-23 LAB
ALBUMIN SERPL-MCNC: 3.5 G/DL (ref 3.5–5.2)
ALBUMIN/GLOB SERPL: 1.4 G/DL
ALP SERPL-CCNC: 78 U/L (ref 40–129)
ALT SERPL W P-5'-P-CCNC: 14 U/L (ref 5–41)
ANION GAP SERPL CALCULATED.3IONS-SCNC: 13.2 MMOL/L
AST SERPL-CCNC: 11 U/L (ref 5–40)
BASOPHILS # BLD AUTO: 0.03 10*3/MM3 (ref 0–0.2)
BASOPHILS NFR BLD AUTO: 0.6 % (ref 0–2)
BILIRUB SERPL-MCNC: 0.4 MG/DL (ref 0.2–1.2)
BUN BLD-MCNC: 15 MG/DL (ref 6–20)
BUN/CREAT SERPL: 6.5 (ref 7–25)
CALCIUM SPEC-SCNC: 9.3 MG/DL (ref 8.6–10.5)
CHLORIDE SERPL-SCNC: 103 MMOL/L (ref 98–107)
CO2 SERPL-SCNC: 24.8 MMOL/L (ref 22–29)
CREAT BLD-MCNC: 2.3 MG/DL (ref 0.76–1.27)
DEPRECATED RDW RBC AUTO: 42.9 FL (ref 37–54)
EOSINOPHIL # BLD AUTO: 0.05 10*3/MM3 (ref 0.1–0.3)
EOSINOPHIL NFR BLD AUTO: 1 % (ref 0–4)
ERYTHROCYTE [DISTWIDTH] IN BLOOD BY AUTOMATED COUNT: 13.4 % (ref 11.5–14.5)
GFR SERPL CREATININE-BSD FRML MDRD: 31 ML/MIN/1.73
GLOBULIN UR ELPH-MCNC: 2.5 GM/DL
GLUCOSE BLD-MCNC: 137 MG/DL (ref 65–99)
HCT VFR BLD AUTO: 42.9 % (ref 42–52)
HGB BLD-MCNC: 14.5 G/DL (ref 14–18)
IMM GRANULOCYTES # BLD: 0.01 10*3/MM3 (ref 0–0.03)
IMM GRANULOCYTES NFR BLD: 0.2 % (ref 0–0.5)
LIPASE SERPL-CCNC: 88 U/L (ref 13–60)
LYMPHOCYTES # BLD AUTO: 1.14 10*3/MM3 (ref 0.6–4.8)
LYMPHOCYTES NFR BLD AUTO: 22.1 % (ref 20–45)
MCH RBC QN AUTO: 29.4 PG (ref 27–31)
MCHC RBC AUTO-ENTMCNC: 33.8 G/DL (ref 31–37)
MCV RBC AUTO: 87 FL (ref 80–94)
MONOCYTES # BLD AUTO: 0.44 10*3/MM3 (ref 0–1)
MONOCYTES NFR BLD AUTO: 8.5 % (ref 3–8)
NEUTROPHILS # BLD AUTO: 3.49 10*3/MM3 (ref 1.5–8.3)
NEUTROPHILS NFR BLD AUTO: 67.6 % (ref 45–70)
NRBC BLD MANUAL-RTO: 0 /100 WBC (ref 0–0)
PLATELET # BLD AUTO: 154 10*3/MM3 (ref 140–500)
PMV BLD AUTO: 11.1 FL (ref 7.4–10.4)
POTASSIUM BLD-SCNC: 3.8 MMOL/L (ref 3.5–5.2)
PROT SERPL-MCNC: 6 G/DL (ref 6–8.5)
RBC # BLD AUTO: 4.93 10*6/MM3 (ref 4.7–6.1)
SODIUM BLD-SCNC: 141 MMOL/L (ref 136–145)
WBC NRBC COR # BLD: 5.16 10*3/MM3 (ref 4.8–10.8)

## 2017-05-23 PROCEDURE — 25010000002 ONDANSETRON PER 1 MG: Performed by: EMERGENCY MEDICINE

## 2017-05-23 PROCEDURE — 85025 COMPLETE CBC W/AUTO DIFF WBC: CPT | Performed by: EMERGENCY MEDICINE

## 2017-05-23 PROCEDURE — 80053 COMPREHEN METABOLIC PANEL: CPT | Performed by: EMERGENCY MEDICINE

## 2017-05-23 PROCEDURE — 83690 ASSAY OF LIPASE: CPT | Performed by: EMERGENCY MEDICINE

## 2017-05-23 PROCEDURE — 25010000002 HYDROMORPHONE PER 4 MG: Performed by: EMERGENCY MEDICINE

## 2017-05-23 RX ORDER — HYDROCODONE BITARTRATE AND ACETAMINOPHEN 10; 325 MG/1; MG/1
1-2 TABLET ORAL EVERY 6 HOURS PRN
Qty: 20 TABLET | Refills: 0 | Status: ON HOLD | OUTPATIENT
Start: 2017-05-23 | End: 2018-04-28

## 2017-05-23 RX ORDER — PROMETHAZINE HYDROCHLORIDE 25 MG/1
25 TABLET ORAL EVERY 6 HOURS PRN
Qty: 20 TABLET | Refills: 0 | Status: SHIPPED | OUTPATIENT
Start: 2017-05-23 | End: 2018-05-03 | Stop reason: HOSPADM

## 2017-05-23 RX ADMIN — HYDROMORPHONE HYDROCHLORIDE 1 MG: 1 INJECTION, SOLUTION INTRAMUSCULAR; INTRAVENOUS; SUBCUTANEOUS at 01:29

## 2017-05-23 RX ADMIN — ONDANSETRON 4 MG: 2 INJECTION, SOLUTION INTRAMUSCULAR; INTRAVENOUS at 00:02

## 2017-05-23 RX ADMIN — SODIUM CHLORIDE 1000 ML: 9 INJECTION, SOLUTION INTRAVENOUS at 00:02

## 2017-05-23 RX ADMIN — HYDROMORPHONE HYDROCHLORIDE 1 MG: 1 INJECTION, SOLUTION INTRAMUSCULAR; INTRAVENOUS; SUBCUTANEOUS at 00:03

## 2017-06-19 ENCOUNTER — OFFICE (OUTPATIENT)
Dept: URBAN - METROPOLITAN AREA CLINIC 71 | Facility: CLINIC | Age: 47
End: 2017-06-19
Payer: MEDICARE

## 2017-06-19 VITALS — HEART RATE: 48 BPM | WEIGHT: 204 LBS | SYSTOLIC BLOOD PRESSURE: 120 MMHG | DIASTOLIC BLOOD PRESSURE: 80 MMHG

## 2017-06-19 DIAGNOSIS — E78.1 PURE HYPERGLYCERIDEMIA: ICD-10-CM

## 2017-06-19 DIAGNOSIS — K21.0 GASTRO-ESOPHAGEAL REFLUX DISEASE WITH ESOPHAGITIS: ICD-10-CM

## 2017-06-19 DIAGNOSIS — K59.00 CONSTIPATION, UNSPECIFIED: ICD-10-CM

## 2017-06-19 DIAGNOSIS — K86.3 PSEUDOCYST OF PANCREAS: ICD-10-CM

## 2017-06-19 PROCEDURE — 99213 OFFICE O/P EST LOW 20 MIN: CPT

## 2017-06-21 ENCOUNTER — LAB (OUTPATIENT)
Dept: LAB | Facility: HOSPITAL | Age: 47
End: 2017-06-21
Attending: INTERNAL MEDICINE

## 2017-06-21 ENCOUNTER — TRANSCRIBE ORDERS (OUTPATIENT)
Dept: ADMINISTRATIVE | Facility: HOSPITAL | Age: 47
End: 2017-06-21

## 2017-06-21 DIAGNOSIS — K59.00 CONSTIPATION, UNSPECIFIED CONSTIPATION TYPE: ICD-10-CM

## 2017-06-21 DIAGNOSIS — K86.3 PSEUDOCYST, PANCREAS: ICD-10-CM

## 2017-06-21 DIAGNOSIS — E78.1 HYPERGLYCERIDEMIA: ICD-10-CM

## 2017-06-21 DIAGNOSIS — K21.00 REFLUX ESOPHAGITIS: ICD-10-CM

## 2017-06-21 DIAGNOSIS — K21.00 REFLUX ESOPHAGITIS: Primary | ICD-10-CM

## 2017-06-21 LAB
ALBUMIN SERPL-MCNC: 3.8 G/DL (ref 3.5–5.2)
ALBUMIN/GLOB SERPL: 1.5 G/DL
ALP SERPL-CCNC: 80 U/L (ref 40–129)
ALT SERPL W P-5'-P-CCNC: 13 U/L (ref 5–41)
ANION GAP SERPL CALCULATED.3IONS-SCNC: 15.9 MMOL/L
AST SERPL-CCNC: 13 U/L (ref 5–40)
BASOPHILS # BLD AUTO: 0.03 10*3/MM3 (ref 0–0.2)
BASOPHILS NFR BLD AUTO: 0.5 % (ref 0–2)
BILIRUB SERPL-MCNC: 0.4 MG/DL (ref 0.2–1.2)
BUN BLD-MCNC: 34 MG/DL (ref 6–20)
BUN/CREAT SERPL: 16 (ref 7–25)
CALCIUM SPEC-SCNC: 9.3 MG/DL (ref 8.6–10.5)
CHLORIDE SERPL-SCNC: 99 MMOL/L (ref 98–107)
CO2 SERPL-SCNC: 25.1 MMOL/L (ref 22–29)
CREAT BLD-MCNC: 2.12 MG/DL (ref 0.76–1.27)
DEPRECATED RDW RBC AUTO: 42.5 FL (ref 37–54)
EOSINOPHIL # BLD AUTO: 0.11 10*3/MM3 (ref 0.1–0.3)
EOSINOPHIL NFR BLD AUTO: 1.8 % (ref 0–4)
ERYTHROCYTE [DISTWIDTH] IN BLOOD BY AUTOMATED COUNT: 13.8 % (ref 11.5–14.5)
GFR SERPL CREATININE-BSD FRML MDRD: 34 ML/MIN/1.73
GLOBULIN UR ELPH-MCNC: 2.6 GM/DL
GLUCOSE BLD-MCNC: 98 MG/DL (ref 65–99)
HCT VFR BLD AUTO: 45.4 % (ref 42–52)
HGB BLD-MCNC: 15.5 G/DL (ref 14–18)
IMM GRANULOCYTES # BLD: 0.02 10*3/MM3 (ref 0–0.03)
IMM GRANULOCYTES NFR BLD: 0.3 % (ref 0–0.5)
LIPASE SERPL-CCNC: 123 U/L (ref 13–60)
LYMPHOCYTES # BLD AUTO: 1.6 10*3/MM3 (ref 0.6–4.8)
LYMPHOCYTES NFR BLD AUTO: 26.6 % (ref 20–45)
MCH RBC QN AUTO: 29.8 PG (ref 27–31)
MCHC RBC AUTO-ENTMCNC: 34.1 G/DL (ref 31–37)
MCV RBC AUTO: 87.1 FL (ref 80–94)
MONOCYTES # BLD AUTO: 0.42 10*3/MM3 (ref 0–1)
MONOCYTES NFR BLD AUTO: 7 % (ref 3–8)
NEUTROPHILS # BLD AUTO: 3.83 10*3/MM3 (ref 1.5–8.3)
NEUTROPHILS NFR BLD AUTO: 63.8 % (ref 45–70)
NRBC BLD MANUAL-RTO: 0 /100 WBC (ref 0–0)
PLATELET # BLD AUTO: 174 10*3/MM3 (ref 140–500)
PMV BLD AUTO: 10.5 FL (ref 7.4–10.4)
POTASSIUM BLD-SCNC: 4.1 MMOL/L (ref 3.5–5.2)
PROT SERPL-MCNC: 6.4 G/DL (ref 6–8.5)
RBC # BLD AUTO: 5.21 10*6/MM3 (ref 4.7–6.1)
SODIUM BLD-SCNC: 140 MMOL/L (ref 136–145)
WBC NRBC COR # BLD: 6.01 10*3/MM3 (ref 4.8–10.8)

## 2017-06-21 PROCEDURE — 83690 ASSAY OF LIPASE: CPT

## 2017-06-21 PROCEDURE — 85025 COMPLETE CBC W/AUTO DIFF WBC: CPT

## 2017-06-21 PROCEDURE — 80053 COMPREHEN METABOLIC PANEL: CPT

## 2017-06-21 PROCEDURE — 36415 COLL VENOUS BLD VENIPUNCTURE: CPT

## 2017-06-28 ENCOUNTER — ON CAMPUS - OUTPATIENT (OUTPATIENT)
Dept: URBAN - METROPOLITAN AREA HOSPITAL 108 | Facility: HOSPITAL | Age: 47
End: 2017-06-28
Payer: COMMERCIAL

## 2017-06-28 DIAGNOSIS — R93.3 ABNORMAL FINDINGS ON DIAGNOSTIC IMAGING OF OTHER PARTS OF DI: ICD-10-CM

## 2017-06-28 DIAGNOSIS — K86.1 OTHER CHRONIC PANCREATITIS: ICD-10-CM

## 2017-06-28 PROCEDURE — 43259 EGD US EXAM DUODENUM/JEJUNUM: CPT

## 2017-07-14 VITALS — DIASTOLIC BLOOD PRESSURE: 65 MMHG | WEIGHT: 198 LBS | SYSTOLIC BLOOD PRESSURE: 123 MMHG

## 2017-07-17 RX ORDER — LORAZEPAM 0.5 MG/1
1 TABLET ORAL EVERY 6 HOURS PRN
COMMUNITY
End: 2020-01-01

## 2017-07-17 RX ORDER — ICOSAPENT ETHYL 1000 MG/1
2 CAPSULE ORAL 4 TIMES DAILY
COMMUNITY
End: 2018-03-13 | Stop reason: ALTCHOICE

## 2017-07-17 RX ORDER — GLIMEPIRIDE 2 MG/1
2 TABLET ORAL 2 TIMES DAILY
COMMUNITY
End: 2018-08-21 | Stop reason: ALTCHOICE

## 2017-07-18 ENCOUNTER — OFFICE VISIT (OUTPATIENT)
Dept: CARDIOLOGY | Facility: CLINIC | Age: 47
End: 2017-07-18

## 2017-07-18 ENCOUNTER — CLINICAL SUPPORT NO REQUIREMENTS (OUTPATIENT)
Dept: CARDIOLOGY | Facility: CLINIC | Age: 47
End: 2017-07-18

## 2017-07-18 VITALS
SYSTOLIC BLOOD PRESSURE: 124 MMHG | HEART RATE: 83 BPM | DIASTOLIC BLOOD PRESSURE: 86 MMHG | HEIGHT: 72 IN | WEIGHT: 199 LBS | BODY MASS INDEX: 26.95 KG/M2

## 2017-07-18 DIAGNOSIS — Z95.810 CARDIAC DEFIBRILLATOR IN PLACE: Primary | ICD-10-CM

## 2017-07-18 DIAGNOSIS — I42.1 HYPERTROPHIC OBSTRUCTIVE CARDIOMYOPATHY (HCC): ICD-10-CM

## 2017-07-18 DIAGNOSIS — I10 ESSENTIAL HYPERTENSION: ICD-10-CM

## 2017-07-18 DIAGNOSIS — Z95.810 AICD (AUTOMATIC CARDIOVERTER/DEFIBRILLATOR) PRESENT: Primary | ICD-10-CM

## 2017-07-18 DIAGNOSIS — I47.20 VT (VENTRICULAR TACHYCARDIA) (HCC): ICD-10-CM

## 2017-07-18 PROCEDURE — 93289 INTERROG DEVICE EVAL HEART: CPT | Performed by: INTERNAL MEDICINE

## 2017-07-18 PROCEDURE — 93000 ELECTROCARDIOGRAM COMPLETE: CPT | Performed by: INTERNAL MEDICINE

## 2017-07-18 PROCEDURE — 99213 OFFICE O/P EST LOW 20 MIN: CPT | Performed by: INTERNAL MEDICINE

## 2017-07-18 NOTE — PROGRESS NOTES
" Subjective:       Sergio Phan is a 47 y.o. male who here for follow up    CC  Follow-up for the ventricular tachycardia  HPI  47-year-old male with known to have hypertrophic cardiomyopathy along with history of ventricular tachycardia was admitted approximately year ago with sudden cardiac death, now has AICD    Interrogation of AICD done today shows episodes of nonsustained ventricular tachycardia     Problem List Items Addressed This Visit        Cardiovascular and Mediastinum    Cardiac defibrillator in place - Primary    Hypertension    VT (ventricular tachycardia)        .    The following portions of the patient's history were reviewed and updated as appropriate: allergies, current medications, past family history, past medical history, past social history, past surgical history and problem list.    Past Medical History:   Diagnosis Date   • Anxiety    • Asthma    • Chest pain    • COPD (chronic obstructive pulmonary disease)    • Depression    • Diabetes mellitus     TYPE II   • Fatigue    • Gout    • HOCM (hypertrophic obstructive cardiomyopathy)    • Hypertension    • Hypertriglyceridemia    • Pancreatitis    • Syncope     reports that he has been smoking Cigarettes.  He has a 8.75 pack-year smoking history. He has never used smokeless tobacco. He reports that he does not drink alcohol or use illicit drugs.  Family History   Problem Relation Age of Onset   • Heart block Mother    • Kidney disease Mother    • Diabetes Father    • Diabetes Maternal Grandmother    • Diabetes Maternal Grandfather    • COPD Maternal Grandfather    • Asthma Maternal Grandfather        Review of Systems  Constitutional: No wt loss, fever, fatigue  Gastrointestinal: No nausea, abdominal pain  Behavioral/Psych: No insomnia or anxiety   Cardiovascular No chest pains or tightness in chest  Objective:       Physical Exam             Physical Exam  /86  Pulse 83  Ht 72\" (182.9 cm)  Wt 199 lb (90.3 kg)  BMI 26.99 " kg/m2    General appearance: NAD, conversant   Eyes: anicteric sclerae, moist conjunctivae; no lid-lag; PERRLA   HENT: Atraumatic; oropharynx clear with moist mucous membranes and no mucosal ulcerations;  normal hard and soft palate   Neck: Trachea midline; FROM, supple, no thyromegaly or lymphadenopathy   Lungs: CTA, with normal respiratory effort and no intercostal retractions   CV: S1-S2 regular, no murmurs, no rub, no gallop   Abdomen: Soft, non-tender; no masses or HSM   Extremities: No peripheral edema or extremity lymphadenopathy  Skin: Normal temperature, turgor and texture; no rash, ulcers or subcutaneous nodules   Psych: Appropriate affect, alert and oriented to person, place and time           Cardiographics  @  ECG 12 Lead  Date/Time: 7/18/2017 2:23 PM  Performed by: LISA GARIBAY  Authorized by: LISA GARIBAY   Comparison: compared with previous ECG   Similar to previous ECG  Rhythm: sinus rhythm  QRS axis: left  Q waves: V1, V2 and V3  Clinical impression: abnormal ECG            Echocardiogram:        Current Outpatient Prescriptions:   •  albuterol (PROVENTIL HFA;VENTOLIN HFA) 108 (90 BASE) MCG/ACT inhaler, Inhale 2 puffs every 4 (four) hours as needed for wheezing., Disp: , Rfl:   •  allopurinol (ZYLOPRIM) 100 MG tablet, Take 3 tablets by mouth Daily., Disp: 30 tablet, Rfl: 0  •  amLODIPine (NORVASC) 10 MG tablet, Take  by mouth daily., Disp: , Rfl:   •  aspirin 81 MG tablet, Take  by mouth daily., Disp: , Rfl:   •  bisoprolol (ZEBeta) 5 MG tablet, Take 5 mg by mouth Daily., Disp: , Rfl:   •  budesonide-formoterol (SYMBICORT) 80-4.5 MCG/ACT inhaler, Inhale 2 puffs 2 (Two) Times a Day., Disp: , Rfl: 12  •  DULoxetine (CYMBALTA) 30 MG capsule, Take 60 mg by mouth Daily., Disp: , Rfl:   •  Ergocalciferol (VITAMIN D2) 400 UNITS tablet, Take 400 Units by mouth 1 (One) Time Per Week., Disp: , Rfl:   •  fluticasone (VERAMYST) 27.5 MCG/SPRAY nasal spray, 2 sprays into each nostril Daily.,  Disp: , Rfl:   •  furosemide (LASIX) 20 MG tablet, Take 20 mg by mouth Daily., Disp: , Rfl:   •  gabapentin (NEURONTIN) 800 MG tablet, Take 800 mg by mouth 3 (three) times a day., Disp: , Rfl:   •  glimepiride (AMARYL) 2 MG tablet, Take 2 mg by mouth 2 (Two) Times a Day., Disp: , Rfl:   •  hydrALAZINE (APRESOLINE) 50 MG tablet, Take 50 mg by mouth 4 (four) times a day., Disp: , Rfl:   •  HYDROcodone-acetaminophen (NORCO)  MG per tablet, Take 1-2 tablets by mouth Every 6 (Six) Hours As Needed for Moderate Pain (4-6)., Disp: 20 tablet, Rfl: 0  •  icosapent ethyl (VASCEPA) 1 G capsule capsule, Take 2 g by mouth 4 (Four) Times a Day., Disp: , Rfl:   •  lisinopril (PRINIVIL,ZESTRIL) 20 MG tablet, Take 1 tablet by mouth daily. (Patient taking differently: Take 5 mg by mouth Daily.), Disp: 30 tablet, Rfl: 1  •  LORazepam (ATIVAN) 0.5 MG tablet, Take 0.5 mg by mouth Every 8 (Eight) Hours As Needed for Anxiety., Disp: , Rfl:   •  methocarbamol (ROBAXIN) 750 MG tablet, Take 750 mg by mouth 3 (Three) Times a Day., Disp: , Rfl:   •  pantoprazole (PROTONIX) 40 MG EC tablet, Take 40 mg by mouth Daily., Disp: , Rfl:   •  promethazine (PHENERGAN) 25 MG tablet, Take 25 mg by mouth every 6 (six) hours as needed for nausea or vomiting., Disp: , Rfl:   •  promethazine (PHENERGAN) 25 MG tablet, Take 1 tablet by mouth Every 6 (Six) Hours As Needed for Nausea or Vomiting., Disp: 20 tablet, Rfl: 0  •  rosuvastatin (CRESTOR) 20 MG tablet, Take 40 mg by mouth Daily., Disp: , Rfl:   •  tiotropium (SPIRIVA) 18 MCG per inhalation capsule, Place 1 capsule into inhaler and inhale 1 (one) time daily., Disp: , Rfl:   •  nicotine (NICODERM CQ) 14 MG/24HR patch, Place 1 patch on the skin Daily., Disp: 14 patch, Rfl: 0  •  Omega-3 Fatty Acids (FISH OIL) 1000 MG capsule capsule, Take 1,000 mg by mouth 2 (Two) Times a Day With Meals., Disp: , Rfl:    Assessment:        Patient Active Problem List   Diagnosis   • Cardiac defibrillator in place   •  Dizziness   • Fatigue   • Hypertension   • Hypertrophic obstructive cardiomyopathy   • Hypertriglyceridemia   • Kidney disorder   • Type 2 diabetes mellitus   • Syncope   • Vitamin D deficiency   • Weakness   • Idiopathic acute pancreatitis   • VT (ventricular tachycardia)   • Acute pancreatitis   • Pancreatitis, recurrent               Plan:            ICD-10-CM ICD-9-CM   1. Cardiac defibrillator in place Z95.810 V45.02   2. Essential hypertension I10 401.9   3. VT (ventricular tachycardia) I47.2 427.1     1. Cardiac defibrillator in place  Appears to be functioning well and interrogation done today shows episodes of nonsustained ventricular tachycardia    2. Essential hypertension  Blood pressures are well controlled    3. VT (ventricular tachycardia)  Remains nonsustained ventricular tachycardia no AICD shocks  SEE US 6 MONTHS  COUNSELING:    Sergio Clifton was given to patient for the following topics: diagnostic results, risk factor reductions, impressions, risks and benefits of treatment options and importance of treatment compliance .       SMOKING COUNSELING:    Ready to quit: Yes  Counseling given: Yes      EMR Dragon/Transcription disclaimer:   Much of this encounter note is an electronic transcription/translation of spoken language to printed text. The electronic translation of spoken language may permit erroneous, or at times, nonsensical words or phrases to be inadvertently transcribed; Although I have reviewed the note for such errors, some may still exist.

## 2017-07-19 ENCOUNTER — OFFICE (OUTPATIENT)
Dept: URBAN - METROPOLITAN AREA CLINIC 75 | Facility: CLINIC | Age: 47
End: 2017-07-19

## 2017-07-19 DIAGNOSIS — K86.1 OTHER CHRONIC PANCREATITIS: ICD-10-CM

## 2017-07-19 PROCEDURE — 99213 OFFICE O/P EST LOW 20 MIN: CPT

## 2017-08-04 ENCOUNTER — ON CAMPUS - OUTPATIENT (OUTPATIENT)
Dept: URBAN - METROPOLITAN AREA HOSPITAL 108 | Facility: HOSPITAL | Age: 47
End: 2017-08-04
Payer: COMMERCIAL

## 2017-08-04 DIAGNOSIS — Z46.59 ENCOUNTER FOR FITTING AND ADJUSTMENT OF OTHER GASTROINTESTIN: ICD-10-CM

## 2017-08-04 DIAGNOSIS — R10.9 UNSPECIFIED ABDOMINAL PAIN: ICD-10-CM

## 2017-08-04 DIAGNOSIS — K86.1 OTHER CHRONIC PANCREATITIS: ICD-10-CM

## 2017-08-04 DIAGNOSIS — K86.89 OTHER SPECIFIED DISEASES OF PANCREAS: ICD-10-CM

## 2017-08-04 PROCEDURE — 43274 ERCP DUCT STENT PLACEMENT: CPT

## 2017-08-07 RX ORDER — ALLOPURINOL 100 MG/1
TABLET ORAL
Qty: 30 TABLET | Refills: 0 | OUTPATIENT
Start: 2017-08-07

## 2017-08-21 ENCOUNTER — OFFICE (OUTPATIENT)
Dept: URBAN - METROPOLITAN AREA CLINIC 71 | Facility: CLINIC | Age: 47
End: 2017-08-21
Payer: COMMERCIAL

## 2017-08-21 VITALS — SYSTOLIC BLOOD PRESSURE: 142 MMHG | HEART RATE: 64 BPM | DIASTOLIC BLOOD PRESSURE: 80 MMHG | WEIGHT: 198 LBS

## 2017-08-21 DIAGNOSIS — K21.0 GASTRO-ESOPHAGEAL REFLUX DISEASE WITH ESOPHAGITIS: ICD-10-CM

## 2017-08-21 DIAGNOSIS — K86.1 OTHER CHRONIC PANCREATITIS: ICD-10-CM

## 2017-08-21 DIAGNOSIS — E78.1 PURE HYPERGLYCERIDEMIA: ICD-10-CM

## 2017-08-21 PROCEDURE — 99212 OFFICE O/P EST SF 10 MIN: CPT

## 2017-09-11 VITALS
WEIGHT: 207 LBS | DIASTOLIC BLOOD PRESSURE: 74 MMHG | SYSTOLIC BLOOD PRESSURE: 165 MMHG | HEIGHT: 72 IN | HEART RATE: 66 BPM

## 2017-09-12 ENCOUNTER — OFFICE (OUTPATIENT)
Dept: URBAN - METROPOLITAN AREA CLINIC 75 | Facility: CLINIC | Age: 47
End: 2017-09-12
Payer: COMMERCIAL

## 2017-09-12 DIAGNOSIS — K86.1 OTHER CHRONIC PANCREATITIS: ICD-10-CM

## 2017-09-12 PROCEDURE — 99213 OFFICE O/P EST LOW 20 MIN: CPT

## 2017-09-22 ENCOUNTER — ON CAMPUS - OUTPATIENT (OUTPATIENT)
Dept: URBAN - METROPOLITAN AREA HOSPITAL 108 | Facility: HOSPITAL | Age: 47
End: 2017-09-22
Payer: COMMERCIAL

## 2017-09-22 DIAGNOSIS — Z96.89 PRESENCE OF OTHER SPECIFIED FUNCTIONAL IMPLANTS: ICD-10-CM

## 2017-09-22 DIAGNOSIS — Z46.59 ENCOUNTER FOR FITTING AND ADJUSTMENT OF OTHER GASTROINTESTIN: ICD-10-CM

## 2017-09-22 DIAGNOSIS — K86.1 OTHER CHRONIC PANCREATITIS: ICD-10-CM

## 2017-09-22 DIAGNOSIS — Z98.890 OTHER SPECIFIED POSTPROCEDURAL STATES: ICD-10-CM

## 2017-09-22 DIAGNOSIS — K86.89 OTHER SPECIFIED DISEASES OF PANCREAS: ICD-10-CM

## 2017-09-22 DIAGNOSIS — R10.9 UNSPECIFIED ABDOMINAL PAIN: ICD-10-CM

## 2017-09-22 DIAGNOSIS — R11.0 NAUSEA: ICD-10-CM

## 2017-09-22 PROCEDURE — 43276 ERCP STENT EXCHANGE W/DILATE: CPT

## 2017-10-23 ENCOUNTER — HOSPITAL ENCOUNTER (EMERGENCY)
Facility: HOSPITAL | Age: 47
Discharge: ANOTHER HOSPITAL | End: 2017-10-24
Attending: EMERGENCY MEDICINE | Admitting: EMERGENCY MEDICINE

## 2017-10-23 DIAGNOSIS — N17.9 ACUTE RENAL FAILURE SUPERIMPOSED ON STAGE 5 CHRONIC KIDNEY DISEASE, NOT ON CHRONIC DIALYSIS, UNSPECIFIED ACUTE RENAL FAILURE TYPE (HCC): ICD-10-CM

## 2017-10-23 DIAGNOSIS — K85.90 ACUTE RECURRENT PANCREATITIS: Primary | ICD-10-CM

## 2017-10-23 DIAGNOSIS — N18.5 ACUTE RENAL FAILURE SUPERIMPOSED ON STAGE 5 CHRONIC KIDNEY DISEASE, NOT ON CHRONIC DIALYSIS, UNSPECIFIED ACUTE RENAL FAILURE TYPE (HCC): ICD-10-CM

## 2017-10-23 LAB
ALBUMIN SERPL-MCNC: 4.3 G/DL (ref 3.5–5.2)
ALBUMIN/GLOB SERPL: 1.5 G/DL
ALP SERPL-CCNC: 98 U/L (ref 40–129)
ALT SERPL W P-5'-P-CCNC: 12 U/L (ref 5–41)
AMYLASE SERPL-CCNC: 69 U/L (ref 28–100)
ANION GAP SERPL CALCULATED.3IONS-SCNC: 16.8 MMOL/L
AST SERPL-CCNC: 11 U/L (ref 5–40)
BASOPHILS # BLD AUTO: 0.03 10*3/MM3 (ref 0–0.2)
BASOPHILS NFR BLD AUTO: 0.3 % (ref 0–2)
BILIRUB SERPL-MCNC: 0.7 MG/DL (ref 0.2–1.2)
BUN BLD-MCNC: 71 MG/DL (ref 6–20)
BUN/CREAT SERPL: 15.4 (ref 7–25)
CALCIUM SPEC-SCNC: 9.7 MG/DL (ref 8.6–10.5)
CHLORIDE SERPL-SCNC: 98 MMOL/L (ref 98–107)
CO2 SERPL-SCNC: 21.2 MMOL/L (ref 22–29)
CREAT BLD-MCNC: 4.62 MG/DL (ref 0.76–1.27)
DEPRECATED RDW RBC AUTO: 37.4 FL (ref 37–54)
EOSINOPHIL # BLD AUTO: 0.04 10*3/MM3 (ref 0.1–0.3)
EOSINOPHIL NFR BLD AUTO: 0.4 % (ref 0–4)
ERYTHROCYTE [DISTWIDTH] IN BLOOD BY AUTOMATED COUNT: 11.9 % (ref 11.5–14.5)
GFR SERPL CREATININE-BSD FRML MDRD: 14 ML/MIN/1.73
GLOBULIN UR ELPH-MCNC: 2.8 GM/DL
GLUCOSE BLD-MCNC: 187 MG/DL (ref 65–99)
HCT VFR BLD AUTO: 51 % (ref 42–52)
HGB BLD-MCNC: 17.8 G/DL (ref 14–18)
IMM GRANULOCYTES # BLD: 0.02 10*3/MM3 (ref 0–0.03)
IMM GRANULOCYTES NFR BLD: 0.2 % (ref 0–0.5)
LIPASE SERPL-CCNC: 86 U/L (ref 13–60)
LYMPHOCYTES # BLD AUTO: 1.26 10*3/MM3 (ref 0.6–4.8)
LYMPHOCYTES NFR BLD AUTO: 12.9 % (ref 20–45)
MCH RBC QN AUTO: 30.1 PG (ref 27–31)
MCHC RBC AUTO-ENTMCNC: 34.9 G/DL (ref 31–37)
MCV RBC AUTO: 86.1 FL (ref 80–94)
MONOCYTES # BLD AUTO: 0.63 10*3/MM3 (ref 0–1)
MONOCYTES NFR BLD AUTO: 6.4 % (ref 3–8)
NEUTROPHILS # BLD AUTO: 7.81 10*3/MM3 (ref 1.5–8.3)
NEUTROPHILS NFR BLD AUTO: 79.8 % (ref 45–70)
NRBC BLD MANUAL-RTO: 0 /100 WBC (ref 0–0)
PLATELET # BLD AUTO: 162 10*3/MM3 (ref 140–500)
PMV BLD AUTO: 10.8 FL (ref 7.4–10.4)
POTASSIUM BLD-SCNC: 4.5 MMOL/L (ref 3.5–5.2)
PROT SERPL-MCNC: 7.1 G/DL (ref 6–8.5)
RBC # BLD AUTO: 5.92 10*6/MM3 (ref 4.7–6.1)
SODIUM BLD-SCNC: 136 MMOL/L (ref 136–145)
WBC NRBC COR # BLD: 9.79 10*3/MM3 (ref 4.8–10.8)

## 2017-10-23 PROCEDURE — 83690 ASSAY OF LIPASE: CPT | Performed by: EMERGENCY MEDICINE

## 2017-10-23 PROCEDURE — 82150 ASSAY OF AMYLASE: CPT | Performed by: EMERGENCY MEDICINE

## 2017-10-23 PROCEDURE — 85025 COMPLETE CBC W/AUTO DIFF WBC: CPT | Performed by: EMERGENCY MEDICINE

## 2017-10-23 PROCEDURE — 80053 COMPREHEN METABOLIC PANEL: CPT | Performed by: EMERGENCY MEDICINE

## 2017-10-23 PROCEDURE — 99285 EMERGENCY DEPT VISIT HI MDM: CPT | Performed by: EMERGENCY MEDICINE

## 2017-10-23 PROCEDURE — 96374 THER/PROPH/DIAG INJ IV PUSH: CPT

## 2017-10-23 PROCEDURE — 99284 EMERGENCY DEPT VISIT MOD MDM: CPT

## 2017-10-23 PROCEDURE — 96375 TX/PRO/DX INJ NEW DRUG ADDON: CPT

## 2017-10-23 PROCEDURE — 25010000002 ONDANSETRON PER 1 MG: Performed by: EMERGENCY MEDICINE

## 2017-10-23 PROCEDURE — 25010000002 HYDROMORPHONE PER 4 MG: Performed by: EMERGENCY MEDICINE

## 2017-10-23 PROCEDURE — 96361 HYDRATE IV INFUSION ADD-ON: CPT

## 2017-10-23 RX ORDER — ONDANSETRON 2 MG/ML
4 INJECTION INTRAMUSCULAR; INTRAVENOUS ONCE
Status: COMPLETED | OUTPATIENT
Start: 2017-10-23 | End: 2017-10-23

## 2017-10-23 RX ADMIN — HYDROMORPHONE HYDROCHLORIDE 1 MG: 1 INJECTION, SOLUTION INTRAMUSCULAR; INTRAVENOUS; SUBCUTANEOUS at 23:45

## 2017-10-23 RX ADMIN — ONDANSETRON 4 MG: 2 INJECTION INTRAMUSCULAR; INTRAVENOUS at 23:45

## 2017-10-23 RX ADMIN — SODIUM CHLORIDE 1000 ML: 9 INJECTION, SOLUTION INTRAVENOUS at 23:28

## 2017-10-24 ENCOUNTER — INPATIENT HOSPITAL (OUTPATIENT)
Dept: URBAN - METROPOLITAN AREA HOSPITAL 107 | Facility: HOSPITAL | Age: 47
End: 2017-10-24
Payer: COMMERCIAL

## 2017-10-24 VITALS
BODY MASS INDEX: 28.3 KG/M2 | TEMPERATURE: 98.1 F | SYSTOLIC BLOOD PRESSURE: 118 MMHG | RESPIRATION RATE: 16 BRPM | WEIGHT: 208.9 LBS | HEIGHT: 72 IN | DIASTOLIC BLOOD PRESSURE: 88 MMHG | OXYGEN SATURATION: 96 % | HEART RATE: 66 BPM

## 2017-10-24 DIAGNOSIS — K86.89 OTHER SPECIFIED DISEASES OF PANCREAS: ICD-10-CM

## 2017-10-24 DIAGNOSIS — R10.9 UNSPECIFIED ABDOMINAL PAIN: ICD-10-CM

## 2017-10-24 DIAGNOSIS — K85.90 ACUTE PANCREATITIS WITHOUT NECROSIS OR INFECTION, UNSPECIFIE: ICD-10-CM

## 2017-10-24 PROCEDURE — 96376 TX/PRO/DX INJ SAME DRUG ADON: CPT

## 2017-10-24 PROCEDURE — 99222 1ST HOSP IP/OBS MODERATE 55: CPT

## 2017-10-24 PROCEDURE — 25010000002 HYDROMORPHONE PER 4 MG: Performed by: EMERGENCY MEDICINE

## 2017-10-24 RX ADMIN — HYDROMORPHONE HYDROCHLORIDE 1 MG: 1 INJECTION, SOLUTION INTRAMUSCULAR; INTRAVENOUS; SUBCUTANEOUS at 01:45

## 2017-10-24 NOTE — ED NOTES
Nurse Practitionier from [facility] called and spoke to Dr. Grover regarding the patient.     Nina Cherry  10/24/17 0124

## 2017-10-24 NOTE — ED NOTES
Transferred to [facility] Room 4113 by [facility].     Hanna Noguera RN  10/24/17 0337

## 2017-10-24 NOTE — ED PROVIDER NOTES
Subjective     History provided by:  Patient, spouse and medical records    History of Present Illness    · Chief complaint: Abdominal pain, nausea, vomiting    · Location: Pain in the upper abdomen bilaterally radiating to the back.    · Quality/Severity: Severe pain associated with nausea and vomiting.  Not tolerating any by mouth at this time.    · Timing/Onset: Symptoms started 4 days ago    · Modifying Factors: Patient states his pain medications and Phenergan have been inadequate, and he's not been keeping down recently.    · Associated symptoms: None    · Narrative: The patient is a 47-year-old white male complaining of upper abdominal pain that radiates to his back associated with nausea and vomiting for the last 4 days.  The patient has a history recurrent current idiopathic pancreatitis.  He is status post pancreatic stents placed by Dr. Easton at [facility] SEPTEMBER 22.  The patient is in pain management with Dr. Keanu Gan.  His other medical problems include hypertrophic cardiomyopathy and he had an episode of ventricular tachycardia last year for which she hasn't a IVC in place, diabetes mellitus type 2, hypertension, stage IV renal failure with a baseline creatinine of 2.1 in June.  Social history the patient is , he is a smoker, doesn't use alcohol.    ED Triage Vitals   Temp Heart Rate Resp BP SpO2   10/23/17 2239 10/23/17 2239 10/23/17 2239 10/23/17 2239 10/23/17 2239   98.8 °F (37.1 °C) 64 20 163/106 96 %      Temp src Heart Rate Source Patient Position BP Location FiO2 (%)   10/23/17 2239 10/23/17 2239 10/23/17 2239 10/23/17 2239 --   Oral Monitor Lying Right arm        Review of Systems   Constitutional: Negative for activity change, appetite change, chills, diaphoresis, fatigue and fever.   HENT: Negative for congestion, dental problem, ear pain, hearing loss, mouth sores, postnasal drip, rhinorrhea, sinus pressure, sore throat and voice change.    Eyes: Negative for  photophobia, pain, discharge, redness and visual disturbance.   Respiratory: Negative for cough, chest tightness, shortness of breath, wheezing and stridor.    Cardiovascular: Negative for chest pain, palpitations and leg swelling.   Gastrointestinal: Positive for abdominal pain, nausea and vomiting. Negative for abdominal distention and diarrhea.   Genitourinary: Negative for difficulty urinating, dysuria, flank pain, frequency, hematuria and urgency.   Musculoskeletal: Positive for back pain. Negative for arthralgias, gait problem, joint swelling, myalgias, neck pain and neck stiffness.   Skin: Negative for color change and rash.   Neurological: Negative for dizziness, tremors, seizures, syncope, facial asymmetry, speech difficulty, weakness, light-headedness, numbness and headaches.   Hematological: Negative for adenopathy.   Psychiatric/Behavioral: Negative.  Negative for confusion and decreased concentration. The patient is not nervous/anxious.        Past Medical History:   Diagnosis Date   • Anxiety    • Asthma    • Chest pain    • COPD (chronic obstructive pulmonary disease)    • Depression    • Diabetes mellitus     TYPE II   • Fatigue    • Gout    • HOCM (hypertrophic obstructive cardiomyopathy)    • Hypertension    • Hypertriglyceridemia    • Myocardial infarction    • Pancreatitis    • Renal disorder    • Syncope        No Known Allergies    Past Surgical History:   Procedure Laterality Date   • ABDOMINAL SURGERY     • CARDIAC CATHETERIZATION     • CARDIAC DEFIBRILLATOR PLACEMENT     • CARDIAC DEFIBRILLATOR PLACEMENT     • CHOLECYSTECTOMY     • ENDOSCOPY N/A 8/30/2016    Procedure: ESOPHAGOGASTRODUODENOSCOPY;  Surgeon: Irineo Mercedes MD;  Location: [facility];  Service:    • INSERT / REPLACE / REMOVE PACEMAKER      ST GEOVANY   • LAPAROSCOPIC APPENDECTOMY     • OTHER SURGICAL HISTORY      NO REMOVAL OF APPENDIX  PATIENT HAS A APPENDIX   • UMBILICAL HERNIA REPAIR         Family History   Problem  Relation Age of Onset   • Heart block Mother    • Kidney disease Mother    • Diabetes Father    • Diabetes Maternal Grandmother    • Diabetes Maternal Grandfather    • COPD Maternal Grandfather    • Asthma Maternal Grandfather        Social History     Social History   • Marital status:      Spouse name: N/A   • Number of children: N/A   • Years of education: N/A     Social History Main Topics   • Smoking status: Current Some Day Smoker     Packs/day: 0.25     Years: 35.00     Types: Cigarettes   • Smokeless tobacco: Never Used      Comment: 1/2 pack per week ; pt states that he hasn't smoked in 5 days   • Alcohol use No   • Drug use: No   • Sexual activity: Defer     Other Topics Concern   • None     Social History Narrative   • None           Objective   Physical Exam   Constitutional: He is oriented to person, place, and time. He appears well-developed and well-nourished. He appears distressed.   The patient appears in distress and vomiting in the emergency department.  He appears in discomfort.   HENT:   Head: Normocephalic and atraumatic.   Nose: Nose normal.   Mouth/Throat: Oropharynx is clear and moist. No oropharyngeal exudate.   Eyes: EOM are normal. Pupils are equal, round, and reactive to light. Right eye exhibits no discharge. Left eye exhibits no discharge. No scleral icterus.   Neck: Normal range of motion. Neck supple. No JVD present. No thyromegaly present.   Cardiovascular: Normal rate, regular rhythm and normal heart sounds.    No murmur heard.  Pulmonary/Chest: Effort normal and breath sounds normal. He has no wheezes. He has no rales. He exhibits no tenderness.   Abdominal: Soft. Bowel sounds are normal. He exhibits no distension. There is tenderness. There is no rebound.   The patient has tenderness in both upper quadrants of the abdomen.  His abdomen is soft.  He is voluntary guarding of both of his upper quadrants of his abdomen.  The lower 2 quadrants of the abdomen are soft and  nontender.   Musculoskeletal: Normal range of motion. He exhibits no edema, tenderness or deformity.   Lymphadenopathy:     He has no cervical adenopathy.   Neurological: He is alert and oriented to person, place, and time. No cranial nerve deficit. Coordination normal.   No focal motor sensory deficit   Skin: Skin is warm and dry. No rash noted. He is not diaphoretic.   He has chronic brown skin changes to his back due to his renal failure.   Psychiatric: He has a normal mood and affect. His behavior is normal. Judgment and thought content normal.   Nursing note and vitals reviewed.      Procedures         ED Course  ED Course   Comment By Time   [facility] Report [id number] shows the patient fills hydrocodone 10 mg #120 monthly with last filled October 1. Dario Grover MD 10/23 2313   IVFs - NS one liter bolus, Dilaudid 1mg and Zofran 4mg IV. Dario Grover MD 10/23 2314   Review the patient's laboratory studies show that he has a markedly elevated creatinine of 4.62 and BUN of 71 which is dramatically elevated from his baseline creatinine of 2.12 and BUN of 34 on June 21.  This exacerbation of his chronic stage IV renal failure may be due to the vomiting secondary to his recurrent pancreatitis.  His lipase is elevated at 86 was is consistent with prior exacerbations of his pancreatitis.  His white count is 9.8 with a normal differential.  Hemoglobin and hematocrit are normal. Dario Grover MD 10/24 0134   00:15 patient discussed with Dr. Foreman, covering for Dr. Easton the patient's pancreatic specialist, who requested that I contact the hospitalists at [facility] for admission and they would consult. Dario Grover MD 10/24 0135   01:25 the patient was discussed with YO Corral covering for Dr. Vaz.  I expressed my concern that the patient had recurrent pancreatitis one month after having a a pancreatic stent placed by Dr. Easton, and now has a severe exacerbation of his renal failure that may require  hemodialysis which can not be performed at this facility.  [facility] except the patient on behalf of Dr. Vaz to [facility]. Dario Grover MD 10/24 0136   01:45 the patient had a re-exacerbation of his abdominal pain and was given additional Dilaudid 1 mg IV. Dario Grover MD 10/24 7043                  MDM  Number of Diagnoses or Management Options  Acute recurrent pancreatitis: new and requires workup  Acute renal failure superimposed on stage 5 chronic kidney disease, not on chronic dialysis, unspecified acute renal failure type: new and requires workup     Amount and/or Complexity of Data Reviewed  Clinical lab tests: ordered and reviewed  Review and summarize past medical records: yes  Discuss the patient with other providers: yes    Risk of Complications, Morbidity, and/or Mortality  Presenting problems: high  Diagnostic procedures: high  Management options: high  General comments: My differential diagnosis for abdominal pain includes but is not limited to:  Gastritis, gastroenteritis, peptic ulcer disease, GERD, esophageal perforation, acute appendicitis, mesenteric adenitis, Meckel's diverticulum, epiploic appendagitis, diverticulitis, colon cancer, ulcerative colitis, Crohn's disease, intussusception, small bowel obstruction, adhesions, ischemic bowel, perforated viscus, ileus, obstipation, biliary colic, cholecystitis, cholelithiasis, Stiven-Bigg Byron, hepatitis, pancreatitis, common bile duct obstruction, cholangitis, bile leak, splenic trauma, splenic rupture, splenic infarction, splenic abscess, abdominal abscess, ascites, spontaneous bacterial peritonitis, hernia, UTI, cystitis, prostatitis, ureterolithiasis, urinary obstruction, ovarian cyst, torsion, pregnancy, ectopic pregnancy, PID, pelvic abscess, mittelschmerz, endometriosis, AAA, myocardial infarction, pneumonia, cancer, porphyria, DKA, medications, sickle cell, viral syndrome, zoster    Patient Progress  Patient progress: other  (comment) (The patient's abdominal pain was transiently improved due to pain medication.  His vomiting is transiently improved by Zofran.  His renal failure is severe)      Final diagnoses:   Acute recurrent pancreatitis   Acute renal failure superimposed on stage 5 chronic kidney disease, not on chronic dialysis, unspecified acute renal failure type           Labs Reviewed   COMPREHENSIVE METABOLIC PANEL - Abnormal; Notable for the following:        Result Value    Glucose 187 (*)     BUN 71 (*)     Creatinine 4.62 (*)     CO2 21.2 (*)     eGFR Non  Amer 14 (*)     All other components within normal limits   LIPASE - Abnormal; Notable for the following:     Lipase 86 (*)     All other components within normal limits   CBC WITH AUTO DIFFERENTIAL - Abnormal; Notable for the following:     MPV 10.8 (*)     Neutrophil % 79.8 (*)     Lymphocyte % 12.9 (*)     Eosinophils, Absolute 0.04 (*)     All other components within normal limits   AMYLASE - Normal   CBC AND DIFFERENTIAL    Narrative:     The following orders were created for panel order CBC & Differential.  Procedure                               Abnormality         Status                     ---------                               -----------         ------                     CBC Auto Differential[[id number]]        Abnormal            Final result                 Please view results for these tests on the individual orders.     No orders to display          Medication List      Notice     No changes were made to your prescriptions during this visit.             Dario Grover MD  10/24/17 0349

## 2017-10-24 NOTE — ED NOTES
Consult to [facility]. Dr. Foreman returned call. Call complete 00:15     Patti Kirk  10/24/17 0016

## 2017-10-25 ENCOUNTER — INPATIENT HOSPITAL (OUTPATIENT)
Dept: URBAN - METROPOLITAN AREA HOSPITAL 107 | Facility: HOSPITAL | Age: 47
End: 2017-10-25
Payer: COMMERCIAL

## 2017-10-25 DIAGNOSIS — K86.1 OTHER CHRONIC PANCREATITIS: ICD-10-CM

## 2017-10-25 DIAGNOSIS — K86.89 OTHER SPECIFIED DISEASES OF PANCREAS: ICD-10-CM

## 2017-10-25 DIAGNOSIS — R10.9 UNSPECIFIED ABDOMINAL PAIN: ICD-10-CM

## 2017-10-25 PROCEDURE — 99232 SBSQ HOSP IP/OBS MODERATE 35: CPT | Performed by: PHYSICIAN ASSISTANT

## 2017-10-26 ENCOUNTER — INPATIENT HOSPITAL (OUTPATIENT)
Dept: URBAN - METROPOLITAN AREA HOSPITAL 107 | Facility: HOSPITAL | Age: 47
End: 2017-10-26
Payer: COMMERCIAL

## 2017-10-26 DIAGNOSIS — R10.9 UNSPECIFIED ABDOMINAL PAIN: ICD-10-CM

## 2017-10-26 DIAGNOSIS — R93.3 ABNORMAL FINDINGS ON DIAGNOSTIC IMAGING OF OTHER PARTS OF DI: ICD-10-CM

## 2017-10-26 DIAGNOSIS — K85.90 ACUTE PANCREATITIS WITHOUT NECROSIS OR INFECTION, UNSPECIFIE: ICD-10-CM

## 2017-10-26 DIAGNOSIS — K86.89 OTHER SPECIFIED DISEASES OF PANCREAS: ICD-10-CM

## 2017-10-26 PROCEDURE — 43238 EGD US FINE NEEDLE BX/ASPIR: CPT

## 2017-11-09 ENCOUNTER — TRANSCRIBE ORDERS (OUTPATIENT)
Dept: ADMINISTRATIVE | Facility: HOSPITAL | Age: 47
End: 2017-11-09

## 2017-11-09 ENCOUNTER — LAB (OUTPATIENT)
Dept: LAB | Facility: HOSPITAL | Age: 47
End: 2017-11-09

## 2017-11-09 DIAGNOSIS — N18.30 CHRONIC KIDNEY DISEASE, STAGE III (MODERATE) (HCC): Primary | ICD-10-CM

## 2017-11-09 DIAGNOSIS — N18.4 CHRONIC KIDNEY DISEASE, STAGE IV (SEVERE) (HCC): ICD-10-CM

## 2017-11-09 LAB
ALBUMIN SERPL-MCNC: 3.8 G/DL (ref 3.5–5.2)
ALBUMIN/GLOB SERPL: 1.3 G/DL
ALP SERPL-CCNC: 147 U/L (ref 40–129)
ALT SERPL W P-5'-P-CCNC: 16 U/L (ref 5–41)
ANION GAP SERPL CALCULATED.3IONS-SCNC: 13.6 MMOL/L
AST SERPL-CCNC: 10 U/L (ref 5–40)
BILIRUB SERPL-MCNC: 0.3 MG/DL (ref 0.2–1.2)
BUN BLD-MCNC: 40 MG/DL (ref 6–20)
BUN/CREAT SERPL: 12.3 (ref 7–25)
CALCIUM SPEC-SCNC: 9.4 MG/DL (ref 8.6–10.5)
CHLORIDE SERPL-SCNC: 98 MMOL/L (ref 98–107)
CO2 SERPL-SCNC: 25.4 MMOL/L (ref 22–29)
CREAT BLD-MCNC: 3.25 MG/DL (ref 0.76–1.27)
CREAT UR-MCNC: 75.8 MG/DL
DEPRECATED RDW RBC AUTO: 40.3 FL (ref 37–54)
ERYTHROCYTE [DISTWIDTH] IN BLOOD BY AUTOMATED COUNT: 12.5 % (ref 11.5–14.5)
GFR SERPL CREATININE-BSD FRML MDRD: 20 ML/MIN/1.73
GLOBULIN UR ELPH-MCNC: 2.9 GM/DL
GLUCOSE BLD-MCNC: 379 MG/DL (ref 65–99)
HCT VFR BLD AUTO: 42.8 % (ref 42–52)
HGB BLD-MCNC: 14.6 G/DL (ref 14–18)
MAGNESIUM SERPL-MCNC: 2 MG/DL (ref 1.7–2.5)
MCH RBC QN AUTO: 30.7 PG (ref 27–31)
MCHC RBC AUTO-ENTMCNC: 34.1 G/DL (ref 31–37)
MCV RBC AUTO: 90.1 FL (ref 80–94)
PHOSPHATE SERPL-MCNC: 3.9 MG/DL (ref 2.7–4.5)
PLATELET # BLD AUTO: 158 10*3/MM3 (ref 140–500)
PMV BLD AUTO: 10.6 FL (ref 7.4–10.4)
POTASSIUM BLD-SCNC: 4.2 MMOL/L (ref 3.5–5.2)
PROT SERPL-MCNC: 6.7 G/DL (ref 6–8.5)
PROT UR-MCNC: 148 MG/DL
PTH-INTACT SERPL-MCNC: 96.4 PG/ML (ref 15–65)
RBC # BLD AUTO: 4.75 10*6/MM3 (ref 4.7–6.1)
SODIUM BLD-SCNC: 137 MMOL/L (ref 136–145)
WBC NRBC COR # BLD: 6.55 10*3/MM3 (ref 4.8–10.8)

## 2017-11-09 PROCEDURE — 84156 ASSAY OF PROTEIN URINE: CPT

## 2017-11-09 PROCEDURE — 84100 ASSAY OF PHOSPHORUS: CPT

## 2017-11-09 PROCEDURE — 36415 COLL VENOUS BLD VENIPUNCTURE: CPT

## 2017-11-09 PROCEDURE — 85027 COMPLETE CBC AUTOMATED: CPT

## 2017-11-09 PROCEDURE — 82570 ASSAY OF URINE CREATININE: CPT

## 2017-11-09 PROCEDURE — 83735 ASSAY OF MAGNESIUM: CPT

## 2017-11-09 PROCEDURE — 83970 ASSAY OF PARATHORMONE: CPT

## 2017-11-09 PROCEDURE — 80053 COMPREHEN METABOLIC PANEL: CPT

## 2017-12-07 VITALS
HEIGHT: 72 IN | WEIGHT: 207 LBS | SYSTOLIC BLOOD PRESSURE: 112 MMHG | DIASTOLIC BLOOD PRESSURE: 84 MMHG | HEART RATE: 74 BPM

## 2017-12-13 ENCOUNTER — OFFICE (OUTPATIENT)
Dept: URBAN - METROPOLITAN AREA CLINIC 75 | Facility: CLINIC | Age: 47
End: 2017-12-13
Payer: COMMERCIAL

## 2017-12-13 DIAGNOSIS — K86.1 OTHER CHRONIC PANCREATITIS: ICD-10-CM

## 2017-12-13 PROCEDURE — 99213 OFFICE O/P EST LOW 20 MIN: CPT

## 2017-12-28 ENCOUNTER — INPATIENT HOSPITAL (OUTPATIENT)
Dept: URBAN - METROPOLITAN AREA HOSPITAL 107 | Facility: HOSPITAL | Age: 47
End: 2017-12-28
Payer: COMMERCIAL

## 2017-12-28 DIAGNOSIS — R19.7 DIARRHEA, UNSPECIFIED: ICD-10-CM

## 2017-12-28 DIAGNOSIS — K86.1 OTHER CHRONIC PANCREATITIS: ICD-10-CM

## 2017-12-28 DIAGNOSIS — R10.9 UNSPECIFIED ABDOMINAL PAIN: ICD-10-CM

## 2017-12-28 DIAGNOSIS — R11.2 NAUSEA WITH VOMITING, UNSPECIFIED: ICD-10-CM

## 2017-12-28 PROCEDURE — 99223 1ST HOSP IP/OBS HIGH 75: CPT | Performed by: INTERNAL MEDICINE

## 2017-12-29 ENCOUNTER — INPATIENT HOSPITAL (OUTPATIENT)
Dept: URBAN - METROPOLITAN AREA HOSPITAL 107 | Facility: HOSPITAL | Age: 47
End: 2017-12-29
Payer: COMMERCIAL

## 2017-12-29 DIAGNOSIS — K21.9 GASTRO-ESOPHAGEAL REFLUX DISEASE WITHOUT ESOPHAGITIS: ICD-10-CM

## 2017-12-29 DIAGNOSIS — K86.1 OTHER CHRONIC PANCREATITIS: ICD-10-CM

## 2017-12-29 DIAGNOSIS — R11.2 NAUSEA WITH VOMITING, UNSPECIFIED: ICD-10-CM

## 2017-12-29 PROCEDURE — 99231 SBSQ HOSP IP/OBS SF/LOW 25: CPT | Performed by: PHYSICIAN ASSISTANT

## 2018-01-25 ENCOUNTER — ON CAMPUS - OUTPATIENT (OUTPATIENT)
Dept: URBAN - METROPOLITAN AREA HOSPITAL 108 | Facility: HOSPITAL | Age: 48
End: 2018-01-25
Payer: COMMERCIAL

## 2018-01-25 DIAGNOSIS — K31.7 POLYP OF STOMACH AND DUODENUM: ICD-10-CM

## 2018-01-25 DIAGNOSIS — K86.1 OTHER CHRONIC PANCREATITIS: ICD-10-CM

## 2018-01-25 DIAGNOSIS — K83.1 OBSTRUCTION OF BILE DUCT: ICD-10-CM

## 2018-01-25 DIAGNOSIS — R10.13 EPIGASTRIC PAIN: ICD-10-CM

## 2018-01-25 DIAGNOSIS — Z96.89 PRESENCE OF OTHER SPECIFIED FUNCTIONAL IMPLANTS: ICD-10-CM

## 2018-01-25 DIAGNOSIS — K83.8 OTHER SPECIFIED DISEASES OF BILIARY TRACT: ICD-10-CM

## 2018-01-25 PROCEDURE — 43264 ERCP REMOVE DUCT CALCULI: CPT

## 2018-01-25 PROCEDURE — 43239 EGD BIOPSY SINGLE/MULTIPLE: CPT

## 2018-01-25 PROCEDURE — 43276 ERCP STENT EXCHANGE W/DILATE: CPT

## 2018-02-09 ENCOUNTER — TRANSCRIBE ORDERS (OUTPATIENT)
Dept: ADMINISTRATIVE | Facility: HOSPITAL | Age: 48
End: 2018-02-09

## 2018-02-09 ENCOUNTER — LAB (OUTPATIENT)
Dept: LAB | Facility: HOSPITAL | Age: 48
End: 2018-02-09

## 2018-02-09 DIAGNOSIS — N18.4 CHRONIC KIDNEY DISEASE, STAGE IV (SEVERE) (HCC): Primary | ICD-10-CM

## 2018-02-09 DIAGNOSIS — N18.4 CHRONIC KIDNEY DISEASE, STAGE IV (SEVERE) (HCC): ICD-10-CM

## 2018-02-09 LAB
ALBUMIN SERPL-MCNC: 3.8 G/DL (ref 3.5–5.2)
ALBUMIN/GLOB SERPL: 1.6 G/DL
ALP SERPL-CCNC: 109 U/L (ref 40–129)
ALT SERPL W P-5'-P-CCNC: 9 U/L (ref 5–41)
ANION GAP SERPL CALCULATED.3IONS-SCNC: 17.3 MMOL/L
AST SERPL-CCNC: 12 U/L (ref 5–40)
BASOPHILS # BLD AUTO: 0.04 10*3/MM3 (ref 0–0.2)
BASOPHILS NFR BLD AUTO: 0.4 % (ref 0–2)
BILIRUB CONJ SERPL-MCNC: <0.2 MG/DL (ref 0.2–0.3)
BILIRUB SERPL-MCNC: 0.5 MG/DL (ref 0.2–1.2)
BUN BLD-MCNC: 51 MG/DL (ref 6–20)
BUN/CREAT SERPL: 11.3 (ref 7–25)
CALCIUM SPEC-SCNC: 8.7 MG/DL (ref 8.6–10.5)
CHLORIDE SERPL-SCNC: 96 MMOL/L (ref 98–107)
CHOLEST SERPL-MCNC: 225 MG/DL (ref 0–200)
CO2 SERPL-SCNC: 22.7 MMOL/L (ref 22–29)
CREAT BLD-MCNC: 4.51 MG/DL (ref 0.76–1.27)
CREAT UR-MCNC: 120.6 MG/DL
DEPRECATED RDW RBC AUTO: 39.3 FL (ref 37–54)
EOSINOPHIL # BLD AUTO: 0.12 10*3/MM3 (ref 0.1–0.3)
EOSINOPHIL NFR BLD AUTO: 1.2 % (ref 0–4)
ERYTHROCYTE [DISTWIDTH] IN BLOOD BY AUTOMATED COUNT: 12.4 % (ref 11.5–14.5)
GFR SERPL CREATININE-BSD FRML MDRD: 14 ML/MIN/1.73
GLOBULIN UR ELPH-MCNC: 2.4 GM/DL
GLUCOSE BLD-MCNC: 227 MG/DL (ref 65–99)
HCT VFR BLD AUTO: 46.3 % (ref 42–52)
HDLC SERPL-MCNC: 42 MG/DL (ref 40–60)
HGB BLD-MCNC: 15.7 G/DL (ref 14–18)
IMM GRANULOCYTES # BLD: 0.03 10*3/MM3 (ref 0–0.03)
IMM GRANULOCYTES NFR BLD: 0.3 % (ref 0–0.5)
LDLC SERPL CALC-MCNC: 104 MG/DL (ref 0–100)
LDLC/HDLC SERPL: 2.47 {RATIO}
LYMPHOCYTES # BLD AUTO: 1.07 10*3/MM3 (ref 0.6–4.8)
LYMPHOCYTES NFR BLD AUTO: 10.4 % (ref 20–45)
MAGNESIUM SERPL-MCNC: 2.3 MG/DL (ref 1.7–2.5)
MCH RBC QN AUTO: 29.5 PG (ref 27–31)
MCHC RBC AUTO-ENTMCNC: 33.9 G/DL (ref 31–37)
MCV RBC AUTO: 87 FL (ref 80–94)
MONOCYTES # BLD AUTO: 0.56 10*3/MM3 (ref 0–1)
MONOCYTES NFR BLD AUTO: 5.4 % (ref 3–8)
NEUTROPHILS # BLD AUTO: 8.49 10*3/MM3 (ref 1.5–8.3)
NEUTROPHILS NFR BLD AUTO: 82.3 % (ref 45–70)
NRBC BLD MANUAL-RTO: 0 /100 WBC (ref 0–0)
PLATELET # BLD AUTO: 170 10*3/MM3 (ref 140–500)
PMV BLD AUTO: 11 FL (ref 7.4–10.4)
POTASSIUM BLD-SCNC: 3.8 MMOL/L (ref 3.5–5.2)
PROT SERPL-MCNC: 6.2 G/DL (ref 6–8.5)
PROT UR-MCNC: 377 MG/DL
RBC # BLD AUTO: 5.32 10*6/MM3 (ref 4.7–6.1)
SODIUM BLD-SCNC: 136 MMOL/L (ref 136–145)
TRIGL SERPL-MCNC: 396 MG/DL (ref 0–150)
VLDLC SERPL-MCNC: 79.2 MG/DL (ref 8–32)
WBC NRBC COR # BLD: 10.31 10*3/MM3 (ref 4.8–10.8)

## 2018-02-09 PROCEDURE — 36415 COLL VENOUS BLD VENIPUNCTURE: CPT

## 2018-02-09 PROCEDURE — 83735 ASSAY OF MAGNESIUM: CPT

## 2018-02-09 PROCEDURE — 80061 LIPID PANEL: CPT

## 2018-02-09 PROCEDURE — 82570 ASSAY OF URINE CREATININE: CPT

## 2018-02-09 PROCEDURE — 84156 ASSAY OF PROTEIN URINE: CPT

## 2018-02-09 PROCEDURE — 80053 COMPREHEN METABOLIC PANEL: CPT

## 2018-02-09 PROCEDURE — 82248 BILIRUBIN DIRECT: CPT

## 2018-02-09 PROCEDURE — 85025 COMPLETE CBC W/AUTO DIFF WBC: CPT

## 2018-03-01 VITALS
DIASTOLIC BLOOD PRESSURE: 62 MMHG | HEART RATE: 78 BPM | SYSTOLIC BLOOD PRESSURE: 118 MMHG | HEIGHT: 72 IN | WEIGHT: 207 LBS

## 2018-03-07 ENCOUNTER — TRANSCRIBE ORDERS (OUTPATIENT)
Dept: ADMINISTRATIVE | Facility: HOSPITAL | Age: 48
End: 2018-03-07

## 2018-03-07 ENCOUNTER — OFFICE (OUTPATIENT)
Dept: URBAN - METROPOLITAN AREA CLINIC 75 | Facility: CLINIC | Age: 48
End: 2018-03-07

## 2018-03-07 DIAGNOSIS — K86.1 CHRONIC PANCREATITIS, UNSPECIFIED PANCREATITIS TYPE (HCC): Primary | ICD-10-CM

## 2018-03-07 DIAGNOSIS — R11.2 NAUSEA WITH VOMITING, UNSPECIFIED: ICD-10-CM

## 2018-03-07 DIAGNOSIS — K86.1 OTHER CHRONIC PANCREATITIS: ICD-10-CM

## 2018-03-07 PROCEDURE — 99214 OFFICE O/P EST MOD 30 MIN: CPT

## 2018-03-13 ENCOUNTER — CLINICAL SUPPORT NO REQUIREMENTS (OUTPATIENT)
Dept: CARDIOLOGY | Facility: CLINIC | Age: 48
End: 2018-03-13

## 2018-03-13 ENCOUNTER — OFFICE VISIT (OUTPATIENT)
Dept: CARDIOLOGY | Facility: CLINIC | Age: 48
End: 2018-03-13

## 2018-03-13 VITALS
DIASTOLIC BLOOD PRESSURE: 103 MMHG | BODY MASS INDEX: 27.63 KG/M2 | HEIGHT: 72 IN | SYSTOLIC BLOOD PRESSURE: 173 MMHG | HEART RATE: 73 BPM | WEIGHT: 204 LBS

## 2018-03-13 DIAGNOSIS — R94.31 ABNORMAL ELECTROCARDIOGRAM: ICD-10-CM

## 2018-03-13 DIAGNOSIS — Z95.810 AICD (AUTOMATIC CARDIOVERTER/DEFIBRILLATOR) PRESENT: ICD-10-CM

## 2018-03-13 DIAGNOSIS — Z95.810 AICD (AUTOMATIC CARDIOVERTER/DEFIBRILLATOR) PRESENT: Primary | ICD-10-CM

## 2018-03-13 DIAGNOSIS — I10 ESSENTIAL HYPERTENSION: ICD-10-CM

## 2018-03-13 DIAGNOSIS — I10 HTN (HYPERTENSION), BENIGN: Primary | ICD-10-CM

## 2018-03-13 DIAGNOSIS — R55 SYNCOPE, UNSPECIFIED SYNCOPE TYPE: ICD-10-CM

## 2018-03-13 DIAGNOSIS — I42.1 HYPERTROPHIC OBSTRUCTIVE CARDIOMYOPATHY (HCC): ICD-10-CM

## 2018-03-13 PROCEDURE — 93289 INTERROG DEVICE EVAL HEART: CPT | Performed by: INTERNAL MEDICINE

## 2018-03-13 PROCEDURE — 99213 OFFICE O/P EST LOW 20 MIN: CPT | Performed by: INTERNAL MEDICINE

## 2018-03-13 PROCEDURE — 93000 ELECTROCARDIOGRAM COMPLETE: CPT | Performed by: INTERNAL MEDICINE

## 2018-03-13 RX ORDER — ASPIRIN 81 MG/1
81 TABLET ORAL DAILY
COMMUNITY
End: 2018-08-21 | Stop reason: ALTCHOICE

## 2018-03-13 NOTE — PROGRESS NOTES
Subjective:       Sergio Phan is a 47 y.o. male who here for follow up    CC  OCCASIONAL CP  HPI  47-year-old with history of the sudden cardiac death as well as AICD with normal coronary arteries in the past has been complaining of the occasional dull aching sensation left side of the chest at rest associated with the shortness of breath and anxiety     Problem List Items Addressed This Visit        Cardiovascular and Mediastinum    AICD (automatic cardioverter/defibrillator) present    Hypertension    Hypertrophic obstructive cardiomyopathy    Syncope    Relevant Orders    Stress Test With Myocardial Perfusion    Adult Transthoracic Echo Complete W/ Cont if Necessary Per Protocol      Other Visit Diagnoses     HTN (hypertension), benign    -  Primary    Relevant Orders    ECG 12 Lead    Stress Test With Myocardial Perfusion    Adult Transthoracic Echo Complete W/ Cont if Necessary Per Protocol    Abnormal electrocardiogram         Relevant Orders    Stress Test With Myocardial Perfusion        .    The following portions of the patient's history were reviewed and updated as appropriate: allergies, current medications, past family history, past medical history, past social history, past surgical history and problem list.    Past Medical History:   Diagnosis Date   • Anxiety    • Asthma    • Chest pain    • COPD (chronic obstructive pulmonary disease)    • Depression    • Diabetes mellitus     TYPE II   • Fatigue    • Gout    • HOCM (hypertrophic obstructive cardiomyopathy)    • Hypertension    • Hypertriglyceridemia    • Myocardial infarction    • Pancreatitis    • Renal disorder    • Syncope     reports that he has been smoking Cigarettes.  He has a 17.50 pack-year smoking history. He has never used smokeless tobacco. He reports that he does not drink alcohol or use drugs.  Family History   Problem Relation Age of Onset   • Heart block Mother    • Kidney disease Mother    • Diabetes Father    • Diabetes  "Maternal Grandmother    • Diabetes Maternal Grandfather    • COPD Maternal Grandfather    • Asthma Maternal Grandfather        Review of Systems  Constitutional: No wt loss, fever, fatigue  Gastrointestinal: No nausea, abdominal pain  Behavioral/Psych: No insomnia or anxiety   Cardiovascular Chest pain  Objective:       Physical Exam           Physical Exam  BP (!) 173/103   Pulse 73   Ht 182.9 cm (72\")   Wt 92.5 kg (204 lb)   BMI 27.67 kg/m²     General appearance: NAD, conversant   Eyes: anicteric sclerae, moist conjunctivae; no lid-lag; PERRLA   HENT: Atraumatic; oropharynx clear with moist mucous membranes and no mucosal ulcerations;  normal hard and soft palate   Neck: Trachea midline; FROM, supple, no thyromegaly or lymphadenopathy   Lungs: CTA, with normal respiratory effort and no intercostal retractions   CV: S1-S2 regular, no murmurs, no rub, no gallop   Abdomen: Soft, non-tender; no masses or HSM   Extremities: No peripheral edema or extremity lymphadenopathy  Skin: Normal temperature, turgor and texture; no rash, ulcers or subcutaneous nodules   Psych: Appropriate affect, alert and oriented to person, place and time           Cardiographics  @  ECG 12 Lead  Date/Time: 3/13/2018 3:08 PM  Performed by: LISA GARIBAY  Authorized by: LISA GARIBAY   Comparison: compared with previous ECG   Similar to previous ECG  Rhythm: sinus rhythm  Q waves: V1, V2 and V3  Clinical impression: abnormal ECG            Echocardiogram:        Current Outpatient Prescriptions:   •  albuterol (PROVENTIL HFA;VENTOLIN HFA) 108 (90 BASE) MCG/ACT inhaler, Inhale 2 puffs every 4 (four) hours as needed for wheezing., Disp: , Rfl:   •  allopurinol (ZYLOPRIM) 100 MG tablet, Take 3 tablets by mouth Daily., Disp: 30 tablet, Rfl: 0  •  amLODIPine (NORVASC) 10 MG tablet, Take  by mouth daily., Disp: , Rfl:   •  aspirin 81 MG EC tablet, Take 81 mg by mouth Daily., Disp: , Rfl:   •  bisoprolol (ZEBeta) 5 MG tablet, Take " 5 mg by mouth Daily., Disp: , Rfl:   •  budesonide-formoterol (SYMBICORT) 80-4.5 MCG/ACT inhaler, Inhale 2 puffs 2 (Two) Times a Day., Disp: , Rfl: 12  •  DULoxetine (CYMBALTA) 30 MG capsule, Take 60 mg by mouth Daily., Disp: , Rfl:   •  Ergocalciferol (VITAMIN D2) 400 UNITS tablet, Take 400 Units by mouth 1 (One) Time Per Week., Disp: , Rfl:   •  fluticasone (VERAMYST) 27.5 MCG/SPRAY nasal spray, 2 sprays into each nostril Daily., Disp: , Rfl:   •  furosemide (LASIX) 20 MG tablet, Take 40 mg by mouth 2 (Two) Times a Day., Disp: , Rfl:   •  gabapentin (NEURONTIN) 800 MG tablet, Take 800 mg by mouth 3 (three) times a day., Disp: , Rfl:   •  glimepiride (AMARYL) 2 MG tablet, Take 2 mg by mouth 2 (Two) Times a Day., Disp: , Rfl:   •  hydrALAZINE (APRESOLINE) 50 MG tablet, Take 50 mg by mouth 4 (four) times a day., Disp: , Rfl:   •  HYDROcodone-acetaminophen (NORCO)  MG per tablet, Take 1-2 tablets by mouth Every 6 (Six) Hours As Needed for Moderate Pain (4-6)., Disp: 20 tablet, Rfl: 0  •  LORazepam (ATIVAN) 0.5 MG tablet, Take 1 mg by mouth Every 6 (Six) Hours As Needed for Anxiety., Disp: , Rfl:   •  methocarbamol (ROBAXIN) 750 MG tablet, Take 750 mg by mouth 3 (Three) Times a Day., Disp: , Rfl:   •  Pancrelipase, Lip-Prot-Amyl, 46226 units capsule delayed-release particles, Take 1 capsule by mouth 3 (Three) Times a Day., Disp: , Rfl:   •  pantoprazole (PROTONIX) 40 MG EC tablet, Take 40 mg by mouth Daily., Disp: , Rfl:   •  promethazine (PHENERGAN) 25 MG tablet, Take 1 tablet by mouth Every 6 (Six) Hours As Needed for Nausea or Vomiting., Disp: 20 tablet, Rfl: 0  •  rosuvastatin (CRESTOR) 20 MG tablet, Take 40 mg by mouth Daily., Disp: , Rfl:   •  tiotropium (SPIRIVA) 18 MCG per inhalation capsule, Place 1 capsule into inhaler and inhale 1 (one) time daily., Disp: , Rfl:    Assessment:        Patient Active Problem List   Diagnosis   • Cardiac defibrillator in place   • Dizziness   • Fatigue   • Hypertension    • Hypertrophic obstructive cardiomyopathy   • Hypertriglyceridemia   • Kidney disorder   • Type 2 diabetes mellitus   • Syncope   • Vitamin D deficiency   • Weakness   • Idiopathic acute pancreatitis   • VT (ventricular tachycardia)   • Acute pancreatitis   • Pancreatitis, recurrent               Plan:            ICD-10-CM ICD-9-CM   1. HTN (hypertension), benign I10 401.1   2. Syncope, unspecified syncope type R55 780.2   3. Abnormal electrocardiogram  R94.31 794.31   4. AICD (automatic cardioverter/defibrillator) present Z95.810 V45.02   5. Essential hypertension I10 401.9   6. Hypertrophic obstructive cardiomyopathy I42.1 425.11     1. HTN (hypertension), benign  Blood pressures are high but has been running normal at home  - ECG 12 Lead  - Stress Test With Myocardial Perfusion; Future  - Adult Transthoracic Echo Complete W/ Cont if Necessary Per Protocol    2. Syncope, unspecified syncope type  In the past has been due to the sudden cardiac death due to hypertrophic cardiomyopathy  - Stress Test With Myocardial Perfusion; Future  - Adult Transthoracic Echo Complete W/ Cont if Necessary Per Protocol    3. Abnormal electrocardiogram   Due to hypertrophic cardiomyopathy  - Stress Test With Myocardial Perfusion; Future    4. AICD (automatic cardioverter/defibrillator) present  AICD functioning normally    5. Essential hypertension  Blood pressure and control    6. Hypertrophic obstructive cardiomyopathy  At this point asymptomatic    7.  Chest pain   Considering the patient's symptoms as well as clinical situation and  EKG findings, along with cardiac risk factors, ischemic workup is necessary to rule out ischemic cardiomyopathy, stress induced arrhythmias, and functional capacity for diagnosis as well as prognostic consideration      BP OK  AT HOME    LEXISCAN/ ECHO    SEE US 2-4 WKS  COUNSELING:    Sergio Clifton was given to patient for the following topics: diagnostic results, risk factor  reductions, impressions, risks and benefits of treatment options and importance of treatment compliance .       SMOKING COUNSELING:    Ready to quit: No  Counseling given: Yes      EMR Dragon/Transcription disclaimer:   Much of this encounter note is an electronic transcription/translation of spoken language to printed text. The electronic translation of spoken language may permit erroneous, or at times, nonsensical words or phrases to be inadvertently transcribed; Although I have reviewed the note for such errors, some may still exist.

## 2018-03-14 ENCOUNTER — HOSPITAL ENCOUNTER (OUTPATIENT)
Dept: NUCLEAR MEDICINE | Facility: HOSPITAL | Age: 48
Discharge: HOME | End: 2018-03-14

## 2018-03-14 ENCOUNTER — LAB (OUTPATIENT)
Dept: LAB | Facility: HOSPITAL | Age: 48
End: 2018-03-14

## 2018-03-14 ENCOUNTER — TRANSCRIBE ORDERS (OUTPATIENT)
Dept: ADMINISTRATIVE | Facility: HOSPITAL | Age: 48
End: 2018-03-14

## 2018-03-14 DIAGNOSIS — N18.4 CHRONIC KIDNEY DISEASE, STAGE IV (SEVERE) (HCC): ICD-10-CM

## 2018-03-14 DIAGNOSIS — N18.4 CHRONIC KIDNEY DISEASE, STAGE IV (SEVERE) (HCC): Primary | ICD-10-CM

## 2018-03-14 LAB
ALBUMIN SERPL-MCNC: 3.3 G/DL (ref 3.5–5.2)
ALBUMIN/GLOB SERPL: 1.2 G/DL
ALP SERPL-CCNC: 144 U/L (ref 40–129)
ALT SERPL W P-5'-P-CCNC: 9 U/L (ref 5–41)
ANION GAP SERPL CALCULATED.3IONS-SCNC: 16.5 MMOL/L
AST SERPL-CCNC: 9 U/L (ref 5–40)
BASOPHILS # BLD AUTO: 0.05 10*3/MM3 (ref 0–0.2)
BASOPHILS NFR BLD AUTO: 0.7 % (ref 0–2)
BILIRUB SERPL-MCNC: 0.4 MG/DL (ref 0.2–1.2)
BUN BLD-MCNC: 37 MG/DL (ref 6–20)
BUN/CREAT SERPL: 8 (ref 7–25)
CALCIUM SPEC-SCNC: 9.2 MG/DL (ref 8.6–10.5)
CHLORIDE SERPL-SCNC: 105 MMOL/L (ref 98–107)
CO2 SERPL-SCNC: 20.5 MMOL/L (ref 22–29)
CREAT BLD-MCNC: 4.61 MG/DL (ref 0.76–1.27)
CREAT UR-MCNC: 159.4 MG/DL
DEPRECATED RDW RBC AUTO: 45.4 FL (ref 37–54)
EOSINOPHIL # BLD AUTO: 0.12 10*3/MM3 (ref 0.1–0.3)
EOSINOPHIL NFR BLD AUTO: 1.7 % (ref 0–4)
ERYTHROCYTE [DISTWIDTH] IN BLOOD BY AUTOMATED COUNT: 13.5 % (ref 11.5–14.5)
GFR SERPL CREATININE-BSD FRML MDRD: 14 ML/MIN/1.73
GLOBULIN UR ELPH-MCNC: 2.7 GM/DL
GLUCOSE BLD-MCNC: 137 MG/DL (ref 65–99)
HCT VFR BLD AUTO: 44.2 % (ref 42–52)
HGB BLD-MCNC: 14.4 G/DL (ref 14–18)
IMM GRANULOCYTES # BLD: 0.01 10*3/MM3 (ref 0–0.03)
IMM GRANULOCYTES NFR BLD: 0.1 % (ref 0–0.5)
LYMPHOCYTES # BLD AUTO: 0.77 10*3/MM3 (ref 0.6–4.8)
LYMPHOCYTES NFR BLD AUTO: 11.1 % (ref 20–45)
MCH RBC QN AUTO: 30.3 PG (ref 27–31)
MCHC RBC AUTO-ENTMCNC: 32.6 G/DL (ref 31–37)
MCV RBC AUTO: 92.9 FL (ref 80–94)
MONOCYTES # BLD AUTO: 0.39 10*3/MM3 (ref 0–1)
MONOCYTES NFR BLD AUTO: 5.6 % (ref 3–8)
NEUTROPHILS # BLD AUTO: 5.58 10*3/MM3 (ref 1.5–8.3)
NEUTROPHILS NFR BLD AUTO: 80.8 % (ref 45–70)
NRBC BLD MANUAL-RTO: 0 /100 WBC (ref 0–0)
PLATELET # BLD AUTO: 188 10*3/MM3 (ref 140–500)
PMV BLD AUTO: 10.2 FL (ref 7.4–10.4)
POTASSIUM BLD-SCNC: 4.3 MMOL/L (ref 3.5–5.2)
PROT SERPL-MCNC: 6 G/DL (ref 6–8.5)
PROT UR-MCNC: >600 MG/DL
RBC # BLD AUTO: 4.76 10*6/MM3 (ref 4.7–6.1)
SODIUM BLD-SCNC: 142 MMOL/L (ref 136–145)
WBC NRBC COR # BLD: 6.92 10*3/MM3 (ref 4.8–10.8)

## 2018-03-14 PROCEDURE — 85025 COMPLETE CBC W/AUTO DIFF WBC: CPT

## 2018-03-14 PROCEDURE — 84156 ASSAY OF PROTEIN URINE: CPT

## 2018-03-14 PROCEDURE — 82570 ASSAY OF URINE CREATININE: CPT

## 2018-03-14 PROCEDURE — 80053 COMPREHEN METABOLIC PANEL: CPT

## 2018-03-14 PROCEDURE — 36415 COLL VENOUS BLD VENIPUNCTURE: CPT

## 2018-03-21 ENCOUNTER — TRANSCRIBE ORDERS (OUTPATIENT)
Dept: ADMINISTRATIVE | Facility: HOSPITAL | Age: 48
End: 2018-03-21

## 2018-03-21 DIAGNOSIS — K85.90 ACUTE ON CHRONIC PANCREATITIS (HCC): Primary | ICD-10-CM

## 2018-03-21 DIAGNOSIS — K86.1 ACUTE ON CHRONIC PANCREATITIS (HCC): Primary | ICD-10-CM

## 2018-03-28 ENCOUNTER — APPOINTMENT (OUTPATIENT)
Dept: CARDIOLOGY | Facility: HOSPITAL | Age: 48
End: 2018-03-28
Attending: INTERNAL MEDICINE

## 2018-03-28 ENCOUNTER — APPOINTMENT (OUTPATIENT)
Dept: NUCLEAR MEDICINE | Facility: HOSPITAL | Age: 48
End: 2018-03-28
Attending: INTERNAL MEDICINE

## 2018-04-11 ENCOUNTER — APPOINTMENT (OUTPATIENT)
Dept: CARDIOLOGY | Facility: HOSPITAL | Age: 48
End: 2018-04-11
Attending: INTERNAL MEDICINE

## 2018-04-11 ENCOUNTER — APPOINTMENT (OUTPATIENT)
Dept: NUCLEAR MEDICINE | Facility: HOSPITAL | Age: 48
End: 2018-04-11
Attending: INTERNAL MEDICINE

## 2018-04-13 ENCOUNTER — APPOINTMENT (OUTPATIENT)
Dept: GENERAL RADIOLOGY | Facility: HOSPITAL | Age: 48
End: 2018-04-13

## 2018-04-13 ENCOUNTER — TRANSCRIBE ORDERS (OUTPATIENT)
Dept: ADMINISTRATIVE | Facility: HOSPITAL | Age: 48
End: 2018-04-13

## 2018-04-13 ENCOUNTER — LAB (OUTPATIENT)
Dept: LAB | Facility: HOSPITAL | Age: 48
End: 2018-04-13

## 2018-04-13 ENCOUNTER — HOSPITAL ENCOUNTER (EMERGENCY)
Facility: HOSPITAL | Age: 48
Discharge: ANOTHER HOSPITAL | End: 2018-04-14
Attending: EMERGENCY MEDICINE | Admitting: EMERGENCY MEDICINE

## 2018-04-13 DIAGNOSIS — N18.9 CHRONIC KIDNEY DISEASE, UNSPECIFIED CKD STAGE: ICD-10-CM

## 2018-04-13 DIAGNOSIS — J96.01 ACUTE RESPIRATORY FAILURE WITH HYPOXIA (HCC): ICD-10-CM

## 2018-04-13 DIAGNOSIS — N18.4 CHRONIC KIDNEY DISEASE, STAGE IV (SEVERE) (HCC): Primary | ICD-10-CM

## 2018-04-13 DIAGNOSIS — I50.9 ACUTE CONGESTIVE HEART FAILURE, UNSPECIFIED CONGESTIVE HEART FAILURE TYPE: ICD-10-CM

## 2018-04-13 DIAGNOSIS — N18.4 CHRONIC KIDNEY DISEASE, STAGE IV (SEVERE) (HCC): ICD-10-CM

## 2018-04-13 DIAGNOSIS — I16.1 HYPERTENSIVE EMERGENCY: Primary | ICD-10-CM

## 2018-04-13 LAB
ALBUMIN SERPL-MCNC: 3.2 G/DL (ref 3.5–5.2)
ALBUMIN SERPL-MCNC: 3.2 G/DL (ref 3.5–5.2)
ALBUMIN/GLOB SERPL: 1.1 G/DL
ALBUMIN/GLOB SERPL: 1.5 G/DL
ALP SERPL-CCNC: 98 U/L (ref 40–129)
ALP SERPL-CCNC: 99 U/L (ref 40–129)
ALT SERPL W P-5'-P-CCNC: 9 U/L (ref 5–41)
ALT SERPL W P-5'-P-CCNC: 9 U/L (ref 5–41)
ANION GAP SERPL CALCULATED.3IONS-SCNC: 12.4 MMOL/L
ANION GAP SERPL CALCULATED.3IONS-SCNC: 16.7 MMOL/L
APTT PPP: 33.4 SECONDS (ref 24.3–38.1)
AST SERPL-CCNC: 10 U/L (ref 5–40)
AST SERPL-CCNC: 9 U/L (ref 5–40)
BASOPHILS # BLD AUTO: 0.05 10*3/MM3 (ref 0–0.2)
BASOPHILS NFR BLD AUTO: 0.5 % (ref 0–2)
BILIRUB SERPL-MCNC: 0.3 MG/DL (ref 0.2–1.2)
BILIRUB SERPL-MCNC: 0.4 MG/DL (ref 0.2–1.2)
BUN BLD-MCNC: 52 MG/DL (ref 6–20)
BUN BLD-MCNC: 53 MG/DL (ref 6–20)
BUN/CREAT SERPL: 11.9 (ref 7–25)
BUN/CREAT SERPL: 12.9 (ref 7–25)
CALCIUM SPEC-SCNC: 7.5 MG/DL (ref 8.6–10.5)
CALCIUM SPEC-SCNC: 8.6 MG/DL (ref 8.6–10.5)
CHLORIDE SERPL-SCNC: 101 MMOL/L (ref 98–107)
CHLORIDE SERPL-SCNC: 106 MMOL/L (ref 98–107)
CO2 SERPL-SCNC: 18.6 MMOL/L (ref 22–29)
CO2 SERPL-SCNC: 19.3 MMOL/L (ref 22–29)
CREAT BLD-MCNC: 4.04 MG/DL (ref 0.76–1.27)
CREAT BLD-MCNC: 4.45 MG/DL (ref 0.76–1.27)
CREAT UR-MCNC: 137.7 MG/DL
DEPRECATED RDW RBC AUTO: 45.3 FL (ref 37–54)
DEPRECATED RDW RBC AUTO: 45.6 FL (ref 37–54)
EOSINOPHIL # BLD AUTO: 0.15 10*3/MM3 (ref 0.1–0.3)
EOSINOPHIL NFR BLD AUTO: 1.6 % (ref 0–4)
ERYTHROCYTE [DISTWIDTH] IN BLOOD BY AUTOMATED COUNT: 13.8 % (ref 11.5–14.5)
ERYTHROCYTE [DISTWIDTH] IN BLOOD BY AUTOMATED COUNT: 13.9 % (ref 11.5–14.5)
GFR SERPL CREATININE-BSD FRML MDRD: 14 ML/MIN/1.73
GFR SERPL CREATININE-BSD FRML MDRD: 16 ML/MIN/1.73
GLOBULIN UR ELPH-MCNC: 2.1 GM/DL
GLOBULIN UR ELPH-MCNC: 3 GM/DL
GLUCOSE BLD-MCNC: 54 MG/DL (ref 65–99)
GLUCOSE BLD-MCNC: 78 MG/DL (ref 65–99)
HCT VFR BLD AUTO: 35.5 % (ref 42–52)
HCT VFR BLD AUTO: 36.4 % (ref 42–52)
HGB BLD-MCNC: 11.3 G/DL (ref 14–18)
HGB BLD-MCNC: 11.4 G/DL (ref 14–18)
IMM GRANULOCYTES # BLD: 0.04 10*3/MM3 (ref 0–0.03)
IMM GRANULOCYTES NFR BLD: 0.4 % (ref 0–0.5)
INR PPP: 1.17 (ref 0.9–1.1)
LYMPHOCYTES # BLD AUTO: 1.1 10*3/MM3 (ref 0.6–4.8)
LYMPHOCYTES NFR BLD AUTO: 11.6 % (ref 20–45)
MAGNESIUM SERPL-MCNC: 1.8 MG/DL (ref 1.7–2.5)
MCH RBC QN AUTO: 28.4 PG (ref 27–31)
MCH RBC QN AUTO: 28.8 PG (ref 27–31)
MCHC RBC AUTO-ENTMCNC: 31.3 G/DL (ref 31–37)
MCHC RBC AUTO-ENTMCNC: 31.8 G/DL (ref 31–37)
MCV RBC AUTO: 90.6 FL (ref 80–94)
MCV RBC AUTO: 90.8 FL (ref 80–94)
MONOCYTES # BLD AUTO: 0.7 10*3/MM3 (ref 0–1)
MONOCYTES NFR BLD AUTO: 7.4 % (ref 3–8)
NEUTROPHILS # BLD AUTO: 7.48 10*3/MM3 (ref 1.5–8.3)
NEUTROPHILS NFR BLD AUTO: 78.5 % (ref 45–70)
NRBC BLD MANUAL-RTO: 0 /100 WBC (ref 0–0)
NT-PROBNP SERPL-MCNC: ABNORMAL PG/ML (ref 5–125)
PHOSPHATE SERPL-MCNC: 4.3 MG/DL (ref 2.7–4.5)
PLATELET # BLD AUTO: 257 10*3/MM3 (ref 140–500)
PLATELET # BLD AUTO: 319 10*3/MM3 (ref 140–500)
PMV BLD AUTO: 10 FL (ref 7.4–10.4)
PMV BLD AUTO: 9.8 FL (ref 7.4–10.4)
POTASSIUM BLD-SCNC: 3.8 MMOL/L (ref 3.5–5.2)
POTASSIUM BLD-SCNC: 4.3 MMOL/L (ref 3.5–5.2)
PROT SERPL-MCNC: 5.3 G/DL (ref 6–8.5)
PROT SERPL-MCNC: 6.2 G/DL (ref 6–8.5)
PROT UR-MCNC: 406 MG/DL
PROTHROMBIN TIME: 15 SECONDS (ref 12.1–15)
PTH-INTACT SERPL-MCNC: 245.7 PG/ML (ref 15–65)
RBC # BLD AUTO: 3.92 10*6/MM3 (ref 4.7–6.1)
RBC # BLD AUTO: 4.01 10*6/MM3 (ref 4.7–6.1)
SODIUM BLD-SCNC: 137 MMOL/L (ref 136–145)
SODIUM BLD-SCNC: 137 MMOL/L (ref 136–145)
TROPONIN T SERPL-MCNC: 0.09 NG/ML (ref 0–0.03)
WBC NRBC COR # BLD: 13.06 10*3/MM3 (ref 4.8–10.8)
WBC NRBC COR # BLD: 9.52 10*3/MM3 (ref 4.8–10.8)

## 2018-04-13 PROCEDURE — 80053 COMPREHEN METABOLIC PANEL: CPT

## 2018-04-13 PROCEDURE — 25010000002 HYDRALAZINE PER 20 MG: Performed by: EMERGENCY MEDICINE

## 2018-04-13 PROCEDURE — 94799 UNLISTED PULMONARY SVC/PX: CPT

## 2018-04-13 PROCEDURE — 93005 ELECTROCARDIOGRAM TRACING: CPT | Performed by: EMERGENCY MEDICINE

## 2018-04-13 PROCEDURE — 96374 THER/PROPH/DIAG INJ IV PUSH: CPT

## 2018-04-13 PROCEDURE — 83880 ASSAY OF NATRIURETIC PEPTIDE: CPT | Performed by: EMERGENCY MEDICINE

## 2018-04-13 PROCEDURE — 36415 COLL VENOUS BLD VENIPUNCTURE: CPT

## 2018-04-13 PROCEDURE — 99291 CRITICAL CARE FIRST HOUR: CPT | Performed by: EMERGENCY MEDICINE

## 2018-04-13 PROCEDURE — 84156 ASSAY OF PROTEIN URINE: CPT

## 2018-04-13 PROCEDURE — 84100 ASSAY OF PHOSPHORUS: CPT

## 2018-04-13 PROCEDURE — 96375 TX/PRO/DX INJ NEW DRUG ADDON: CPT

## 2018-04-13 PROCEDURE — 94660 CPAP INITIATION&MGMT: CPT

## 2018-04-13 PROCEDURE — 83735 ASSAY OF MAGNESIUM: CPT

## 2018-04-13 PROCEDURE — 85025 COMPLETE CBC W/AUTO DIFF WBC: CPT | Performed by: EMERGENCY MEDICINE

## 2018-04-13 PROCEDURE — 85730 THROMBOPLASTIN TIME PARTIAL: CPT | Performed by: EMERGENCY MEDICINE

## 2018-04-13 PROCEDURE — 25010000002 FUROSEMIDE PER 20 MG: Performed by: EMERGENCY MEDICINE

## 2018-04-13 PROCEDURE — 85027 COMPLETE CBC AUTOMATED: CPT

## 2018-04-13 PROCEDURE — 99292 CRITICAL CARE ADDL 30 MIN: CPT | Performed by: EMERGENCY MEDICINE

## 2018-04-13 PROCEDURE — 80053 COMPREHEN METABOLIC PANEL: CPT | Performed by: EMERGENCY MEDICINE

## 2018-04-13 PROCEDURE — 71045 X-RAY EXAM CHEST 1 VIEW: CPT

## 2018-04-13 PROCEDURE — 99284 EMERGENCY DEPT VISIT MOD MDM: CPT

## 2018-04-13 PROCEDURE — 83970 ASSAY OF PARATHORMONE: CPT

## 2018-04-13 PROCEDURE — 84484 ASSAY OF TROPONIN QUANT: CPT | Performed by: EMERGENCY MEDICINE

## 2018-04-13 PROCEDURE — 94640 AIRWAY INHALATION TREATMENT: CPT

## 2018-04-13 PROCEDURE — 85610 PROTHROMBIN TIME: CPT | Performed by: EMERGENCY MEDICINE

## 2018-04-13 PROCEDURE — 82570 ASSAY OF URINE CREATININE: CPT

## 2018-04-13 RX ORDER — NITROGLYCERIN 0.4 MG/1
0.4 TABLET SUBLINGUAL
Status: COMPLETED | OUTPATIENT
Start: 2018-04-13 | End: 2018-04-13

## 2018-04-13 RX ORDER — OXYCODONE HCL 10 MG/1
10 TABLET, FILM COATED, EXTENDED RELEASE ORAL EVERY 12 HOURS
Status: ON HOLD | COMMUNITY
End: 2018-04-28

## 2018-04-13 RX ORDER — IPRATROPIUM BROMIDE AND ALBUTEROL SULFATE 2.5; .5 MG/3ML; MG/3ML
3 SOLUTION RESPIRATORY (INHALATION) ONCE
Status: COMPLETED | OUTPATIENT
Start: 2018-04-13 | End: 2018-04-13

## 2018-04-13 RX ORDER — NITROGLYCERIN 0.4 MG/1
TABLET SUBLINGUAL
Status: COMPLETED
Start: 2018-04-13 | End: 2018-04-13

## 2018-04-13 RX ORDER — SODIUM CHLORIDE 0.9 % (FLUSH) 0.9 %
10 SYRINGE (ML) INJECTION AS NEEDED
Status: DISCONTINUED | OUTPATIENT
Start: 2018-04-13 | End: 2018-04-14 | Stop reason: HOSPADM

## 2018-04-13 RX ORDER — FUROSEMIDE 10 MG/ML
40 INJECTION INTRAMUSCULAR; INTRAVENOUS ONCE
Status: COMPLETED | OUTPATIENT
Start: 2018-04-13 | End: 2018-04-13

## 2018-04-13 RX ORDER — HYDRALAZINE HYDROCHLORIDE 20 MG/ML
10 INJECTION INTRAMUSCULAR; INTRAVENOUS ONCE
Status: COMPLETED | OUTPATIENT
Start: 2018-04-13 | End: 2018-04-13

## 2018-04-13 RX ADMIN — NITROGLYCERIN 0.4 MG: 0.4 TABLET SUBLINGUAL at 22:25

## 2018-04-13 RX ADMIN — NITROGLYCERIN 0.4 MG: 0.4 TABLET SUBLINGUAL at 22:34

## 2018-04-13 RX ADMIN — FUROSEMIDE 40 MG: 10 INJECTION, SOLUTION INTRAMUSCULAR; INTRAVENOUS at 23:15

## 2018-04-13 RX ADMIN — IPRATROPIUM BROMIDE AND ALBUTEROL SULFATE 3 ML: .5; 3 SOLUTION RESPIRATORY (INHALATION) at 22:24

## 2018-04-13 RX ADMIN — NITROGLYCERIN 0.4 MG: 0.4 TABLET SUBLINGUAL at 22:43

## 2018-04-13 RX ADMIN — Medication 10 MG: at 23:18

## 2018-04-14 ENCOUNTER — APPOINTMENT (OUTPATIENT)
Dept: CARDIOLOGY | Facility: HOSPITAL | Age: 48
End: 2018-04-14
Attending: INTERNAL MEDICINE

## 2018-04-14 ENCOUNTER — HOSPITAL ENCOUNTER (INPATIENT)
Facility: HOSPITAL | Age: 48
LOS: 6 days | Discharge: HOME | End: 2018-04-20
Attending: INTERNAL MEDICINE | Admitting: INTERNAL MEDICINE

## 2018-04-14 ENCOUNTER — APPOINTMENT (OUTPATIENT)
Dept: GENERAL RADIOLOGY | Facility: HOSPITAL | Age: 48
End: 2018-04-14

## 2018-04-14 VITALS
TEMPERATURE: 97.7 F | OXYGEN SATURATION: 99 % | SYSTOLIC BLOOD PRESSURE: 181 MMHG | HEART RATE: 99 BPM | DIASTOLIC BLOOD PRESSURE: 121 MMHG | RESPIRATION RATE: 14 BRPM

## 2018-04-14 DIAGNOSIS — R26.2 DIFFICULTY WALKING: Primary | ICD-10-CM

## 2018-04-14 PROBLEM — I16.1 HYPERTENSIVE EMERGENCY: Status: ACTIVE | Noted: 2018-04-14

## 2018-04-14 LAB
ANION GAP SERPL CALCULATED.3IONS-SCNC: 16.1 MMOL/L
BH CV ECHO MEAS - ACS: 2.4 CM
BH CV ECHO MEAS - AO MEAN PG (FULL): 1 MMHG
BH CV ECHO MEAS - AO MEAN PG: 3 MMHG
BH CV ECHO MEAS - AO V2 MAX: 109 CM/SEC
BH CV ECHO MEAS - AO V2 MEAN: 85.4 CM/SEC
BH CV ECHO MEAS - AO V2 VTI: 19.5 CM
BH CV ECHO MEAS - AVA(I,A): 3.5 CM^2
BH CV ECHO MEAS - AVA(I,D): 3.5 CM^2
BH CV ECHO MEAS - BSA(HAYCOCK): 2.3 M^2
BH CV ECHO MEAS - BSA: 2.2 M^2
BH CV ECHO MEAS - BZI_BMI: 29.4 KILOGRAMS/M^2
BH CV ECHO MEAS - BZI_METRIC_HEIGHT: 182.9 CM
BH CV ECHO MEAS - BZI_METRIC_WEIGHT: 98.4 KG
BH CV ECHO MEAS - CONTRAST EF (2CH): 53.7 ML/M^2
BH CV ECHO MEAS - CONTRAST EF 4CH: 50.7 ML/M^2
BH CV ECHO MEAS - EDV(CUBED): 103.8 ML
BH CV ECHO MEAS - EDV(MOD-SP2): 149 ML
BH CV ECHO MEAS - EDV(MOD-SP4): 150 ML
BH CV ECHO MEAS - EDV(TEICH): 102.4 ML
BH CV ECHO MEAS - EF(CUBED): 62.1 %
BH CV ECHO MEAS - EF(MOD-SP2): 53.7 %
BH CV ECHO MEAS - EF(MOD-SP4): 50.7 %
BH CV ECHO MEAS - EF(TEICH): 53.7 %
BH CV ECHO MEAS - ESV(CUBED): 39.3 ML
BH CV ECHO MEAS - ESV(MOD-SP2): 69 ML
BH CV ECHO MEAS - ESV(MOD-SP4): 74 ML
BH CV ECHO MEAS - ESV(TEICH): 47.4 ML
BH CV ECHO MEAS - FS: 27.7 %
BH CV ECHO MEAS - IVS/LVPW: 1
BH CV ECHO MEAS - IVSD: 1.3 CM
BH CV ECHO MEAS - LA DIMENSION: 6 CM
BH CV ECHO MEAS - LAT PEAK E' VEL: 8 CM/SEC
BH CV ECHO MEAS - LV DIASTOLIC VOL/BSA (35-75): 68 ML/M^2
BH CV ECHO MEAS - LV MASS(C)D: 237.9 GRAMS
BH CV ECHO MEAS - LV MASS(C)DI: 107.9 GRAMS/M^2
BH CV ECHO MEAS - LV MEAN PG: 2 MMHG
BH CV ECHO MEAS - LV SYSTOLIC VOL/BSA (12-30): 33.6 ML/M^2
BH CV ECHO MEAS - LV V1 MAX: 98 CM/SEC
BH CV ECHO MEAS - LV V1 MEAN: 71.1 CM/SEC
BH CV ECHO MEAS - LV V1 VTI: 19.8 CM
BH CV ECHO MEAS - LVIDD: 4.7 CM
BH CV ECHO MEAS - LVIDS: 3.4 CM
BH CV ECHO MEAS - LVLD AP2: 10.1 CM
BH CV ECHO MEAS - LVLD AP4: 9.9 CM
BH CV ECHO MEAS - LVLS AP2: 9.3 CM
BH CV ECHO MEAS - LVLS AP4: 9.2 CM
BH CV ECHO MEAS - LVOT AREA (M): 3.5 CM^2
BH CV ECHO MEAS - LVOT AREA: 3.5 CM^2
BH CV ECHO MEAS - LVOT DIAM: 2.1 CM
BH CV ECHO MEAS - LVPWD: 1.3 CM
BH CV ECHO MEAS - MED PEAK E' VEL: 5 CM/SEC
BH CV ECHO MEAS - MR MAX PG: 92.9 MMHG
BH CV ECHO MEAS - MR MAX VEL: 482 CM/SEC
BH CV ECHO MEAS - MV A DUR: 0.11 SEC
BH CV ECHO MEAS - MV A MAX VEL: 44.9 CM/SEC
BH CV ECHO MEAS - MV DEC SLOPE: 909 CM/SEC^2
BH CV ECHO MEAS - MV DEC TIME: 0.11 SEC
BH CV ECHO MEAS - MV E MAX VEL: 106 CM/SEC
BH CV ECHO MEAS - MV E/A: 2.4
BH CV ECHO MEAS - MV MEAN PG: 2 MMHG
BH CV ECHO MEAS - MV P1/2T MAX VEL: 126 CM/SEC
BH CV ECHO MEAS - MV P1/2T: 40.6 MSEC
BH CV ECHO MEAS - MV V2 MEAN: 62.8 CM/SEC
BH CV ECHO MEAS - MV V2 VTI: 22.1 CM
BH CV ECHO MEAS - MVA P1/2T LCG: 1.7 CM^2
BH CV ECHO MEAS - MVA(P1/2T): 5.4 CM^2
BH CV ECHO MEAS - MVA(VTI): 3.1 CM^2
BH CV ECHO MEAS - PA ACC SLOPE: 11.7 CM/SEC^2
BH CV ECHO MEAS - PA ACC TIME: 0.12 SEC
BH CV ECHO MEAS - PA MAX PG (FULL): 0.82 MMHG
BH CV ECHO MEAS - PA MAX PG: 2.7 MMHG
BH CV ECHO MEAS - PA PR(ACCEL): 26.8 MMHG
BH CV ECHO MEAS - PA V2 MAX: 81.4 CM/SEC
BH CV ECHO MEAS - PULM DIAS VEL: 84.1 CM/SEC
BH CV ECHO MEAS - PULM S/D: 0.64
BH CV ECHO MEAS - PULM SYS VEL: 54 CM/SEC
BH CV ECHO MEAS - RAP SYSTOLE: 8 MMHG
BH CV ECHO MEAS - RV MAX PG: 1.8 MMHG
BH CV ECHO MEAS - RV MEAN PG: 1 MMHG
BH CV ECHO MEAS - RV V1 MAX: 67.7 CM/SEC
BH CV ECHO MEAS - RV V1 MEAN: 50.5 CM/SEC
BH CV ECHO MEAS - RV V1 VTI: 16.5 CM
BH CV ECHO MEAS - RVSP: 41.2 MMHG
BH CV ECHO MEAS - SI(CUBED): 29.3 ML/M^2
BH CV ECHO MEAS - SI(LVOT): 31.1 ML/M^2
BH CV ECHO MEAS - SI(MOD-SP2): 36.3 ML/M^2
BH CV ECHO MEAS - SI(MOD-SP4): 34.5 ML/M^2
BH CV ECHO MEAS - SI(TEICH): 24.9 ML/M^2
BH CV ECHO MEAS - SV(CUBED): 64.5 ML
BH CV ECHO MEAS - SV(LVOT): 68.6 ML
BH CV ECHO MEAS - SV(MOD-SP2): 80 ML
BH CV ECHO MEAS - SV(MOD-SP4): 76 ML
BH CV ECHO MEAS - SV(TEICH): 54.9 ML
BH CV ECHO MEAS - TAPSE (>1.6): 1.6 CM2
BH CV ECHO MEAS - TR MAX VEL: 288 CM/SEC
BH CV VAS MEAS BASILIC ANTECUBITAL FOSSA LEFT: 0.33 CM
BH CV VAS MEAS BASILIC ANTECUBITAL FOSSA RIGHT: 0.2 CM
BH CV VAS MEAS BASILIC FOREARM LEFT - DIST: 0.16 CM
BH CV VAS MEAS BASILIC FOREARM LEFT - MID: 0.19 CM
BH CV VAS MEAS BASILIC FOREARM LEFT - PROX: 0.2 CM
BH CV VAS MEAS BASILIC FOREARM RIGHT - DIST: 0.13 CM
BH CV VAS MEAS BASILIC FOREARM RIGHT - MID: 0.15 CM
BH CV VAS MEAS BASILIC FOREARM RIGHT - PROX: 0.15 CM
BH CV VAS MEAS BASILIC UPPER ARM LEFT - DIST: 0.37 CM
BH CV VAS MEAS BASILIC UPPER ARM LEFT - MID: 0.58 CM
BH CV VAS MEAS BASILIC UPPER ARM RIGHT - DIST: 0.41 CM
BH CV VAS MEAS BASILIC UPPER ARM RIGHT - MID: 0.47 CM
BH CV VAS MEAS CEPHALIC ANTECUBITAL FOSSA LEFT: 0.36 CM
BH CV VAS MEAS CEPHALIC ANTECUBITAL FOSSA RIGHT: 0.42 CM
BH CV VAS MEAS CEPHALIC FOREARM LEFT - DIST: 0.13 CM
BH CV VAS MEAS CEPHALIC FOREARM LEFT - MID: 0.21 CM
BH CV VAS MEAS CEPHALIC FOREARM LEFT - PROX: 0.21 CM
BH CV VAS MEAS CEPHALIC FOREARM RIGHT - DIST: 0.16 CM
BH CV VAS MEAS CEPHALIC FOREARM RIGHT - MID: 0.18 CM
BH CV VAS MEAS CEPHALIC FOREARM RIGHT - PROX: 0.27 CM
BH CV VAS MEAS CEPHALIC UPPER ARM LEFT - DIST: 0.2 CM
BH CV VAS MEAS CEPHALIC UPPER ARM LEFT - MID: 0.23 CM
BH CV VAS MEAS CEPHALIC UPPER ARM LEFT - PROX: 0.22 CM
BH CV VAS MEAS CEPHALIC UPPER ARM RIGHT - DIST: 0.34 CM
BH CV VAS MEAS CEPHALIC UPPER ARM RIGHT - MID: 0.39 CM
BH CV VAS MEAS CEPHALIC UPPER ARM RIGHT - PROX: 0.38 CM
BH CV VAS MEAS RADIAL UPPER ARM LEFT - DIST: 0.15 CM
BH CV VAS MEAS RADIAL UPPER ARM LEFT - MID: 0.12 CM
BH CV VAS MEAS RADIAL UPPER ARM LEFT - PROX: 0.17 CM
BH CV VAS MEAS RADIAL UPPER ARM RIGHT - DIST: 0.1 CM
BH CV VAS MEAS RADIAL UPPER ARM RIGHT - MID: 0.12 CM
BH CV VAS MEAS RADIAL UPPER ARM RIGHT - PROX: 0.17 CM
BH CV XLRA - RV BASE: 3.6 CM
BH CV XLRA - TDI S': 12 CM/SEC
BUN BLD-MCNC: 58 MG/DL (ref 6–20)
BUN/CREAT SERPL: 12.8 (ref 7–25)
CALCIUM SPEC-SCNC: 8.5 MG/DL (ref 8.6–10.5)
CHLORIDE SERPL-SCNC: 105 MMOL/L (ref 98–107)
CO2 SERPL-SCNC: 19.9 MMOL/L (ref 22–29)
CREAT BLD-MCNC: 4.54 MG/DL (ref 0.76–1.27)
E/E' RATIO: 17
GFR SERPL CREATININE-BSD FRML MDRD: 14 ML/MIN/1.73
GLUCOSE BLD-MCNC: 69 MG/DL (ref 65–99)
GLUCOSE BLDC GLUCOMTR-MCNC: 126 MG/DL (ref 70–130)
GLUCOSE BLDC GLUCOMTR-MCNC: 160 MG/DL (ref 70–130)
GLUCOSE BLDC GLUCOMTR-MCNC: 62 MG/DL (ref 70–130)
GLUCOSE BLDC GLUCOMTR-MCNC: 79 MG/DL (ref 70–130)
LEFT ATRIUM VOLUME INDEX: 31 ML/M2
LEFT ATRIUM VOLUME: 65 CM3
LV EF 2D ECHO EST: 51 %
MAXIMAL PREDICTED HEART RATE: 172 BPM
POTASSIUM BLD-SCNC: 4.5 MMOL/L (ref 3.5–5.2)
SODIUM BLD-SCNC: 141 MMOL/L (ref 136–145)
STRESS TARGET HR: 146 BPM
TROPONIN T SERPL-MCNC: 0.07 NG/ML (ref 0–0.03)
UPPER ARTERIAL LEFT ARM BRACHIAL LENGTH: 0.45 CM
UPPER ARTERIAL RIGHT ARM BRACHIAL LENGTH: 0.45 CM

## 2018-04-14 PROCEDURE — 63710000001 INSULIN ASPART PER 5 UNITS: Performed by: INTERNAL MEDICINE

## 2018-04-14 PROCEDURE — 94799 UNLISTED PULMONARY SVC/PX: CPT

## 2018-04-14 PROCEDURE — 82962 GLUCOSE BLOOD TEST: CPT

## 2018-04-14 PROCEDURE — 25010000002 ENOXAPARIN PER 10 MG: Performed by: INTERNAL MEDICINE

## 2018-04-14 PROCEDURE — 80048 BASIC METABOLIC PNL TOTAL CA: CPT | Performed by: INTERNAL MEDICINE

## 2018-04-14 PROCEDURE — 99222 1ST HOSP IP/OBS MODERATE 55: CPT | Performed by: INTERNAL MEDICINE

## 2018-04-14 PROCEDURE — 25010000002 FUROSEMIDE PER 20 MG: Performed by: INTERNAL MEDICINE

## 2018-04-14 PROCEDURE — 93010 ELECTROCARDIOGRAM REPORT: CPT | Performed by: INTERNAL MEDICINE

## 2018-04-14 PROCEDURE — 71045 X-RAY EXAM CHEST 1 VIEW: CPT

## 2018-04-14 PROCEDURE — 93306 TTE W/DOPPLER COMPLETE: CPT

## 2018-04-14 PROCEDURE — 93005 ELECTROCARDIOGRAM TRACING: CPT | Performed by: INTERNAL MEDICINE

## 2018-04-14 PROCEDURE — 84484 ASSAY OF TROPONIN QUANT: CPT | Performed by: INTERNAL MEDICINE

## 2018-04-14 PROCEDURE — 93306 TTE W/DOPPLER COMPLETE: CPT | Performed by: INTERNAL MEDICINE

## 2018-04-14 RX ORDER — LORAZEPAM 1 MG/1
1 TABLET ORAL EVERY 6 HOURS PRN
Status: DISCONTINUED | OUTPATIENT
Start: 2018-04-14 | End: 2018-04-20 | Stop reason: HOSPADM

## 2018-04-14 RX ORDER — AMLODIPINE BESYLATE 10 MG/1
10 TABLET ORAL DAILY
Status: DISCONTINUED | OUTPATIENT
Start: 2018-04-14 | End: 2018-04-20 | Stop reason: HOSPADM

## 2018-04-14 RX ORDER — PROMETHAZINE HYDROCHLORIDE 25 MG/1
25 TABLET ORAL EVERY 6 HOURS PRN
Status: DISCONTINUED | OUTPATIENT
Start: 2018-04-14 | End: 2018-04-20 | Stop reason: HOSPADM

## 2018-04-14 RX ORDER — BUDESONIDE AND FORMOTEROL FUMARATE DIHYDRATE 160; 4.5 UG/1; UG/1
2 AEROSOL RESPIRATORY (INHALATION)
Status: DISCONTINUED | OUTPATIENT
Start: 2018-04-14 | End: 2018-04-20 | Stop reason: HOSPADM

## 2018-04-14 RX ORDER — ROSUVASTATIN CALCIUM 10 MG/1
10 TABLET, COATED ORAL DAILY
Status: DISCONTINUED | OUTPATIENT
Start: 2018-04-15 | End: 2018-04-20 | Stop reason: HOSPADM

## 2018-04-14 RX ORDER — NICOTINE POLACRILEX 4 MG
15 LOZENGE BUCCAL
Status: DISCONTINUED | OUTPATIENT
Start: 2018-04-14 | End: 2018-04-20 | Stop reason: HOSPADM

## 2018-04-14 RX ORDER — DULOXETIN HYDROCHLORIDE 60 MG/1
60 CAPSULE, DELAYED RELEASE ORAL DAILY
Status: DISCONTINUED | OUTPATIENT
Start: 2018-04-14 | End: 2018-04-20 | Stop reason: HOSPADM

## 2018-04-14 RX ORDER — ONDANSETRON 2 MG/ML
4 INJECTION INTRAMUSCULAR; INTRAVENOUS EVERY 6 HOURS PRN
Status: DISCONTINUED | OUTPATIENT
Start: 2018-04-14 | End: 2018-04-20 | Stop reason: HOSPADM

## 2018-04-14 RX ORDER — METHOCARBAMOL 750 MG/1
750 TABLET, FILM COATED ORAL 3 TIMES DAILY
Status: DISCONTINUED | OUTPATIENT
Start: 2018-04-14 | End: 2018-04-20 | Stop reason: HOSPADM

## 2018-04-14 RX ORDER — BISOPROLOL FUMARATE 5 MG/1
5 TABLET, FILM COATED ORAL DAILY
Status: DISCONTINUED | OUTPATIENT
Start: 2018-04-14 | End: 2018-04-15

## 2018-04-14 RX ORDER — OXYCODONE HCL 10 MG/1
10 TABLET, FILM COATED, EXTENDED RELEASE ORAL EVERY 12 HOURS
Status: DISCONTINUED | OUTPATIENT
Start: 2018-04-14 | End: 2018-04-20 | Stop reason: HOSPADM

## 2018-04-14 RX ORDER — HYDROCODONE BITARTRATE AND ACETAMINOPHEN 10; 325 MG/1; MG/1
1 TABLET ORAL EVERY 6 HOURS PRN
Status: DISCONTINUED | OUTPATIENT
Start: 2018-04-14 | End: 2018-04-20 | Stop reason: HOSPADM

## 2018-04-14 RX ORDER — DEXTROSE MONOHYDRATE 25 G/50ML
25 INJECTION, SOLUTION INTRAVENOUS
Status: DISCONTINUED | OUTPATIENT
Start: 2018-04-14 | End: 2018-04-20 | Stop reason: HOSPADM

## 2018-04-14 RX ORDER — FUROSEMIDE 10 MG/ML
80 INJECTION INTRAMUSCULAR; INTRAVENOUS EVERY 8 HOURS
Status: COMPLETED | OUTPATIENT
Start: 2018-04-14 | End: 2018-04-14

## 2018-04-14 RX ORDER — FUROSEMIDE 10 MG/ML
60 INJECTION INTRAMUSCULAR; INTRAVENOUS EVERY 8 HOURS
Status: DISCONTINUED | OUTPATIENT
Start: 2018-04-14 | End: 2018-04-14

## 2018-04-14 RX ORDER — ASPIRIN 81 MG/1
81 TABLET ORAL DAILY
Status: DISCONTINUED | OUTPATIENT
Start: 2018-04-14 | End: 2018-04-20 | Stop reason: HOSPADM

## 2018-04-14 RX ORDER — HYDRALAZINE HYDROCHLORIDE 50 MG/1
50 TABLET, FILM COATED ORAL 4 TIMES DAILY
Status: DISCONTINUED | OUTPATIENT
Start: 2018-04-14 | End: 2018-04-16

## 2018-04-14 RX ORDER — GABAPENTIN 400 MG/1
800 CAPSULE ORAL EVERY 8 HOURS SCHEDULED
Status: DISCONTINUED | OUTPATIENT
Start: 2018-04-14 | End: 2018-04-14

## 2018-04-14 RX ORDER — METOLAZONE 5 MG/1
10 TABLET ORAL ONCE
Status: COMPLETED | OUTPATIENT
Start: 2018-04-14 | End: 2018-04-14

## 2018-04-14 RX ORDER — ALLOPURINOL 300 MG/1
150 TABLET ORAL DAILY
Status: DISCONTINUED | OUTPATIENT
Start: 2018-04-15 | End: 2018-04-20 | Stop reason: HOSPADM

## 2018-04-14 RX ORDER — ALBUTEROL SULFATE 2.5 MG/3ML
2.5 SOLUTION RESPIRATORY (INHALATION) EVERY 6 HOURS PRN
Status: DISCONTINUED | OUTPATIENT
Start: 2018-04-14 | End: 2018-04-20 | Stop reason: HOSPADM

## 2018-04-14 RX ORDER — PANTOPRAZOLE SODIUM 40 MG/1
40 TABLET, DELAYED RELEASE ORAL DAILY
Status: DISCONTINUED | OUTPATIENT
Start: 2018-04-14 | End: 2018-04-20 | Stop reason: HOSPADM

## 2018-04-14 RX ORDER — GLIPIZIDE 5 MG/1
5 TABLET ORAL
Status: DISCONTINUED | OUTPATIENT
Start: 2018-04-14 | End: 2018-04-14

## 2018-04-14 RX ORDER — ROSUVASTATIN CALCIUM 40 MG/1
40 TABLET, COATED ORAL DAILY
Status: DISCONTINUED | OUTPATIENT
Start: 2018-04-14 | End: 2018-04-14

## 2018-04-14 RX ORDER — LABETALOL HYDROCHLORIDE 5 MG/ML
20 INJECTION, SOLUTION INTRAVENOUS
Status: DISCONTINUED | OUTPATIENT
Start: 2018-04-14 | End: 2018-04-20 | Stop reason: HOSPADM

## 2018-04-14 RX ORDER — GABAPENTIN 100 MG/1
100 CAPSULE ORAL EVERY 8 HOURS SCHEDULED
Status: DISCONTINUED | OUTPATIENT
Start: 2018-04-14 | End: 2018-04-20 | Stop reason: HOSPADM

## 2018-04-14 RX ORDER — ALLOPURINOL 300 MG/1
300 TABLET ORAL DAILY
Status: DISCONTINUED | OUTPATIENT
Start: 2018-04-14 | End: 2018-04-14

## 2018-04-14 RX ORDER — SODIUM CHLORIDE 0.9 % (FLUSH) 0.9 %
1-10 SYRINGE (ML) INJECTION AS NEEDED
Status: DISCONTINUED | OUTPATIENT
Start: 2018-04-14 | End: 2018-04-20 | Stop reason: HOSPADM

## 2018-04-14 RX ADMIN — GABAPENTIN 800 MG: 400 CAPSULE ORAL at 14:21

## 2018-04-14 RX ADMIN — ROSUVASTATIN CALCIUM 40 MG: 40 TABLET, FILM COATED ORAL at 08:24

## 2018-04-14 RX ADMIN — BUDESONIDE AND FORMOTEROL FUMARATE DIHYDRATE 2 PUFF: 160; 4.5 AEROSOL RESPIRATORY (INHALATION) at 19:50

## 2018-04-14 RX ADMIN — GABAPENTIN 800 MG: 400 CAPSULE ORAL at 06:00

## 2018-04-14 RX ADMIN — DULOXETINE HYDROCHLORIDE 60 MG: 60 CAPSULE, DELAYED RELEASE ORAL at 08:24

## 2018-04-14 RX ADMIN — HYDRALAZINE HYDROCHLORIDE 50 MG: 50 TABLET, FILM COATED ORAL at 11:30

## 2018-04-14 RX ADMIN — BUDESONIDE AND FORMOTEROL FUMARATE DIHYDRATE 2 PUFF: 160; 4.5 AEROSOL RESPIRATORY (INHALATION) at 08:22

## 2018-04-14 RX ADMIN — FUROSEMIDE 80 MG: 10 INJECTION, SOLUTION INTRAMUSCULAR; INTRAVENOUS at 11:29

## 2018-04-14 RX ADMIN — ALLOPURINOL 300 MG: 300 TABLET ORAL at 08:24

## 2018-04-14 RX ADMIN — PANCRELIPASE 36000 UNITS OF LIPASE: 60000; 12000; 38000 CAPSULE, DELAYED RELEASE PELLETS ORAL at 11:30

## 2018-04-14 RX ADMIN — METHOCARBAMOL 750 MG: 750 TABLET ORAL at 15:59

## 2018-04-14 RX ADMIN — ENOXAPARIN SODIUM 30 MG: 30 INJECTION SUBCUTANEOUS at 05:05

## 2018-04-14 RX ADMIN — METOLAZONE 10 MG: 5 TABLET ORAL at 11:30

## 2018-04-14 RX ADMIN — HYDRALAZINE HYDROCHLORIDE 50 MG: 50 TABLET, FILM COATED ORAL at 08:24

## 2018-04-14 RX ADMIN — PANCRELIPASE 36000 UNITS OF LIPASE: 60000; 12000; 38000 CAPSULE, DELAYED RELEASE PELLETS ORAL at 17:53

## 2018-04-14 RX ADMIN — BISOPROLOL FUMARATE 5 MG: 5 TABLET ORAL at 08:24

## 2018-04-14 RX ADMIN — GABAPENTIN 100 MG: 100 CAPSULE ORAL at 21:11

## 2018-04-14 RX ADMIN — METHOCARBAMOL 750 MG: 750 TABLET ORAL at 08:24

## 2018-04-14 RX ADMIN — HYDRALAZINE HYDROCHLORIDE 50 MG: 50 TABLET, FILM COATED ORAL at 17:52

## 2018-04-14 RX ADMIN — METHOCARBAMOL 750 MG: 750 TABLET ORAL at 21:11

## 2018-04-14 RX ADMIN — PANTOPRAZOLE SODIUM 40 MG: 40 TABLET, DELAYED RELEASE ORAL at 09:27

## 2018-04-14 RX ADMIN — FUROSEMIDE 60 MG: 10 INJECTION, SOLUTION INTRAMUSCULAR; INTRAVENOUS at 05:05

## 2018-04-14 RX ADMIN — LABETALOL HYDROCHLORIDE 20 MG: 5 INJECTION, SOLUTION INTRAVENOUS at 08:00

## 2018-04-14 RX ADMIN — AMLODIPINE BESYLATE 10 MG: 10 TABLET ORAL at 08:24

## 2018-04-14 RX ADMIN — OXYCODONE HYDROCHLORIDE 10 MG: 10 TABLET, FILM COATED, EXTENDED RELEASE ORAL at 05:05

## 2018-04-14 RX ADMIN — INSULIN ASPART 3 UNITS: 100 INJECTION, SOLUTION INTRAVENOUS; SUBCUTANEOUS at 11:29

## 2018-04-14 RX ADMIN — HYDRALAZINE HYDROCHLORIDE 50 MG: 50 TABLET, FILM COATED ORAL at 21:11

## 2018-04-14 RX ADMIN — TIOTROPIUM BROMIDE 1 CAPSULE: 18 CAPSULE ORAL; RESPIRATORY (INHALATION) at 08:22

## 2018-04-14 RX ADMIN — FUROSEMIDE 80 MG: 10 INJECTION, SOLUTION INTRAMUSCULAR; INTRAVENOUS at 21:10

## 2018-04-14 RX ADMIN — PANCRELIPASE 36000 UNITS OF LIPASE: 60000; 12000; 38000 CAPSULE, DELAYED RELEASE PELLETS ORAL at 08:24

## 2018-04-14 RX ADMIN — ASPIRIN 81 MG: 81 TABLET ORAL at 08:24

## 2018-04-14 RX ADMIN — OXYCODONE HYDROCHLORIDE 10 MG: 10 TABLET, FILM COATED, EXTENDED RELEASE ORAL at 15:59

## 2018-04-14 RX ADMIN — GLIPIZIDE 5 MG: 5 TABLET ORAL at 08:24

## 2018-04-14 NOTE — ED NOTES
Pt arrived via EMS, SOA, pt anxious, wanting to stand up. Resp. At bedside. Port cxr done. Pt denies chest pain.     Nikky Perez RN  04/13/18 3992

## 2018-04-14 NOTE — ED NOTES
Pt resting quietly waiting for EMS to transfer pt to [facility]     Nikky Perez RN  04/14/18 0052

## 2018-04-14 NOTE — ED NOTES
Went to waiting room for wife to come back. No one in waiting room at this time     Nikky Perez RN  04/13/18 9928

## 2018-04-14 NOTE — ED PROVIDER NOTES
Subjective   History of Present Illness  History of Present Illness    Chief complaint: Dyspnea    Location: Respiratory    Quality/Severity:  Severe    Timing/Duration: Sudden onset this evening within the past hour    Modifying Factors: None    Narrative: This patient presents via EMS for sudden onset severe dyspnea feeling.  Tonight as he was at home, he had a rather rapid feeling of inability to catch his breath.  He was not involved in any particular activities at that time.  He denied any chest pain.  When EMS arrived to the house they found him to be in respiratory distress with a room air saturation of 80%.  They put him on nasal cannula oxygen and his saturations did improve but he still complains of shortness of breath.  The patient has not had any recent fevers or malaise.  He has had an occasional cough this week but he indicates that is not particularly unusual for him.  He is a smoker.  He does have a history of cardiac and renal disease.  He is not on dialysis.  Remainder of the history is rather limited because of his condition at arrival.    Associated Symptoms: As above    Review of Systems   Constitutional: Negative for activity change and fever.   HENT: Negative for facial swelling and trouble swallowing.    Respiratory: Positive for cough (occasional) and shortness of breath. Negative for choking.    Cardiovascular: Positive for palpitations and leg swelling (both legs over the past few days). Negative for chest pain.   Gastrointestinal: Negative for abdominal pain, diarrhea and vomiting.   Genitourinary: Negative for dysuria and flank pain.   Musculoskeletal: Negative for gait problem and neck pain.   Skin: Negative for color change and rash.   Neurological: Negative for syncope and headaches.   Psychiatric/Behavioral: The patient is nervous/anxious.    All other systems reviewed and are negative.      Past Medical History:   Diagnosis Date   • Anxiety    • Asthma    • Chest pain    • COPD  (chronic obstructive pulmonary disease)    • Depression    • Diabetes mellitus     TYPE II   • Fatigue    • Gout    • HOCM (hypertrophic obstructive cardiomyopathy)    • Hypertension    • Hypertriglyceridemia    • Myocardial infarction    • Pancreatitis    • Renal disorder    • Syncope        No Known Allergies    Past Surgical History:   Procedure Laterality Date   • ABDOMINAL SURGERY     • CARDIAC CATHETERIZATION     • CARDIAC DEFIBRILLATOR PLACEMENT     • CARDIAC DEFIBRILLATOR PLACEMENT     • CHOLECYSTECTOMY     • ENDOSCOPY N/A 8/30/2016    Procedure: ESOPHAGOGASTRODUODENOSCOPY;  Surgeon: Irineo Mercedes MD;  Location: [facility];  Service:    • ERCP     • INSERT / REPLACE / REMOVE PACEMAKER      [facility] [place]   • LAPAROSCOPIC APPENDECTOMY     • OTHER SURGICAL HISTORY      NO REMOVAL OF APPENDIX  PATIENT HAS A APPENDIX   • UMBILICAL HERNIA REPAIR         Family History   Problem Relation Age of Onset   • Heart block Mother    • Kidney disease Mother    • Diabetes Father    • Diabetes Maternal Grandmother    • Diabetes Maternal Grandfather    • COPD Maternal Grandfather    • Asthma Maternal Grandfather        Social History     Social History   • Marital status:      Social History Main Topics   • Smoking status: Current Some Day Smoker     Packs/day: 0.50     Years: 35.00     Types: Cigarettes   • Smokeless tobacco: Never Used   • Alcohol use No   • Drug use: No   • Sexual activity: Defer     Other Topics Concern   • Not on file     ED Triage Vitals   Temp Heart Rate Resp BP SpO2   04/13/18 2218 04/13/18 2218 04/13/18 2218 04/13/18 2218 04/13/18 2218   98.5 °F (36.9 °C) 113 (!) 33 (!) 227/123 (!) 4 %      Temp src Heart Rate Source Patient Position BP Location FiO2 (%)   -- 04/13/18 2218 04/13/18 2218 04/13/18 2218 04/13/18 2230    Monitor Sitting Right arm 50 %           Objective   Physical Exam   Constitutional: He is oriented to person, place, and time. He appears well-developed and well-nourished.  He appears distressed.   Anxious appearing in distress, tripod position   HENT:   Head: Normocephalic and atraumatic.   Eyes: EOM are normal. Pupils are equal, round, and reactive to light. Right eye exhibits no discharge. Left eye exhibits no discharge.   Neck: Normal range of motion. Neck supple. No tracheal deviation present.   Cardiovascular: Regular rhythm and intact distal pulses.  Exam reveals no gallop and no friction rub.    Tachycardic   Pulmonary/Chest: He is in respiratory distress. He has rales.   Tachypnea with significant accessory muscle use noted.  Diminished breath sounds bilaterally.  Scattered rales noted bilaterally.   Abdominal: Soft. He exhibits no mass. There is no tenderness. There is no rebound and no guarding.   Musculoskeletal: Normal range of motion. He exhibits edema (bilateral 2+ pitting edema). He exhibits no deformity.   Neurological: He is alert and oriented to person, place, and time. He exhibits normal muscle tone.   Skin: Skin is warm and dry. No rash noted. No erythema. There is pallor.   Psychiatric: He has a normal mood and affect. His behavior is normal. Judgment and thought content normal.   Nursing note and vitals reviewed.    EKG           EKG time/nterp time: 2217/2218  Rhythm/Rate: Sinus tachycardia, 114 bpm  P waves and MT: P waves are present, 118 ms  QRS, axis: 90 ms, left axis deviation   ST and T waves: Somewhat limited interpretation due to poor baseline with respiratory artifact but no obvious ST segment elevations or depressions noted    Independently interpreted by me contemporaneously with treatment    Results for orders placed or performed during the hospital encounter of 04/13/18   Comprehensive Metabolic Panel   Result Value Ref Range    Glucose 78 65 - 99 mg/dL    BUN 52 (H) 6 - 20 mg/dL    Creatinine 4.04 (C) 0.76 - 1.27 mg/dL    Sodium 137 136 - 145 mmol/L    Potassium 3.8 3.5 - 5.2 mmol/L    Chloride 106 98 - 107 mmol/L    CO2 18.6 (L) 22.0 - 29.0 mmol/L     Calcium 7.5 (L) 8.6 - 10.5 mg/dL    Total Protein 5.3 (L) 6.0 - 8.5 g/dL    Albumin 3.20 (L) 3.50 - 5.20 g/dL    ALT (SGPT) 9 5 - 41 U/L    AST (SGOT) 10 5 - 40 U/L    Alkaline Phosphatase 98 40 - 129 U/L    Total Bilirubin 0.3 0.2 - 1.2 mg/dL    eGFR Non African Amer 16 (L) >60 mL/min/1.73    Globulin 2.1 gm/dL    A/G Ratio 1.5 g/dL    BUN/Creatinine Ratio 12.9 7.0 - 25.0    Anion Gap 12.4 mmol/L   Protime-INR   Result Value Ref Range    Protime 15.0 12.1 - 15.0 Seconds    INR 1.17 (H) 0.90 - 1.10   aPTT   Result Value Ref Range    PTT 33.4 24.3 - 38.1 seconds   BNP   Result Value Ref Range    proBNP 26,979.0 (H) 5.0 - 125.0 pg/mL   Troponin   Result Value Ref Range    Troponin T 0.086 (H) 0.000 - 0.030 ng/mL   CBC Auto Differential   Result Value Ref Range    WBC 9.52 4.80 - 10.80 10*3/mm3    RBC 4.01 (L) 4.70 - 6.10 10*6/mm3    Hemoglobin 11.4 (L) 14.0 - 18.0 g/dL    Hematocrit 36.4 (L) 42.0 - 52.0 %    MCV 90.8 80.0 - 94.0 fL    MCH 28.4 27.0 - 31.0 pg    MCHC 31.3 31.0 - 37.0 g/dL    RDW 13.9 11.5 - 14.5 %    RDW-SD 45.6 37.0 - 54.0 fl    MPV 9.8 7.4 - 10.4 fL    Platelets 257 140 - 500 10*3/mm3    Neutrophil % 78.5 (H) 45.0 - 70.0 %    Lymphocyte % 11.6 (L) 20.0 - 45.0 %    Monocyte % 7.4 3.0 - 8.0 %    Eosinophil % 1.6 0.0 - 4.0 %    Basophil % 0.5 0.0 - 2.0 %    Immature Grans % 0.4 0.0 - 0.5 %    Neutrophils, Absolute 7.48 1.50 - 8.30 10*3/mm3    Lymphocytes, Absolute 1.10 0.60 - 4.80 10*3/mm3    Monocytes, Absolute 0.70 0.00 - 1.00 10*3/mm3    Eosinophils, Absolute 0.15 0.10 - 0.30 10*3/mm3    Basophils, Absolute 0.05 0.00 - 0.20 10*3/mm3    Immature Grans, Absolute 0.04 (H) 0.00 - 0.03 10*3/mm3    nRBC 0.0 0.0 - 0.0 /100 WBC       RADIOLOGY        Study: Portable chest x-ray    Findings: Interstitial edematous pattern noted bilaterally with moderate perihilar congestion also.  Findings most consistent with CHF.    Interpreted contemporaneously with treatment by myself, independently viewed by  me            Procedures         ED Course  ED Course   Comment By Time   I have reviewed the EKG and labs and portable chest x-ray.  The patient arrived with obvious signs of respiratory failure, sitting in tripod position with accessory muscle use and tachypnea.  His saturations were okay on the supplemental oxygen that he was receiving from EMS but when we removed the oxygen to transfer him to our bed he quickly desaturated within 30 seconds to at least an oxygenation of 75% on room air before we could get him back up on our supplemental oxygen here.  I gave him 1 DuoNeb while we were sorting out his condition.  Once I saw that his blood pressure was extremely high, I ordered some nitroglycerin and asked the respiratory therapist to put him on BiPAP therapy.  His blood pressure did begin to decrease and once he was on BiPAP for several minutes his work of breathing began to normalize also.  He is now looking much better and is actually able to have a conversation with me while on BiPAP.  His respiratory rate has decreased and his accessory muscle use has dramatically decreased also.  After his wife arrived, I learned more about his history, specifically that he is not very compliant with his antihypertensive medications and also that he recently ran out of his Lasix medication.  Given all that, I think is obvious problem tonight is a hypertensive emergency with respiratory failure and hypoxia yielding CHF features.  I called our hospitalist here in [place] to see if they would accept him for further therapy [facility] but they declined because of his chronic kidney disease and creatinine above 4.0.  Therefore, we will make arrangements to transfer him to [facility] in [facility] for further intensive care treatment and monitoring.  Patient is agreeable to this plan. Gomez Wagoner MD 04/13 7021                  [facility]  Number of Diagnoses or Management Options  Acute congestive heart failure,  unspecified congestive heart failure type:   Acute respiratory failure with hypoxia:   Chronic kidney disease, unspecified CKD stage:   Hypertensive emergency:      Amount and/or Complexity of Data Reviewed  Clinical lab tests: reviewed and ordered  Tests in the radiology section of CPT®: ordered and reviewed  Decide to obtain previous medical records or to obtain history from someone other than the patient: yes  Review and summarize past medical records: yes  Discuss the patient with other providers: yes (Dr. Cabral-refused patient based on his CKD and creatinine level  Dr. Al-accepts patient at [facility])  Independent visualization of images, tracings, or specimens: yes    Risk of Complications, Morbidity, and/or Mortality  Presenting problems: high  Diagnostic procedures: high  Management options: high    Critical Care  Total time providing critical care:  minutes      Final diagnoses:   Hypertensive emergency   Acute congestive heart failure, unspecified congestive heart failure type   Acute respiratory failure with hypoxia   Chronic kidney disease, unspecified CKD stage            Gomez Wagoner MD  04/14/18 0005       Gomez Wagoner MD  04/14/18 0015

## 2018-04-14 NOTE — ED NOTES
Consult to [facility] Intensivist  Dr Al to return call     Astrid Jang  04/13/18 1561

## 2018-04-15 ENCOUNTER — APPOINTMENT (OUTPATIENT)
Dept: CARDIOLOGY | Facility: HOSPITAL | Age: 48
End: 2018-04-15
Attending: INTERNAL MEDICINE

## 2018-04-15 LAB
ANION GAP SERPL CALCULATED.3IONS-SCNC: 16.9 MMOL/L
ANION GAP SERPL CALCULATED.3IONS-SCNC: 17 MMOL/L
BH CV LOW VAS LEFT LESSER SAPH VESSEL: 1
BH CV LOWER VASCULAR LEFT COMMON FEMORAL AUGMENT: NORMAL
BH CV LOWER VASCULAR LEFT COMMON FEMORAL COMPETENT: NORMAL
BH CV LOWER VASCULAR LEFT COMMON FEMORAL COMPRESS: NORMAL
BH CV LOWER VASCULAR LEFT COMMON FEMORAL PHASIC: NORMAL
BH CV LOWER VASCULAR LEFT COMMON FEMORAL SPONT: NORMAL
BH CV LOWER VASCULAR LEFT DISTAL FEMORAL COMPRESS: NORMAL
BH CV LOWER VASCULAR LEFT GASTRONEMIUS COMPRESS: NORMAL
BH CV LOWER VASCULAR LEFT GREATER SAPH AK COMPRESS: NORMAL
BH CV LOWER VASCULAR LEFT GREATER SAPH BK COMPRESS: NORMAL
BH CV LOWER VASCULAR LEFT LESSER SAPH COMPRESS: NORMAL
BH CV LOWER VASCULAR LEFT LESSER SAPH THROMBUS: NORMAL
BH CV LOWER VASCULAR LEFT MID FEMORAL AUGMENT: NORMAL
BH CV LOWER VASCULAR LEFT MID FEMORAL COMPETENT: NORMAL
BH CV LOWER VASCULAR LEFT MID FEMORAL COMPRESS: NORMAL
BH CV LOWER VASCULAR LEFT MID FEMORAL PHASIC: NORMAL
BH CV LOWER VASCULAR LEFT MID FEMORAL SPONT: NORMAL
BH CV LOWER VASCULAR LEFT PERONEAL COMPRESS: NORMAL
BH CV LOWER VASCULAR LEFT POPLITEAL AUGMENT: NORMAL
BH CV LOWER VASCULAR LEFT POPLITEAL COMPETENT: NORMAL
BH CV LOWER VASCULAR LEFT POPLITEAL COMPRESS: NORMAL
BH CV LOWER VASCULAR LEFT POPLITEAL PHASIC: NORMAL
BH CV LOWER VASCULAR LEFT POPLITEAL SPONT: NORMAL
BH CV LOWER VASCULAR LEFT POSTERIOR TIBIAL COMPRESS: NORMAL
BH CV LOWER VASCULAR LEFT PROXIMAL FEMORAL COMPRESS: NORMAL
BH CV LOWER VASCULAR LEFT SAPHENOFEMORAL JUNCTION COMPRESS: NORMAL
BH CV LOWER VASCULAR LEFT SAPHENOFEMORAL JUNCTION PHASIC: NORMAL
BH CV LOWER VASCULAR LEFT SAPHENOFEMORAL JUNCTION SPONT: NORMAL
BH CV LOWER VASCULAR RIGHT COMMON FEMORAL AUGMENT: NORMAL
BH CV LOWER VASCULAR RIGHT COMMON FEMORAL COMPETENT: NORMAL
BH CV LOWER VASCULAR RIGHT COMMON FEMORAL COMPRESS: NORMAL
BH CV LOWER VASCULAR RIGHT COMMON FEMORAL PHASIC: NORMAL
BH CV LOWER VASCULAR RIGHT COMMON FEMORAL SPONT: NORMAL
BH CV LOWER VASCULAR RIGHT DISTAL FEMORAL COMPRESS: NORMAL
BH CV LOWER VASCULAR RIGHT GASTRONEMIUS COMPRESS: NORMAL
BH CV LOWER VASCULAR RIGHT GREATER SAPH AK COMPRESS: NORMAL
BH CV LOWER VASCULAR RIGHT GREATER SAPH BK COMPRESS: NORMAL
BH CV LOWER VASCULAR RIGHT LESSER SAPH COMPRESS: NORMAL
BH CV LOWER VASCULAR RIGHT MID FEMORAL AUGMENT: NORMAL
BH CV LOWER VASCULAR RIGHT MID FEMORAL COMPETENT: NORMAL
BH CV LOWER VASCULAR RIGHT MID FEMORAL COMPRESS: NORMAL
BH CV LOWER VASCULAR RIGHT MID FEMORAL PHASIC: NORMAL
BH CV LOWER VASCULAR RIGHT MID FEMORAL SPONT: NORMAL
BH CV LOWER VASCULAR RIGHT PERONEAL COMPRESS: NORMAL
BH CV LOWER VASCULAR RIGHT POPLITEAL AUGMENT: NORMAL
BH CV LOWER VASCULAR RIGHT POPLITEAL COMPETENT: NORMAL
BH CV LOWER VASCULAR RIGHT POPLITEAL COMPRESS: NORMAL
BH CV LOWER VASCULAR RIGHT POPLITEAL PHASIC: NORMAL
BH CV LOWER VASCULAR RIGHT POPLITEAL SPONT: NORMAL
BH CV LOWER VASCULAR RIGHT POSTERIOR TIBIAL COMPRESS: NORMAL
BH CV LOWER VASCULAR RIGHT PROXIMAL FEMORAL COMPRESS: NORMAL
BH CV LOWER VASCULAR RIGHT SAPHENOFEMORAL JUNCTION COMPRESS: NORMAL
BH CV LOWER VASCULAR RIGHT SAPHENOFEMORAL JUNCTION PHASIC: NORMAL
BH CV LOWER VASCULAR RIGHT SAPHENOFEMORAL JUNCTION SPONT: NORMAL
BUN BLD-MCNC: 62 MG/DL (ref 6–20)
BUN BLD-MCNC: 63 MG/DL (ref 6–20)
BUN/CREAT SERPL: 12.2 (ref 7–25)
BUN/CREAT SERPL: 13 (ref 7–25)
CALCIUM SPEC-SCNC: 8.2 MG/DL (ref 8.6–10.5)
CALCIUM SPEC-SCNC: 8.6 MG/DL (ref 8.6–10.5)
CHLORIDE SERPL-SCNC: 101 MMOL/L (ref 98–107)
CHLORIDE SERPL-SCNC: 98 MMOL/L (ref 98–107)
CO2 SERPL-SCNC: 21 MMOL/L (ref 22–29)
CO2 SERPL-SCNC: 22.1 MMOL/L (ref 22–29)
CREAT BLD-MCNC: 4.77 MG/DL (ref 0.76–1.27)
CREAT BLD-MCNC: 5.17 MG/DL (ref 0.76–1.27)
GFR SERPL CREATININE-BSD FRML MDRD: 12 ML/MIN/1.73
GFR SERPL CREATININE-BSD FRML MDRD: 13 ML/MIN/1.73
GLUCOSE BLD-MCNC: 105 MG/DL (ref 65–99)
GLUCOSE BLD-MCNC: 169 MG/DL (ref 65–99)
GLUCOSE BLDC GLUCOMTR-MCNC: 114 MG/DL (ref 70–130)
GLUCOSE BLDC GLUCOMTR-MCNC: 117 MG/DL (ref 70–130)
GLUCOSE BLDC GLUCOMTR-MCNC: 142 MG/DL (ref 70–130)
PHOSPHATE SERPL-MCNC: 5.2 MG/DL (ref 2.5–4.5)
POTASSIUM BLD-SCNC: 3.9 MMOL/L (ref 3.5–5.2)
POTASSIUM BLD-SCNC: 3.9 MMOL/L (ref 3.5–5.2)
SODIUM BLD-SCNC: 137 MMOL/L (ref 136–145)
SODIUM BLD-SCNC: 139 MMOL/L (ref 136–145)
URATE SERPL-MCNC: 7.7 MG/DL (ref 3.4–7)

## 2018-04-15 PROCEDURE — 84550 ASSAY OF BLOOD/URIC ACID: CPT | Performed by: INTERNAL MEDICINE

## 2018-04-15 PROCEDURE — 93970 EXTREMITY STUDY: CPT

## 2018-04-15 PROCEDURE — 25010000002 ENOXAPARIN PER 10 MG: Performed by: INTERNAL MEDICINE

## 2018-04-15 PROCEDURE — 94799 UNLISTED PULMONARY SVC/PX: CPT

## 2018-04-15 PROCEDURE — 82962 GLUCOSE BLOOD TEST: CPT

## 2018-04-15 PROCEDURE — 84100 ASSAY OF PHOSPHORUS: CPT | Performed by: INTERNAL MEDICINE

## 2018-04-15 PROCEDURE — 80048 BASIC METABOLIC PNL TOTAL CA: CPT | Performed by: INTERNAL MEDICINE

## 2018-04-15 PROCEDURE — 63710000001 INSULIN ASPART PER 5 UNITS: Performed by: INTERNAL MEDICINE

## 2018-04-15 PROCEDURE — 99232 SBSQ HOSP IP/OBS MODERATE 35: CPT | Performed by: INTERNAL MEDICINE

## 2018-04-15 RX ORDER — BISOPROLOL FUMARATE 5 MG/1
10 TABLET, FILM COATED ORAL DAILY
Status: DISCONTINUED | OUTPATIENT
Start: 2018-04-16 | End: 2018-04-20 | Stop reason: HOSPADM

## 2018-04-15 RX ORDER — BISOPROLOL FUMARATE 5 MG/1
5 TABLET, FILM COATED ORAL ONCE
Status: COMPLETED | OUTPATIENT
Start: 2018-04-15 | End: 2018-04-15

## 2018-04-15 RX ORDER — METOLAZONE 5 MG/1
10 TABLET ORAL ONCE
Status: COMPLETED | OUTPATIENT
Start: 2018-04-15 | End: 2018-04-15

## 2018-04-15 RX ADMIN — BUDESONIDE AND FORMOTEROL FUMARATE DIHYDRATE 2 PUFF: 160; 4.5 AEROSOL RESPIRATORY (INHALATION) at 07:01

## 2018-04-15 RX ADMIN — HYDRALAZINE HYDROCHLORIDE 50 MG: 50 TABLET, FILM COATED ORAL at 17:32

## 2018-04-15 RX ADMIN — GABAPENTIN 100 MG: 100 CAPSULE ORAL at 06:25

## 2018-04-15 RX ADMIN — TIOTROPIUM BROMIDE 1 CAPSULE: 18 CAPSULE ORAL; RESPIRATORY (INHALATION) at 07:01

## 2018-04-15 RX ADMIN — BISOPROLOL FUMARATE 5 MG: 5 TABLET ORAL at 10:36

## 2018-04-15 RX ADMIN — PANTOPRAZOLE SODIUM 40 MG: 40 TABLET, DELAYED RELEASE ORAL at 08:10

## 2018-04-15 RX ADMIN — METHOCARBAMOL 750 MG: 750 TABLET ORAL at 16:51

## 2018-04-15 RX ADMIN — BISOPROLOL FUMARATE 5 MG: 5 TABLET ORAL at 08:10

## 2018-04-15 RX ADMIN — PANCRELIPASE 36000 UNITS OF LIPASE: 60000; 12000; 38000 CAPSULE, DELAYED RELEASE PELLETS ORAL at 11:48

## 2018-04-15 RX ADMIN — GABAPENTIN 100 MG: 100 CAPSULE ORAL at 13:48

## 2018-04-15 RX ADMIN — CALCIUM ACETATE 667 MG: 667 CAPSULE ORAL at 10:36

## 2018-04-15 RX ADMIN — OXYCODONE HYDROCHLORIDE 10 MG: 10 TABLET, FILM COATED, EXTENDED RELEASE ORAL at 03:45

## 2018-04-15 RX ADMIN — DULOXETINE HYDROCHLORIDE 60 MG: 60 CAPSULE, DELAYED RELEASE ORAL at 08:10

## 2018-04-15 RX ADMIN — ALLOPURINOL 150 MG: 300 TABLET ORAL at 08:11

## 2018-04-15 RX ADMIN — HYDRALAZINE HYDROCHLORIDE 50 MG: 50 TABLET, FILM COATED ORAL at 11:48

## 2018-04-15 RX ADMIN — METOLAZONE 10 MG: 5 TABLET ORAL at 16:51

## 2018-04-15 RX ADMIN — ENOXAPARIN SODIUM 30 MG: 30 INJECTION SUBCUTANEOUS at 03:45

## 2018-04-15 RX ADMIN — ASPIRIN 81 MG: 81 TABLET ORAL at 08:10

## 2018-04-15 RX ADMIN — PANCRELIPASE 36000 UNITS OF LIPASE: 60000; 12000; 38000 CAPSULE, DELAYED RELEASE PELLETS ORAL at 17:32

## 2018-04-15 RX ADMIN — INSULIN ASPART 3 UNITS: 100 INJECTION, SOLUTION INTRAVENOUS; SUBCUTANEOUS at 17:32

## 2018-04-15 RX ADMIN — CALCIUM ACETATE 667 MG: 667 CAPSULE ORAL at 11:48

## 2018-04-15 RX ADMIN — GABAPENTIN 100 MG: 100 CAPSULE ORAL at 21:16

## 2018-04-15 RX ADMIN — OXYCODONE HYDROCHLORIDE 10 MG: 10 TABLET, FILM COATED, EXTENDED RELEASE ORAL at 15:01

## 2018-04-15 RX ADMIN — CALCIUM ACETATE 667 MG: 667 CAPSULE ORAL at 17:33

## 2018-04-15 RX ADMIN — BUDESONIDE AND FORMOTEROL FUMARATE DIHYDRATE 2 PUFF: 160; 4.5 AEROSOL RESPIRATORY (INHALATION) at 20:00

## 2018-04-15 RX ADMIN — ROSUVASTATIN CALCIUM 10 MG: 10 TABLET, FILM COATED ORAL at 08:10

## 2018-04-15 RX ADMIN — HYDRALAZINE HYDROCHLORIDE 50 MG: 50 TABLET, FILM COATED ORAL at 08:10

## 2018-04-15 RX ADMIN — METHOCARBAMOL 750 MG: 750 TABLET ORAL at 20:49

## 2018-04-15 RX ADMIN — HYDRALAZINE HYDROCHLORIDE 50 MG: 50 TABLET, FILM COATED ORAL at 20:49

## 2018-04-15 RX ADMIN — PANCRELIPASE 36000 UNITS OF LIPASE: 60000; 12000; 38000 CAPSULE, DELAYED RELEASE PELLETS ORAL at 08:10

## 2018-04-15 RX ADMIN — METHOCARBAMOL 750 MG: 750 TABLET ORAL at 08:10

## 2018-04-15 RX ADMIN — AMLODIPINE BESYLATE 10 MG: 10 TABLET ORAL at 08:10

## 2018-04-16 ENCOUNTER — APPOINTMENT (OUTPATIENT)
Dept: GENERAL RADIOLOGY | Facility: HOSPITAL | Age: 48
End: 2018-04-16

## 2018-04-16 LAB
ANION GAP SERPL CALCULATED.3IONS-SCNC: 18.8 MMOL/L
BUN BLD-MCNC: 66 MG/DL (ref 6–20)
BUN/CREAT SERPL: 13.2 (ref 7–25)
CALCIUM SPEC-SCNC: 8.7 MG/DL (ref 8.6–10.5)
CHLORIDE SERPL-SCNC: 96 MMOL/L (ref 98–107)
CO2 SERPL-SCNC: 21.2 MMOL/L (ref 22–29)
CREAT BLD-MCNC: 4.99 MG/DL (ref 0.76–1.27)
GFR SERPL CREATININE-BSD FRML MDRD: 12 ML/MIN/1.73
GLUCOSE BLD-MCNC: 103 MG/DL (ref 65–99)
GLUCOSE BLDC GLUCOMTR-MCNC: 139 MG/DL (ref 70–130)
GLUCOSE BLDC GLUCOMTR-MCNC: 156 MG/DL (ref 70–130)
GLUCOSE BLDC GLUCOMTR-MCNC: 158 MG/DL (ref 70–130)
GLUCOSE BLDC GLUCOMTR-MCNC: 164 MG/DL (ref 70–130)
PHOSPHATE SERPL-MCNC: 4.9 MG/DL (ref 2.5–4.5)
POTASSIUM BLD-SCNC: 3.9 MMOL/L (ref 3.5–5.2)
SODIUM BLD-SCNC: 136 MMOL/L (ref 136–145)

## 2018-04-16 PROCEDURE — 94799 UNLISTED PULMONARY SVC/PX: CPT

## 2018-04-16 PROCEDURE — 25010000002 ENOXAPARIN PER 10 MG: Performed by: INTERNAL MEDICINE

## 2018-04-16 PROCEDURE — 97162 PT EVAL MOD COMPLEX 30 MIN: CPT

## 2018-04-16 PROCEDURE — 63710000001 INSULIN ASPART PER 5 UNITS: Performed by: INTERNAL MEDICINE

## 2018-04-16 PROCEDURE — 99231 SBSQ HOSP IP/OBS SF/LOW 25: CPT | Performed by: INTERNAL MEDICINE

## 2018-04-16 PROCEDURE — 82962 GLUCOSE BLOOD TEST: CPT

## 2018-04-16 PROCEDURE — 80048 BASIC METABOLIC PNL TOTAL CA: CPT | Performed by: INTERNAL MEDICINE

## 2018-04-16 PROCEDURE — 84100 ASSAY OF PHOSPHORUS: CPT | Performed by: INTERNAL MEDICINE

## 2018-04-16 PROCEDURE — 71046 X-RAY EXAM CHEST 2 VIEWS: CPT

## 2018-04-16 PROCEDURE — 97110 THERAPEUTIC EXERCISES: CPT

## 2018-04-16 RX ORDER — HYDRALAZINE HYDROCHLORIDE 50 MG/1
100 TABLET, FILM COATED ORAL EVERY 8 HOURS SCHEDULED
Status: DISCONTINUED | OUTPATIENT
Start: 2018-04-16 | End: 2018-04-20 | Stop reason: HOSPADM

## 2018-04-16 RX ORDER — FUROSEMIDE 80 MG
80 TABLET ORAL 3 TIMES DAILY
Status: DISCONTINUED | OUTPATIENT
Start: 2018-04-16 | End: 2018-04-19

## 2018-04-16 RX ADMIN — CALCIUM ACETATE 667 MG: 667 CAPSULE ORAL at 18:22

## 2018-04-16 RX ADMIN — FUROSEMIDE 80 MG: 80 TABLET ORAL at 21:13

## 2018-04-16 RX ADMIN — INSULIN ASPART 3 UNITS: 100 INJECTION, SOLUTION INTRAVENOUS; SUBCUTANEOUS at 21:13

## 2018-04-16 RX ADMIN — ENOXAPARIN SODIUM 30 MG: 30 INJECTION SUBCUTANEOUS at 03:45

## 2018-04-16 RX ADMIN — GABAPENTIN 100 MG: 100 CAPSULE ORAL at 05:53

## 2018-04-16 RX ADMIN — HYDRALAZINE HYDROCHLORIDE 100 MG: 50 TABLET, FILM COATED ORAL at 21:17

## 2018-04-16 RX ADMIN — OXYCODONE HYDROCHLORIDE 10 MG: 10 TABLET, FILM COATED, EXTENDED RELEASE ORAL at 03:45

## 2018-04-16 RX ADMIN — GABAPENTIN 100 MG: 100 CAPSULE ORAL at 14:03

## 2018-04-16 RX ADMIN — METHOCARBAMOL 750 MG: 750 TABLET ORAL at 16:07

## 2018-04-16 RX ADMIN — FUROSEMIDE 80 MG: 80 TABLET ORAL at 10:38

## 2018-04-16 RX ADMIN — ALLOPURINOL 150 MG: 300 TABLET ORAL at 08:04

## 2018-04-16 RX ADMIN — BUDESONIDE AND FORMOTEROL FUMARATE DIHYDRATE 2 PUFF: 160; 4.5 AEROSOL RESPIRATORY (INHALATION) at 08:47

## 2018-04-16 RX ADMIN — PANCRELIPASE 36000 UNITS OF LIPASE: 60000; 12000; 38000 CAPSULE, DELAYED RELEASE PELLETS ORAL at 18:22

## 2018-04-16 RX ADMIN — CALCIUM ACETATE 667 MG: 667 CAPSULE ORAL at 12:03

## 2018-04-16 RX ADMIN — GABAPENTIN 100 MG: 100 CAPSULE ORAL at 21:14

## 2018-04-16 RX ADMIN — PANCRELIPASE 36000 UNITS OF LIPASE: 60000; 12000; 38000 CAPSULE, DELAYED RELEASE PELLETS ORAL at 12:02

## 2018-04-16 RX ADMIN — INSULIN ASPART 3 UNITS: 100 INJECTION, SOLUTION INTRAVENOUS; SUBCUTANEOUS at 18:22

## 2018-04-16 RX ADMIN — ROSUVASTATIN CALCIUM 10 MG: 10 TABLET, FILM COATED ORAL at 08:04

## 2018-04-16 RX ADMIN — PANCRELIPASE 36000 UNITS OF LIPASE: 60000; 12000; 38000 CAPSULE, DELAYED RELEASE PELLETS ORAL at 08:05

## 2018-04-16 RX ADMIN — FUROSEMIDE 80 MG: 80 TABLET ORAL at 16:07

## 2018-04-16 RX ADMIN — ASPIRIN 81 MG: 81 TABLET ORAL at 08:04

## 2018-04-16 RX ADMIN — OXYCODONE HYDROCHLORIDE 10 MG: 10 TABLET, FILM COATED, EXTENDED RELEASE ORAL at 16:07

## 2018-04-16 RX ADMIN — INSULIN ASPART 3 UNITS: 100 INJECTION, SOLUTION INTRAVENOUS; SUBCUTANEOUS at 08:05

## 2018-04-16 RX ADMIN — METHOCARBAMOL 750 MG: 750 TABLET ORAL at 21:14

## 2018-04-16 RX ADMIN — BISOPROLOL FUMARATE 10 MG: 5 TABLET ORAL at 08:03

## 2018-04-16 RX ADMIN — DULOXETINE HYDROCHLORIDE 60 MG: 60 CAPSULE, DELAYED RELEASE ORAL at 08:04

## 2018-04-16 RX ADMIN — PANTOPRAZOLE SODIUM 40 MG: 40 TABLET, DELAYED RELEASE ORAL at 08:03

## 2018-04-16 RX ADMIN — BUDESONIDE AND FORMOTEROL FUMARATE DIHYDRATE 2 PUFF: 160; 4.5 AEROSOL RESPIRATORY (INHALATION) at 21:29

## 2018-04-16 RX ADMIN — HYDRALAZINE HYDROCHLORIDE 100 MG: 50 TABLET, FILM COATED ORAL at 14:03

## 2018-04-16 RX ADMIN — AMLODIPINE BESYLATE 10 MG: 10 TABLET ORAL at 08:04

## 2018-04-16 RX ADMIN — TIOTROPIUM BROMIDE 1 CAPSULE: 18 CAPSULE ORAL; RESPIRATORY (INHALATION) at 08:47

## 2018-04-16 RX ADMIN — METHOCARBAMOL 750 MG: 750 TABLET ORAL at 08:04

## 2018-04-16 RX ADMIN — CALCIUM ACETATE 667 MG: 667 CAPSULE ORAL at 08:04

## 2018-04-17 LAB
ANION GAP SERPL CALCULATED.3IONS-SCNC: 16.8 MMOL/L
BUN BLD-MCNC: 79 MG/DL (ref 6–20)
BUN/CREAT SERPL: 15.4 (ref 7–25)
CALCIUM SPEC-SCNC: 8.9 MG/DL (ref 8.6–10.5)
CHLORIDE SERPL-SCNC: 96 MMOL/L (ref 98–107)
CO2 SERPL-SCNC: 24.2 MMOL/L (ref 22–29)
CREAT BLD-MCNC: 5.12 MG/DL (ref 0.76–1.27)
GFR SERPL CREATININE-BSD FRML MDRD: 12 ML/MIN/1.73
GLUCOSE BLD-MCNC: 137 MG/DL (ref 65–99)
GLUCOSE BLDC GLUCOMTR-MCNC: 135 MG/DL (ref 70–130)
GLUCOSE BLDC GLUCOMTR-MCNC: 149 MG/DL (ref 70–130)
GLUCOSE BLDC GLUCOMTR-MCNC: 184 MG/DL (ref 70–130)
GLUCOSE BLDC GLUCOMTR-MCNC: 188 MG/DL (ref 70–130)
GLUCOSE BLDC GLUCOMTR-MCNC: 68 MG/DL (ref 70–130)
GLUCOSE BLDC GLUCOMTR-MCNC: 82 MG/DL (ref 70–130)
PHOSPHATE SERPL-MCNC: 5.5 MG/DL (ref 2.5–4.5)
PLATELET # BLD AUTO: 170 10*3/MM3 (ref 140–500)
POTASSIUM BLD-SCNC: 3.4 MMOL/L (ref 3.5–5.2)
SODIUM BLD-SCNC: 137 MMOL/L (ref 136–145)

## 2018-04-17 PROCEDURE — 94799 UNLISTED PULMONARY SVC/PX: CPT

## 2018-04-17 PROCEDURE — 63710000001 INSULIN ASPART PER 5 UNITS: Performed by: INTERNAL MEDICINE

## 2018-04-17 PROCEDURE — 82962 GLUCOSE BLOOD TEST: CPT

## 2018-04-17 PROCEDURE — 80048 BASIC METABOLIC PNL TOTAL CA: CPT | Performed by: INTERNAL MEDICINE

## 2018-04-17 PROCEDURE — 84100 ASSAY OF PHOSPHORUS: CPT | Performed by: INTERNAL MEDICINE

## 2018-04-17 PROCEDURE — 25010000002 ENOXAPARIN PER 10 MG: Performed by: INTERNAL MEDICINE

## 2018-04-17 PROCEDURE — 97110 THERAPEUTIC EXERCISES: CPT

## 2018-04-17 PROCEDURE — 85049 AUTOMATED PLATELET COUNT: CPT | Performed by: INTERNAL MEDICINE

## 2018-04-17 PROCEDURE — 99231 SBSQ HOSP IP/OBS SF/LOW 25: CPT | Performed by: NURSE PRACTITIONER

## 2018-04-17 RX ORDER — CHLORTHALIDONE 25 MG/1
25 TABLET ORAL DAILY
Status: DISCONTINUED | OUTPATIENT
Start: 2018-04-17 | End: 2018-04-20 | Stop reason: HOSPADM

## 2018-04-17 RX ORDER — POTASSIUM CHLORIDE 750 MG/1
40 CAPSULE, EXTENDED RELEASE ORAL ONCE
Status: COMPLETED | OUTPATIENT
Start: 2018-04-17 | End: 2018-04-17

## 2018-04-17 RX ADMIN — TIOTROPIUM BROMIDE 1 CAPSULE: 18 CAPSULE ORAL; RESPIRATORY (INHALATION) at 08:04

## 2018-04-17 RX ADMIN — GABAPENTIN 100 MG: 100 CAPSULE ORAL at 21:30

## 2018-04-17 RX ADMIN — ROSUVASTATIN CALCIUM 10 MG: 10 TABLET, FILM COATED ORAL at 09:16

## 2018-04-17 RX ADMIN — OXYCODONE HYDROCHLORIDE 10 MG: 10 TABLET, FILM COATED, EXTENDED RELEASE ORAL at 04:16

## 2018-04-17 RX ADMIN — AMLODIPINE BESYLATE 10 MG: 10 TABLET ORAL at 09:17

## 2018-04-17 RX ADMIN — HYDRALAZINE HYDROCHLORIDE 100 MG: 50 TABLET, FILM COATED ORAL at 14:41

## 2018-04-17 RX ADMIN — BUDESONIDE AND FORMOTEROL FUMARATE DIHYDRATE 2 PUFF: 160; 4.5 AEROSOL RESPIRATORY (INHALATION) at 08:04

## 2018-04-17 RX ADMIN — BUDESONIDE AND FORMOTEROL FUMARATE DIHYDRATE 2 PUFF: 160; 4.5 AEROSOL RESPIRATORY (INHALATION) at 21:20

## 2018-04-17 RX ADMIN — CHLORTHALIDONE 25 MG: 25 TABLET ORAL at 13:03

## 2018-04-17 RX ADMIN — CALCIUM ACETATE 667 MG: 667 CAPSULE ORAL at 17:58

## 2018-04-17 RX ADMIN — ENOXAPARIN SODIUM 30 MG: 30 INJECTION SUBCUTANEOUS at 04:16

## 2018-04-17 RX ADMIN — PANCRELIPASE 36000 UNITS OF LIPASE: 60000; 12000; 38000 CAPSULE, DELAYED RELEASE PELLETS ORAL at 09:17

## 2018-04-17 RX ADMIN — PANCRELIPASE 36000 UNITS OF LIPASE: 60000; 12000; 38000 CAPSULE, DELAYED RELEASE PELLETS ORAL at 17:58

## 2018-04-17 RX ADMIN — METHOCARBAMOL 750 MG: 750 TABLET ORAL at 21:30

## 2018-04-17 RX ADMIN — PANTOPRAZOLE SODIUM 40 MG: 40 TABLET, DELAYED RELEASE ORAL at 09:16

## 2018-04-17 RX ADMIN — FUROSEMIDE 80 MG: 80 TABLET ORAL at 16:28

## 2018-04-17 RX ADMIN — POTASSIUM CHLORIDE 40 MEQ: 750 CAPSULE, EXTENDED RELEASE ORAL at 13:08

## 2018-04-17 RX ADMIN — METHOCARBAMOL 750 MG: 750 TABLET ORAL at 09:16

## 2018-04-17 RX ADMIN — INSULIN ASPART 3 UNITS: 100 INJECTION, SOLUTION INTRAVENOUS; SUBCUTANEOUS at 13:04

## 2018-04-17 RX ADMIN — ASPIRIN 81 MG: 81 TABLET ORAL at 09:17

## 2018-04-17 RX ADMIN — BISOPROLOL FUMARATE 10 MG: 5 TABLET ORAL at 09:17

## 2018-04-17 RX ADMIN — FUROSEMIDE 80 MG: 80 TABLET ORAL at 09:16

## 2018-04-17 RX ADMIN — INSULIN ASPART 3 UNITS: 100 INJECTION, SOLUTION INTRAVENOUS; SUBCUTANEOUS at 21:27

## 2018-04-17 RX ADMIN — GABAPENTIN 100 MG: 100 CAPSULE ORAL at 14:41

## 2018-04-17 RX ADMIN — FUROSEMIDE 80 MG: 80 TABLET ORAL at 21:30

## 2018-04-17 RX ADMIN — HYDRALAZINE HYDROCHLORIDE 100 MG: 50 TABLET, FILM COATED ORAL at 06:10

## 2018-04-17 RX ADMIN — PANCRELIPASE 36000 UNITS OF LIPASE: 60000; 12000; 38000 CAPSULE, DELAYED RELEASE PELLETS ORAL at 13:03

## 2018-04-17 RX ADMIN — METHOCARBAMOL 750 MG: 750 TABLET ORAL at 16:28

## 2018-04-17 RX ADMIN — ALLOPURINOL 150 MG: 300 TABLET ORAL at 09:17

## 2018-04-17 RX ADMIN — CALCIUM ACETATE 667 MG: 667 CAPSULE ORAL at 13:03

## 2018-04-17 RX ADMIN — OXYCODONE HYDROCHLORIDE 10 MG: 10 TABLET, FILM COATED, EXTENDED RELEASE ORAL at 16:28

## 2018-04-17 RX ADMIN — DULOXETINE HYDROCHLORIDE 60 MG: 60 CAPSULE, DELAYED RELEASE ORAL at 09:17

## 2018-04-17 RX ADMIN — GABAPENTIN 100 MG: 100 CAPSULE ORAL at 06:19

## 2018-04-17 RX ADMIN — CALCIUM ACETATE 667 MG: 667 CAPSULE ORAL at 09:17

## 2018-04-17 RX ADMIN — HYDRALAZINE HYDROCHLORIDE 100 MG: 50 TABLET, FILM COATED ORAL at 21:30

## 2018-04-18 ENCOUNTER — APPOINTMENT (OUTPATIENT)
Dept: CARDIOLOGY | Facility: HOSPITAL | Age: 48
End: 2018-04-18
Attending: SURGERY

## 2018-04-18 LAB
ANION GAP SERPL CALCULATED.3IONS-SCNC: 19.5 MMOL/L
BUN BLD-MCNC: 83 MG/DL (ref 6–20)
BUN/CREAT SERPL: 13.9 (ref 7–25)
CALCIUM SPEC-SCNC: 9 MG/DL (ref 8.6–10.5)
CHLORIDE SERPL-SCNC: 96 MMOL/L (ref 98–107)
CO2 SERPL-SCNC: 24.5 MMOL/L (ref 22–29)
CREAT BLD-MCNC: 5.96 MG/DL (ref 0.76–1.27)
GFR SERPL CREATININE-BSD FRML MDRD: 10 ML/MIN/1.73
GLUCOSE BLD-MCNC: 144 MG/DL (ref 65–99)
GLUCOSE BLDC GLUCOMTR-MCNC: 124 MG/DL (ref 70–130)
GLUCOSE BLDC GLUCOMTR-MCNC: 140 MG/DL (ref 70–130)
GLUCOSE BLDC GLUCOMTR-MCNC: 153 MG/DL (ref 70–130)
GLUCOSE BLDC GLUCOMTR-MCNC: 175 MG/DL (ref 70–130)
PHOSPHATE SERPL-MCNC: 5.5 MG/DL (ref 2.5–4.5)
POTASSIUM BLD-SCNC: 3.3 MMOL/L (ref 3.5–5.2)
SODIUM BLD-SCNC: 140 MMOL/L (ref 136–145)

## 2018-04-18 PROCEDURE — 80048 BASIC METABOLIC PNL TOTAL CA: CPT | Performed by: INTERNAL MEDICINE

## 2018-04-18 PROCEDURE — 84100 ASSAY OF PHOSPHORUS: CPT | Performed by: INTERNAL MEDICINE

## 2018-04-18 PROCEDURE — 63710000001 INSULIN ASPART PER 5 UNITS: Performed by: INTERNAL MEDICINE

## 2018-04-18 PROCEDURE — 94799 UNLISTED PULMONARY SVC/PX: CPT

## 2018-04-18 PROCEDURE — 82962 GLUCOSE BLOOD TEST: CPT

## 2018-04-18 PROCEDURE — G0365 VESSEL MAPPING HEMO ACCESS: HCPCS

## 2018-04-18 PROCEDURE — 25010000002 ENOXAPARIN PER 10 MG: Performed by: INTERNAL MEDICINE

## 2018-04-18 RX ORDER — POTASSIUM CHLORIDE 750 MG/1
40 CAPSULE, EXTENDED RELEASE ORAL ONCE
Status: COMPLETED | OUTPATIENT
Start: 2018-04-18 | End: 2018-04-18

## 2018-04-18 RX ORDER — SENNA AND DOCUSATE SODIUM 50; 8.6 MG/1; MG/1
2 TABLET, FILM COATED ORAL DAILY
Status: DISCONTINUED | OUTPATIENT
Start: 2018-04-18 | End: 2018-04-20 | Stop reason: HOSPADM

## 2018-04-18 RX ADMIN — TIOTROPIUM BROMIDE 1 CAPSULE: 18 CAPSULE ORAL; RESPIRATORY (INHALATION) at 11:09

## 2018-04-18 RX ADMIN — POTASSIUM CHLORIDE 40 MEQ: 750 CAPSULE, EXTENDED RELEASE ORAL at 08:19

## 2018-04-18 RX ADMIN — DULOXETINE HYDROCHLORIDE 60 MG: 60 CAPSULE, DELAYED RELEASE ORAL at 08:19

## 2018-04-18 RX ADMIN — PANCRELIPASE 36000 UNITS OF LIPASE: 60000; 12000; 38000 CAPSULE, DELAYED RELEASE PELLETS ORAL at 18:01

## 2018-04-18 RX ADMIN — ALLOPURINOL 150 MG: 300 TABLET ORAL at 08:20

## 2018-04-18 RX ADMIN — ENOXAPARIN SODIUM 30 MG: 30 INJECTION SUBCUTANEOUS at 04:25

## 2018-04-18 RX ADMIN — PANCRELIPASE 36000 UNITS OF LIPASE: 60000; 12000; 38000 CAPSULE, DELAYED RELEASE PELLETS ORAL at 08:20

## 2018-04-18 RX ADMIN — OXYCODONE HYDROCHLORIDE 10 MG: 10 TABLET, FILM COATED, EXTENDED RELEASE ORAL at 16:13

## 2018-04-18 RX ADMIN — FUROSEMIDE 80 MG: 80 TABLET ORAL at 08:19

## 2018-04-18 RX ADMIN — CALCIUM ACETATE 667 MG: 667 CAPSULE ORAL at 08:19

## 2018-04-18 RX ADMIN — METHOCARBAMOL 750 MG: 750 TABLET ORAL at 16:13

## 2018-04-18 RX ADMIN — GABAPENTIN 100 MG: 100 CAPSULE ORAL at 22:06

## 2018-04-18 RX ADMIN — METHOCARBAMOL 750 MG: 750 TABLET ORAL at 20:55

## 2018-04-18 RX ADMIN — PANTOPRAZOLE SODIUM 40 MG: 40 TABLET, DELAYED RELEASE ORAL at 08:19

## 2018-04-18 RX ADMIN — GABAPENTIN 100 MG: 100 CAPSULE ORAL at 06:42

## 2018-04-18 RX ADMIN — INSULIN ASPART 3 UNITS: 100 INJECTION, SOLUTION INTRAVENOUS; SUBCUTANEOUS at 08:22

## 2018-04-18 RX ADMIN — FUROSEMIDE 80 MG: 80 TABLET ORAL at 20:55

## 2018-04-18 RX ADMIN — CALCIUM ACETATE 667 MG: 667 CAPSULE ORAL at 12:13

## 2018-04-18 RX ADMIN — BUDESONIDE AND FORMOTEROL FUMARATE DIHYDRATE 2 PUFF: 160; 4.5 AEROSOL RESPIRATORY (INHALATION) at 21:42

## 2018-04-18 RX ADMIN — ASPIRIN 81 MG: 81 TABLET ORAL at 08:19

## 2018-04-18 RX ADMIN — GABAPENTIN 100 MG: 100 CAPSULE ORAL at 14:02

## 2018-04-18 RX ADMIN — DOCUSATE SODIUM -SENNOSIDES 2 TABLET: 50; 8.6 TABLET, COATED ORAL at 12:13

## 2018-04-18 RX ADMIN — FUROSEMIDE 80 MG: 80 TABLET ORAL at 16:13

## 2018-04-18 RX ADMIN — PANCRELIPASE 36000 UNITS OF LIPASE: 60000; 12000; 38000 CAPSULE, DELAYED RELEASE PELLETS ORAL at 12:12

## 2018-04-18 RX ADMIN — OXYCODONE HYDROCHLORIDE 10 MG: 10 TABLET, FILM COATED, EXTENDED RELEASE ORAL at 04:25

## 2018-04-18 RX ADMIN — BISOPROLOL FUMARATE 10 MG: 5 TABLET ORAL at 08:19

## 2018-04-18 RX ADMIN — INSULIN ASPART 3 UNITS: 100 INJECTION, SOLUTION INTRAVENOUS; SUBCUTANEOUS at 18:02

## 2018-04-18 RX ADMIN — ROSUVASTATIN CALCIUM 10 MG: 10 TABLET, FILM COATED ORAL at 08:20

## 2018-04-18 RX ADMIN — HYDRALAZINE HYDROCHLORIDE 100 MG: 50 TABLET, FILM COATED ORAL at 22:06

## 2018-04-18 RX ADMIN — CALCIUM ACETATE 667 MG: 667 CAPSULE ORAL at 18:01

## 2018-04-18 RX ADMIN — AMLODIPINE BESYLATE 10 MG: 10 TABLET ORAL at 08:20

## 2018-04-18 RX ADMIN — HYDRALAZINE HYDROCHLORIDE 100 MG: 50 TABLET, FILM COATED ORAL at 06:42

## 2018-04-18 RX ADMIN — METHOCARBAMOL 750 MG: 750 TABLET ORAL at 08:19

## 2018-04-18 RX ADMIN — BUDESONIDE AND FORMOTEROL FUMARATE DIHYDRATE 2 PUFF: 160; 4.5 AEROSOL RESPIRATORY (INHALATION) at 11:09

## 2018-04-18 RX ADMIN — CHLORTHALIDONE 25 MG: 25 TABLET ORAL at 08:19

## 2018-04-18 RX ADMIN — HYDRALAZINE HYDROCHLORIDE 100 MG: 50 TABLET, FILM COATED ORAL at 14:02

## 2018-04-19 ENCOUNTER — ANESTHESIA (OUTPATIENT)
Dept: PERIOP | Facility: HOSPITAL | Age: 48
End: 2018-04-19

## 2018-04-19 ENCOUNTER — ANESTHESIA EVENT (OUTPATIENT)
Dept: PERIOP | Facility: HOSPITAL | Age: 48
End: 2018-04-19

## 2018-04-19 PROBLEM — N18.5 STAGE 5 CHRONIC KIDNEY DISEASE NOT ON CHRONIC DIALYSIS (HCC): Status: ACTIVE | Noted: 2018-04-19

## 2018-04-19 PROBLEM — N18.4 STAGE 4 CHRONIC KIDNEY DISEASE (HCC): Status: ACTIVE | Noted: 2018-04-19

## 2018-04-19 LAB
ANION GAP SERPL CALCULATED.3IONS-SCNC: 22 MMOL/L
BUN BLD-MCNC: 84 MG/DL (ref 6–20)
BUN/CREAT SERPL: 13.7 (ref 7–25)
CALCIUM SPEC-SCNC: 9.2 MG/DL (ref 8.6–10.5)
CHLORIDE SERPL-SCNC: 94 MMOL/L (ref 98–107)
CO2 SERPL-SCNC: 23 MMOL/L (ref 22–29)
CREAT BLD-MCNC: 6.12 MG/DL (ref 0.76–1.27)
GFR SERPL CREATININE-BSD FRML MDRD: 10 ML/MIN/1.73
GLUCOSE BLD-MCNC: 104 MG/DL (ref 65–99)
GLUCOSE BLDC GLUCOMTR-MCNC: 112 MG/DL (ref 70–130)
GLUCOSE BLDC GLUCOMTR-MCNC: 119 MG/DL (ref 70–130)
GLUCOSE BLDC GLUCOMTR-MCNC: 121 MG/DL (ref 70–130)
GLUCOSE BLDC GLUCOMTR-MCNC: 141 MG/DL (ref 70–130)
PHOSPHATE SERPL-MCNC: 5.3 MG/DL (ref 2.5–4.5)
POTASSIUM BLD-SCNC: 3 MMOL/L (ref 3.5–5.2)
SODIUM BLD-SCNC: 139 MMOL/L (ref 136–145)

## 2018-04-19 PROCEDURE — 94799 UNLISTED PULMONARY SVC/PX: CPT

## 2018-04-19 PROCEDURE — 25010000002 HEPARIN (PORCINE) PER 1000 UNITS: Performed by: SURGERY

## 2018-04-19 PROCEDURE — 25010000002 HEPARIN (PORCINE) PER 1000 UNITS: Performed by: NURSE ANESTHETIST, CERTIFIED REGISTERED

## 2018-04-19 PROCEDURE — 25010000003 CEFAZOLIN IN DEXTROSE 2-4 GM/100ML-% SOLUTION: Performed by: SURGERY

## 2018-04-19 PROCEDURE — 80048 BASIC METABOLIC PNL TOTAL CA: CPT | Performed by: INTERNAL MEDICINE

## 2018-04-19 PROCEDURE — 25010000002 FENTANYL CITRATE (PF) 100 MCG/2ML SOLUTION: Performed by: NURSE ANESTHETIST, CERTIFIED REGISTERED

## 2018-04-19 PROCEDURE — 03170ZF BYPASS RIGHT BRACHIAL ARTERY TO LOWER ARM VEIN, OPEN APPROACH: ICD-10-PCS | Performed by: SURGERY

## 2018-04-19 PROCEDURE — 25010000003 CEFAZOLIN PER 500 MG: Performed by: SURGERY

## 2018-04-19 PROCEDURE — 25010000002 PROPOFOL 10 MG/ML EMULSION: Performed by: NURSE ANESTHETIST, CERTIFIED REGISTERED

## 2018-04-19 PROCEDURE — 25010000002 MIDAZOLAM PER 1 MG: Performed by: NURSE ANESTHETIST, CERTIFIED REGISTERED

## 2018-04-19 PROCEDURE — 84100 ASSAY OF PHOSPHORUS: CPT | Performed by: INTERNAL MEDICINE

## 2018-04-19 PROCEDURE — 25010000002 ENOXAPARIN PER 10 MG: Performed by: INTERNAL MEDICINE

## 2018-04-19 PROCEDURE — 82962 GLUCOSE BLOOD TEST: CPT

## 2018-04-19 PROCEDURE — 25010000002 MIDAZOLAM PER 1 MG: Performed by: ANESTHESIOLOGY

## 2018-04-19 PROCEDURE — 25010000002 PHENYLEPHRINE PER 1 ML: Performed by: NURSE ANESTHETIST, CERTIFIED REGISTERED

## 2018-04-19 RX ORDER — HYDRALAZINE HYDROCHLORIDE 20 MG/ML
5 INJECTION INTRAMUSCULAR; INTRAVENOUS
Status: DISCONTINUED | OUTPATIENT
Start: 2018-04-19 | End: 2018-04-19 | Stop reason: HOSPADM

## 2018-04-19 RX ORDER — PROPOFOL 10 MG/ML
VIAL (ML) INTRAVENOUS AS NEEDED
Status: DISCONTINUED | OUTPATIENT
Start: 2018-04-19 | End: 2018-04-19 | Stop reason: SURG

## 2018-04-19 RX ORDER — EPHEDRINE SULFATE 50 MG/ML
5 INJECTION, SOLUTION INTRAVENOUS ONCE AS NEEDED
Status: DISCONTINUED | OUTPATIENT
Start: 2018-04-19 | End: 2018-04-19 | Stop reason: HOSPADM

## 2018-04-19 RX ORDER — NALOXONE HCL 0.4 MG/ML
0.2 VIAL (ML) INJECTION AS NEEDED
Status: DISCONTINUED | OUTPATIENT
Start: 2018-04-19 | End: 2018-04-19 | Stop reason: HOSPADM

## 2018-04-19 RX ORDER — HEPARIN SODIUM 1000 [USP'U]/ML
INJECTION, SOLUTION INTRAVENOUS; SUBCUTANEOUS AS NEEDED
Status: DISCONTINUED | OUTPATIENT
Start: 2018-04-19 | End: 2018-04-19 | Stop reason: SURG

## 2018-04-19 RX ORDER — SODIUM CHLORIDE 0.9 % (FLUSH) 0.9 %
1-10 SYRINGE (ML) INJECTION AS NEEDED
Status: DISCONTINUED | OUTPATIENT
Start: 2018-04-19 | End: 2018-04-19 | Stop reason: HOSPADM

## 2018-04-19 RX ORDER — FENTANYL CITRATE 50 UG/ML
50 INJECTION, SOLUTION INTRAMUSCULAR; INTRAVENOUS
Status: DISCONTINUED | OUTPATIENT
Start: 2018-04-19 | End: 2018-04-19 | Stop reason: HOSPADM

## 2018-04-19 RX ORDER — MIDAZOLAM HYDROCHLORIDE 1 MG/ML
1 INJECTION INTRAMUSCULAR; INTRAVENOUS
Status: DISCONTINUED | OUTPATIENT
Start: 2018-04-19 | End: 2018-04-19 | Stop reason: HOSPADM

## 2018-04-19 RX ORDER — FENTANYL CITRATE 50 UG/ML
INJECTION, SOLUTION INTRAMUSCULAR; INTRAVENOUS AS NEEDED
Status: DISCONTINUED | OUTPATIENT
Start: 2018-04-19 | End: 2018-04-19 | Stop reason: SURG

## 2018-04-19 RX ORDER — OXYCODONE AND ACETAMINOPHEN 7.5; 325 MG/1; MG/1
1 TABLET ORAL ONCE AS NEEDED
Status: DISCONTINUED | OUTPATIENT
Start: 2018-04-19 | End: 2018-04-19 | Stop reason: HOSPADM

## 2018-04-19 RX ORDER — CEFAZOLIN SODIUM 2 G/100ML
2 INJECTION, SOLUTION INTRAVENOUS
Status: COMPLETED | OUTPATIENT
Start: 2018-04-20 | End: 2018-04-19

## 2018-04-19 RX ORDER — MIDAZOLAM HYDROCHLORIDE 1 MG/ML
INJECTION INTRAMUSCULAR; INTRAVENOUS AS NEEDED
Status: DISCONTINUED | OUTPATIENT
Start: 2018-04-19 | End: 2018-04-19 | Stop reason: SURG

## 2018-04-19 RX ORDER — ONDANSETRON 2 MG/ML
4 INJECTION INTRAMUSCULAR; INTRAVENOUS ONCE AS NEEDED
Status: DISCONTINUED | OUTPATIENT
Start: 2018-04-19 | End: 2018-04-19 | Stop reason: HOSPADM

## 2018-04-19 RX ORDER — PROMETHAZINE HYDROCHLORIDE 25 MG/1
25 SUPPOSITORY RECTAL ONCE AS NEEDED
Status: DISCONTINUED | OUTPATIENT
Start: 2018-04-19 | End: 2018-04-19 | Stop reason: HOSPADM

## 2018-04-19 RX ORDER — POTASSIUM CHLORIDE 750 MG/1
30 CAPSULE, EXTENDED RELEASE ORAL DAILY
Status: DISCONTINUED | OUTPATIENT
Start: 2018-04-19 | End: 2018-04-20 | Stop reason: HOSPADM

## 2018-04-19 RX ORDER — PROMETHAZINE HYDROCHLORIDE 25 MG/ML
12.5 INJECTION, SOLUTION INTRAMUSCULAR; INTRAVENOUS ONCE AS NEEDED
Status: DISCONTINUED | OUTPATIENT
Start: 2018-04-19 | End: 2018-04-19 | Stop reason: HOSPADM

## 2018-04-19 RX ORDER — FUROSEMIDE 80 MG
80 TABLET ORAL 3 TIMES DAILY
Status: DISCONTINUED | OUTPATIENT
Start: 2018-04-19 | End: 2018-04-20 | Stop reason: HOSPADM

## 2018-04-19 RX ORDER — MIDAZOLAM HYDROCHLORIDE 1 MG/ML
2 INJECTION INTRAMUSCULAR; INTRAVENOUS
Status: DISCONTINUED | OUTPATIENT
Start: 2018-04-19 | End: 2018-04-19 | Stop reason: HOSPADM

## 2018-04-19 RX ORDER — FLUMAZENIL 0.1 MG/ML
0.2 INJECTION INTRAVENOUS AS NEEDED
Status: DISCONTINUED | OUTPATIENT
Start: 2018-04-19 | End: 2018-04-19 | Stop reason: HOSPADM

## 2018-04-19 RX ORDER — PROMETHAZINE HYDROCHLORIDE 25 MG/1
25 TABLET ORAL ONCE AS NEEDED
Status: DISCONTINUED | OUTPATIENT
Start: 2018-04-19 | End: 2018-04-19 | Stop reason: HOSPADM

## 2018-04-19 RX ORDER — SODIUM CHLORIDE 9 MG/ML
9 INJECTION, SOLUTION INTRAVENOUS CONTINUOUS PRN
Status: DISCONTINUED | OUTPATIENT
Start: 2018-04-19 | End: 2018-04-19 | Stop reason: HOSPADM

## 2018-04-19 RX ORDER — PROPOFOL 10 MG/ML
VIAL (ML) INTRAVENOUS CONTINUOUS PRN
Status: DISCONTINUED | OUTPATIENT
Start: 2018-04-19 | End: 2018-04-19 | Stop reason: SURG

## 2018-04-19 RX ORDER — LIDOCAINE HYDROCHLORIDE 20 MG/ML
INJECTION, SOLUTION INFILTRATION; PERINEURAL AS NEEDED
Status: DISCONTINUED | OUTPATIENT
Start: 2018-04-19 | End: 2018-04-19

## 2018-04-19 RX ORDER — LABETALOL HYDROCHLORIDE 5 MG/ML
5 INJECTION, SOLUTION INTRAVENOUS
Status: DISCONTINUED | OUTPATIENT
Start: 2018-04-19 | End: 2018-04-19 | Stop reason: HOSPADM

## 2018-04-19 RX ORDER — HYDROCODONE BITARTRATE AND ACETAMINOPHEN 7.5; 325 MG/1; MG/1
1 TABLET ORAL ONCE AS NEEDED
Status: DISCONTINUED | OUTPATIENT
Start: 2018-04-19 | End: 2018-04-19 | Stop reason: HOSPADM

## 2018-04-19 RX ORDER — FAMOTIDINE 10 MG/ML
20 INJECTION, SOLUTION INTRAVENOUS ONCE
Status: COMPLETED | OUTPATIENT
Start: 2018-04-19 | End: 2018-04-19

## 2018-04-19 RX ORDER — DIPHENHYDRAMINE HYDROCHLORIDE 50 MG/ML
12.5 INJECTION INTRAMUSCULAR; INTRAVENOUS
Status: DISCONTINUED | OUTPATIENT
Start: 2018-04-19 | End: 2018-04-19 | Stop reason: HOSPADM

## 2018-04-19 RX ORDER — HYDROMORPHONE HCL 110MG/55ML
0.5 PATIENT CONTROLLED ANALGESIA SYRINGE INTRAVENOUS
Status: DISCONTINUED | OUTPATIENT
Start: 2018-04-19 | End: 2018-04-19 | Stop reason: HOSPADM

## 2018-04-19 RX ORDER — LIDOCAINE HYDROCHLORIDE 10 MG/ML
0.5 INJECTION, SOLUTION EPIDURAL; INFILTRATION; INTRACAUDAL; PERINEURAL ONCE AS NEEDED
Status: DISCONTINUED | OUTPATIENT
Start: 2018-04-19 | End: 2018-04-19 | Stop reason: HOSPADM

## 2018-04-19 RX ORDER — PROMETHAZINE HYDROCHLORIDE 25 MG/1
12.5 TABLET ORAL ONCE AS NEEDED
Status: DISCONTINUED | OUTPATIENT
Start: 2018-04-19 | End: 2018-04-19 | Stop reason: HOSPADM

## 2018-04-19 RX ADMIN — FENTANYL CITRATE 25 MCG: 50 INJECTION INTRAMUSCULAR; INTRAVENOUS at 13:29

## 2018-04-19 RX ADMIN — TIOTROPIUM BROMIDE 1 CAPSULE: 18 CAPSULE ORAL; RESPIRATORY (INHALATION) at 10:10

## 2018-04-19 RX ADMIN — HEPARIN SODIUM 3000 UNITS: 1000 INJECTION, SOLUTION INTRAVENOUS; SUBCUTANEOUS at 13:51

## 2018-04-19 RX ADMIN — FUROSEMIDE 80 MG: 80 TABLET ORAL at 21:54

## 2018-04-19 RX ADMIN — PROPOFOL 150 MG: 10 INJECTION, EMULSION INTRAVENOUS at 13:20

## 2018-04-19 RX ADMIN — DOCUSATE SODIUM -SENNOSIDES 2 TABLET: 50; 8.6 TABLET, COATED ORAL at 08:32

## 2018-04-19 RX ADMIN — HYDRALAZINE HYDROCHLORIDE 100 MG: 50 TABLET, FILM COATED ORAL at 06:09

## 2018-04-19 RX ADMIN — GABAPENTIN 100 MG: 100 CAPSULE ORAL at 06:09

## 2018-04-19 RX ADMIN — PHENYLEPHRINE HYDROCHLORIDE 100 MCG: 10 INJECTION INTRAVENOUS at 14:10

## 2018-04-19 RX ADMIN — BUDESONIDE AND FORMOTEROL FUMARATE DIHYDRATE 2 PUFF: 160; 4.5 AEROSOL RESPIRATORY (INHALATION) at 10:10

## 2018-04-19 RX ADMIN — BUDESONIDE AND FORMOTEROL FUMARATE DIHYDRATE 2 PUFF: 160; 4.5 AEROSOL RESPIRATORY (INHALATION) at 21:38

## 2018-04-19 RX ADMIN — EPHEDRINE SULFATE 15 MG: 50 INJECTION INTRAMUSCULAR; INTRAVENOUS; SUBCUTANEOUS at 13:57

## 2018-04-19 RX ADMIN — PROPOFOL 100 MCG/KG/MIN: 10 INJECTION, EMULSION INTRAVENOUS at 13:20

## 2018-04-19 RX ADMIN — DULOXETINE HYDROCHLORIDE 60 MG: 60 CAPSULE, DELAYED RELEASE ORAL at 08:33

## 2018-04-19 RX ADMIN — EPHEDRINE SULFATE 10 MG: 50 INJECTION INTRAMUSCULAR; INTRAVENOUS; SUBCUTANEOUS at 13:47

## 2018-04-19 RX ADMIN — GABAPENTIN 100 MG: 100 CAPSULE ORAL at 21:54

## 2018-04-19 RX ADMIN — POTASSIUM CHLORIDE 30 MEQ: 750 CAPSULE, EXTENDED RELEASE ORAL at 17:15

## 2018-04-19 RX ADMIN — ENOXAPARIN SODIUM 30 MG: 30 INJECTION SUBCUTANEOUS at 04:23

## 2018-04-19 RX ADMIN — ROSUVASTATIN CALCIUM 10 MG: 10 TABLET, FILM COATED ORAL at 08:33

## 2018-04-19 RX ADMIN — PHENYLEPHRINE HYDROCHLORIDE 100 MCG: 10 INJECTION INTRAVENOUS at 14:15

## 2018-04-19 RX ADMIN — CALCIUM ACETATE 667 MG: 667 CAPSULE ORAL at 17:50

## 2018-04-19 RX ADMIN — CEFAZOLIN SODIUM 2 G: 2 INJECTION, SOLUTION INTRAVENOUS at 13:30

## 2018-04-19 RX ADMIN — EPHEDRINE SULFATE 10 MG: 50 INJECTION INTRAMUSCULAR; INTRAVENOUS; SUBCUTANEOUS at 13:53

## 2018-04-19 RX ADMIN — PHENYLEPHRINE HYDROCHLORIDE 100 MCG: 10 INJECTION INTRAVENOUS at 14:02

## 2018-04-19 RX ADMIN — ASPIRIN 81 MG: 81 TABLET ORAL at 08:33

## 2018-04-19 RX ADMIN — PANCRELIPASE 36000 UNITS OF LIPASE: 60000; 12000; 38000 CAPSULE, DELAYED RELEASE PELLETS ORAL at 17:50

## 2018-04-19 RX ADMIN — ALLOPURINOL 150 MG: 300 TABLET ORAL at 08:33

## 2018-04-19 RX ADMIN — METHOCARBAMOL 750 MG: 750 TABLET ORAL at 21:54

## 2018-04-19 RX ADMIN — FUROSEMIDE 80 MG: 80 TABLET ORAL at 08:33

## 2018-04-19 RX ADMIN — EPHEDRINE SULFATE 10 MG: 50 INJECTION INTRAMUSCULAR; INTRAVENOUS; SUBCUTANEOUS at 13:41

## 2018-04-19 RX ADMIN — FAMOTIDINE 20 MG: 10 INJECTION INTRAVENOUS at 12:19

## 2018-04-19 RX ADMIN — HYDROCODONE BITARTRATE AND ACETAMINOPHEN 1 TABLET: 10; 325 TABLET ORAL at 21:54

## 2018-04-19 RX ADMIN — PANTOPRAZOLE SODIUM 40 MG: 40 TABLET, DELAYED RELEASE ORAL at 08:33

## 2018-04-19 RX ADMIN — SODIUM CHLORIDE: 9 INJECTION, SOLUTION INTRAVENOUS at 13:17

## 2018-04-19 RX ADMIN — METHOCARBAMOL 750 MG: 750 TABLET ORAL at 08:33

## 2018-04-19 RX ADMIN — OXYCODONE HYDROCHLORIDE 10 MG: 10 TABLET, FILM COATED, EXTENDED RELEASE ORAL at 04:23

## 2018-04-19 RX ADMIN — Medication 2 MG: at 12:19

## 2018-04-19 RX ADMIN — CHLORTHALIDONE 25 MG: 25 TABLET ORAL at 08:32

## 2018-04-19 RX ADMIN — AMLODIPINE BESYLATE 10 MG: 10 TABLET ORAL at 08:34

## 2018-04-19 RX ADMIN — BISOPROLOL FUMARATE 10 MG: 5 TABLET ORAL at 08:33

## 2018-04-19 RX ADMIN — PHENYLEPHRINE HYDROCHLORIDE 100 MCG: 10 INJECTION INTRAVENOUS at 13:58

## 2018-04-19 RX ADMIN — Medication 2 MG: at 13:18

## 2018-04-19 RX ADMIN — EPHEDRINE SULFATE 5 MG: 50 INJECTION INTRAMUSCULAR; INTRAVENOUS; SUBCUTANEOUS at 13:36

## 2018-04-19 RX ADMIN — FUROSEMIDE 80 MG: 80 TABLET ORAL at 17:15

## 2018-04-19 NOTE — ANESTHESIA POSTPROCEDURE EVALUATION
Patient: Sergio Phan    Procedure Summary     Date:  04/19/18 Room / Location:  [facility] OR 09 / [facility] MAIN OR    Anesthesia Start:  1317 Anesthesia Stop:  1430    Procedure:  RT/ ARTERIOVENOUS FISTULA FORMATION (Right ) Diagnosis:      Surgeon:  Chuck Woodward MD Provider:  Holger Chung MD    Anesthesia Type:  MAC ASA Status:  3          Anesthesia Type: MAC  Last vitals  BP   144/80 (04/19/18 1148)   Temp   36.4 °C (97.5 °F) (04/19/18 1148)   Pulse   77 (04/19/18 1148)   Resp   20 (04/19/18 1148)     SpO2   93 % (04/19/18 1148)     Post Anesthesia Care and Evaluation    Patient location during evaluation: PACU  Patient participation: complete - patient participated  Level of consciousness: awake and alert  Pain management: adequate  Airway patency: patent  Anesthetic complications: No anesthetic complications    Cardiovascular status: acceptable  Respiratory status: acceptable  Hydration status: acceptable    Comments: ---------------------------               04/19/18                      1148         ---------------------------   BP:          144/80         Pulse:         77           Resp:          20           Temp:   36.4 °C (97.5 °F)   SpO2:          93%         ---------------------------

## 2018-04-19 NOTE — ANESTHESIA PREPROCEDURE EVALUATION
Anesthesia Evaluation     Patient summary reviewed and Nursing notes reviewed   history of anesthetic complications:               Airway   Mallampati: III  Dental      Pulmonary    (+) a smoker Former, COPD, asthma,   Cardiovascular     ECG reviewed  Rhythm: regular  Rate: normal    (+) hypertension, past MI , hyperlipidemia,       Neuro/Psych  (+) dizziness/light headedness, syncope, weakness, psychiatric history Anxiety,     GI/Hepatic/Renal/Endo    (+)   renal disease ESRD, diabetes mellitus type 2,     Musculoskeletal (-) negative ROS    Abdominal    Substance History - negative use     OB/GYN negative ob/gyn ROS         Other                        Anesthesia Plan    ASA 3     MAC   (AICD/Pacemaker  Old anterior MI on EKG   multiple major medical probelms)  intravenous induction   Anesthetic plan and risks discussed with patient.

## 2018-04-20 VITALS
HEART RATE: 68 BPM | HEIGHT: 72 IN | RESPIRATION RATE: 18 BRPM | WEIGHT: 213.41 LBS | TEMPERATURE: 97.6 F | DIASTOLIC BLOOD PRESSURE: 64 MMHG | SYSTOLIC BLOOD PRESSURE: 124 MMHG | BODY MASS INDEX: 28.91 KG/M2 | OXYGEN SATURATION: 95 %

## 2018-04-20 LAB
ANION GAP SERPL CALCULATED.3IONS-SCNC: 21.7 MMOL/L
BUN BLD-MCNC: 93 MG/DL (ref 6–20)
BUN/CREAT SERPL: 14.2 (ref 7–25)
CALCIUM SPEC-SCNC: 9.2 MG/DL (ref 8.6–10.5)
CHLORIDE SERPL-SCNC: 96 MMOL/L (ref 98–107)
CO2 SERPL-SCNC: 24.3 MMOL/L (ref 22–29)
CREAT BLD-MCNC: 6.53 MG/DL (ref 0.76–1.27)
GFR SERPL CREATININE-BSD FRML MDRD: 9 ML/MIN/1.73
GLUCOSE BLD-MCNC: 109 MG/DL (ref 65–99)
GLUCOSE BLDC GLUCOMTR-MCNC: 128 MG/DL (ref 70–130)
GLUCOSE BLDC GLUCOMTR-MCNC: 207 MG/DL (ref 70–130)
PHOSPHATE SERPL-MCNC: 6.4 MG/DL (ref 2.5–4.5)
POTASSIUM BLD-SCNC: 3.4 MMOL/L (ref 3.5–5.2)
SODIUM BLD-SCNC: 142 MMOL/L (ref 136–145)

## 2018-04-20 PROCEDURE — 63710000001 PROMETHAZINE PER 25 MG: Performed by: INTERNAL MEDICINE

## 2018-04-20 PROCEDURE — 63710000001 INSULIN ASPART PER 5 UNITS: Performed by: INTERNAL MEDICINE

## 2018-04-20 PROCEDURE — 80048 BASIC METABOLIC PNL TOTAL CA: CPT | Performed by: INTERNAL MEDICINE

## 2018-04-20 PROCEDURE — 25010000002 ENOXAPARIN PER 10 MG: Performed by: INTERNAL MEDICINE

## 2018-04-20 PROCEDURE — 94618 PULMONARY STRESS TESTING: CPT

## 2018-04-20 PROCEDURE — 99231 SBSQ HOSP IP/OBS SF/LOW 25: CPT | Performed by: NURSE PRACTITIONER

## 2018-04-20 PROCEDURE — 84100 ASSAY OF PHOSPHORUS: CPT | Performed by: INTERNAL MEDICINE

## 2018-04-20 PROCEDURE — 94799 UNLISTED PULMONARY SVC/PX: CPT

## 2018-04-20 PROCEDURE — 82962 GLUCOSE BLOOD TEST: CPT

## 2018-04-20 RX ORDER — NITROGLYCERIN 0.4 MG/1
TABLET SUBLINGUAL
Qty: 25 TABLET | Refills: 0 | Status: SHIPPED | OUTPATIENT
Start: 2018-04-20

## 2018-04-20 RX ORDER — HYDRALAZINE HYDROCHLORIDE 100 MG/1
100 TABLET, FILM COATED ORAL EVERY 8 HOURS SCHEDULED
Qty: 90 TABLET | Refills: 0 | Status: SHIPPED | OUTPATIENT
Start: 2018-04-20 | End: 2018-05-03 | Stop reason: HOSPADM

## 2018-04-20 RX ORDER — FUROSEMIDE 80 MG
80 TABLET ORAL 2 TIMES DAILY
Qty: 60 TABLET | Refills: 0 | Status: SHIPPED | OUTPATIENT
Start: 2018-04-20 | End: 2020-01-01

## 2018-04-20 RX ORDER — ALLOPURINOL 300 MG/1
150 TABLET ORAL DAILY
Qty: 30 TABLET | Refills: 0 | Status: SHIPPED | OUTPATIENT
Start: 2018-04-21 | End: 2020-01-01

## 2018-04-20 RX ORDER — FUROSEMIDE 80 MG
80 TABLET ORAL 3 TIMES DAILY
Qty: 90 TABLET | Refills: 0 | Status: SHIPPED | OUTPATIENT
Start: 2018-04-20 | End: 2018-04-20

## 2018-04-20 RX ADMIN — POTASSIUM CHLORIDE 30 MEQ: 750 CAPSULE, EXTENDED RELEASE ORAL at 08:58

## 2018-04-20 RX ADMIN — HYDRALAZINE HYDROCHLORIDE 100 MG: 50 TABLET, FILM COATED ORAL at 13:35

## 2018-04-20 RX ADMIN — DOCUSATE SODIUM -SENNOSIDES 2 TABLET: 50; 8.6 TABLET, COATED ORAL at 08:58

## 2018-04-20 RX ADMIN — PANCRELIPASE 36000 UNITS OF LIPASE: 60000; 12000; 38000 CAPSULE, DELAYED RELEASE PELLETS ORAL at 08:51

## 2018-04-20 RX ADMIN — TIOTROPIUM BROMIDE 1 CAPSULE: 18 CAPSULE ORAL; RESPIRATORY (INHALATION) at 07:35

## 2018-04-20 RX ADMIN — GABAPENTIN 100 MG: 100 CAPSULE ORAL at 03:46

## 2018-04-20 RX ADMIN — INSULIN ASPART 5 UNITS: 100 INJECTION, SOLUTION INTRAVENOUS; SUBCUTANEOUS at 11:26

## 2018-04-20 RX ADMIN — ALLOPURINOL 150 MG: 300 TABLET ORAL at 08:53

## 2018-04-20 RX ADMIN — PROMETHAZINE HYDROCHLORIDE 25 MG: 25 TABLET ORAL at 14:50

## 2018-04-20 RX ADMIN — PANTOPRAZOLE SODIUM 40 MG: 40 TABLET, DELAYED RELEASE ORAL at 08:58

## 2018-04-20 RX ADMIN — CHLORTHALIDONE 25 MG: 25 TABLET ORAL at 08:59

## 2018-04-20 RX ADMIN — CALCIUM ACETATE 667 MG: 667 CAPSULE ORAL at 08:51

## 2018-04-20 RX ADMIN — BUDESONIDE AND FORMOTEROL FUMARATE DIHYDRATE 2 PUFF: 160; 4.5 AEROSOL RESPIRATORY (INHALATION) at 07:35

## 2018-04-20 RX ADMIN — PANCRELIPASE 36000 UNITS OF LIPASE: 60000; 12000; 38000 CAPSULE, DELAYED RELEASE PELLETS ORAL at 11:27

## 2018-04-20 RX ADMIN — BISOPROLOL FUMARATE 10 MG: 5 TABLET ORAL at 08:59

## 2018-04-20 RX ADMIN — HYDRALAZINE HYDROCHLORIDE 100 MG: 50 TABLET, FILM COATED ORAL at 03:46

## 2018-04-20 RX ADMIN — FUROSEMIDE 80 MG: 80 TABLET ORAL at 08:59

## 2018-04-20 RX ADMIN — ENOXAPARIN SODIUM 30 MG: 30 INJECTION SUBCUTANEOUS at 03:46

## 2018-04-20 RX ADMIN — DULOXETINE HYDROCHLORIDE 60 MG: 60 CAPSULE, DELAYED RELEASE ORAL at 08:59

## 2018-04-20 RX ADMIN — ROSUVASTATIN CALCIUM 10 MG: 10 TABLET, FILM COATED ORAL at 08:58

## 2018-04-20 RX ADMIN — AMLODIPINE BESYLATE 10 MG: 10 TABLET ORAL at 08:59

## 2018-04-20 RX ADMIN — OXYCODONE HYDROCHLORIDE 10 MG: 10 TABLET, FILM COATED, EXTENDED RELEASE ORAL at 03:42

## 2018-04-20 RX ADMIN — METHOCARBAMOL 750 MG: 750 TABLET ORAL at 08:58

## 2018-04-20 RX ADMIN — ASPIRIN 81 MG: 81 TABLET ORAL at 08:59

## 2018-04-20 RX ADMIN — CALCIUM ACETATE 667 MG: 667 CAPSULE ORAL at 11:27

## 2018-04-20 RX ADMIN — GABAPENTIN 100 MG: 100 CAPSULE ORAL at 13:35

## 2018-04-25 ENCOUNTER — HOSPITAL ENCOUNTER (INPATIENT)
Facility: HOSPITAL | Age: 48
LOS: 8 days | Discharge: HOME HEALTH | End: 2018-05-03
Attending: EMERGENCY MEDICINE | Admitting: INTERNAL MEDICINE

## 2018-04-25 ENCOUNTER — APPOINTMENT (OUTPATIENT)
Dept: GENERAL RADIOLOGY | Facility: HOSPITAL | Age: 48
End: 2018-04-25

## 2018-04-25 ENCOUNTER — APPOINTMENT (OUTPATIENT)
Dept: CT IMAGING | Facility: HOSPITAL | Age: 48
End: 2018-04-25

## 2018-04-25 ENCOUNTER — APPOINTMENT (OUTPATIENT)
Dept: GENERAL RADIOLOGY | Facility: HOSPITAL | Age: 48
End: 2018-04-25
Attending: INTERNAL MEDICINE

## 2018-04-25 DIAGNOSIS — N18.5 CHRONIC RENAL FAILURE, STAGE 5 (HCC): ICD-10-CM

## 2018-04-25 DIAGNOSIS — E16.2 HYPOGLYCEMIA: ICD-10-CM

## 2018-04-25 DIAGNOSIS — Z74.09 IMPAIRED FUNCTIONAL MOBILITY, BALANCE, GAIT, AND ENDURANCE: ICD-10-CM

## 2018-04-25 DIAGNOSIS — R40.20 UNCONSCIOUS (HCC): Primary | ICD-10-CM

## 2018-04-25 LAB
ABO GROUP BLD: NORMAL
ALBUMIN SERPL-MCNC: 3.3 G/DL (ref 3.5–5.2)
ALBUMIN/GLOB SERPL: 1 G/DL
ALP SERPL-CCNC: 81 U/L (ref 39–117)
ALT SERPL W P-5'-P-CCNC: <5 U/L (ref 1–41)
AMPHET+METHAMPHET UR QL: NEGATIVE
ANION GAP SERPL CALCULATED.3IONS-SCNC: 24.5 MMOL/L
APTT PPP: 32.7 SECONDS (ref 22.7–35.4)
ARTERIAL PATENCY WRIST A: NEGATIVE
AST SERPL-CCNC: 20 U/L (ref 1–40)
ATMOSPHERIC PRESS: 748.7 MMHG
BACTERIA UR QL AUTO: NORMAL /HPF
BARBITURATES UR QL SCN: NEGATIVE
BASE EXCESS BLDA CALC-SCNC: 3.1 MMOL/L (ref 0–2)
BASOPHILS # BLD AUTO: 0.01 10*3/MM3 (ref 0–0.2)
BASOPHILS NFR BLD AUTO: 0.1 % (ref 0–1.5)
BDY SITE: ABNORMAL
BENZODIAZ UR QL SCN: POSITIVE
BILIRUB SERPL-MCNC: 0.5 MG/DL (ref 0.1–1.2)
BILIRUB UR QL STRIP: NEGATIVE
BLD GP AB SCN SERPL QL: NEGATIVE
BUN BLD-MCNC: 107 MG/DL (ref 6–20)
BUN/CREAT SERPL: 12.8 (ref 7–25)
CALCIUM SPEC-SCNC: 9.1 MG/DL (ref 8.6–10.5)
CANNABINOIDS SERPL QL: NEGATIVE
CHLORIDE SERPL-SCNC: 97 MMOL/L (ref 98–107)
CLARITY UR: CLEAR
CO2 SERPL-SCNC: 26.5 MMOL/L (ref 22–29)
COCAINE UR QL: NEGATIVE
COLOR UR: YELLOW
CREAT BLD-MCNC: 8.39 MG/DL (ref 0.76–1.27)
DEPRECATED RDW RBC AUTO: 47.4 FL (ref 37–54)
EOSINOPHIL # BLD AUTO: 0.04 10*3/MM3 (ref 0–0.7)
EOSINOPHIL NFR BLD AUTO: 0.4 % (ref 0.3–6.2)
ERYTHROCYTE [DISTWIDTH] IN BLOOD BY AUTOMATED COUNT: 14.3 % (ref 11.5–14.5)
GFR SERPL CREATININE-BSD FRML MDRD: 7 ML/MIN/1.73
GLOBULIN UR ELPH-MCNC: 3.3 GM/DL
GLUCOSE BLD-MCNC: 18 MG/DL (ref 65–99)
GLUCOSE BLDC GLUCOMTR-MCNC: 160 MG/DL (ref 70–130)
GLUCOSE BLDC GLUCOMTR-MCNC: 65 MG/DL (ref 70–130)
GLUCOSE BLDC GLUCOMTR-MCNC: 90 MG/DL (ref 70–130)
GLUCOSE UR STRIP-MCNC: NEGATIVE MG/DL
HCO3 BLDA-SCNC: 28.4 MMOL/L (ref 22–28)
HCT VFR BLD AUTO: 35.5 % (ref 40.4–52.2)
HGB BLD-MCNC: 10.7 G/DL (ref 13.7–17.6)
HGB UR QL STRIP.AUTO: NEGATIVE
HOROWITZ INDEX BLD+IHG-RTO: 100 %
HYALINE CASTS UR QL AUTO: NORMAL /LPF
IMM GRANULOCYTES # BLD: 0.02 10*3/MM3 (ref 0–0.03)
IMM GRANULOCYTES NFR BLD: 0.2 % (ref 0–0.5)
INR PPP: 1.33 (ref 0.9–1.1)
KETONES UR QL STRIP: NEGATIVE
LEUKOCYTE ESTERASE UR QL STRIP.AUTO: NEGATIVE
LYMPHOCYTES # BLD AUTO: 0.45 10*3/MM3 (ref 0.9–4.8)
LYMPHOCYTES NFR BLD AUTO: 4.3 % (ref 19.6–45.3)
MCH RBC QN AUTO: 27.2 PG (ref 27–32.7)
MCHC RBC AUTO-ENTMCNC: 30.1 G/DL (ref 32.6–36.4)
MCV RBC AUTO: 90.3 FL (ref 79.8–96.2)
METHADONE UR QL SCN: NEGATIVE
MODALITY: ABNORMAL
MONOCYTES # BLD AUTO: 0.43 10*3/MM3 (ref 0.2–1.2)
MONOCYTES NFR BLD AUTO: 4.1 % (ref 5–12)
NEUTROPHILS # BLD AUTO: 9.44 10*3/MM3 (ref 1.9–8.1)
NEUTROPHILS NFR BLD AUTO: 90.9 % (ref 42.7–76)
NITRITE UR QL STRIP: NEGATIVE
O2 A-A PPRESDIFF RESPIRATORY: 0.2 MMHG
OPIATES UR QL: NEGATIVE
OXYCODONE UR QL SCN: POSITIVE
PCO2 BLDA: 44.6 MM HG (ref 35–45)
PEEP RESPIRATORY: 5 CM[H2O]
PH BLDA: 7.41 PH UNITS (ref 7.35–7.45)
PH UR STRIP.AUTO: 5.5 [PH] (ref 5–8)
PLATELET # BLD AUTO: 243 10*3/MM3 (ref 140–500)
PMV BLD AUTO: 10.3 FL (ref 6–12)
PO2 BLDA: 171.6 MM HG (ref 80–100)
POTASSIUM BLD-SCNC: 3.4 MMOL/L (ref 3.5–5.2)
PROT SERPL-MCNC: 6.6 G/DL (ref 6–8.5)
PROT UR QL STRIP: ABNORMAL
PROTHROMBIN TIME: 16.3 SECONDS (ref 11.7–14.2)
RBC # BLD AUTO: 3.93 10*6/MM3 (ref 4.6–6)
RBC # UR: NORMAL /HPF
REF LAB TEST METHOD: NORMAL
RH BLD: POSITIVE
SAO2 % BLDCOA: 99.5 % (ref 92–99)
SET MECH RESP RATE: 14
SODIUM BLD-SCNC: 148 MMOL/L (ref 136–145)
SP GR UR STRIP: 1.01 (ref 1–1.03)
SQUAMOUS #/AREA URNS HPF: NORMAL /HPF
T&S EXPIRATION DATE: NORMAL
TOTAL RATE: 14 BREATHS/MINUTE
TROPONIN T SERPL-MCNC: 0.08 NG/ML (ref 0–0.03)
UROBILINOGEN UR QL STRIP: ABNORMAL
VENTILATOR MODE: ABNORMAL
VT ON VENT VENT: 650 ML
WBC NRBC COR # BLD: 10.39 10*3/MM3 (ref 4.5–10.7)
WBC UR QL AUTO: NORMAL /HPF

## 2018-04-25 PROCEDURE — 71045 X-RAY EXAM CHEST 1 VIEW: CPT

## 2018-04-25 PROCEDURE — 81001 URINALYSIS AUTO W/SCOPE: CPT | Performed by: EMERGENCY MEDICINE

## 2018-04-25 PROCEDURE — 94799 UNLISTED PULMONARY SVC/PX: CPT

## 2018-04-25 PROCEDURE — 80307 DRUG TEST PRSMV CHEM ANLYZR: CPT | Performed by: EMERGENCY MEDICINE

## 2018-04-25 PROCEDURE — 93005 ELECTROCARDIOGRAM TRACING: CPT | Performed by: EMERGENCY MEDICINE

## 2018-04-25 PROCEDURE — 5A1935Z RESPIRATORY VENTILATION, LESS THAN 24 CONSECUTIVE HOURS: ICD-10-PCS | Performed by: INTERNAL MEDICINE

## 2018-04-25 PROCEDURE — 99285 EMERGENCY DEPT VISIT HI MDM: CPT

## 2018-04-25 PROCEDURE — 86901 BLOOD TYPING SEROLOGIC RH(D): CPT | Performed by: EMERGENCY MEDICINE

## 2018-04-25 PROCEDURE — 84484 ASSAY OF TROPONIN QUANT: CPT | Performed by: EMERGENCY MEDICINE

## 2018-04-25 PROCEDURE — 86900 BLOOD TYPING SEROLOGIC ABO: CPT | Performed by: EMERGENCY MEDICINE

## 2018-04-25 PROCEDURE — 85025 COMPLETE CBC W/AUTO DIFF WBC: CPT | Performed by: EMERGENCY MEDICINE

## 2018-04-25 PROCEDURE — 93010 ELECTROCARDIOGRAM REPORT: CPT | Performed by: INTERNAL MEDICINE

## 2018-04-25 PROCEDURE — 74018 RADEX ABDOMEN 1 VIEW: CPT

## 2018-04-25 PROCEDURE — 70450 CT HEAD/BRAIN W/O DYE: CPT

## 2018-04-25 PROCEDURE — 94002 VENT MGMT INPAT INIT DAY: CPT

## 2018-04-25 PROCEDURE — 86850 RBC ANTIBODY SCREEN: CPT | Performed by: EMERGENCY MEDICINE

## 2018-04-25 PROCEDURE — 36600 WITHDRAWAL OF ARTERIAL BLOOD: CPT

## 2018-04-25 PROCEDURE — 85610 PROTHROMBIN TIME: CPT | Performed by: EMERGENCY MEDICINE

## 2018-04-25 PROCEDURE — 82962 GLUCOSE BLOOD TEST: CPT

## 2018-04-25 PROCEDURE — 82803 BLOOD GASES ANY COMBINATION: CPT

## 2018-04-25 PROCEDURE — 94640 AIRWAY INHALATION TREATMENT: CPT

## 2018-04-25 PROCEDURE — 99291 CRITICAL CARE FIRST HOUR: CPT

## 2018-04-25 PROCEDURE — 25010000002 PROPOFOL 1000 MG/ML EMULSION: Performed by: EMERGENCY MEDICINE

## 2018-04-25 PROCEDURE — 72125 CT NECK SPINE W/O DYE: CPT

## 2018-04-25 PROCEDURE — 80053 COMPREHEN METABOLIC PANEL: CPT | Performed by: EMERGENCY MEDICINE

## 2018-04-25 PROCEDURE — 85730 THROMBOPLASTIN TIME PARTIAL: CPT | Performed by: EMERGENCY MEDICINE

## 2018-04-25 RX ORDER — ACETAMINOPHEN 325 MG/1
650 TABLET ORAL EVERY 4 HOURS PRN
Status: DISCONTINUED | OUTPATIENT
Start: 2018-04-25 | End: 2018-05-03 | Stop reason: HOSPADM

## 2018-04-25 RX ORDER — PANTOPRAZOLE SODIUM 40 MG/1
40 TABLET, DELAYED RELEASE ORAL
Status: DISCONTINUED | OUTPATIENT
Start: 2018-04-26 | End: 2018-04-26

## 2018-04-25 RX ORDER — DEXTROSE MONOHYDRATE 25 G/50ML
25 INJECTION, SOLUTION INTRAVENOUS ONCE
Status: COMPLETED | OUTPATIENT
Start: 2018-04-25 | End: 2018-04-25

## 2018-04-25 RX ORDER — SODIUM CHLORIDE 9 MG/ML
125 INJECTION, SOLUTION INTRAVENOUS CONTINUOUS
Status: DISCONTINUED | OUTPATIENT
Start: 2018-04-25 | End: 2018-04-25

## 2018-04-25 RX ORDER — CHLORHEXIDINE GLUCONATE 0.12 MG/ML
15 RINSE ORAL EVERY 12 HOURS SCHEDULED
Status: DISCONTINUED | OUTPATIENT
Start: 2018-04-25 | End: 2018-04-26

## 2018-04-25 RX ORDER — SODIUM CHLORIDE 0.9 % (FLUSH) 0.9 %
1-10 SYRINGE (ML) INJECTION AS NEEDED
Status: DISCONTINUED | OUTPATIENT
Start: 2018-04-25 | End: 2018-05-03 | Stop reason: HOSPADM

## 2018-04-25 RX ORDER — BISOPROLOL FUMARATE 5 MG/1
5 TABLET, FILM COATED ORAL DAILY
Status: DISCONTINUED | OUTPATIENT
Start: 2018-04-25 | End: 2018-05-03 | Stop reason: HOSPADM

## 2018-04-25 RX ORDER — DEXTROSE AND SODIUM CHLORIDE 5; .45 G/100ML; G/100ML
100 INJECTION, SOLUTION INTRAVENOUS CONTINUOUS
Status: DISCONTINUED | OUTPATIENT
Start: 2018-04-25 | End: 2018-04-25

## 2018-04-25 RX ORDER — FUROSEMIDE 80 MG
80 TABLET ORAL 2 TIMES DAILY
Status: DISCONTINUED | OUTPATIENT
Start: 2018-04-25 | End: 2018-05-03 | Stop reason: HOSPADM

## 2018-04-25 RX ORDER — METHOCARBAMOL 750 MG/1
750 TABLET, FILM COATED ORAL 3 TIMES DAILY
Status: DISCONTINUED | OUTPATIENT
Start: 2018-04-25 | End: 2018-04-25

## 2018-04-25 RX ORDER — ONDANSETRON 2 MG/ML
4 INJECTION INTRAMUSCULAR; INTRAVENOUS EVERY 6 HOURS PRN
Status: DISCONTINUED | OUTPATIENT
Start: 2018-04-25 | End: 2018-04-29

## 2018-04-25 RX ORDER — ALBUTEROL SULFATE 90 UG/1
6 AEROSOL, METERED RESPIRATORY (INHALATION)
Status: DISCONTINUED | OUTPATIENT
Start: 2018-04-25 | End: 2018-04-26

## 2018-04-25 RX ORDER — GABAPENTIN 250 MG/5ML
300 SOLUTION ORAL NIGHTLY
Status: DISCONTINUED | OUTPATIENT
Start: 2018-04-25 | End: 2018-04-25

## 2018-04-25 RX ORDER — NICOTINE POLACRILEX 4 MG
15 LOZENGE BUCCAL
Status: DISCONTINUED | OUTPATIENT
Start: 2018-04-25 | End: 2018-05-03 | Stop reason: HOSPADM

## 2018-04-25 RX ORDER — ROSUVASTATIN CALCIUM 10 MG/1
10 TABLET, COATED ORAL DAILY
Status: DISCONTINUED | OUTPATIENT
Start: 2018-04-25 | End: 2018-04-27

## 2018-04-25 RX ORDER — DEXTROSE MONOHYDRATE 100 MG/ML
75 INJECTION, SOLUTION INTRAVENOUS CONTINUOUS
Status: DISCONTINUED | OUTPATIENT
Start: 2018-04-25 | End: 2018-04-27

## 2018-04-25 RX ORDER — SODIUM CHLORIDE 0.9 % (FLUSH) 0.9 %
10 SYRINGE (ML) INJECTION AS NEEDED
Status: DISCONTINUED | OUTPATIENT
Start: 2018-04-25 | End: 2018-05-03 | Stop reason: HOSPADM

## 2018-04-25 RX ORDER — HEPARIN SODIUM 5000 [USP'U]/ML
5000 INJECTION, SOLUTION INTRAVENOUS; SUBCUTANEOUS EVERY 8 HOURS SCHEDULED
Status: DISCONTINUED | OUTPATIENT
Start: 2018-04-25 | End: 2018-05-03 | Stop reason: HOSPADM

## 2018-04-25 RX ORDER — DEXTROSE MONOHYDRATE 25 G/50ML
25 INJECTION, SOLUTION INTRAVENOUS
Status: DISCONTINUED | OUTPATIENT
Start: 2018-04-25 | End: 2018-05-03 | Stop reason: HOSPADM

## 2018-04-25 RX ORDER — ASPIRIN 81 MG/1
81 TABLET ORAL DAILY
Status: DISCONTINUED | OUTPATIENT
Start: 2018-04-25 | End: 2018-05-03 | Stop reason: HOSPADM

## 2018-04-25 RX ORDER — IPRATROPIUM BROMIDE AND ALBUTEROL SULFATE 2.5; .5 MG/3ML; MG/3ML
3 SOLUTION RESPIRATORY (INHALATION) EVERY 6 HOURS PRN
Status: DISCONTINUED | OUTPATIENT
Start: 2018-04-25 | End: 2018-05-03 | Stop reason: HOSPADM

## 2018-04-25 RX ORDER — ALLOPURINOL 300 MG/1
150 TABLET ORAL DAILY
Status: DISCONTINUED | OUTPATIENT
Start: 2018-04-25 | End: 2018-05-03 | Stop reason: HOSPADM

## 2018-04-25 RX ADMIN — PROPOFOL 30 MCG/KG/MIN: 10 INJECTION, EMULSION INTRAVENOUS at 22:23

## 2018-04-25 RX ADMIN — DEXTROSE MONOHYDRATE 25 G: 25 INJECTION, SOLUTION INTRAVENOUS at 18:04

## 2018-04-25 RX ADMIN — DEXTROSE MONOHYDRATE 50 ML/HR: 100 INJECTION, SOLUTION INTRAVENOUS at 23:45

## 2018-04-25 RX ADMIN — SODIUM CHLORIDE 125 ML/HR: 9 INJECTION, SOLUTION INTRAVENOUS at 18:38

## 2018-04-25 RX ADMIN — DEXTROSE AND SODIUM CHLORIDE 100 ML/HR: 5; 450 INJECTION, SOLUTION INTRAVENOUS at 19:05

## 2018-04-25 RX ADMIN — PROPOFOL 30 MCG/KG/MIN: 10 INJECTION, EMULSION INTRAVENOUS at 18:12

## 2018-04-25 RX ADMIN — ALBUTEROL SULFATE 6 PUFF: 90 AEROSOL, METERED RESPIRATORY (INHALATION) at 23:50

## 2018-04-25 RX ADMIN — DEXTROSE MONOHYDRATE 25 G: 25 INJECTION, SOLUTION INTRAVENOUS at 22:25

## 2018-04-26 ENCOUNTER — APPOINTMENT (OUTPATIENT)
Dept: GENERAL RADIOLOGY | Facility: HOSPITAL | Age: 48
End: 2018-04-26

## 2018-04-26 LAB
ANION GAP SERPL CALCULATED.3IONS-SCNC: 18.5 MMOL/L
ANION GAP SERPL CALCULATED.3IONS-SCNC: 20.3 MMOL/L
ARTERIAL PATENCY WRIST A: POSITIVE
ARTERIAL PATENCY WRIST A: POSITIVE
ATMOSPHERIC PRESS: 746 MMHG
ATMOSPHERIC PRESS: 746.6 MMHG
BASE EXCESS BLDA CALC-SCNC: 1.1 MMOL/L (ref 0–2)
BASE EXCESS BLDA CALC-SCNC: 3.1 MMOL/L (ref 0–2)
BASOPHILS # BLD AUTO: 0.01 10*3/MM3 (ref 0–0.2)
BASOPHILS NFR BLD AUTO: 0.1 % (ref 0–1.5)
BDY SITE: ABNORMAL
BDY SITE: ABNORMAL
BUN BLD-MCNC: 104 MG/DL (ref 6–20)
BUN BLD-MCNC: 109 MG/DL (ref 6–20)
BUN/CREAT SERPL: 12.3 (ref 7–25)
BUN/CREAT SERPL: 13.4 (ref 7–25)
CALCIUM SPEC-SCNC: 8.6 MG/DL (ref 8.6–10.5)
CALCIUM SPEC-SCNC: 8.8 MG/DL (ref 8.6–10.5)
CHLORIDE SERPL-SCNC: 90 MMOL/L (ref 98–107)
CHLORIDE SERPL-SCNC: 94 MMOL/L (ref 98–107)
CO2 SERPL-SCNC: 25.5 MMOL/L (ref 22–29)
CO2 SERPL-SCNC: 27.7 MMOL/L (ref 22–29)
CREAT BLD-MCNC: 8.11 MG/DL (ref 0.76–1.27)
CREAT BLD-MCNC: 8.45 MG/DL (ref 0.76–1.27)
DEPRECATED RDW RBC AUTO: 48 FL (ref 37–54)
EOSINOPHIL # BLD AUTO: 0.1 10*3/MM3 (ref 0–0.7)
EOSINOPHIL NFR BLD AUTO: 0.9 % (ref 0.3–6.2)
ERYTHROCYTE [DISTWIDTH] IN BLOOD BY AUTOMATED COUNT: 14.7 % (ref 11.5–14.5)
GFR SERPL CREATININE-BSD FRML MDRD: 7 ML/MIN/1.73
GFR SERPL CREATININE-BSD FRML MDRD: 7 ML/MIN/1.73
GLUCOSE BLD-MCNC: 101 MG/DL (ref 65–99)
GLUCOSE BLD-MCNC: 118 MG/DL (ref 65–99)
GLUCOSE BLDC GLUCOMTR-MCNC: 105 MG/DL (ref 70–130)
GLUCOSE BLDC GLUCOMTR-MCNC: 109 MG/DL (ref 70–130)
GLUCOSE BLDC GLUCOMTR-MCNC: 121 MG/DL (ref 70–130)
GLUCOSE BLDC GLUCOMTR-MCNC: 123 MG/DL (ref 70–130)
GLUCOSE BLDC GLUCOMTR-MCNC: 128 MG/DL (ref 70–130)
GLUCOSE BLDC GLUCOMTR-MCNC: 129 MG/DL (ref 70–130)
GLUCOSE BLDC GLUCOMTR-MCNC: 130 MG/DL (ref 70–130)
GLUCOSE BLDC GLUCOMTR-MCNC: 136 MG/DL (ref 70–130)
GLUCOSE BLDC GLUCOMTR-MCNC: 138 MG/DL (ref 70–130)
GLUCOSE BLDC GLUCOMTR-MCNC: 148 MG/DL (ref 70–130)
GLUCOSE BLDC GLUCOMTR-MCNC: 158 MG/DL (ref 70–130)
GLUCOSE BLDC GLUCOMTR-MCNC: 168 MG/DL (ref 70–130)
GLUCOSE BLDC GLUCOMTR-MCNC: 67 MG/DL (ref 70–130)
GLUCOSE BLDC GLUCOMTR-MCNC: 73 MG/DL (ref 70–130)
HBA1C MFR BLD: 5.48 % (ref 4.8–5.6)
HCO3 BLDA-SCNC: 24.7 MMOL/L (ref 22–28)
HCO3 BLDA-SCNC: 27.8 MMOL/L (ref 22–28)
HCT VFR BLD AUTO: 35.1 % (ref 40.4–52.2)
HGB BLD-MCNC: 10.4 G/DL (ref 13.7–17.6)
HOROWITZ INDEX BLD+IHG-RTO: 30 %
HOROWITZ INDEX BLD+IHG-RTO: 40 %
IMM GRANULOCYTES # BLD: 0.02 10*3/MM3 (ref 0–0.03)
IMM GRANULOCYTES NFR BLD: 0.2 % (ref 0–0.5)
LYMPHOCYTES # BLD AUTO: 0.56 10*3/MM3 (ref 0.9–4.8)
LYMPHOCYTES NFR BLD AUTO: 5.2 % (ref 19.6–45.3)
MAGNESIUM SERPL-MCNC: 2.3 MG/DL (ref 1.6–2.6)
MCH RBC QN AUTO: 26.9 PG (ref 27–32.7)
MCHC RBC AUTO-ENTMCNC: 29.6 G/DL (ref 32.6–36.4)
MCV RBC AUTO: 90.9 FL (ref 79.8–96.2)
MODALITY: ABNORMAL
MODALITY: ABNORMAL
MONOCYTES # BLD AUTO: 0.54 10*3/MM3 (ref 0.2–1.2)
MONOCYTES NFR BLD AUTO: 5 % (ref 5–12)
NEUTROPHILS # BLD AUTO: 9.61 10*3/MM3 (ref 1.9–8.1)
NEUTROPHILS NFR BLD AUTO: 88.6 % (ref 42.7–76)
O2 A-A PPRESDIFF RESPIRATORY: 0.3 MMHG
O2 A-A PPRESDIFF RESPIRATORY: 0.4 MMHG
PCO2 BLDA: 34.9 MM HG (ref 35–45)
PCO2 BLDA: 42.3 MM HG (ref 35–45)
PEEP RESPIRATORY: 5 CM[H2O]
PEEP RESPIRATORY: 5 CM[H2O]
PH BLDA: 7.43 PH UNITS (ref 7.35–7.45)
PH BLDA: 7.46 PH UNITS (ref 7.35–7.45)
PHOSPHATE SERPL-MCNC: 8.6 MG/DL (ref 2.5–4.5)
PLATELET # BLD AUTO: 212 10*3/MM3 (ref 140–500)
PMV BLD AUTO: 10.4 FL (ref 6–12)
PO2 BLDA: 63 MM HG (ref 80–100)
PO2 BLDA: 98.4 MM HG (ref 80–100)
POTASSIUM BLD-SCNC: 2.9 MMOL/L (ref 3.5–5.2)
POTASSIUM BLD-SCNC: 4.1 MMOL/L (ref 3.5–5.2)
PSV: 8 CMH2O
RBC # BLD AUTO: 3.86 10*6/MM3 (ref 4.6–6)
SAO2 % BLDCOA: 93.1 % (ref 92–99)
SAO2 % BLDCOA: 97.8 % (ref 92–99)
SET MECH RESP RATE: 18
SODIUM BLD-SCNC: 138 MMOL/L (ref 136–145)
SODIUM BLD-SCNC: 138 MMOL/L (ref 136–145)
T4 FREE SERPL-MCNC: 1.18 NG/DL (ref 0.93–1.7)
TOTAL RATE: 11 BREATHS/MINUTE
TOTAL RATE: 20 BREATHS/MINUTE
TSH SERPL DL<=0.05 MIU/L-ACNC: 1.6 MIU/ML (ref 0.27–4.2)
VENTILATOR MODE: ABNORMAL
VENTILATOR MODE: ABNORMAL
VT ON VENT VENT: 450 ML
WBC NRBC COR # BLD: 10.84 10*3/MM3 (ref 4.5–10.7)

## 2018-04-26 PROCEDURE — 25010000002 PROPOFOL 1000 MG/ML EMULSION: Performed by: EMERGENCY MEDICINE

## 2018-04-26 PROCEDURE — 25010000002 HEPARIN (PORCINE) PER 1000 UNITS: Performed by: INTERNAL MEDICINE

## 2018-04-26 PROCEDURE — 94799 UNLISTED PULMONARY SVC/PX: CPT

## 2018-04-26 PROCEDURE — 87070 CULTURE OTHR SPECIMN AEROBIC: CPT | Performed by: INTERNAL MEDICINE

## 2018-04-26 PROCEDURE — 85025 COMPLETE CBC W/AUTO DIFF WBC: CPT | Performed by: INTERNAL MEDICINE

## 2018-04-26 PROCEDURE — 87040 BLOOD CULTURE FOR BACTERIA: CPT | Performed by: INTERNAL MEDICINE

## 2018-04-26 PROCEDURE — 92610 EVALUATE SWALLOWING FUNCTION: CPT

## 2018-04-26 PROCEDURE — 94003 VENT MGMT INPAT SUBQ DAY: CPT

## 2018-04-26 PROCEDURE — 82962 GLUCOSE BLOOD TEST: CPT

## 2018-04-26 PROCEDURE — 83735 ASSAY OF MAGNESIUM: CPT | Performed by: INTERNAL MEDICINE

## 2018-04-26 PROCEDURE — 87205 SMEAR GRAM STAIN: CPT | Performed by: INTERNAL MEDICINE

## 2018-04-26 PROCEDURE — 82803 BLOOD GASES ANY COMBINATION: CPT

## 2018-04-26 PROCEDURE — 84443 ASSAY THYROID STIM HORMONE: CPT | Performed by: INTERNAL MEDICINE

## 2018-04-26 PROCEDURE — 84439 ASSAY OF FREE THYROXINE: CPT | Performed by: INTERNAL MEDICINE

## 2018-04-26 PROCEDURE — 71045 X-RAY EXAM CHEST 1 VIEW: CPT

## 2018-04-26 PROCEDURE — 99222 1ST HOSP IP/OBS MODERATE 55: CPT | Performed by: INTERNAL MEDICINE

## 2018-04-26 PROCEDURE — 84100 ASSAY OF PHOSPHORUS: CPT | Performed by: INTERNAL MEDICINE

## 2018-04-26 PROCEDURE — 25010000002 PROPOFOL 1000 MG/ML EMULSION: Performed by: INTERNAL MEDICINE

## 2018-04-26 PROCEDURE — 87185 SC STD ENZYME DETCJ PER NZM: CPT | Performed by: INTERNAL MEDICINE

## 2018-04-26 PROCEDURE — 80048 BASIC METABOLIC PNL TOTAL CA: CPT | Performed by: INTERNAL MEDICINE

## 2018-04-26 PROCEDURE — 36600 WITHDRAWAL OF ARTERIAL BLOOD: CPT

## 2018-04-26 PROCEDURE — 87077 CULTURE AEROBIC IDENTIFY: CPT | Performed by: INTERNAL MEDICINE

## 2018-04-26 PROCEDURE — 25010000002 FUROSEMIDE PER 20 MG: Performed by: INTERNAL MEDICINE

## 2018-04-26 PROCEDURE — 83036 HEMOGLOBIN GLYCOSYLATED A1C: CPT | Performed by: INTERNAL MEDICINE

## 2018-04-26 PROCEDURE — 25010000003 POTASSIUM CHLORIDE 10 MEQ/100ML SOLUTION: Performed by: INTERNAL MEDICINE

## 2018-04-26 RX ORDER — POTASSIUM CHLORIDE 1.5 G/1.77G
20 POWDER, FOR SOLUTION ORAL ONCE
Status: COMPLETED | OUTPATIENT
Start: 2018-04-26 | End: 2018-04-26

## 2018-04-26 RX ORDER — PANTOPRAZOLE SODIUM 40 MG/10ML
40 INJECTION, POWDER, LYOPHILIZED, FOR SOLUTION INTRAVENOUS
Status: DISCONTINUED | OUTPATIENT
Start: 2018-04-26 | End: 2018-04-26

## 2018-04-26 RX ORDER — PANTOPRAZOLE SODIUM 40 MG/1
40 TABLET, DELAYED RELEASE ORAL
Status: DISCONTINUED | OUTPATIENT
Start: 2018-04-27 | End: 2018-05-03 | Stop reason: HOSPADM

## 2018-04-26 RX ORDER — POTASSIUM CHLORIDE 7.45 MG/ML
10 INJECTION INTRAVENOUS
Status: COMPLETED | OUTPATIENT
Start: 2018-04-26 | End: 2018-04-26

## 2018-04-26 RX ORDER — PANTOPRAZOLE SODIUM 40 MG/1
40 TABLET, DELAYED RELEASE ORAL
Status: DISCONTINUED | OUTPATIENT
Start: 2018-04-26 | End: 2018-04-26

## 2018-04-26 RX ORDER — FUROSEMIDE 10 MG/ML
40 INJECTION INTRAMUSCULAR; INTRAVENOUS ONCE
Status: COMPLETED | OUTPATIENT
Start: 2018-04-26 | End: 2018-04-26

## 2018-04-26 RX ADMIN — ALLOPURINOL 150 MG: 300 TABLET ORAL at 08:59

## 2018-04-26 RX ADMIN — POTASSIUM CHLORIDE 10 MEQ: 10 INJECTION, SOLUTION INTRAVENOUS at 11:00

## 2018-04-26 RX ADMIN — ALBUTEROL SULFATE 6 PUFF: 90 AEROSOL, METERED RESPIRATORY (INHALATION) at 04:20

## 2018-04-26 RX ADMIN — ACETAMINOPHEN 650 MG: 325 TABLET ORAL at 17:24

## 2018-04-26 RX ADMIN — DEXTROSE MONOHYDRATE 25 G: 25 INJECTION, SOLUTION INTRAVENOUS at 03:09

## 2018-04-26 RX ADMIN — ALLOPURINOL 150 MG: 300 TABLET ORAL at 01:02

## 2018-04-26 RX ADMIN — CHLORHEXIDINE GLUCONATE 15 ML: 1.2 RINSE ORAL at 09:02

## 2018-04-26 RX ADMIN — FUROSEMIDE 40 MG: 10 INJECTION, SOLUTION INTRAMUSCULAR; INTRAVENOUS at 05:44

## 2018-04-26 RX ADMIN — FUROSEMIDE 80 MG: 80 TABLET ORAL at 20:03

## 2018-04-26 RX ADMIN — CHLORHEXIDINE GLUCONATE 15 ML: 1.2 RINSE ORAL at 01:03

## 2018-04-26 RX ADMIN — ROSUVASTATIN CALCIUM 10 MG: 10 TABLET, FILM COATED ORAL at 01:01

## 2018-04-26 RX ADMIN — POTASSIUM CHLORIDE 10 MEQ: 10 INJECTION, SOLUTION INTRAVENOUS at 10:04

## 2018-04-26 RX ADMIN — PROPOFOL 30 MCG/KG/MIN: 10 INJECTION, EMULSION INTRAVENOUS at 03:32

## 2018-04-26 RX ADMIN — DEXTROSE MONOHYDRATE 75 ML/HR: 100 INJECTION, SOLUTION INTRAVENOUS at 16:24

## 2018-04-26 RX ADMIN — CHLORHEXIDINE GLUCONATE 15 ML: 1.2 RINSE ORAL at 20:19

## 2018-04-26 RX ADMIN — ALBUTEROL SULFATE 6 PUFF: 90 AEROSOL, METERED RESPIRATORY (INHALATION) at 12:18

## 2018-04-26 RX ADMIN — ALBUTEROL SULFATE 6 PUFF: 90 AEROSOL, METERED RESPIRATORY (INHALATION) at 08:41

## 2018-04-26 RX ADMIN — HEPARIN SODIUM 5000 UNITS: 5000 INJECTION, SOLUTION INTRAVENOUS; SUBCUTANEOUS at 16:27

## 2018-04-26 RX ADMIN — POTASSIUM CHLORIDE 10 MEQ: 10 INJECTION, SOLUTION INTRAVENOUS at 09:10

## 2018-04-26 RX ADMIN — BISOPROLOL FUMARATE 5 MG: 5 TABLET ORAL at 09:02

## 2018-04-26 RX ADMIN — POTASSIUM CHLORIDE 10 MEQ: 10 INJECTION, SOLUTION INTRAVENOUS at 07:57

## 2018-04-26 RX ADMIN — PANTOPRAZOLE SODIUM 40 MG: 40 INJECTION, POWDER, FOR SOLUTION INTRAVENOUS at 06:31

## 2018-04-26 RX ADMIN — HEPARIN SODIUM 5000 UNITS: 5000 INJECTION, SOLUTION INTRAVENOUS; SUBCUTANEOUS at 09:03

## 2018-04-26 RX ADMIN — PANTOPRAZOLE SODIUM 40 MG: 40 INJECTION, POWDER, FOR SOLUTION INTRAVENOUS at 17:25

## 2018-04-26 RX ADMIN — POTASSIUM CHLORIDE 20 MEQ: 1.5 POWDER, FOR SOLUTION ORAL at 07:17

## 2018-04-26 RX ADMIN — ROSUVASTATIN CALCIUM 10 MG: 10 TABLET, FILM COATED ORAL at 09:45

## 2018-04-26 RX ADMIN — PROPOFOL 30 MCG/KG/MIN: 10 INJECTION, EMULSION INTRAVENOUS at 10:03

## 2018-04-26 RX ADMIN — DEXTROSE MONOHYDRATE 25 G: 25 INJECTION, SOLUTION INTRAVENOUS at 06:31

## 2018-04-26 RX ADMIN — ASPIRIN 81 MG: 81 TABLET ORAL at 08:59

## 2018-04-26 RX ADMIN — HEPARIN SODIUM 5000 UNITS: 5000 INJECTION, SOLUTION INTRAVENOUS; SUBCUTANEOUS at 01:02

## 2018-04-26 RX ADMIN — FUROSEMIDE 80 MG: 80 TABLET ORAL at 09:02

## 2018-04-26 RX ADMIN — FUROSEMIDE 80 MG: 80 TABLET ORAL at 01:02

## 2018-04-27 ENCOUNTER — APPOINTMENT (OUTPATIENT)
Dept: GENERAL RADIOLOGY | Facility: HOSPITAL | Age: 48
End: 2018-04-27

## 2018-04-27 PROBLEM — R40.20 UNCONSCIOUS (HCC): Status: RESOLVED | Noted: 2018-04-25 | Resolved: 2018-04-27

## 2018-04-27 PROBLEM — E11.649 SEVERE DIABETIC HYPOGLYCEMIA (HCC): Status: ACTIVE | Noted: 2018-04-27

## 2018-04-27 LAB
ANION GAP SERPL CALCULATED.3IONS-SCNC: 19.2 MMOL/L
BASOPHILS # BLD AUTO: 0.02 10*3/MM3 (ref 0–0.2)
BASOPHILS NFR BLD AUTO: 0.2 % (ref 0–1.5)
BUN BLD-MCNC: 107 MG/DL (ref 6–20)
BUN/CREAT SERPL: 12.7 (ref 7–25)
CALCIUM SPEC-SCNC: 8.9 MG/DL (ref 8.6–10.5)
CHLORIDE SERPL-SCNC: 93 MMOL/L (ref 98–107)
CHOLEST SERPL-MCNC: 81 MG/DL (ref 0–200)
CO2 SERPL-SCNC: 24.8 MMOL/L (ref 22–29)
CREAT BLD-MCNC: 8.4 MG/DL (ref 0.76–1.27)
DEPRECATED RDW RBC AUTO: 47.5 FL (ref 37–54)
EOSINOPHIL # BLD AUTO: 0.16 10*3/MM3 (ref 0–0.7)
EOSINOPHIL NFR BLD AUTO: 1.4 % (ref 0.3–6.2)
ERYTHROCYTE [DISTWIDTH] IN BLOOD BY AUTOMATED COUNT: 14.5 % (ref 11.5–14.5)
GFR SERPL CREATININE-BSD FRML MDRD: 7 ML/MIN/1.73
GLUCOSE BLD-MCNC: 76 MG/DL (ref 65–99)
GLUCOSE BLDC GLUCOMTR-MCNC: 119 MG/DL (ref 70–130)
GLUCOSE BLDC GLUCOMTR-MCNC: 126 MG/DL (ref 70–130)
GLUCOSE BLDC GLUCOMTR-MCNC: 137 MG/DL (ref 70–130)
GLUCOSE BLDC GLUCOMTR-MCNC: 143 MG/DL (ref 70–130)
GLUCOSE BLDC GLUCOMTR-MCNC: 147 MG/DL (ref 70–130)
GLUCOSE BLDC GLUCOMTR-MCNC: 222 MG/DL (ref 70–130)
GLUCOSE BLDC GLUCOMTR-MCNC: 78 MG/DL (ref 70–130)
GLUCOSE BLDC GLUCOMTR-MCNC: 79 MG/DL (ref 70–130)
GLUCOSE BLDC GLUCOMTR-MCNC: 97 MG/DL (ref 70–130)
HBV CORE IGM SERPL QL IA: NORMAL
HBV SURFACE AG SERPL QL IA: NORMAL
HCT VFR BLD AUTO: 31.2 % (ref 40.4–52.2)
HDLC SERPL-MCNC: 33 MG/DL (ref 40–60)
HGB BLD-MCNC: 9.4 G/DL (ref 13.7–17.6)
IMM GRANULOCYTES # BLD: 0.03 10*3/MM3 (ref 0–0.03)
IMM GRANULOCYTES NFR BLD: 0.3 % (ref 0–0.5)
LDLC SERPL CALC-MCNC: 32 MG/DL (ref 0–100)
LDLC/HDLC SERPL: 0.96 {RATIO}
LYMPHOCYTES # BLD AUTO: 0.75 10*3/MM3 (ref 0.9–4.8)
LYMPHOCYTES NFR BLD AUTO: 6.7 % (ref 19.6–45.3)
MAGNESIUM SERPL-MCNC: 1.9 MG/DL (ref 1.6–2.6)
MAGNESIUM SERPL-MCNC: 2 MG/DL (ref 1.6–2.6)
MCH RBC QN AUTO: 27.2 PG (ref 27–32.7)
MCHC RBC AUTO-ENTMCNC: 30.1 G/DL (ref 32.6–36.4)
MCV RBC AUTO: 90.2 FL (ref 79.8–96.2)
MONOCYTES # BLD AUTO: 0.77 10*3/MM3 (ref 0.2–1.2)
MONOCYTES NFR BLD AUTO: 6.9 % (ref 5–12)
NEUTROPHILS # BLD AUTO: 9.4 10*3/MM3 (ref 1.9–8.1)
NEUTROPHILS NFR BLD AUTO: 84.5 % (ref 42.7–76)
PHOSPHATE SERPL-MCNC: 7.7 MG/DL (ref 2.5–4.5)
PLATELET # BLD AUTO: 201 10*3/MM3 (ref 140–500)
PMV BLD AUTO: 10.6 FL (ref 6–12)
POTASSIUM BLD-SCNC: 3.5 MMOL/L (ref 3.5–5.2)
POTASSIUM BLD-SCNC: 3.9 MMOL/L (ref 3.5–5.2)
RBC # BLD AUTO: 3.46 10*6/MM3 (ref 4.6–6)
SODIUM BLD-SCNC: 137 MMOL/L (ref 136–145)
TRIGL SERPL-MCNC: 82 MG/DL (ref 0–150)
VLDLC SERPL-MCNC: 16.4 MG/DL (ref 5–40)
WBC NRBC COR # BLD: 11.13 10*3/MM3 (ref 4.5–10.7)

## 2018-04-27 PROCEDURE — 84100 ASSAY OF PHOSPHORUS: CPT | Performed by: INTERNAL MEDICINE

## 2018-04-27 PROCEDURE — 84132 ASSAY OF SERUM POTASSIUM: CPT | Performed by: INTERNAL MEDICINE

## 2018-04-27 PROCEDURE — 71045 X-RAY EXAM CHEST 1 VIEW: CPT

## 2018-04-27 PROCEDURE — 87350 HEPATITIS BE AG IA: CPT | Performed by: INTERNAL MEDICINE

## 2018-04-27 PROCEDURE — 93010 ELECTROCARDIOGRAM REPORT: CPT | Performed by: INTERNAL MEDICINE

## 2018-04-27 PROCEDURE — 86705 HEP B CORE ANTIBODY IGM: CPT | Performed by: INTERNAL MEDICINE

## 2018-04-27 PROCEDURE — 99232 SBSQ HOSP IP/OBS MODERATE 35: CPT | Performed by: INTERNAL MEDICINE

## 2018-04-27 PROCEDURE — 93005 ELECTROCARDIOGRAM TRACING: CPT | Performed by: INTERNAL MEDICINE

## 2018-04-27 PROCEDURE — 80074 ACUTE HEPATITIS PANEL: CPT | Performed by: INTERNAL MEDICINE

## 2018-04-27 PROCEDURE — 80048 BASIC METABOLIC PNL TOTAL CA: CPT | Performed by: INTERNAL MEDICINE

## 2018-04-27 PROCEDURE — 83735 ASSAY OF MAGNESIUM: CPT | Performed by: INTERNAL MEDICINE

## 2018-04-27 PROCEDURE — 02HV33Z INSERTION OF INFUSION DEVICE INTO SUPERIOR VENA CAVA, PERCUTANEOUS APPROACH: ICD-10-PCS | Performed by: SURGERY

## 2018-04-27 PROCEDURE — 94799 UNLISTED PULMONARY SVC/PX: CPT

## 2018-04-27 PROCEDURE — 5A1D70Z PERFORMANCE OF URINARY FILTRATION, INTERMITTENT, LESS THAN 6 HOURS PER DAY: ICD-10-PCS | Performed by: INTERNAL MEDICINE

## 2018-04-27 PROCEDURE — 86706 HEP B SURFACE ANTIBODY: CPT | Performed by: INTERNAL MEDICINE

## 2018-04-27 PROCEDURE — 86707 HEPATITIS BE ANTIBODY: CPT | Performed by: INTERNAL MEDICINE

## 2018-04-27 PROCEDURE — 86704 HEP B CORE ANTIBODY TOTAL: CPT | Performed by: INTERNAL MEDICINE

## 2018-04-27 PROCEDURE — 85025 COMPLETE CBC W/AUTO DIFF WBC: CPT | Performed by: INTERNAL MEDICINE

## 2018-04-27 PROCEDURE — 82962 GLUCOSE BLOOD TEST: CPT

## 2018-04-27 PROCEDURE — 80061 LIPID PANEL: CPT | Performed by: INTERNAL MEDICINE

## 2018-04-27 PROCEDURE — B548ZZA ULTRASONOGRAPHY OF SUPERIOR VENA CAVA, GUIDANCE: ICD-10-PCS | Performed by: SURGERY

## 2018-04-27 PROCEDURE — 25010000002 HEPARIN (PORCINE) PER 1000 UNITS: Performed by: INTERNAL MEDICINE

## 2018-04-27 PROCEDURE — 92526 ORAL FUNCTION THERAPY: CPT

## 2018-04-27 RX ORDER — HYDROCODONE BITARTRATE AND ACETAMINOPHEN 10; 325 MG/1; MG/1
1 TABLET ORAL EVERY 6 HOURS PRN
Status: DISCONTINUED | OUTPATIENT
Start: 2018-04-27 | End: 2018-04-28

## 2018-04-27 RX ORDER — OXYCODONE HYDROCHLORIDE AND ACETAMINOPHEN 5; 325 MG/1; MG/1
2 TABLET ORAL EVERY 12 HOURS PRN
Status: DISCONTINUED | OUTPATIENT
Start: 2018-04-27 | End: 2018-04-28

## 2018-04-27 RX ORDER — ALBUMIN (HUMAN) 12.5 G/50ML
12.5 SOLUTION INTRAVENOUS AS NEEDED
Status: ACTIVE | OUTPATIENT
Start: 2018-04-27 | End: 2018-04-28

## 2018-04-27 RX ORDER — HEPARIN SODIUM 1000 [USP'U]/ML
3000 INJECTION, SOLUTION INTRAVENOUS; SUBCUTANEOUS ONCE
Status: DISCONTINUED | OUTPATIENT
Start: 2018-04-27 | End: 2018-04-27

## 2018-04-27 RX ORDER — ROSUVASTATIN CALCIUM 5 MG/1
5 TABLET, COATED ORAL DAILY
Status: DISCONTINUED | OUTPATIENT
Start: 2018-04-28 | End: 2018-05-02

## 2018-04-27 RX ORDER — HEPARIN SODIUM 1000 [USP'U]/ML
4000 INJECTION, SOLUTION INTRAVENOUS; SUBCUTANEOUS ONCE
Status: DISCONTINUED | OUTPATIENT
Start: 2018-04-27 | End: 2018-05-01

## 2018-04-27 RX ADMIN — HEPARIN SODIUM 5000 UNITS: 5000 INJECTION, SOLUTION INTRAVENOUS; SUBCUTANEOUS at 18:09

## 2018-04-27 RX ADMIN — ROSUVASTATIN CALCIUM 10 MG: 10 TABLET, FILM COATED ORAL at 08:36

## 2018-04-27 RX ADMIN — HEPARIN SODIUM 5000 UNITS: 5000 INJECTION, SOLUTION INTRAVENOUS; SUBCUTANEOUS at 08:36

## 2018-04-27 RX ADMIN — DEXTROSE MONOHYDRATE 75 ML/HR: 100 INJECTION, SOLUTION INTRAVENOUS at 06:38

## 2018-04-27 RX ADMIN — FUROSEMIDE 80 MG: 80 TABLET ORAL at 21:34

## 2018-04-27 RX ADMIN — PANTOPRAZOLE SODIUM 40 MG: 40 TABLET, DELAYED RELEASE ORAL at 18:09

## 2018-04-27 RX ADMIN — PANTOPRAZOLE SODIUM 40 MG: 40 TABLET, DELAYED RELEASE ORAL at 07:06

## 2018-04-27 RX ADMIN — OXYCODONE HYDROCHLORIDE AND ACETAMINOPHEN 2 TABLET: 5; 325 TABLET ORAL at 09:15

## 2018-04-27 RX ADMIN — HEPARIN SODIUM 5000 UNITS: 5000 INJECTION, SOLUTION INTRAVENOUS; SUBCUTANEOUS at 00:06

## 2018-04-27 RX ADMIN — HYDROCODONE BITARTRATE AND ACETAMINOPHEN 1 TABLET: 10; 325 TABLET ORAL at 16:39

## 2018-04-27 RX ADMIN — OXYCODONE HYDROCHLORIDE AND ACETAMINOPHEN 2 TABLET: 5; 325 TABLET ORAL at 23:19

## 2018-04-27 RX ADMIN — ASPIRIN 81 MG: 81 TABLET ORAL at 08:36

## 2018-04-27 RX ADMIN — FUROSEMIDE 80 MG: 80 TABLET ORAL at 08:36

## 2018-04-27 RX ADMIN — ALLOPURINOL 150 MG: 300 TABLET ORAL at 08:36

## 2018-04-27 RX ADMIN — BISOPROLOL FUMARATE 5 MG: 5 TABLET ORAL at 08:36

## 2018-04-28 ENCOUNTER — APPOINTMENT (OUTPATIENT)
Dept: GENERAL RADIOLOGY | Facility: HOSPITAL | Age: 48
End: 2018-04-28

## 2018-04-28 LAB
ANION GAP SERPL CALCULATED.3IONS-SCNC: 18.7 MMOL/L
BACTERIA SPEC RESP CULT: ABNORMAL
BASOPHILS # BLD AUTO: 0.02 10*3/MM3 (ref 0–0.2)
BASOPHILS NFR BLD AUTO: 0.3 % (ref 0–1.5)
BUN BLD-MCNC: 85 MG/DL (ref 6–20)
BUN/CREAT SERPL: 13 (ref 7–25)
CALCIUM SPEC-SCNC: 8.9 MG/DL (ref 8.6–10.5)
CHLORIDE SERPL-SCNC: 95 MMOL/L (ref 98–107)
CO2 SERPL-SCNC: 23.3 MMOL/L (ref 22–29)
CREAT BLD-MCNC: 6.52 MG/DL (ref 0.76–1.27)
DEPRECATED RDW RBC AUTO: 47.7 FL (ref 37–54)
EOSINOPHIL # BLD AUTO: 0.1 10*3/MM3 (ref 0–0.7)
EOSINOPHIL NFR BLD AUTO: 1.3 % (ref 0.3–6.2)
ERYTHROCYTE [DISTWIDTH] IN BLOOD BY AUTOMATED COUNT: 14.4 % (ref 11.5–14.5)
GFR SERPL CREATININE-BSD FRML MDRD: 9 ML/MIN/1.73
GLUCOSE BLD-MCNC: 124 MG/DL (ref 65–99)
GLUCOSE BLDC GLUCOMTR-MCNC: 115 MG/DL (ref 70–130)
GLUCOSE BLDC GLUCOMTR-MCNC: 127 MG/DL (ref 70–130)
GLUCOSE BLDC GLUCOMTR-MCNC: 136 MG/DL (ref 70–130)
GLUCOSE BLDC GLUCOMTR-MCNC: 173 MG/DL (ref 70–130)
GLUCOSE BLDC GLUCOMTR-MCNC: 197 MG/DL (ref 70–130)
GRAM STN SPEC: ABNORMAL
GRAM STN SPEC: ABNORMAL
HCT VFR BLD AUTO: 28.8 % (ref 40.4–52.2)
HGB BLD-MCNC: 8.6 G/DL (ref 13.7–17.6)
IMM GRANULOCYTES # BLD: 0 10*3/MM3 (ref 0–0.03)
IMM GRANULOCYTES NFR BLD: 0 % (ref 0–0.5)
LYMPHOCYTES # BLD AUTO: 0.74 10*3/MM3 (ref 0.9–4.8)
LYMPHOCYTES NFR BLD AUTO: 9.3 % (ref 19.6–45.3)
MAGNESIUM SERPL-MCNC: 1.9 MG/DL (ref 1.6–2.6)
MCH RBC QN AUTO: 27 PG (ref 27–32.7)
MCHC RBC AUTO-ENTMCNC: 29.9 G/DL (ref 32.6–36.4)
MCV RBC AUTO: 90.3 FL (ref 79.8–96.2)
MONOCYTES # BLD AUTO: 0.77 10*3/MM3 (ref 0.2–1.2)
MONOCYTES NFR BLD AUTO: 9.7 % (ref 5–12)
NEUTROPHILS # BLD AUTO: 6.31 10*3/MM3 (ref 1.9–8.1)
NEUTROPHILS NFR BLD AUTO: 79.4 % (ref 42.7–76)
PHOSPHATE SERPL-MCNC: 6.4 MG/DL (ref 2.5–4.5)
PLATELET # BLD AUTO: 175 10*3/MM3 (ref 140–500)
PMV BLD AUTO: 11 FL (ref 6–12)
POTASSIUM BLD-SCNC: 4 MMOL/L (ref 3.5–5.2)
RBC # BLD AUTO: 3.19 10*6/MM3 (ref 4.6–6)
SODIUM BLD-SCNC: 137 MMOL/L (ref 136–145)
WBC NRBC COR # BLD: 7.94 10*3/MM3 (ref 4.5–10.7)

## 2018-04-28 PROCEDURE — 25010000002 HEPARIN (PORCINE) PER 1000 UNITS: Performed by: INTERNAL MEDICINE

## 2018-04-28 PROCEDURE — 80048 BASIC METABOLIC PNL TOTAL CA: CPT | Performed by: INTERNAL MEDICINE

## 2018-04-28 PROCEDURE — 82962 GLUCOSE BLOOD TEST: CPT

## 2018-04-28 PROCEDURE — 99222 1ST HOSP IP/OBS MODERATE 55: CPT | Performed by: INTERNAL MEDICINE

## 2018-04-28 PROCEDURE — 83735 ASSAY OF MAGNESIUM: CPT | Performed by: INTERNAL MEDICINE

## 2018-04-28 PROCEDURE — 85025 COMPLETE CBC W/AUTO DIFF WBC: CPT | Performed by: INTERNAL MEDICINE

## 2018-04-28 PROCEDURE — 25010000002 CEFTRIAXONE PER 250 MG: Performed by: INTERNAL MEDICINE

## 2018-04-28 PROCEDURE — 71045 X-RAY EXAM CHEST 1 VIEW: CPT

## 2018-04-28 PROCEDURE — 84100 ASSAY OF PHOSPHORUS: CPT | Performed by: INTERNAL MEDICINE

## 2018-04-28 PROCEDURE — 94799 UNLISTED PULMONARY SVC/PX: CPT

## 2018-04-28 PROCEDURE — 99232 SBSQ HOSP IP/OBS MODERATE 35: CPT | Performed by: INTERNAL MEDICINE

## 2018-04-28 PROCEDURE — 5A1D70Z PERFORMANCE OF URINARY FILTRATION, INTERMITTENT, LESS THAN 6 HOURS PER DAY: ICD-10-PCS | Performed by: INTERNAL MEDICINE

## 2018-04-28 RX ORDER — ALBUMIN (HUMAN) 12.5 G/50ML
12.5 SOLUTION INTRAVENOUS AS NEEDED
Status: ACTIVE | OUTPATIENT
Start: 2018-04-28 | End: 2018-04-29

## 2018-04-28 RX ORDER — OXYCODONE HYDROCHLORIDE 10 MG/1
10 TABLET ORAL 4 TIMES DAILY PRN
COMMUNITY

## 2018-04-28 RX ORDER — CEFTRIAXONE SODIUM 1 G/50ML
1 INJECTION, SOLUTION INTRAVENOUS EVERY 24 HOURS
Status: DISCONTINUED | OUTPATIENT
Start: 2018-04-28 | End: 2018-05-03 | Stop reason: HOSPADM

## 2018-04-28 RX ORDER — OXYCODONE HYDROCHLORIDE 5 MG/1
10 TABLET ORAL EVERY 6 HOURS PRN
Status: DISCONTINUED | OUTPATIENT
Start: 2018-04-28 | End: 2018-05-03 | Stop reason: HOSPADM

## 2018-04-28 RX ORDER — HEPARIN SODIUM 1000 [USP'U]/ML
3600 INJECTION, SOLUTION INTRAVENOUS; SUBCUTANEOUS ONCE
Status: COMPLETED | OUTPATIENT
Start: 2018-04-28 | End: 2018-04-28

## 2018-04-28 RX ADMIN — PANTOPRAZOLE SODIUM 40 MG: 40 TABLET, DELAYED RELEASE ORAL at 08:25

## 2018-04-28 RX ADMIN — HEPARIN SODIUM 3600 UNITS: 1000 INJECTION, SOLUTION INTRAVENOUS; SUBCUTANEOUS at 17:19

## 2018-04-28 RX ADMIN — HYDROCODONE BITARTRATE AND ACETAMINOPHEN 1 TABLET: 10; 325 TABLET ORAL at 02:51

## 2018-04-28 RX ADMIN — ASPIRIN 81 MG: 81 TABLET ORAL at 08:25

## 2018-04-28 RX ADMIN — OXYCODONE HYDROCHLORIDE 10 MG: 5 TABLET ORAL at 18:35

## 2018-04-28 RX ADMIN — HEPARIN SODIUM 5000 UNITS: 5000 INJECTION, SOLUTION INTRAVENOUS; SUBCUTANEOUS at 08:25

## 2018-04-28 RX ADMIN — HEPARIN SODIUM 5000 UNITS: 5000 INJECTION, SOLUTION INTRAVENOUS; SUBCUTANEOUS at 01:06

## 2018-04-28 RX ADMIN — BISOPROLOL FUMARATE 5 MG: 5 TABLET ORAL at 08:24

## 2018-04-28 RX ADMIN — OXYCODONE HYDROCHLORIDE 10 MG: 5 TABLET ORAL at 12:09

## 2018-04-28 RX ADMIN — CEFTRIAXONE SODIUM 1 G: 1 INJECTION, SOLUTION INTRAVENOUS at 12:44

## 2018-04-28 RX ADMIN — ALLOPURINOL 150 MG: 300 TABLET ORAL at 08:25

## 2018-04-28 RX ADMIN — HYDROCODONE BITARTRATE AND ACETAMINOPHEN 1 TABLET: 10; 325 TABLET ORAL at 08:25

## 2018-04-28 RX ADMIN — FUROSEMIDE 80 MG: 80 TABLET ORAL at 08:24

## 2018-04-28 RX ADMIN — FUROSEMIDE 80 MG: 80 TABLET ORAL at 20:43

## 2018-04-28 RX ADMIN — HEPARIN SODIUM 5000 UNITS: 5000 INJECTION, SOLUTION INTRAVENOUS; SUBCUTANEOUS at 18:35

## 2018-04-28 RX ADMIN — PANTOPRAZOLE SODIUM 40 MG: 40 TABLET, DELAYED RELEASE ORAL at 18:35

## 2018-04-28 RX ADMIN — ROSUVASTATIN CALCIUM 5 MG: 5 TABLET, FILM COATED ORAL at 08:25

## 2018-04-29 LAB
ANION GAP SERPL CALCULATED.3IONS-SCNC: 18.2 MMOL/L
BASOPHILS # BLD AUTO: 0.01 10*3/MM3 (ref 0–0.2)
BASOPHILS NFR BLD AUTO: 0.1 % (ref 0–1.5)
BUN BLD-MCNC: 59 MG/DL (ref 6–20)
BUN/CREAT SERPL: 10.8 (ref 7–25)
CALCIUM SPEC-SCNC: 9.5 MG/DL (ref 8.6–10.5)
CHLORIDE SERPL-SCNC: 99 MMOL/L (ref 98–107)
CO2 SERPL-SCNC: 23.8 MMOL/L (ref 22–29)
CREAT BLD-MCNC: 5.48 MG/DL (ref 0.76–1.27)
DEPRECATED RDW RBC AUTO: 47.2 FL (ref 37–54)
EOSINOPHIL # BLD AUTO: 0.14 10*3/MM3 (ref 0–0.7)
EOSINOPHIL NFR BLD AUTO: 1.9 % (ref 0.3–6.2)
ERYTHROCYTE [DISTWIDTH] IN BLOOD BY AUTOMATED COUNT: 14.4 % (ref 11.5–14.5)
GFR SERPL CREATININE-BSD FRML MDRD: 11 ML/MIN/1.73
GLUCOSE BLD-MCNC: 102 MG/DL (ref 65–99)
GLUCOSE BLDC GLUCOMTR-MCNC: 101 MG/DL (ref 70–130)
GLUCOSE BLDC GLUCOMTR-MCNC: 124 MG/DL (ref 70–130)
GLUCOSE BLDC GLUCOMTR-MCNC: 146 MG/DL (ref 70–130)
GLUCOSE BLDC GLUCOMTR-MCNC: 95 MG/DL (ref 70–130)
HCT VFR BLD AUTO: 32.4 % (ref 40.4–52.2)
HGB BLD-MCNC: 9.8 G/DL (ref 13.7–17.6)
IMM GRANULOCYTES # BLD: 0.02 10*3/MM3 (ref 0–0.03)
IMM GRANULOCYTES NFR BLD: 0.3 % (ref 0–0.5)
LYMPHOCYTES # BLD AUTO: 0.77 10*3/MM3 (ref 0.9–4.8)
LYMPHOCYTES NFR BLD AUTO: 10.7 % (ref 19.6–45.3)
MAGNESIUM SERPL-MCNC: 2.1 MG/DL (ref 1.6–2.6)
MCH RBC QN AUTO: 27.1 PG (ref 27–32.7)
MCHC RBC AUTO-ENTMCNC: 30.2 G/DL (ref 32.6–36.4)
MCV RBC AUTO: 89.8 FL (ref 79.8–96.2)
MONOCYTES # BLD AUTO: 0.61 10*3/MM3 (ref 0.2–1.2)
MONOCYTES NFR BLD AUTO: 8.4 % (ref 5–12)
NEUTROPHILS # BLD AUTO: 5.69 10*3/MM3 (ref 1.9–8.1)
NEUTROPHILS NFR BLD AUTO: 78.9 % (ref 42.7–76)
PHOSPHATE SERPL-MCNC: 4.9 MG/DL (ref 2.5–4.5)
PLATELET # BLD AUTO: 200 10*3/MM3 (ref 140–500)
PMV BLD AUTO: 10.9 FL (ref 6–12)
POTASSIUM BLD-SCNC: 4 MMOL/L (ref 3.5–5.2)
RBC # BLD AUTO: 3.61 10*6/MM3 (ref 4.6–6)
SODIUM BLD-SCNC: 141 MMOL/L (ref 136–145)
WBC NRBC COR # BLD: 7.22 10*3/MM3 (ref 4.5–10.7)

## 2018-04-29 PROCEDURE — 84100 ASSAY OF PHOSPHORUS: CPT | Performed by: INTERNAL MEDICINE

## 2018-04-29 PROCEDURE — 99231 SBSQ HOSP IP/OBS SF/LOW 25: CPT | Performed by: NURSE PRACTITIONER

## 2018-04-29 PROCEDURE — 97162 PT EVAL MOD COMPLEX 30 MIN: CPT

## 2018-04-29 PROCEDURE — 97110 THERAPEUTIC EXERCISES: CPT

## 2018-04-29 PROCEDURE — 99222 1ST HOSP IP/OBS MODERATE 55: CPT | Performed by: INTERNAL MEDICINE

## 2018-04-29 PROCEDURE — 5A1D70Z PERFORMANCE OF URINARY FILTRATION, INTERMITTENT, LESS THAN 6 HOURS PER DAY: ICD-10-PCS | Performed by: INTERNAL MEDICINE

## 2018-04-29 PROCEDURE — 99232 SBSQ HOSP IP/OBS MODERATE 35: CPT | Performed by: INTERNAL MEDICINE

## 2018-04-29 PROCEDURE — 82962 GLUCOSE BLOOD TEST: CPT

## 2018-04-29 PROCEDURE — 80048 BASIC METABOLIC PNL TOTAL CA: CPT | Performed by: INTERNAL MEDICINE

## 2018-04-29 PROCEDURE — 25010000002 ONDANSETRON PER 1 MG: Performed by: INTERNAL MEDICINE

## 2018-04-29 PROCEDURE — 85025 COMPLETE CBC W/AUTO DIFF WBC: CPT | Performed by: INTERNAL MEDICINE

## 2018-04-29 PROCEDURE — 25010000002 HEPARIN (PORCINE) PER 1000 UNITS: Performed by: INTERNAL MEDICINE

## 2018-04-29 PROCEDURE — 25010000002 CEFTRIAXONE PER 250 MG: Performed by: INTERNAL MEDICINE

## 2018-04-29 PROCEDURE — 36415 COLL VENOUS BLD VENIPUNCTURE: CPT | Performed by: INTERNAL MEDICINE

## 2018-04-29 PROCEDURE — 83735 ASSAY OF MAGNESIUM: CPT | Performed by: INTERNAL MEDICINE

## 2018-04-29 RX ORDER — HEPARIN SODIUM 1000 [USP'U]/ML
3600 INJECTION, SOLUTION INTRAVENOUS; SUBCUTANEOUS ONCE
Status: COMPLETED | OUTPATIENT
Start: 2018-04-29 | End: 2018-04-29

## 2018-04-29 RX ORDER — ONDANSETRON 2 MG/ML
4 INJECTION INTRAMUSCULAR; INTRAVENOUS EVERY 4 HOURS PRN
Status: DISCONTINUED | OUTPATIENT
Start: 2018-04-29 | End: 2018-05-03 | Stop reason: HOSPADM

## 2018-04-29 RX ADMIN — OXYCODONE HYDROCHLORIDE 10 MG: 5 TABLET ORAL at 06:31

## 2018-04-29 RX ADMIN — FUROSEMIDE 80 MG: 80 TABLET ORAL at 22:16

## 2018-04-29 RX ADMIN — HEPARIN SODIUM 5000 UNITS: 5000 INJECTION, SOLUTION INTRAVENOUS; SUBCUTANEOUS at 08:28

## 2018-04-29 RX ADMIN — BISOPROLOL FUMARATE 5 MG: 5 TABLET ORAL at 08:27

## 2018-04-29 RX ADMIN — HEPARIN SODIUM 5000 UNITS: 5000 INJECTION, SOLUTION INTRAVENOUS; SUBCUTANEOUS at 17:58

## 2018-04-29 RX ADMIN — ONDANSETRON 4 MG: 2 INJECTION INTRAMUSCULAR; INTRAVENOUS at 13:32

## 2018-04-29 RX ADMIN — FUROSEMIDE 80 MG: 80 TABLET ORAL at 08:27

## 2018-04-29 RX ADMIN — HEPARIN SODIUM 5000 UNITS: 5000 INJECTION, SOLUTION INTRAVENOUS; SUBCUTANEOUS at 00:30

## 2018-04-29 RX ADMIN — OXYCODONE HYDROCHLORIDE 10 MG: 5 TABLET ORAL at 13:25

## 2018-04-29 RX ADMIN — PANTOPRAZOLE SODIUM 40 MG: 40 TABLET, DELAYED RELEASE ORAL at 06:32

## 2018-04-29 RX ADMIN — OXYCODONE HYDROCHLORIDE 10 MG: 5 TABLET ORAL at 22:45

## 2018-04-29 RX ADMIN — OXYCODONE HYDROCHLORIDE 10 MG: 5 TABLET ORAL at 00:24

## 2018-04-29 RX ADMIN — CEFTRIAXONE SODIUM 1 G: 1 INJECTION, SOLUTION INTRAVENOUS at 13:25

## 2018-04-29 RX ADMIN — HEPARIN SODIUM 3600 UNITS: 1000 INJECTION, SOLUTION INTRAVENOUS; SUBCUTANEOUS at 12:57

## 2018-04-29 RX ADMIN — ASPIRIN 81 MG: 81 TABLET ORAL at 08:27

## 2018-04-29 RX ADMIN — ROSUVASTATIN CALCIUM 5 MG: 5 TABLET, FILM COATED ORAL at 08:27

## 2018-04-29 RX ADMIN — PANTOPRAZOLE SODIUM 40 MG: 40 TABLET, DELAYED RELEASE ORAL at 17:58

## 2018-04-29 RX ADMIN — ALLOPURINOL 150 MG: 300 TABLET ORAL at 08:27

## 2018-04-29 RX ADMIN — ONDANSETRON 4 MG: 2 INJECTION INTRAMUSCULAR; INTRAVENOUS at 17:59

## 2018-04-30 ENCOUNTER — INPATIENT HOSPITAL (OUTPATIENT)
Dept: URBAN - METROPOLITAN AREA HOSPITAL 27 | Facility: HOSPITAL | Age: 48
End: 2018-04-30
Payer: MEDICARE

## 2018-04-30 ENCOUNTER — APPOINTMENT (OUTPATIENT)
Dept: CARDIOLOGY | Facility: HOSPITAL | Age: 48
End: 2018-04-30
Attending: SURGERY

## 2018-04-30 ENCOUNTER — ANESTHESIA (OUTPATIENT)
Dept: PERIOP | Facility: HOSPITAL | Age: 48
End: 2018-04-30

## 2018-04-30 ENCOUNTER — ANESTHESIA EVENT (OUTPATIENT)
Dept: PERIOP | Facility: HOSPITAL | Age: 48
End: 2018-04-30

## 2018-04-30 ENCOUNTER — APPOINTMENT (OUTPATIENT)
Dept: GENERAL RADIOLOGY | Facility: HOSPITAL | Age: 48
End: 2018-04-30

## 2018-04-30 DIAGNOSIS — D64.9 ANEMIA, UNSPECIFIED: ICD-10-CM

## 2018-04-30 DIAGNOSIS — N18.6 END STAGE RENAL DISEASE: ICD-10-CM

## 2018-04-30 DIAGNOSIS — I73.9 PERIPHERAL VASCULAR DISEASE, UNSPECIFIED: ICD-10-CM

## 2018-04-30 DIAGNOSIS — K62.5 HEMORRHAGE OF ANUS AND RECTUM: ICD-10-CM

## 2018-04-30 PROBLEM — I70.229 ATHEROSCLEROSIS OF NATIVE ARTERIES OF EXTREMITY WITH REST PAIN (HCC): Status: ACTIVE | Noted: 2018-04-30

## 2018-04-30 LAB
ANION GAP SERPL CALCULATED.3IONS-SCNC: 15.1 MMOL/L
BASOPHILS # BLD AUTO: 0.01 10*3/MM3 (ref 0–0.2)
BASOPHILS NFR BLD AUTO: 0.1 % (ref 0–1.5)
BH CV LOWER ARTERIAL LEFT ABI RATIO: 0.59
BH CV LOWER ARTERIAL LEFT DORSALIS PEDIS SYS MAX: 86 MMHG
BH CV LOWER ARTERIAL LEFT GREAT TOE SYS MAX: 41 MMHG
BH CV LOWER ARTERIAL LEFT POST TIBIAL SYS MAX: 82 MMHG
BH CV LOWER ARTERIAL LEFT TBI RATIO: 0.28
BH CV LOWER ARTERIAL RIGHT ABI RATIO: 0.57
BH CV LOWER ARTERIAL RIGHT DORSALIS PEDIS SYS MAX: 77 MMHG
BH CV LOWER ARTERIAL RIGHT GREAT TOE SYS MAX: 61 MMHG
BH CV LOWER ARTERIAL RIGHT POST TIBIAL SYS MAX: 83 MMHG
BH CV LOWER ARTERIAL RIGHT TBI RATIO: 0.42
BUN BLD-MCNC: 39 MG/DL (ref 6–20)
BUN/CREAT SERPL: 8.8 (ref 7–25)
CALCIUM SPEC-SCNC: 9.8 MG/DL (ref 8.6–10.5)
CHLORIDE SERPL-SCNC: 98 MMOL/L (ref 98–107)
CO2 SERPL-SCNC: 25.9 MMOL/L (ref 22–29)
CREAT BLD-MCNC: 4.43 MG/DL (ref 0.76–1.27)
DEPRECATED RDW RBC AUTO: 47.1 FL (ref 37–54)
EOSINOPHIL # BLD AUTO: 0.13 10*3/MM3 (ref 0–0.7)
EOSINOPHIL NFR BLD AUTO: 1.7 % (ref 0.3–6.2)
ERYTHROCYTE [DISTWIDTH] IN BLOOD BY AUTOMATED COUNT: 14.3 % (ref 11.5–14.5)
GFR SERPL CREATININE-BSD FRML MDRD: 14 ML/MIN/1.73
GLUCOSE BLD-MCNC: 110 MG/DL (ref 65–99)
GLUCOSE BLDC GLUCOMTR-MCNC: 131 MG/DL (ref 70–130)
GLUCOSE BLDC GLUCOMTR-MCNC: 150 MG/DL (ref 70–130)
GLUCOSE BLDC GLUCOMTR-MCNC: 217 MG/DL (ref 70–130)
GLUCOSE BLDC GLUCOMTR-MCNC: 93 MG/DL (ref 70–130)
GLUCOSE BLDC GLUCOMTR-MCNC: 95 MG/DL (ref 70–130)
HBV CORE AB SER DONR QL IA: NEGATIVE
HBV CORE IGM SERPL QL IA: NEGATIVE
HBV E AB SERPL QL IA: NEGATIVE
HBV E AG SERPL QL IA: NEGATIVE
HBV SURFACE AB SER QL: NON REACTIVE
HBV SURFACE AG SERPL QL IA: NEGATIVE
HCT VFR BLD AUTO: 32.5 % (ref 40.4–52.2)
HCV AB S/CO SERPL IA: <0.1 S/CO RATIO (ref 0–0.9)
HGB BLD-MCNC: 9.9 G/DL (ref 13.7–17.6)
IMM GRANULOCYTES # BLD: 0.02 10*3/MM3 (ref 0–0.03)
IMM GRANULOCYTES NFR BLD: 0.3 % (ref 0–0.5)
LABORATORY COMMENT REPORT: NORMAL
LYMPHOCYTES # BLD AUTO: 0.91 10*3/MM3 (ref 0.9–4.8)
LYMPHOCYTES NFR BLD AUTO: 12.1 % (ref 19.6–45.3)
MAGNESIUM SERPL-MCNC: 2.1 MG/DL (ref 1.6–2.6)
MCH RBC QN AUTO: 27.4 PG (ref 27–32.7)
MCHC RBC AUTO-ENTMCNC: 30.5 G/DL (ref 32.6–36.4)
MCV RBC AUTO: 90 FL (ref 79.8–96.2)
MONOCYTES # BLD AUTO: 0.77 10*3/MM3 (ref 0.2–1.2)
MONOCYTES NFR BLD AUTO: 10.2 % (ref 5–12)
NEUTROPHILS # BLD AUTO: 5.71 10*3/MM3 (ref 1.9–8.1)
NEUTROPHILS NFR BLD AUTO: 75.9 % (ref 42.7–76)
PHOSPHATE SERPL-MCNC: 4.9 MG/DL (ref 2.5–4.5)
PLATELET # BLD AUTO: 244 10*3/MM3 (ref 140–500)
PMV BLD AUTO: 10.4 FL (ref 6–12)
POTASSIUM BLD-SCNC: 4.8 MMOL/L (ref 3.5–5.2)
RBC # BLD AUTO: 3.61 10*6/MM3 (ref 4.6–6)
SODIUM BLD-SCNC: 139 MMOL/L (ref 136–145)
UPPER ARTERIAL LEFT ARM BRACHIAL SYS MAX: 146 MMHG
WBC NRBC COR # BLD: 7.53 10*3/MM3 (ref 4.5–10.7)

## 2018-04-30 PROCEDURE — 82962 GLUCOSE BLOOD TEST: CPT

## 2018-04-30 PROCEDURE — 25010000002 HEPARIN (PORCINE) PER 1000 UNITS: Performed by: SURGERY

## 2018-04-30 PROCEDURE — 25010000002 PROPOFOL 10 MG/ML EMULSION: Performed by: ANESTHESIOLOGY

## 2018-04-30 PROCEDURE — C1750 CATH, HEMODIALYSIS,LONG-TERM: HCPCS | Performed by: SURGERY

## 2018-04-30 PROCEDURE — 25010000002 FENTANYL CITRATE (PF) 100 MCG/2ML SOLUTION: Performed by: ANESTHESIOLOGY

## 2018-04-30 PROCEDURE — B5181ZA FLUOROSCOPY OF SUPERIOR VENA CAVA USING LOW OSMOLAR CONTRAST, GUIDANCE: ICD-10-PCS | Performed by: SURGERY

## 2018-04-30 PROCEDURE — 85025 COMPLETE CBC W/AUTO DIFF WBC: CPT | Performed by: INTERNAL MEDICINE

## 2018-04-30 PROCEDURE — 80048 BASIC METABOLIC PNL TOTAL CA: CPT | Performed by: INTERNAL MEDICINE

## 2018-04-30 PROCEDURE — 83735 ASSAY OF MAGNESIUM: CPT | Performed by: INTERNAL MEDICINE

## 2018-04-30 PROCEDURE — C1894 INTRO/SHEATH, NON-LASER: HCPCS | Performed by: SURGERY

## 2018-04-30 PROCEDURE — 77001 FLUOROGUIDE FOR VEIN DEVICE: CPT

## 2018-04-30 PROCEDURE — 94799 UNLISTED PULMONARY SVC/PX: CPT

## 2018-04-30 PROCEDURE — 0JH63XZ INSERTION OF TUNNELED VASCULAR ACCESS DEVICE INTO CHEST SUBCUTANEOUS TISSUE AND FASCIA, PERCUTANEOUS APPROACH: ICD-10-PCS | Performed by: SURGERY

## 2018-04-30 PROCEDURE — 93923 UPR/LXTR ART STDY 3+ LVLS: CPT

## 2018-04-30 PROCEDURE — 25010000003 CEFAZOLIN IN DEXTROSE 2-4 GM/100ML-% SOLUTION: Performed by: SURGERY

## 2018-04-30 PROCEDURE — 99231 SBSQ HOSP IP/OBS SF/LOW 25: CPT

## 2018-04-30 PROCEDURE — 93005 ELECTROCARDIOGRAM TRACING: CPT | Performed by: NURSE PRACTITIONER

## 2018-04-30 PROCEDURE — 25010000002 MIDAZOLAM PER 1 MG: Performed by: ANESTHESIOLOGY

## 2018-04-30 PROCEDURE — 93010 ELECTROCARDIOGRAM REPORT: CPT | Performed by: INTERNAL MEDICINE

## 2018-04-30 PROCEDURE — 99231 SBSQ HOSP IP/OBS SF/LOW 25: CPT | Performed by: INTERNAL MEDICINE

## 2018-04-30 PROCEDURE — 84100 ASSAY OF PHOSPHORUS: CPT | Performed by: INTERNAL MEDICINE

## 2018-04-30 PROCEDURE — 02HV33Z INSERTION OF INFUSION DEVICE INTO SUPERIOR VENA CAVA, PERCUTANEOUS APPROACH: ICD-10-PCS | Performed by: SURGERY

## 2018-04-30 PROCEDURE — 25010000002 HEPARIN (PORCINE) PER 1000 UNITS: Performed by: INTERNAL MEDICINE

## 2018-04-30 RX ORDER — HYDROCODONE BITARTRATE AND ACETAMINOPHEN 7.5; 325 MG/1; MG/1
1 TABLET ORAL ONCE AS NEEDED
Status: DISCONTINUED | OUTPATIENT
Start: 2018-04-30 | End: 2018-04-30

## 2018-04-30 RX ORDER — FLUMAZENIL 0.1 MG/ML
0.2 INJECTION INTRAVENOUS AS NEEDED
Status: DISCONTINUED | OUTPATIENT
Start: 2018-04-30 | End: 2018-04-30

## 2018-04-30 RX ORDER — PROMETHAZINE HYDROCHLORIDE 25 MG/ML
12.5 INJECTION, SOLUTION INTRAMUSCULAR; INTRAVENOUS ONCE AS NEEDED
Status: DISCONTINUED | OUTPATIENT
Start: 2018-04-30 | End: 2018-04-30

## 2018-04-30 RX ORDER — PROMETHAZINE HYDROCHLORIDE 25 MG/1
25 SUPPOSITORY RECTAL ONCE AS NEEDED
Status: DISCONTINUED | OUTPATIENT
Start: 2018-04-30 | End: 2018-04-30

## 2018-04-30 RX ORDER — SODIUM CHLORIDE 9 MG/ML
9 INJECTION, SOLUTION INTRAVENOUS CONTINUOUS
Status: DISCONTINUED | OUTPATIENT
Start: 2018-04-30 | End: 2018-05-03 | Stop reason: HOSPADM

## 2018-04-30 RX ORDER — MIDAZOLAM HYDROCHLORIDE 1 MG/ML
1 INJECTION INTRAMUSCULAR; INTRAVENOUS
Status: DISCONTINUED | OUTPATIENT
Start: 2018-04-30 | End: 2018-04-30

## 2018-04-30 RX ORDER — DIPHENHYDRAMINE HYDROCHLORIDE 50 MG/ML
12.5 INJECTION INTRAMUSCULAR; INTRAVENOUS
Status: DISCONTINUED | OUTPATIENT
Start: 2018-04-30 | End: 2018-04-30

## 2018-04-30 RX ORDER — ALBUMIN (HUMAN) 12.5 G/50ML
12.5 SOLUTION INTRAVENOUS AS NEEDED
Status: ACTIVE | OUTPATIENT
Start: 2018-04-30 | End: 2018-05-01

## 2018-04-30 RX ORDER — LABETALOL HYDROCHLORIDE 5 MG/ML
5 INJECTION, SOLUTION INTRAVENOUS
Status: DISCONTINUED | OUTPATIENT
Start: 2018-04-30 | End: 2018-04-30

## 2018-04-30 RX ORDER — SODIUM CHLORIDE 0.9 % (FLUSH) 0.9 %
1-10 SYRINGE (ML) INJECTION AS NEEDED
Status: DISCONTINUED | OUTPATIENT
Start: 2018-04-30 | End: 2018-04-30

## 2018-04-30 RX ORDER — FAMOTIDINE 10 MG/ML
20 INJECTION, SOLUTION INTRAVENOUS ONCE
Status: COMPLETED | OUTPATIENT
Start: 2018-04-30 | End: 2018-04-30

## 2018-04-30 RX ORDER — HEPARIN SODIUM 1000 [USP'U]/ML
INJECTION, SOLUTION INTRAVENOUS; SUBCUTANEOUS AS NEEDED
Status: DISCONTINUED | OUTPATIENT
Start: 2018-04-30 | End: 2018-04-30 | Stop reason: HOSPADM

## 2018-04-30 RX ORDER — NALOXONE HCL 0.4 MG/ML
0.2 VIAL (ML) INJECTION AS NEEDED
Status: DISCONTINUED | OUTPATIENT
Start: 2018-04-30 | End: 2018-04-30

## 2018-04-30 RX ORDER — CEFAZOLIN SODIUM 2 G/100ML
2 INJECTION, SOLUTION INTRAVENOUS ONCE
Status: COMPLETED | OUTPATIENT
Start: 2018-04-30 | End: 2018-04-30

## 2018-04-30 RX ORDER — SODIUM CHLORIDE, SODIUM LACTATE, POTASSIUM CHLORIDE, CALCIUM CHLORIDE 600; 310; 30; 20 MG/100ML; MG/100ML; MG/100ML; MG/100ML
9 INJECTION, SOLUTION INTRAVENOUS CONTINUOUS
Status: DISCONTINUED | OUTPATIENT
Start: 2018-04-30 | End: 2018-04-30

## 2018-04-30 RX ORDER — FENTANYL CITRATE 50 UG/ML
50 INJECTION, SOLUTION INTRAMUSCULAR; INTRAVENOUS
Status: DISCONTINUED | OUTPATIENT
Start: 2018-04-30 | End: 2018-04-30

## 2018-04-30 RX ORDER — HYDRALAZINE HYDROCHLORIDE 20 MG/ML
5 INJECTION INTRAMUSCULAR; INTRAVENOUS
Status: DISCONTINUED | OUTPATIENT
Start: 2018-04-30 | End: 2018-04-30

## 2018-04-30 RX ORDER — MIDAZOLAM HYDROCHLORIDE 1 MG/ML
2 INJECTION INTRAMUSCULAR; INTRAVENOUS
Status: DISCONTINUED | OUTPATIENT
Start: 2018-04-30 | End: 2018-04-30

## 2018-04-30 RX ORDER — ONDANSETRON 2 MG/ML
4 INJECTION INTRAMUSCULAR; INTRAVENOUS ONCE AS NEEDED
Status: DISCONTINUED | OUTPATIENT
Start: 2018-04-30 | End: 2018-04-30

## 2018-04-30 RX ORDER — FENTANYL CITRATE 50 UG/ML
INJECTION, SOLUTION INTRAMUSCULAR; INTRAVENOUS AS NEEDED
Status: DISCONTINUED | OUTPATIENT
Start: 2018-04-30 | End: 2018-04-30 | Stop reason: SURG

## 2018-04-30 RX ORDER — HYDROCORTISONE ACETATE 25 MG/1
25 SUPPOSITORY RECTAL 2 TIMES DAILY
Status: DISCONTINUED | OUTPATIENT
Start: 2018-04-30 | End: 2018-05-03 | Stop reason: HOSPADM

## 2018-04-30 RX ORDER — EPHEDRINE SULFATE 50 MG/ML
5 INJECTION, SOLUTION INTRAVENOUS ONCE AS NEEDED
Status: DISCONTINUED | OUTPATIENT
Start: 2018-04-30 | End: 2018-04-30

## 2018-04-30 RX ORDER — HYDROMORPHONE HCL 110MG/55ML
0.5 PATIENT CONTROLLED ANALGESIA SYRINGE INTRAVENOUS
Status: DISCONTINUED | OUTPATIENT
Start: 2018-04-30 | End: 2018-04-30

## 2018-04-30 RX ORDER — PROMETHAZINE HYDROCHLORIDE 25 MG/1
12.5 TABLET ORAL ONCE AS NEEDED
Status: DISCONTINUED | OUTPATIENT
Start: 2018-04-30 | End: 2018-04-30

## 2018-04-30 RX ORDER — PROPOFOL 10 MG/ML
VIAL (ML) INTRAVENOUS CONTINUOUS PRN
Status: DISCONTINUED | OUTPATIENT
Start: 2018-04-30 | End: 2018-04-30 | Stop reason: SURG

## 2018-04-30 RX ORDER — OXYCODONE AND ACETAMINOPHEN 7.5; 325 MG/1; MG/1
1 TABLET ORAL ONCE AS NEEDED
Status: DISCONTINUED | OUTPATIENT
Start: 2018-04-30 | End: 2018-04-30

## 2018-04-30 RX ORDER — PROMETHAZINE HYDROCHLORIDE 25 MG/1
25 TABLET ORAL ONCE AS NEEDED
Status: DISCONTINUED | OUTPATIENT
Start: 2018-04-30 | End: 2018-04-30

## 2018-04-30 RX ADMIN — SODIUM CHLORIDE: 9 INJECTION, SOLUTION INTRAVENOUS at 12:51

## 2018-04-30 RX ADMIN — Medication 1 MG: at 12:58

## 2018-04-30 RX ADMIN — BISOPROLOL FUMARATE 5 MG: 5 TABLET ORAL at 08:31

## 2018-04-30 RX ADMIN — FENTANYL CITRATE 50 MCG: 50 INJECTION INTRAMUSCULAR; INTRAVENOUS at 12:58

## 2018-04-30 RX ADMIN — SODIUM CHLORIDE 9 ML/HR: 9 INJECTION, SOLUTION INTRAVENOUS at 09:57

## 2018-04-30 RX ADMIN — OXYCODONE HYDROCHLORIDE 10 MG: 5 TABLET ORAL at 15:43

## 2018-04-30 RX ADMIN — FAMOTIDINE 20 MG: 10 INJECTION, SOLUTION INTRAVENOUS at 09:55

## 2018-04-30 RX ADMIN — PROPOFOL 100 MCG/KG/MIN: 10 INJECTION, EMULSION INTRAVENOUS at 12:58

## 2018-04-30 RX ADMIN — FUROSEMIDE 80 MG: 80 TABLET ORAL at 08:31

## 2018-04-30 RX ADMIN — CEFAZOLIN SODIUM 2 G: 2 INJECTION, SOLUTION INTRAVENOUS at 12:55

## 2018-04-30 RX ADMIN — HEPARIN SODIUM 5000 UNITS: 5000 INJECTION, SOLUTION INTRAVENOUS; SUBCUTANEOUS at 18:54

## 2018-04-30 RX ADMIN — Medication 1 MG: at 12:07

## 2018-04-30 RX ADMIN — PANTOPRAZOLE SODIUM 40 MG: 40 TABLET, DELAYED RELEASE ORAL at 18:53

## 2018-04-30 RX ADMIN — FUROSEMIDE 80 MG: 80 TABLET ORAL at 21:08

## 2018-04-30 RX ADMIN — Medication 1 MG: at 09:55

## 2018-04-30 NOTE — ANESTHESIA POSTPROCEDURE EVALUATION
Patient: Sergio Phan    Procedure Summary     Date:  04/30/18 Room / Location:  [facility] OR 18 INV / [facility] HYBRID OR 18/19    Anesthesia Start:  1251 Anesthesia Stop:  1334    Procedure:  HEMODIALYSIS CATHETER INSERTION (N/A ) Diagnosis:      Provider:  Mya Wilkinson Jr., MD Provider:  Robert Portillo MD    Anesthesia Type:  MAC ASA Status:  3          Anesthesia Type: MAC  Last vitals  BP   132/79 (04/30/18 1400)   Temp   36.7 °C (98 °F) (04/30/18 1331)   Pulse   67 (04/30/18 1400)   Resp   16 (04/30/18 1400)     SpO2   100 % (04/30/18 1400)     Post Anesthesia Care and Evaluation    Patient location during evaluation: PACU  Patient participation: complete - patient participated  Level of consciousness: awake and alert  Pain management: adequate  Airway patency: patent  Anesthetic complications: No anesthetic complications    Cardiovascular status: acceptable  Respiratory status: acceptable  Hydration status: acceptable    Comments: --------------------            04/30/18               1400     --------------------   BP:       132/79     Pulse:      67       Resp:       16       Temp:                SpO2:      100%     --------------------

## 2018-04-30 NOTE — ANESTHESIA PREPROCEDURE EVALUATION
Anesthesia Evaluation     Patient summary reviewed and Nursing notes reviewed   history of anesthetic complications:  NPO Solid Status: > 8 hours  NPO Liquid Status: > 4 hours           Airway   Mallampati: III  Dental      Pulmonary    (+) a smoker Former, COPD, asthma,   Cardiovascular     ECG reviewed  Rhythm: regular  Rate: normal    (+) hypertension, past MI , hyperlipidemia,       Neuro/Psych  (+) dizziness/light headedness, syncope, weakness, psychiatric history Anxiety,     GI/Hepatic/Renal/Endo    (+)   renal disease ESRD, diabetes mellitus type 2,     Musculoskeletal (-) negative ROS    Abdominal    Substance History - negative use     OB/GYN negative ob/gyn ROS         Other                          Anesthesia Plan    ASA 3     MAC   (AICD/Pacemaker  Old anterior MI on EKG   multiple major medical problems  Poor historian)  intravenous induction   Anesthetic plan and risks discussed with patient.

## 2018-05-01 ENCOUNTER — APPOINTMENT (OUTPATIENT)
Dept: CARDIOLOGY | Facility: HOSPITAL | Age: 48
End: 2018-05-01
Attending: SURGERY

## 2018-05-01 ENCOUNTER — INPATIENT HOSPITAL (OUTPATIENT)
Dept: URBAN - METROPOLITAN AREA HOSPITAL 27 | Facility: HOSPITAL | Age: 48
End: 2018-05-01
Payer: MEDICARE

## 2018-05-01 DIAGNOSIS — N18.6 END STAGE RENAL DISEASE: ICD-10-CM

## 2018-05-01 DIAGNOSIS — I73.9 PERIPHERAL VASCULAR DISEASE, UNSPECIFIED: ICD-10-CM

## 2018-05-01 DIAGNOSIS — K62.5 HEMORRHAGE OF ANUS AND RECTUM: ICD-10-CM

## 2018-05-01 DIAGNOSIS — D64.9 ANEMIA, UNSPECIFIED: ICD-10-CM

## 2018-05-01 LAB
ANION GAP SERPL CALCULATED.3IONS-SCNC: 15.7 MMOL/L
BACTERIA SPEC AEROBE CULT: NORMAL
BACTERIA SPEC AEROBE CULT: NORMAL
BASOPHILS # BLD AUTO: 0.01 10*3/MM3 (ref 0–0.2)
BASOPHILS NFR BLD AUTO: 0.1 % (ref 0–1.5)
BUN BLD-MCNC: 54 MG/DL (ref 6–20)
BUN/CREAT SERPL: 9.7 (ref 7–25)
CALCIUM SPEC-SCNC: 9.6 MG/DL (ref 8.6–10.5)
CHLORIDE SERPL-SCNC: 97 MMOL/L (ref 98–107)
CO2 SERPL-SCNC: 26.3 MMOL/L (ref 22–29)
CREAT BLD-MCNC: 5.55 MG/DL (ref 0.76–1.27)
DEPRECATED RDW RBC AUTO: 46.7 FL (ref 37–54)
EOSINOPHIL # BLD AUTO: 0.24 10*3/MM3 (ref 0–0.7)
EOSINOPHIL NFR BLD AUTO: 3.3 % (ref 0.3–6.2)
ERYTHROCYTE [DISTWIDTH] IN BLOOD BY AUTOMATED COUNT: 14.2 % (ref 11.5–14.5)
GFR SERPL CREATININE-BSD FRML MDRD: 11 ML/MIN/1.73
GLUCOSE BLD-MCNC: 129 MG/DL (ref 65–99)
GLUCOSE BLDC GLUCOMTR-MCNC: 111 MG/DL (ref 70–130)
GLUCOSE BLDC GLUCOMTR-MCNC: 128 MG/DL (ref 70–130)
GLUCOSE BLDC GLUCOMTR-MCNC: 129 MG/DL (ref 70–130)
GLUCOSE BLDC GLUCOMTR-MCNC: 177 MG/DL (ref 70–130)
HCT VFR BLD AUTO: 34.9 % (ref 40.4–52.2)
HGB BLD-MCNC: 10.3 G/DL (ref 13.7–17.6)
IMM GRANULOCYTES # BLD: 0.03 10*3/MM3 (ref 0–0.03)
IMM GRANULOCYTES NFR BLD: 0.4 % (ref 0–0.5)
LYMPHOCYTES # BLD AUTO: 0.96 10*3/MM3 (ref 0.9–4.8)
LYMPHOCYTES NFR BLD AUTO: 13.2 % (ref 19.6–45.3)
MAGNESIUM SERPL-MCNC: 2.3 MG/DL (ref 1.6–2.6)
MCH RBC QN AUTO: 26.6 PG (ref 27–32.7)
MCHC RBC AUTO-ENTMCNC: 29.5 G/DL (ref 32.6–36.4)
MCV RBC AUTO: 90.2 FL (ref 79.8–96.2)
MONOCYTES # BLD AUTO: 0.8 10*3/MM3 (ref 0.2–1.2)
MONOCYTES NFR BLD AUTO: 11 % (ref 5–12)
NEUTROPHILS # BLD AUTO: 5.25 10*3/MM3 (ref 1.9–8.1)
NEUTROPHILS NFR BLD AUTO: 72 % (ref 42.7–76)
PHOSPHATE SERPL-MCNC: 5.1 MG/DL (ref 2.5–4.5)
PLATELET # BLD AUTO: 232 10*3/MM3 (ref 140–500)
PMV BLD AUTO: 10.5 FL (ref 6–12)
POTASSIUM BLD-SCNC: 4.1 MMOL/L (ref 3.5–5.2)
RBC # BLD AUTO: 3.87 10*6/MM3 (ref 4.6–6)
SODIUM BLD-SCNC: 139 MMOL/L (ref 136–145)
WBC NRBC COR # BLD: 7.29 10*3/MM3 (ref 4.5–10.7)

## 2018-05-01 PROCEDURE — 80048 BASIC METABOLIC PNL TOTAL CA: CPT | Performed by: INTERNAL MEDICINE

## 2018-05-01 PROCEDURE — 85025 COMPLETE CBC W/AUTO DIFF WBC: CPT | Performed by: INTERNAL MEDICINE

## 2018-05-01 PROCEDURE — 25010000002 HEPARIN (PORCINE) PER 1000 UNITS: Performed by: INTERNAL MEDICINE

## 2018-05-01 PROCEDURE — 36415 COLL VENOUS BLD VENIPUNCTURE: CPT | Performed by: INTERNAL MEDICINE

## 2018-05-01 PROCEDURE — 82962 GLUCOSE BLOOD TEST: CPT

## 2018-05-01 PROCEDURE — 84100 ASSAY OF PHOSPHORUS: CPT | Performed by: INTERNAL MEDICINE

## 2018-05-01 PROCEDURE — 25010000002 CEFTRIAXONE PER 250 MG: Performed by: INTERNAL MEDICINE

## 2018-05-01 PROCEDURE — 99232 SBSQ HOSP IP/OBS MODERATE 35: CPT | Performed by: INTERNAL MEDICINE

## 2018-05-01 PROCEDURE — 99231 SBSQ HOSP IP/OBS SF/LOW 25: CPT | Performed by: INTERNAL MEDICINE

## 2018-05-01 PROCEDURE — 5A1D70Z PERFORMANCE OF URINARY FILTRATION, INTERMITTENT, LESS THAN 6 HOURS PER DAY: ICD-10-PCS | Performed by: INTERNAL MEDICINE

## 2018-05-01 PROCEDURE — 83735 ASSAY OF MAGNESIUM: CPT | Performed by: INTERNAL MEDICINE

## 2018-05-01 PROCEDURE — 99231 SBSQ HOSP IP/OBS SF/LOW 25: CPT

## 2018-05-01 PROCEDURE — 25010000002 ONDANSETRON PER 1 MG: Performed by: INTERNAL MEDICINE

## 2018-05-01 RX ORDER — HEPARIN SODIUM 1000 [USP'U]/ML
3600 INJECTION, SOLUTION INTRAVENOUS; SUBCUTANEOUS ONCE
Status: COMPLETED | OUTPATIENT
Start: 2018-05-01 | End: 2018-05-01

## 2018-05-01 RX ADMIN — PANTOPRAZOLE SODIUM 40 MG: 40 TABLET, DELAYED RELEASE ORAL at 18:14

## 2018-05-01 RX ADMIN — HEPARIN SODIUM 5000 UNITS: 5000 INJECTION, SOLUTION INTRAVENOUS; SUBCUTANEOUS at 21:04

## 2018-05-01 RX ADMIN — HEPARIN SODIUM 5000 UNITS: 5000 INJECTION, SOLUTION INTRAVENOUS; SUBCUTANEOUS at 04:31

## 2018-05-01 RX ADMIN — HEPARIN SODIUM 5000 UNITS: 5000 INJECTION, SOLUTION INTRAVENOUS; SUBCUTANEOUS at 14:12

## 2018-05-01 RX ADMIN — PANTOPRAZOLE SODIUM 40 MG: 40 TABLET, DELAYED RELEASE ORAL at 04:31

## 2018-05-01 RX ADMIN — HYDROCORTISONE ACETATE 25 MG: 25 SUPPOSITORY RECTAL at 20:00

## 2018-05-01 RX ADMIN — FUROSEMIDE 80 MG: 80 TABLET ORAL at 14:12

## 2018-05-01 RX ADMIN — ONDANSETRON 4 MG: 2 INJECTION INTRAMUSCULAR; INTRAVENOUS at 14:12

## 2018-05-01 RX ADMIN — CEFTRIAXONE SODIUM 1 G: 1 INJECTION, SOLUTION INTRAVENOUS at 14:13

## 2018-05-01 RX ADMIN — HYDROCORTISONE ACETATE 25 MG: 25 SUPPOSITORY RECTAL at 08:30

## 2018-05-01 RX ADMIN — HEPARIN SODIUM 3600 UNITS: 1000 INJECTION, SOLUTION INTRAVENOUS; SUBCUTANEOUS at 12:30

## 2018-05-01 RX ADMIN — OXYCODONE HYDROCHLORIDE 10 MG: 5 TABLET ORAL at 04:33

## 2018-05-01 RX ADMIN — ASPIRIN 81 MG: 81 TABLET ORAL at 14:13

## 2018-05-01 RX ADMIN — OXYCODONE HYDROCHLORIDE 10 MG: 5 TABLET ORAL at 14:19

## 2018-05-01 RX ADMIN — ALLOPURINOL 150 MG: 300 TABLET ORAL at 14:13

## 2018-05-01 RX ADMIN — FUROSEMIDE 80 MG: 80 TABLET ORAL at 20:00

## 2018-05-01 RX ADMIN — ROSUVASTATIN CALCIUM 5 MG: 5 TABLET, FILM COATED ORAL at 14:12

## 2018-05-01 RX ADMIN — BISOPROLOL FUMARATE 5 MG: 5 TABLET ORAL at 14:12

## 2018-05-01 NOTE — PROGRESS NOTES
"  Cardiology Progress Note    Patient Identification:  Name: Sergio Phan  Age: 47 y.o.  Sex: male  :  1970  MRN: [id number]                 Follow Up / Chief Complaint: PVCs, h/o cardiac death, VT, ICD ([facility]), HTN, HOCM    Interval History:  He presented to the ED on  unresponsive and a recent h/o increased mechanical falls. He was intubated en route. Labs showed a bun/creat of 107/2.69, glucose 18, trop 0.080. Drug screen was positive for benzo and oxycodone (narcan was given by EMS). CXR and CT head were negative. EKG showed NSR with an occasional PVC. After glucose was corrected, he began to follow some commands     Subjective:  Seen in HD.  \"Doing pretty good\".  Denies chest pain or shortness of breath      Objective:  SR 70s with occasional PVC.  BP 130s/ 58-70s  Afebrile  Sats >90% on room air    Having HD today    Past Medical History:  Past Medical History:   Diagnosis Date   • Anxiety    • Asthma    • Chest pain    • COPD (chronic obstructive pulmonary disease)    • Depression    • Diabetes mellitus     TYPE II   • Fatigue    • Gout    • HOCM (hypertrophic obstructive cardiomyopathy)    • Hypertension    • Hypertriglyceridemia    • Myocardial infarction    • Pancreatitis    • Renal disorder    • Syncope      Past Surgical History:  Past Surgical History:   Procedure Laterality Date   • ABDOMINAL SURGERY     • ARTERIOVENOUS FISTULA/SHUNT SURGERY Right 2018    Procedure: RT/ ARTERIOVENOUS FISTULA FORMATION;  Surgeon: Chuck Woodward MD;  Location: [facility] OR;  Service: Vascular   • CARDIAC CATHETERIZATION     • CARDIAC DEFIBRILLATOR PLACEMENT     • CARDIAC DEFIBRILLATOR PLACEMENT     • CHOLECYSTECTOMY     • ENDOSCOPY N/A 2016    Procedure: ESOPHAGOGASTRODUODENOSCOPY;  Surgeon: Irineo Mercedes MD;  Location: [facility] OR;  Service:    • ERCP     • INSERT / REPLACE / REMOVE PACEMAKER      [facility]   • INSERTION HEMODIALYSIS CATHETER N/A 2018    Procedure: " HEMODIALYSIS CATHETER INSERTION;  Surgeon: Mya Wilkinson Jr., MD;  Location: [facility] OR 18/19;  Service: Vascular   • LAPAROSCOPIC APPENDECTOMY     • OTHER SURGICAL HISTORY      NO REMOVAL OF APPENDIX  PATIENT HAS A APPENDIX   • UMBILICAL HERNIA REPAIR          Social History:   Social History   Substance Use Topics   • Smoking status: Current Some Day Smoker     Packs/day: 0.50     Years: 35.00     Types: Cigarettes   • Smokeless tobacco: Never Used   • Alcohol use No      Family History:  Family History   Problem Relation Age of Onset   • Heart block Mother    • Kidney disease Mother    • Diabetes Father    • Diabetes Maternal Grandmother    • Diabetes Maternal Grandfather    • COPD Maternal Grandfather    • Asthma Maternal Grandfather           Allergies:  No Known Allergies  Scheduled Meds:    allopurinol 150 mg Daily   aspirin 81 mg Daily   bisoprolol 5 mg Daily   ceftriaxone 1 g Q24H   furosemide 80 mg BID   heparin (porcine) 5,000 Units Q8H   heparin (porcine) 3,600 Units Once   hydrocortisone 25 mg BID   insulin aspart 0-12 Units 4x Daily AC & at Bedtime   pantoprazole 40 mg BID AC   rosuvastatin 5 mg Daily           INTAKE AND OUTPUT:    Intake/Output Summary (Last 24 hours) at 05/01/18 1047  Last data filed at 04/30/18 1800   Gross per 24 hour   Intake              390 ml   Output                0 ml   Net              390 ml       Review of Systems:   GI:  No nausea or vomiting  Cardiac: No chest pain or tightness  Pulmonary: No cough    Constitutional:  Temp:  [97.4 °F (36.3 °C)-98 °F (36.7 °C)] 97.8 °F (36.6 °C)  Heart Rate:  [64-70] 65  Resp:  [16-25] 25  BP: (101-157)/(54-96) 128/77    Physical Exam:  General:  Resting quietly during HD.  Arouses easily.  Appears chronically ill, but in no acute distress  Eyes: PERTL,  HEENT:  Right subclavian tunnel HD catheter noted with dialysis in progress.  Oral mucosa dry, no cyanosis  Respiratory: Respirations regular and unlabored at rest. BBS  with good air entry in all fields. No crackles or wheezes auscultated  Cardiovascular: S1S2 Regular rate and rhythm with an occasional premature beat (PAC).  No pretibial pitting edema  Gastrointestinal: Abdomen soft,  non tender. Bowel sounds present.   Musculoskeletal: NO x4. No abnormal movements  Extremities: No digital clubbing.    Skin: Skin warm and dry to touch.   Numerous skin lacerations of various ages noted over both knees and shins..   Neuro: Awake, alert.  Answers questions appropriately and follows commands  Psych: cooperative and pleasant          Cardiographics  Telemetry:       Echo 4/14/18  · Left ventricular wall thickness is consistent with mild concentric hypertrophy.  · Left ventricular systolic function is normal. Estimated EF = 51%.  · Mild mitral valve regurgitation is present  · Mild tricuspid valve regurgitation is present.  · Calculated right ventricular systolic pressure from tricuspid regurgitation is 41.2 mmHg.     Cardiac Cath 2015 CORONARY ANGIOGRAM:   1. The left main was normal, bifurcated into left anterior descending and circumflex in a normal fashion.   2. Left anterior descending was normal, including the diagonal and  branches.   3. Circumflex artery nondominant, free of any atherosclerotic  narrowing.   4. Right coronary artery was dominant, free of any atherosclerotic narrowing.   5. LV gram showed severe LV hypertrophy with almost obliteration of the LV       Lab Review     Results from last 7 days  Lab Units 05/01/18  0536   MAGNESIUM mg/dL 2.3     Results from last 7 days  Lab Units 05/01/18  0536   SODIUM mmol/L 139   POTASSIUM mmol/L 4.1   BUN mg/dL 54*   CREATININE mg/dL 5.55*   CALCIUM mg/dL 9.6     Results from last 7 days  Lab Units 05/01/18  0536 04/30/18  0538 04/29/18  0515   WBC 10*3/mm3 7.29 7.53 7.22   HEMOGLOBIN g/dL 10.3* 9.9* 9.8*   HEMATOCRIT % 34.9* 32.5* 32.4*   PLATELETS 10*3/mm3 232 244 200     Assessment:  - s/p PVCs  - a/c bravo HF  -  "uncontrolled HTN  - Hypertrophic obstructive CM with indwelling ICD  - h/o SCD 2015  - s/p hypoglycemia  - s./p acute hypercapnic and hypoxemic respiratory failure  - Haemophilus influenza pneumonia right lower lobe  - Metabolic encephalopathy: Improved  - CKD IV-> ESRD with HD  - Chronic anemia  - DM  - RAD/COPD  - Rectal bleeding with symptoms of hemorrhoids  - Medical/pharmacologic noncompliance        Plan:  - PVCs - rare isolated PVCs.  Previous ectopy likely multi-factorial including: Hypoxemia, hypokalemia and hypoglycemia    - a/c diastolic dysfunction -  Clinically compensated.  Fluid management as per HD No evidence of an acute ischemic cardiac event.  Continue  Zebeta  and diuretic as per Renal.       - hypertension - BP 130s/ 58-70s on Zebeta.       - Hypertrophic obstructive CM with h/o SCD=> ICD  implant 2015. EF 51%.  AICD functioning normally    No chest pain.  Recent stress test negative.  Blood pressure controlled.     Labs/tests ordered: EKG      )5/1/2018  Noelle Rubin MD      [facility] Dragon/Transcription:   \"Dictated utilizing Dragon dictation\".     "

## 2018-05-01 NOTE — PROGRESS NOTES
"  ENDOCRINE    Subjective   AND PLANS  Sergio Phan is a 47 y.o. male.     Follow-up diabetes    Feeling better.  Appetite improving and blood sugars slowly rising.  Fasting glucose 129.  Random glucose 131-217.  Will ask diabetes educator to teach insulin administration for future use.    Objective   /77 (BP Location: Left arm, Patient Position: Lying)   Pulse 65   Temp 97.8 °F (36.6 °C) (Oral)   Resp 25 Comment: 20  Ht 182.9 cm (72.01\")   Wt 83 kg (182 lb 14.4 oz)   SpO2 92%   BMI 24.80 kg/m²   Physical Exam    Awake, alert, not in distress  No rales or wheezes.  Regular heart rate and rhythm.  Abdomen soft, nontender.  No cyanosis or pedal edema.    Lab Results (last 24 hours)     Procedure Component Value Units Date/Time    POC Glucose Once [[id number]]  (Normal) Collected:  05/01/18 0742    Specimen:  Blood Updated:  05/01/18 0744     Glucose 129 mg/dL     Narrative:       Meter: [id number] : [id number] Chadkarin Low FRANKI    Basic Metabolic Panel [[id number]]  (Abnormal) Collected:  05/01/18 0536    Specimen:  Blood Updated:  05/01/18 0646     Glucose 129 (H) mg/dL      BUN 54 (H) mg/dL      Creatinine 5.55 (H) mg/dL      Sodium 139 mmol/L      Potassium 4.1 mmol/L      Chloride 97 (L) mmol/L      CO2 26.3 mmol/L      Calcium 9.6 mg/dL      eGFR Non African Amer 11 (L) mL/min/1.73      BUN/Creatinine Ratio 9.7     Anion Gap 15.7 mmol/L     Narrative:       GFR Normal >60  Chronic Kidney Disease <60  Kidney Failure <15    Magnesium [[id number]]  (Normal) Collected:  05/01/18 0536    Specimen:  Blood Updated:  05/01/18 0646     Magnesium 2.3 mg/dL     Phosphorus [[id number]]  (Abnormal) Collected:  05/01/18 0536    Specimen:  Blood Updated:  05/01/18 0646     Phosphorus 5.1 (H) mg/dL     CBC & Differential [[id number]] Collected:  05/01/18 0536    Specimen:  Blood Updated:  05/01/18 0620    Narrative:       The following orders were created for panel order CBC & Differential.  Procedure             "                   Abnormality         Status                     ---------                               -----------         ------                     CBC Auto Differential[[id number]]        Abnormal            Final result                 Please view results for these tests on the individual orders.    CBC Auto Differential [[id number]]  (Abnormal) Collected:  05/01/18 0536    Specimen:  Blood Updated:  05/01/18 0620     WBC 7.29 10*3/mm3      RBC 3.87 (L) 10*6/mm3      Hemoglobin 10.3 (L) g/dL      Hematocrit 34.9 (L) %      MCV 90.2 fL      MCH 26.6 (L) pg      MCHC 29.5 (L) g/dL      RDW 14.2 %      RDW-SD 46.7 fl      MPV 10.5 fL      Platelets 232 10*3/mm3      Neutrophil % 72.0 %      Lymphocyte % 13.2 (L) %      Monocyte % 11.0 %      Eosinophil % 3.3 %      Basophil % 0.1 %      Immature Grans % 0.4 %      Neutrophils, Absolute 5.25 10*3/mm3      Lymphocytes, Absolute 0.96 10*3/mm3      Monocytes, Absolute 0.80 10*3/mm3      Eosinophils, Absolute 0.24 10*3/mm3      Basophils, Absolute 0.01 10*3/mm3      Immature Grans, Absolute 0.03 10*3/mm3     Blood Culture - Blood, [[id number]]  (Normal) Collected:  04/26/18 0027    Specimen:  Blood from Blood, Venous Line Updated:  05/01/18 0100     Blood Culture No growth at 5 days    Blood Culture - Blood, [[id number]]  (Normal) Collected:  04/26/18 0048    Specimen:  Blood from Blood, Venous Line Updated:  05/01/18 0100     Blood Culture No growth at 5 days    POC Glucose Once [[id number]]  (Abnormal) Collected:  04/30/18 2337    Specimen:  Blood Updated:  04/30/18 2339     Glucose 131 (H) mg/dL     Narrative:       Meter: [id number] : [id number] Jones Samantha CNA    POC Glucose Once [[id number]]  (Abnormal) Collected:  04/30/18 2103    Specimen:  Blood Updated:  04/30/18 2104     Glucose 217 (H) mg/dL     Narrative:       Meter: [id number] : [id number] Jones Samantha CNA    POC Glucose Once [[id number]]  (Abnormal) Collected:  04/30/18 6082    Specimen:  Blood  Updated:  04/30/18 1712     Glucose 150 (H) mg/dL     Narrative:       Meter: [id number] : [id number] Sandeep Smith CNA    Hepatitis B & C Profile [[id number]] Collected:  04/27/18 0939    Specimen:  Blood Updated:  04/30/18 1514     Hepatitis B Surface Ag Negative     Hep B E Ag Negative     Hep B Core IgM Negative     Hep B Core Total Ab Negative     Hep B E Ab Negative     Hep B S Ab Non Reactive     Comment:               Non Reactive: Inconsistent with immunity,                              less than 10 mIU/mL                Reactive:     Consistent with immunity,                              greater than 9.9 mIU/mL        Hepatitis C Ab <0.1 s/co ratio     Narrative:       Performed at:  [address] [facility]  [id number]  : Matti Romero PhD, Phone:  [phone]    Comment: [[id number]] Collected:  04/27/18 0939    Specimen:  Blood Updated:  04/30/18 1514     Comment Comment     Comment: Non reactive HCV antibody screen is consistent with no HCV infection,  unless recent infection is suspected or other evidence exists to  indicate HCV infection.       Narrative:       Performed at:  [address] [facility]  [id number]  : Matti Romero PhD, Phone:  [phone]            Active Problems:    Hypertension    Hypertrophic obstructive cardiomyopathy    Type 2 diabetes mellitus    Stage 4 chronic kidney disease    Severe diabetic hypoglycemia    Atherosclerosis of native arteries of extremity with rest pain    Continue NovoLog as needed.  Patient may need to use a small dose of Levemir.  Will watch blood sugars for now.

## 2018-05-01 NOTE — PROGRESS NOTES
"   LOS: 6 days    Patient Care Team:  ROBSON Sampson as PCP - General    Chief Complaint:  Weakness.    Subjective   No complaints.  Interval History:     Patient Complaints: fatigue,weakness  Patient Denies:  soa.  History taken from: patient chart family    Review of Systems:    All systems were reviewed and negative except for:  Constitution:  positive for fatigue and malaise    Objective     Vital Sign Min/Max for last 24 hours  Temp  Min: 97.4 °F (36.3 °C)  Max: 98 °F (36.7 °C)   BP  Min: 101/54  Max: 157/96   Pulse  Min: 64  Max: 78   Resp  Min: 16  Max: 25   SpO2  Min: 92 %  Max: 100 %   No Data Recorded   Weight  Min: 83 kg (182 lb 14.4 oz)  Max: 83 kg (182 lb 14.4 oz)     Flowsheet Rows    Flowsheet Row First Filed Value   Admission Height 177.8 cm (70\") Documented at 04/25/2018 1836   Admission Weight 96.8 kg (213 lb 6.5 oz) Documented at 04/25/2018 1836          No intake/output data recorded.  I/O last 3 completed shifts:  In: 450 [P.O.:300; I.V.:150]  Out: 500 [Urine:500]    Physical Exam:    Constitutional: He appears well-developed.   HENT:   Head: Normocephalic and atraumatic.   Eyes: Pupils are equal, round, and reactive to light.   Neck: No JVD present.   Cardiovascular: Normal heart sounds.  Exam reveals no friction rub.    Pulmonary/Chest: Breath sounds normal. He has no wheezes. He has no rales.   Abdominal: Soft. There is no tenderness. There is no guarding.     WBC WBC   Date Value Ref Range Status   05/01/2018 7.29 4.50 - 10.70 10*3/mm3 Final   04/30/2018 7.53 4.50 - 10.70 10*3/mm3 Final   04/29/2018 7.22 4.50 - 10.70 10*3/mm3 Final      HGB Hemoglobin   Date Value Ref Range Status   05/01/2018 10.3 (L) 13.7 - 17.6 g/dL Final   04/30/2018 9.9 (L) 13.7 - 17.6 g/dL Final   04/29/2018 9.8 (L) 13.7 - 17.6 g/dL Final      HCT Hematocrit   Date Value Ref Range Status   05/01/2018 34.9 (L) 40.4 - 52.2 % Final   04/30/2018 32.5 (L) 40.4 - 52.2 % Final   04/29/2018 32.4 (L) 40.4 - 52.2 " % Final      Platlets No results found for: LABPLAT   MCV MCV   Date Value Ref Range Status   05/01/2018 90.2 79.8 - 96.2 fL Final   04/30/2018 90.0 79.8 - 96.2 fL Final   04/29/2018 89.8 79.8 - 96.2 fL Final          Sodium Sodium   Date Value Ref Range Status   05/01/2018 139 136 - 145 mmol/L Final   04/30/2018 139 136 - 145 mmol/L Final   04/29/2018 141 136 - 145 mmol/L Final      Potassium Potassium   Date Value Ref Range Status   05/01/2018 4.1 3.5 - 5.2 mmol/L Final   04/30/2018 4.8 3.5 - 5.2 mmol/L Final   04/29/2018 4.0 3.5 - 5.2 mmol/L Final      Chloride Chloride   Date Value Ref Range Status   05/01/2018 97 (L) 98 - 107 mmol/L Final   04/30/2018 98 98 - 107 mmol/L Final   04/29/2018 99 98 - 107 mmol/L Final      CO2 CO2   Date Value Ref Range Status   05/01/2018 26.3 22.0 - 29.0 mmol/L Final   04/30/2018 25.9 22.0 - 29.0 mmol/L Final   04/29/2018 23.8 22.0 - 29.0 mmol/L Final      BUN BUN   Date Value Ref Range Status   05/01/2018 54 (H) 6 - 20 mg/dL Final   04/30/2018 39 (H) 6 - 20 mg/dL Final   04/29/2018 59 (H) 6 - 20 mg/dL Final      Creatinine Creatinine   Date Value Ref Range Status   05/01/2018 5.55 (H) 0.76 - 1.27 mg/dL Final   04/30/2018 4.43 (H) 0.76 - 1.27 mg/dL Final   04/29/2018 5.48 (H) 0.76 - 1.27 mg/dL Final      Calcium Calcium   Date Value Ref Range Status   05/01/2018 9.6 8.6 - 10.5 mg/dL Final   04/30/2018 9.8 8.6 - 10.5 mg/dL Final   04/29/2018 9.5 8.6 - 10.5 mg/dL Final      PO4 No results found for: CAPO4   Albumin No results found for: ALBUMIN   Magnesium Magnesium   Date Value Ref Range Status   05/01/2018 2.3 1.6 - 2.6 mg/dL Final   04/30/2018 2.1 1.6 - 2.6 mg/dL Final   04/29/2018 2.1 1.6 - 2.6 mg/dL Final      Uric Acid No results found for: URICACID        Results Review:     I reviewed the patient's new clinical results.      allopurinol 150 mg Oral Daily   aspirin 81 mg Oral Daily   bisoprolol 5 mg Oral Daily   ceftriaxone 1 g Intravenous Q24H   furosemide 80 mg Oral BID    heparin (porcine) 5,000 Units Subcutaneous Q8H   heparin (porcine) 4,000 Units Intravenous Once   hydrocortisone 25 mg Rectal BID   insulin aspart 0-12 Units Subcutaneous 4x Daily AC & at Bedtime   pantoprazole 40 mg Oral BID AC   rosuvastatin 5 mg Oral Daily       sodium chloride 9 mL/hr Last Rate: 9 mL/hr (04/30/18 0957)       Medication Review: reviewed    Assessment/Plan     Active Problems:    Hypertension    Hypertrophic obstructive cardiomyopathy    Type 2 diabetes mellitus    Stage 4 chronic kidney disease    Severe diabetic hypoglycemia    Atherosclerosis of native arteries of extremity with rest pain      esrd now.,with biopsy showing cholesterol emboli previously.   - obesity.   - nephrotic range proteinuria.   - DM   - history of recurrent pancreatitis.    - ECHO 04/14/2018, EF is 51 %, rvsp IS 41.2. Left ventricular wall thickness is consistent with mild concentric hypertrophy. Left ventricular diastolic function was indeterminate. Elevated left atrial pressure.  Cath:2015:normal coronaries with hypertrophic cardiomyopathy.  - non compliance with treatment.   - Hypoglycemia.   - smoker.  Duplex:Chronic left lower extremity superficial thrombophlebitis noted in the small saphenous. 4/4/18.  S/p brachial cephalic fistula creation by  4/19/18.     Plan:  Awaiting dialysis today.uf as tolerated.  Urinary retention with about 600 ml so far on straight cath.will monitor with bladder scan and might need indwelling lamb catheter with poss.need for urology follow up.  Vascular planning on samson.pt cleared from renal standpoint for iv contrast.  Gi bleed with gi recommendations noted.  Cardiology following.    to arrange admission with dialysis tts second shift at [facility].  Will follow.         Pedro Frye MD  05/01/18  8:54 AM

## 2018-05-01 NOTE — NURSING NOTE
Notified Dr. Bishop of pt refusing doppler this evening r/t nausea and vomiting. Attempted to do doppler a 2nd time and patient refused. Will try again tomorrow. OK with MD.

## 2018-05-01 NOTE — PROGRESS NOTES
Continued Stay Note   [facility]     Patient Name: Sergio Phan  MRN: [id number]  Today's Date: 5/1/2018    Admit Date: 4/25/2018          Discharge Plan     Row Name 05/01/18 1111       Plan    Plan Janae Mclain pending precert.  HD at [facility]    Patient/Family in Agreement with Plan yes    Plan Comments Informed Marichuy/Rayne that pt now has planned procedure for tomorrow.  Marichuy will stop precert.  RN to notify renal that pt cannot do TThS schedule at [facility].  EMLER Moreno RN              Discharge Codes    No documentation.           Jessica Moreno

## 2018-05-01 NOTE — CONSULTS
Diabetes Education    Patient Name:  Sergio Phan  YOB: 1970  MRN: [id number]  Admit Date:  4/25/2018    Pt off floor in dialysis.      Electronically signed by:  Shelby Madera RN  05/01/18 9:44 AM

## 2018-05-01 NOTE — PLAN OF CARE
"Problem: Patient Care Overview  Goal: Plan of Care Review  Outcome: Ongoing (interventions implemented as appropriate)   04/30/18 1815   Coping/Psychosocial   Plan of Care Reviewed With patient;spouse   Plan of Care Review   Progress improving   OTHER   Outcome Summary accumax, waffle boots, blood glucose monitoring, up with assist, pain meds prn, dialysis as ordered      Goal: Individualization and Mutuality  Outcome: Ongoing (interventions implemented as appropriate)   04/30/18 1815   Individualization   Patient Specific Preferences prefers to be called \" Ray \"    Patient Specific Goals (Include Timeframe) pain controlled, no falls, mobility improved, edema improved    Patient Specific Interventions monitor I/O, vitals, assess for c/o pain, ambulate with assist, dialysis continued    Mutuality/Individual Preferences   What Anxieties, Fears, Concerns, or Questions Do You Have About Your Care? length of hospital stay, pain control, gen weakness, dialysis tx    What Information Would Help Us Give You More Personalized Care? update daily plan of care with patient/spouse, new meds, new tx orders    How Would You and/or Your Support Person Like to Participate in Your Care? discuss plan of care with nurse/physician/discharge planner    Mutuality/Individual Preferences   How to Address Anxieties/Fears update daily plan of care, address needs/concerns with patient/spouse      Goal: Interprofessional Rounds/Family Conf  Outcome: Ongoing (interventions implemented as appropriate)      Problem: Skin Injury Risk (Adult)  Goal: Identify Related Risk Factors and Signs and Symptoms  Outcome: Ongoing (interventions implemented as appropriate)   04/30/18 1815   Skin Injury Risk (Adult)   Related Risk Factors (Skin Injury Risk) critical care admission;fluid intake inadequate;edema     Goal: Skin Health and Integrity  Outcome: Ongoing (interventions implemented as appropriate)   04/30/18 1815   Skin Injury Risk (Adult)   Skin Health " and Integrity making progress toward outcome       Problem: Fall Risk (Adult)  Goal: Identify Related Risk Factors and Signs and Symptoms  Outcome: Ongoing (interventions implemented as appropriate)   04/30/18 1815   Fall Risk (Adult)   Related Risk Factors (Fall Risk) environment unfamiliar;gait/mobility problems;history of falls   Signs and Symptoms (Fall Risk) presence of risk factors     Goal: Absence of Fall  Outcome: Ongoing (interventions implemented as appropriate)   04/30/18 1815   Fall Risk (Adult)   Absence of Fall making progress toward outcome       Problem: Pain, Acute (Adult)  Goal: Identify Related Risk Factors and Signs and Symptoms  Outcome: Ongoing (interventions implemented as appropriate)   04/30/18 1815   Pain, Acute (Adult)   Related Risk Factors (Acute Pain) patient perception;procedure/treatment;knowledge deficit;disease process;surgery   Signs and Symptoms (Acute Pain) verbalization of pain descriptors;fatigue/weakness     Goal: Acceptable Pain Control/Comfort Level  Outcome: Ongoing (interventions implemented as appropriate)   04/30/18 1815   Pain, Acute (Adult)   Acceptable Pain Control/Comfort Level making progress toward outcome

## 2018-05-01 NOTE — SIGNIFICANT NOTE
05/01/18 1539   Rehab Treatment   Discipline physical therapist   Reason Treatment Not Performed unavailable for treatment  (Pt off floor again this pm. Will follow up tomorrow.)   Recommendation   PT - Next Appointment 05/02/18

## 2018-05-01 NOTE — PROGRESS NOTES
Called patient to discuss the results of his JASMINA, which shows distal disease with severe digital ischemia left worse than right.  Will plan angiogram with possible intervention tomorrow under MAC.

## 2018-05-01 NOTE — PROGRESS NOTES
[facility] INPATIENT PROGRESS NOTE         [address]    2018      PATIENT IDENTIFICATION:  Name: Sergio Phan ADMIT: 2018   : 1970  PCP: ROBSON Melgoza    MRN: [id number] LOS: 6 days   AGE/SEX: 47 y.o. male  ROOM: [facility]                     LOS 6    Reason for visit: Follow up respiratory failure      SUBJECTIVE:    Having urinary retention issues. Denies cough or shortness of breath. Plan for vascular intervention to his foot tomorrow noted.  Denies productive cough or chest pain.  Denies nausea or vomiting.  Chart reviewed.      Objective   OBJECTIVE:    Vital Sign Min/Max for last 24 hours  Temp  Min: 97.7 °F (36.5 °C)  Max: 98.5 °F (36.9 °C)   BP  Min: 120/70  Max: 160/64   Pulse  Min: 64  Max: 76   Resp  Min: 16  Max: 25   SpO2  Min: 92 %  Max: 97 %   No Data Recorded   Weight  Min: 83 kg (182 lb 14.4 oz)  Max: 83 kg (182 lb 14.4 oz)                   FiO2 (%): 30 %     Body mass index is 24.8 kg/m².    Intake/Output Summary (Last 24 hours) at 18 1522  Last data filed at 18 1426   Gross per 24 hour   Intake              560 ml   Output             3550 ml   Net            -2990 ml         Exam:  GEN:  No distress, appears stated age  LUNGS: Normal chest on inspection, palpation and auscultation  CV:  Normal S1S2, without murmur  ABD:  Non tender, non distended, no hepatosplenomegaly, +BS  EXT:  No cyanosis or clubbing.  trace+ edema    Scheduled meds:      allopurinol 150 mg Oral Daily   aspirin 81 mg Oral Daily   bisoprolol 5 mg Oral Daily   ceftriaxone 1 g Intravenous Q24H   furosemide 80 mg Oral BID   heparin (porcine) 5,000 Units Subcutaneous Q8H   hydrocortisone 25 mg Rectal BID   insulin aspart 0-12 Units Subcutaneous 4x Daily AC & at Bedtime   pantoprazole 40 mg Oral BID AC   rosuvastatin 5 mg Oral Daily     IV meds:                          sodium chloride 9 mL/hr Last Rate: 9 mL/hr (18 0957)     Data Review:    Results from last 7  days  Lab Units 05/01/18  0536 04/30/18  0538 04/29/18  0515 04/28/18  0423 04/27/18  2133 04/27/18  0420   SODIUM mmol/L 139 139 141 137  --  137   POTASSIUM mmol/L 4.1 4.8 4.0 4.0 3.9 3.5   CHLORIDE mmol/L 97* 98 99 95*  --  93*   CO2 mmol/L 26.3 25.9 23.8 23.3  --  24.8   BUN mg/dL 54* 39* 59* 85*  --  107*   CREATININE mg/dL 5.55* 4.43* 5.48* 6.52*  --  8.40*   GLUCOSE mg/dL 129* 110* 102* 124*  --  76   CALCIUM mg/dL 9.6 9.8 9.5 8.9  --  8.9         Estimated Creatinine Clearance: 19.1 mL/min (by C-G formula based on SCr of 5.55 mg/dL (H)).    Results from last 7 days  Lab Units 05/01/18  0536 04/30/18  0538 04/29/18  0515 04/28/18  0423 04/27/18  0420   WBC 10*3/mm3 7.29 7.53 7.22 7.94 11.13*   HEMOGLOBIN g/dL 10.3* 9.9* 9.8* 8.6* 9.4*   PLATELETS 10*3/mm3 232 244 200 175 201       Results from last 7 days  Lab Units 04/25/18  1806   INR  1.33*       Results from last 7 days  Lab Units 04/25/18  1806   ALT (SGPT) U/L <5   AST (SGOT) U/L 20       Results from last 7 days  Lab Units 04/26/18  1137 04/26/18  0420 04/25/18  1847   PH, ARTERIAL pH units 7.459* 7.427 7.412   PO2 ART mm Hg 63.0* 98.4 171.6*   PCO2, ARTERIAL mm Hg 34.9* 42.3 44.6   HCO3 ART mmol/L 24.7 27.8 28.4*             Chest x-ray 4/28 viewed shows significant improvement right lung base infiltrate    Microbiology reviewed:    Respiratory Culture - Sputum, Bronchus [[id number]] (Abnormal) Collected: 04/26/18 0013   Lab Status: Final result Specimen: Sputum from Bronchus Updated: 04/28/18 1052    Respiratory Culture --     Heavy growth (4+) Haemophilus influenzae (A)       • Dopplers reviewed showing severe ischemia    )Assessment   ASSESSMENT:      Active Hospital Problems (** Indicates Principal Problem)    Diagnosis Date Noted   • Atherosclerosis of native arteries of extremity with rest pain [I70.229] 04/30/2018   • Severe diabetic hypoglycemia [E11.649] 04/27/2018   • Stage 4 chronic kidney disease [N18.4] 04/19/2018   • Hypertrophic  obstructive cardiomyopathy [I42.1] 05/05/2016   • Hypertension [I10] 04/20/2016   • Type 2 diabetes mellitus [E11.9] 04/20/2016      Resolved Hospital Problems    Diagnosis Date Noted Date Resolved   • Unconscious [R40.20] 04/25/2018 04/27/2018     Acute hypercapnic and hypoxemic respiratory failure due to airway compromise: Improved  Haemophilus influenza pneumonia right lower lobe  Metabolic encephalopathy: Improved  Acute on chronic kidney disease IV  Chronic pancreatic insufficiency  Asthma/COPD  Rectal bleeding with symptoms of hemorrhoids  Hypokalemia  Chronic anemia  Peripheral vascular disease with severe digital ischemia left greater than right      PLAN:  Plan angiogram tomorrow for distal ischemia legs per vascular surgery.  Hemorrhoidal treatment per GI recommendations.  Dialysis plan per nephrology.  Will do dialysis in [place] chronic unit after discharge.  S/P for tunneled catheter today per vascular.  Encourage pulmonary toilet.  Discharge to [facility] skilled unit when okay with all.      Fernando Sandoval MD  Pulmonary and Critical Care Medicine  [facility], [facility]  5/1/2018    3:22 PM

## 2018-05-01 NOTE — PLAN OF CARE
Problem: Patient Care Overview  Goal: Plan of Care Review  Outcome: Ongoing (interventions implemented as appropriate)   05/01/18 9298   Coping/Psychosocial   Plan of Care Reviewed With patient   Plan of Care Review   Progress improving   OTHER   Outcome Summary VSS. pt return from dialysis with intermittent N&V. will cont to monitor     Goal: Individualization and Mutuality  Outcome: Ongoing (interventions implemented as appropriate)      Problem: Skin Injury Risk (Adult)  Goal: Identify Related Risk Factors and Signs and Symptoms  Outcome: Ongoing (interventions implemented as appropriate)    Goal: Skin Health and Integrity  Outcome: Ongoing (interventions implemented as appropriate)

## 2018-05-01 NOTE — PROGRESS NOTES
GI Daily Progress Note    Assessment/Plan:    Active Problems:    Hypertension    Hypertrophic obstructive cardiomyopathy    Type 2 diabetes mellitus    Stage 4 chronic kidney disease    Severe diabetic hypoglycemia    Atherosclerosis of native arteries of extremity with rest pain       LOS: 6 days     Sergio Phan is a 47 y.o. male who was admitted with Falls. He reports his symptoms are improving with treatment. Somewhat frustrated and wants to go home but isnt irrational about it reviewed goals for angio tomorrow inhopes of increasing healing and ambulation. No further Rectal bleeding and HGB is stable to improved, his mental state is s/w off with mild TME, poor word recall and slow mentation.    Subjective:    Patient expresses vomiting  Patient denies abdominal pain, diarrhea and bloody stools    Objective:    Vital signs in last 24 hours:  Temp:  [97.7 °F (36.5 °C)-98.5 °F (36.9 °C)] 97.9 °F (36.6 °C)  Heart Rate:  [64-76] 76  Resp:  [16-25] 18  BP: (120-160)/(58-79) 120/70    Intake/Output last 3 shifts:  I/O last 3 completed shifts:  In: 450 [P.O.:300; I.V.:150]  Out: 500 [Urine:500]  Intake/Output this shift:  I/O this shift:  In: 320 [P.O.:320]  Out: 3550 [Urine:550; Other:3000]    Physical Exam:Abdomen  Sounds Normal Active Bowel Sounds   Distension Soft   Tenderness Nontender     ESRD  Hypoglycemia  Rectal Bleeding  ACD  PVD    Feel his N/V may be from hypotension post dialysis but will follow. Would request time off monitor to get wheeled outside by wife for mental health reasons. No plans for endoscopy at this time plans for Arteriogram noted for tomorrow

## 2018-05-01 NOTE — PROGRESS NOTES
The patient is one day status post placement of a right internal jugular tunnel dialysis catheter.  He is doing satisfactorily.  Only having a modest amount of discomfort.    His fistula in the right upper extremity has a good thrill near the elbow but not palpable from mid biceps cephalad.  His incision at the base of the neck and the catheter exit site R without signs of infection.    The catheter is okay to use at any time.  We will obtain a dialysis access scan to determine if there is stenotic changes within his fistula.

## 2018-05-02 ENCOUNTER — ANESTHESIA (OUTPATIENT)
Dept: PERIOP | Facility: HOSPITAL | Age: 48
End: 2018-05-02

## 2018-05-02 ENCOUNTER — ANESTHESIA EVENT (OUTPATIENT)
Dept: PERIOP | Facility: HOSPITAL | Age: 48
End: 2018-05-02

## 2018-05-02 ENCOUNTER — APPOINTMENT (OUTPATIENT)
Dept: GENERAL RADIOLOGY | Facility: HOSPITAL | Age: 48
End: 2018-05-02

## 2018-05-02 PROBLEM — Z99.2 STAGE 5 CHRONIC KIDNEY DISEASE ON CHRONIC DIALYSIS (HCC): Status: ACTIVE | Noted: 2018-04-19

## 2018-05-02 PROBLEM — N18.6 STAGE 5 CHRONIC KIDNEY DISEASE ON CHRONIC DIALYSIS (HCC): Status: ACTIVE | Noted: 2018-04-19

## 2018-05-02 LAB
ANION GAP SERPL CALCULATED.3IONS-SCNC: 17.3 MMOL/L
BASOPHILS # BLD AUTO: 0.03 10*3/MM3 (ref 0–0.2)
BASOPHILS NFR BLD AUTO: 0.3 % (ref 0–1.5)
BUN BLD-MCNC: 41 MG/DL (ref 6–20)
BUN/CREAT SERPL: 8.2 (ref 7–25)
CALCIUM SPEC-SCNC: 9.9 MG/DL (ref 8.6–10.5)
CHLORIDE SERPL-SCNC: 95 MMOL/L (ref 98–107)
CO2 SERPL-SCNC: 23.7 MMOL/L (ref 22–29)
CREAT BLD-MCNC: 5.03 MG/DL (ref 0.76–1.27)
DEPRECATED RDW RBC AUTO: 46.6 FL (ref 37–54)
EOSINOPHIL # BLD AUTO: 0.21 10*3/MM3 (ref 0–0.7)
EOSINOPHIL NFR BLD AUTO: 2.2 % (ref 0.3–6.2)
ERYTHROCYTE [DISTWIDTH] IN BLOOD BY AUTOMATED COUNT: 14.4 % (ref 11.5–14.5)
GFR SERPL CREATININE-BSD FRML MDRD: 12 ML/MIN/1.73
GLUCOSE BLD-MCNC: 90 MG/DL (ref 65–99)
GLUCOSE BLDC GLUCOMTR-MCNC: 114 MG/DL (ref 70–130)
GLUCOSE BLDC GLUCOMTR-MCNC: 196 MG/DL (ref 70–130)
GLUCOSE BLDC GLUCOMTR-MCNC: 91 MG/DL (ref 70–130)
HCT VFR BLD AUTO: 37.1 % (ref 40.4–52.2)
HGB BLD-MCNC: 11.3 G/DL (ref 13.7–17.6)
IMM GRANULOCYTES # BLD: 0.04 10*3/MM3 (ref 0–0.03)
IMM GRANULOCYTES NFR BLD: 0.4 % (ref 0–0.5)
LYMPHOCYTES # BLD AUTO: 1.1 10*3/MM3 (ref 0.9–4.8)
LYMPHOCYTES NFR BLD AUTO: 11.6 % (ref 19.6–45.3)
MAGNESIUM SERPL-MCNC: 2.3 MG/DL (ref 1.6–2.6)
MCH RBC QN AUTO: 27.3 PG (ref 27–32.7)
MCHC RBC AUTO-ENTMCNC: 30.5 G/DL (ref 32.6–36.4)
MCV RBC AUTO: 89.6 FL (ref 79.8–96.2)
MONOCYTES # BLD AUTO: 0.97 10*3/MM3 (ref 0.2–1.2)
MONOCYTES NFR BLD AUTO: 10.2 % (ref 5–12)
NEUTROPHILS # BLD AUTO: 7.21 10*3/MM3 (ref 1.9–8.1)
NEUTROPHILS NFR BLD AUTO: 75.7 % (ref 42.7–76)
PHOSPHATE SERPL-MCNC: 4.6 MG/DL (ref 2.5–4.5)
PLATELET # BLD AUTO: 236 10*3/MM3 (ref 140–500)
PMV BLD AUTO: 10.4 FL (ref 6–12)
POTASSIUM BLD-SCNC: 4.4 MMOL/L (ref 3.5–5.2)
RBC # BLD AUTO: 4.14 10*6/MM3 (ref 4.6–6)
SODIUM BLD-SCNC: 136 MMOL/L (ref 136–145)
WBC NRBC COR # BLD: 9.52 10*3/MM3 (ref 4.5–10.7)

## 2018-05-02 PROCEDURE — 99231 SBSQ HOSP IP/OBS SF/LOW 25: CPT | Performed by: INTERNAL MEDICINE

## 2018-05-02 PROCEDURE — 85347 COAGULATION TIME ACTIVATED: CPT

## 2018-05-02 PROCEDURE — 25010000002 MIDAZOLAM PER 1 MG: Performed by: ANESTHESIOLOGY

## 2018-05-02 PROCEDURE — C1760 CLOSURE DEV, VASC: HCPCS | Performed by: SURGERY

## 2018-05-02 PROCEDURE — 93010 ELECTROCARDIOGRAM REPORT: CPT | Performed by: INTERNAL MEDICINE

## 2018-05-02 PROCEDURE — 25010000002 VANCOMYCIN PER 500 MG: Performed by: SURGERY

## 2018-05-02 PROCEDURE — C1894 INTRO/SHEATH, NON-LASER: HCPCS | Performed by: SURGERY

## 2018-05-02 PROCEDURE — 25010000002 FENTANYL CITRATE (PF) 100 MCG/2ML SOLUTION: Performed by: NURSE ANESTHETIST, CERTIFIED REGISTERED

## 2018-05-02 PROCEDURE — C1725 CATH, TRANSLUMIN NON-LASER: HCPCS | Performed by: SURGERY

## 2018-05-02 PROCEDURE — B41D1ZZ FLUOROSCOPY OF AORTA AND BILATERAL LOWER EXTREMITY ARTERIES USING LOW OSMOLAR CONTRAST: ICD-10-PCS | Performed by: SURGERY

## 2018-05-02 PROCEDURE — B44FZZ3 ULTRASONOGRAPHY OF RIGHT LOWER EXTREMITY ARTERIES, INTRAVASCULAR: ICD-10-PCS | Performed by: SURGERY

## 2018-05-02 PROCEDURE — 63710000001 INSULIN ASPART PER 5 UNITS: Performed by: INTERNAL MEDICINE

## 2018-05-02 PROCEDURE — 25010000002 HEPARIN (PORCINE) PER 1000 UNITS: Performed by: NURSE ANESTHETIST, CERTIFIED REGISTERED

## 2018-05-02 PROCEDURE — 80048 BASIC METABOLIC PNL TOTAL CA: CPT | Performed by: INTERNAL MEDICINE

## 2018-05-02 PROCEDURE — 25010000002 HEPARIN (PORCINE) PER 1000 UNITS: Performed by: SURGERY

## 2018-05-02 PROCEDURE — 04CM3ZZ EXTIRPATION OF MATTER FROM RIGHT POPLITEAL ARTERY, PERCUTANEOUS APPROACH: ICD-10-PCS | Performed by: SURGERY

## 2018-05-02 PROCEDURE — 047K3ZZ DILATION OF RIGHT FEMORAL ARTERY, PERCUTANEOUS APPROACH: ICD-10-PCS | Performed by: SURGERY

## 2018-05-02 PROCEDURE — 75716 ARTERY X-RAYS ARMS/LEGS: CPT

## 2018-05-02 PROCEDURE — 25010000002 PROTAMINE SULFATE PER 10 MG: Performed by: ANESTHESIOLOGY

## 2018-05-02 PROCEDURE — 25010000002 MIDAZOLAM PER 1 MG: Performed by: NURSE ANESTHETIST, CERTIFIED REGISTERED

## 2018-05-02 PROCEDURE — 82962 GLUCOSE BLOOD TEST: CPT

## 2018-05-02 PROCEDURE — 85025 COMPLETE CBC W/AUTO DIFF WBC: CPT | Performed by: INTERNAL MEDICINE

## 2018-05-02 PROCEDURE — C1887 CATHETER, GUIDING: HCPCS | Performed by: SURGERY

## 2018-05-02 PROCEDURE — 25010000002 PROPOFOL 10 MG/ML EMULSION: Performed by: NURSE ANESTHETIST, CERTIFIED REGISTERED

## 2018-05-02 PROCEDURE — 36415 COLL VENOUS BLD VENIPUNCTURE: CPT | Performed by: INTERNAL MEDICINE

## 2018-05-02 PROCEDURE — C1769 GUIDE WIRE: HCPCS | Performed by: SURGERY

## 2018-05-02 PROCEDURE — C2623 CATH, TRANSLUMIN, DRUG-COAT: HCPCS | Performed by: SURGERY

## 2018-05-02 PROCEDURE — 83735 ASSAY OF MAGNESIUM: CPT | Performed by: INTERNAL MEDICINE

## 2018-05-02 PROCEDURE — 25010000002 HEPARIN (PORCINE) PER 1000 UNITS: Performed by: INTERNAL MEDICINE

## 2018-05-02 PROCEDURE — C1724 CATH, TRANS ATHEREC,ROTATION: HCPCS | Performed by: SURGERY

## 2018-05-02 PROCEDURE — 0 IOPAMIDOL PER 1 ML: Performed by: SURGERY

## 2018-05-02 PROCEDURE — 75625 CONTRAST EXAM ABDOMINL AORTA: CPT

## 2018-05-02 PROCEDURE — 25010000002 PHENYLEPHRINE PER 1 ML: Performed by: NURSE ANESTHETIST, CERTIFIED REGISTERED

## 2018-05-02 PROCEDURE — 84100 ASSAY OF PHOSPHORUS: CPT | Performed by: INTERNAL MEDICINE

## 2018-05-02 PROCEDURE — 04CK3ZZ EXTIRPATION OF MATTER FROM RIGHT FEMORAL ARTERY, PERCUTANEOUS APPROACH: ICD-10-PCS | Performed by: SURGERY

## 2018-05-02 PROCEDURE — 25010000002 CEFTRIAXONE PER 250 MG: Performed by: INTERNAL MEDICINE

## 2018-05-02 PROCEDURE — 93005 ELECTROCARDIOGRAM TRACING: CPT | Performed by: NURSE PRACTITIONER

## 2018-05-02 PROCEDURE — C1753 CATH, INTRAVAS ULTRASOUND: HCPCS | Performed by: SURGERY

## 2018-05-02 PROCEDURE — 047M3ZZ DILATION OF RIGHT POPLITEAL ARTERY, PERCUTANEOUS APPROACH: ICD-10-PCS | Performed by: SURGERY

## 2018-05-02 RX ORDER — SODIUM CHLORIDE 9 MG/ML
100 INJECTION, SOLUTION INTRAVENOUS CONTINUOUS
Status: DISCONTINUED | OUTPATIENT
Start: 2018-05-02 | End: 2018-05-03

## 2018-05-02 RX ORDER — MIDAZOLAM HYDROCHLORIDE 5 MG/ML
2 INJECTION INTRAMUSCULAR; INTRAVENOUS ONCE
Status: DISCONTINUED | OUTPATIENT
Start: 2018-05-02 | End: 2018-05-02 | Stop reason: HOSPADM

## 2018-05-02 RX ORDER — DIPHENHYDRAMINE HYDROCHLORIDE 50 MG/ML
12.5 INJECTION INTRAMUSCULAR; INTRAVENOUS
Status: DISCONTINUED | OUTPATIENT
Start: 2018-05-02 | End: 2018-05-02 | Stop reason: HOSPADM

## 2018-05-02 RX ORDER — MIDAZOLAM HYDROCHLORIDE 1 MG/ML
INJECTION INTRAMUSCULAR; INTRAVENOUS AS NEEDED
Status: DISCONTINUED | OUTPATIENT
Start: 2018-05-02 | End: 2018-05-02 | Stop reason: SURG

## 2018-05-02 RX ORDER — OXYCODONE AND ACETAMINOPHEN 7.5; 325 MG/1; MG/1
1 TABLET ORAL ONCE AS NEEDED
Status: DISCONTINUED | OUTPATIENT
Start: 2018-05-02 | End: 2018-05-02 | Stop reason: HOSPADM

## 2018-05-02 RX ORDER — FENTANYL CITRATE 50 UG/ML
50 INJECTION, SOLUTION INTRAMUSCULAR; INTRAVENOUS
Status: DISCONTINUED | OUTPATIENT
Start: 2018-05-02 | End: 2018-05-02 | Stop reason: HOSPADM

## 2018-05-02 RX ORDER — PROPOFOL 10 MG/ML
VIAL (ML) INTRAVENOUS CONTINUOUS PRN
Status: DISCONTINUED | OUTPATIENT
Start: 2018-05-02 | End: 2018-05-02 | Stop reason: SURG

## 2018-05-02 RX ORDER — FLUMAZENIL 0.1 MG/ML
0.2 INJECTION INTRAVENOUS AS NEEDED
Status: DISCONTINUED | OUTPATIENT
Start: 2018-05-02 | End: 2018-05-02 | Stop reason: HOSPADM

## 2018-05-02 RX ORDER — MIDAZOLAM HYDROCHLORIDE 1 MG/ML
1 INJECTION INTRAMUSCULAR; INTRAVENOUS
Status: DISCONTINUED | OUTPATIENT
Start: 2018-05-02 | End: 2018-05-02 | Stop reason: HOSPADM

## 2018-05-02 RX ORDER — ONDANSETRON 2 MG/ML
4 INJECTION INTRAMUSCULAR; INTRAVENOUS ONCE AS NEEDED
Status: DISCONTINUED | OUTPATIENT
Start: 2018-05-02 | End: 2018-05-02 | Stop reason: HOSPADM

## 2018-05-02 RX ORDER — VANCOMYCIN HYDROCHLORIDE 1 G/200ML
1000 INJECTION, SOLUTION INTRAVENOUS ONCE
Status: COMPLETED | OUTPATIENT
Start: 2018-05-02 | End: 2018-05-02

## 2018-05-02 RX ORDER — LIDOCAINE HYDROCHLORIDE 20 MG/ML
INJECTION, SOLUTION INFILTRATION; PERINEURAL AS NEEDED
Status: DISCONTINUED | OUTPATIENT
Start: 2018-05-02 | End: 2018-05-02 | Stop reason: SURG

## 2018-05-02 RX ORDER — EPHEDRINE SULFATE 50 MG/ML
5 INJECTION, SOLUTION INTRAVENOUS ONCE AS NEEDED
Status: DISCONTINUED | OUTPATIENT
Start: 2018-05-02 | End: 2018-05-02 | Stop reason: HOSPADM

## 2018-05-02 RX ORDER — NALOXONE HCL 0.4 MG/ML
0.2 VIAL (ML) INJECTION AS NEEDED
Status: DISCONTINUED | OUTPATIENT
Start: 2018-05-02 | End: 2018-05-02 | Stop reason: HOSPADM

## 2018-05-02 RX ORDER — LIDOCAINE HYDROCHLORIDE 10 MG/ML
0.5 INJECTION, SOLUTION EPIDURAL; INFILTRATION; INTRACAUDAL; PERINEURAL ONCE AS NEEDED
Status: DISCONTINUED | OUTPATIENT
Start: 2018-05-02 | End: 2018-05-02 | Stop reason: HOSPADM

## 2018-05-02 RX ORDER — HEPARIN SODIUM 1000 [USP'U]/ML
INJECTION, SOLUTION INTRAVENOUS; SUBCUTANEOUS AS NEEDED
Status: DISCONTINUED | OUTPATIENT
Start: 2018-05-02 | End: 2018-05-02 | Stop reason: SURG

## 2018-05-02 RX ORDER — PROMETHAZINE HYDROCHLORIDE 25 MG/ML
12.5 INJECTION, SOLUTION INTRAMUSCULAR; INTRAVENOUS ONCE AS NEEDED
Status: DISCONTINUED | OUTPATIENT
Start: 2018-05-02 | End: 2018-05-02 | Stop reason: HOSPADM

## 2018-05-02 RX ORDER — ROSUVASTATIN CALCIUM 20 MG/1
20 TABLET, COATED ORAL DAILY
Status: DISCONTINUED | OUTPATIENT
Start: 2018-05-03 | End: 2018-05-03 | Stop reason: HOSPADM

## 2018-05-02 RX ORDER — HYDRALAZINE HYDROCHLORIDE 20 MG/ML
5 INJECTION INTRAMUSCULAR; INTRAVENOUS
Status: DISCONTINUED | OUTPATIENT
Start: 2018-05-02 | End: 2018-05-02 | Stop reason: HOSPADM

## 2018-05-02 RX ORDER — PROMETHAZINE HYDROCHLORIDE 25 MG/1
12.5 TABLET ORAL ONCE AS NEEDED
Status: DISCONTINUED | OUTPATIENT
Start: 2018-05-02 | End: 2018-05-02 | Stop reason: HOSPADM

## 2018-05-02 RX ORDER — FAMOTIDINE 10 MG/ML
20 INJECTION, SOLUTION INTRAVENOUS ONCE
Status: COMPLETED | OUTPATIENT
Start: 2018-05-02 | End: 2018-05-02

## 2018-05-02 RX ORDER — MIDAZOLAM HYDROCHLORIDE 1 MG/ML
2 INJECTION INTRAMUSCULAR; INTRAVENOUS
Status: DISCONTINUED | OUTPATIENT
Start: 2018-05-02 | End: 2018-05-02 | Stop reason: HOSPADM

## 2018-05-02 RX ORDER — HYDROCODONE BITARTRATE AND ACETAMINOPHEN 7.5; 325 MG/1; MG/1
1 TABLET ORAL ONCE AS NEEDED
Status: DISCONTINUED | OUTPATIENT
Start: 2018-05-02 | End: 2018-05-02 | Stop reason: HOSPADM

## 2018-05-02 RX ORDER — PROMETHAZINE HYDROCHLORIDE 25 MG/1
25 SUPPOSITORY RECTAL ONCE AS NEEDED
Status: DISCONTINUED | OUTPATIENT
Start: 2018-05-02 | End: 2018-05-02 | Stop reason: HOSPADM

## 2018-05-02 RX ORDER — HYDROMORPHONE HCL 110MG/55ML
0.5 PATIENT CONTROLLED ANALGESIA SYRINGE INTRAVENOUS
Status: DISCONTINUED | OUTPATIENT
Start: 2018-05-02 | End: 2018-05-02 | Stop reason: HOSPADM

## 2018-05-02 RX ORDER — SODIUM CHLORIDE 0.9 % (FLUSH) 0.9 %
1-10 SYRINGE (ML) INJECTION AS NEEDED
Status: DISCONTINUED | OUTPATIENT
Start: 2018-05-02 | End: 2018-05-02 | Stop reason: HOSPADM

## 2018-05-02 RX ORDER — FENTANYL CITRATE 50 UG/ML
INJECTION, SOLUTION INTRAMUSCULAR; INTRAVENOUS AS NEEDED
Status: DISCONTINUED | OUTPATIENT
Start: 2018-05-02 | End: 2018-05-02 | Stop reason: SURG

## 2018-05-02 RX ORDER — PROPOFOL 10 MG/ML
VIAL (ML) INTRAVENOUS AS NEEDED
Status: DISCONTINUED | OUTPATIENT
Start: 2018-05-02 | End: 2018-05-02 | Stop reason: SURG

## 2018-05-02 RX ORDER — PROTAMINE SULFATE 10 MG/ML
INJECTION, SOLUTION INTRAVENOUS AS NEEDED
Status: DISCONTINUED | OUTPATIENT
Start: 2018-05-02 | End: 2018-05-02 | Stop reason: SURG

## 2018-05-02 RX ORDER — CLOPIDOGREL BISULFATE 75 MG/1
75 TABLET ORAL DAILY
Status: DISCONTINUED | OUTPATIENT
Start: 2018-05-02 | End: 2018-05-03 | Stop reason: HOSPADM

## 2018-05-02 RX ORDER — SODIUM CHLORIDE, SODIUM LACTATE, POTASSIUM CHLORIDE, CALCIUM CHLORIDE 600; 310; 30; 20 MG/100ML; MG/100ML; MG/100ML; MG/100ML
9 INJECTION, SOLUTION INTRAVENOUS CONTINUOUS
Status: DISCONTINUED | OUTPATIENT
Start: 2018-05-02 | End: 2018-05-02

## 2018-05-02 RX ORDER — PROMETHAZINE HYDROCHLORIDE 25 MG/1
25 TABLET ORAL ONCE AS NEEDED
Status: DISCONTINUED | OUTPATIENT
Start: 2018-05-02 | End: 2018-05-02 | Stop reason: HOSPADM

## 2018-05-02 RX ORDER — LABETALOL HYDROCHLORIDE 5 MG/ML
5 INJECTION, SOLUTION INTRAVENOUS
Status: DISCONTINUED | OUTPATIENT
Start: 2018-05-02 | End: 2018-05-02 | Stop reason: HOSPADM

## 2018-05-02 RX ADMIN — FUROSEMIDE 80 MG: 80 TABLET ORAL at 09:39

## 2018-05-02 RX ADMIN — PROTAMINE SULFATE 30 MG: 10 INJECTION, SOLUTION INTRAVENOUS at 15:41

## 2018-05-02 RX ADMIN — PANTOPRAZOLE SODIUM 40 MG: 40 TABLET, DELAYED RELEASE ORAL at 09:39

## 2018-05-02 RX ADMIN — LIDOCAINE HYDROCHLORIDE 50 MG: 20 INJECTION, SOLUTION INFILTRATION; PERINEURAL at 14:11

## 2018-05-02 RX ADMIN — Medication 1 MG: at 14:06

## 2018-05-02 RX ADMIN — HEPARIN SODIUM 2500 UNITS: 1000 INJECTION, SOLUTION INTRAVENOUS; SUBCUTANEOUS at 14:45

## 2018-05-02 RX ADMIN — HEPARIN SODIUM 5000 UNITS: 5000 INJECTION, SOLUTION INTRAVENOUS; SUBCUTANEOUS at 23:29

## 2018-05-02 RX ADMIN — CLOPIDOGREL 75 MG: 75 TABLET, FILM COATED ORAL at 20:41

## 2018-05-02 RX ADMIN — MIDAZOLAM HYDROCHLORIDE 2 MG: 2 INJECTION, SOLUTION INTRAMUSCULAR; INTRAVENOUS at 10:45

## 2018-05-02 RX ADMIN — SODIUM CHLORIDE 100 ML/HR: 9 INJECTION, SOLUTION INTRAVENOUS at 18:27

## 2018-05-02 RX ADMIN — ASPIRIN 81 MG: 81 TABLET ORAL at 09:39

## 2018-05-02 RX ADMIN — PROPOFOL 40 MG: 10 INJECTION, EMULSION INTRAVENOUS at 14:11

## 2018-05-02 RX ADMIN — MIDAZOLAM HYDROCHLORIDE 2 MG: 2 INJECTION, SOLUTION INTRAMUSCULAR; INTRAVENOUS at 11:31

## 2018-05-02 RX ADMIN — PHENYLEPHRINE HYDROCHLORIDE 100 MCG: 10 INJECTION INTRAVENOUS at 15:02

## 2018-05-02 RX ADMIN — INSULIN ASPART 1 UNITS: 100 INJECTION, SOLUTION INTRAVENOUS; SUBCUTANEOUS at 23:29

## 2018-05-02 RX ADMIN — SODIUM CHLORIDE 9 ML/HR: 9 INJECTION, SOLUTION INTRAVENOUS at 10:46

## 2018-05-02 RX ADMIN — HEPARIN SODIUM 5000 UNITS: 1000 INJECTION, SOLUTION INTRAVENOUS; SUBCUTANEOUS at 14:31

## 2018-05-02 RX ADMIN — OXYCODONE HYDROCHLORIDE 10 MG: 5 TABLET ORAL at 18:27

## 2018-05-02 RX ADMIN — MIDAZOLAM HYDROCHLORIDE 2 MG: 2 INJECTION, SOLUTION INTRAMUSCULAR; INTRAVENOUS at 13:35

## 2018-05-02 RX ADMIN — ROSUVASTATIN CALCIUM 5 MG: 5 TABLET, FILM COATED ORAL at 09:39

## 2018-05-02 RX ADMIN — HYDROCORTISONE ACETATE 25 MG: 25 SUPPOSITORY RECTAL at 09:39

## 2018-05-02 RX ADMIN — PROPOFOL 100 MCG/KG/MIN: 10 INJECTION, EMULSION INTRAVENOUS at 14:11

## 2018-05-02 RX ADMIN — PANTOPRAZOLE SODIUM 40 MG: 40 TABLET, DELAYED RELEASE ORAL at 18:42

## 2018-05-02 RX ADMIN — ALLOPURINOL 150 MG: 300 TABLET ORAL at 09:39

## 2018-05-02 RX ADMIN — FENTANYL CITRATE 50 MCG: 50 INJECTION INTRAMUSCULAR; INTRAVENOUS at 14:09

## 2018-05-02 RX ADMIN — FUROSEMIDE 80 MG: 80 TABLET ORAL at 20:44

## 2018-05-02 RX ADMIN — BISOPROLOL FUMARATE 5 MG: 5 TABLET ORAL at 09:39

## 2018-05-02 RX ADMIN — VANCOMYCIN HYDROCHLORIDE 1000 MG: 1 INJECTION, SOLUTION INTRAVENOUS at 11:07

## 2018-05-02 RX ADMIN — FAMOTIDINE 20 MG: 10 INJECTION INTRAVENOUS at 10:45

## 2018-05-02 RX ADMIN — PHENYLEPHRINE HYDROCHLORIDE 100 MCG: 10 INJECTION INTRAVENOUS at 14:43

## 2018-05-02 RX ADMIN — CEFTRIAXONE SODIUM 1 G: 1 INJECTION, SOLUTION INTRAVENOUS at 13:40

## 2018-05-02 RX ADMIN — IOPAMIDOL 200.3 ML: 510 INJECTION, SOLUTION INTRAVASCULAR at 14:15

## 2018-05-02 RX ADMIN — OXYCODONE HYDROCHLORIDE 10 MG: 5 TABLET ORAL at 09:43

## 2018-05-02 NOTE — CONSULTS
Diabetes Education    Patient Name:  Sergio Phan  YOB: 1970  MRN: [id number]  Admit Date:  4/25/2018      Pt off floor in OR      Electronically signed by:  Shelby Madera RN  05/02/18 10:44 AM

## 2018-05-02 NOTE — PROGRESS NOTES
"   LOS: 7 days    Patient Care Team:  ROBSON Sampson as PCP - General    Chief Complaint:  Weakness.    Subjective   No complaints.  Interval History:     Patient Complaints: fatigue,weakness  Patient Denies:  soa.  History taken from: patient chart family    Review of Systems:    All systems were reviewed and negative except for:  Constitution:  positive for fatigue and malaise    Objective     Vital Sign Min/Max for last 24 hours  Temp  Min: 97.6 °F (36.4 °C)  Max: 98.5 °F (36.9 °C)   BP  Min: 111/83  Max: 160/64   Pulse  Min: 63  Max: 80   Resp  Min: 16  Max: 25   SpO2  Min: 96 %  Max: 96 %   No Data Recorded   No Data Recorded     Flowsheet Rows    Flowsheet Row First Filed Value   Admission Height 177.8 cm (70\") Documented at 04/25/2018 1836   Admission Weight 96.8 kg (213 lb 6.5 oz) Documented at 04/25/2018 1836          I/O this shift:  In: -   Out: 260 [Urine:260]  I/O last 3 completed shifts:  In: 710 [P.O.:560; I.V.:150]  Out: 3550 [Urine:550; Other:3000]    Physical Exam:    Constitutional: He appears well-developed.   HENT:   Head: Normocephalic and atraumatic.   Eyes: Pupils are equal, round, and reactive to light.   Neck: No JVD present.   Cardiovascular: Normal heart sounds.  Exam reveals no friction rub.    Pulmonary/Chest: Breath sounds normal. He has no wheezes. He has no rales.   Abdominal: Soft. There is no tenderness. There is no guarding.     WBC WBC   Date Value Ref Range Status   05/01/2018 7.29 4.50 - 10.70 10*3/mm3 Final   04/30/2018 7.53 4.50 - 10.70 10*3/mm3 Final      HGB Hemoglobin   Date Value Ref Range Status   05/01/2018 10.3 (L) 13.7 - 17.6 g/dL Final   04/30/2018 9.9 (L) 13.7 - 17.6 g/dL Final      HCT Hematocrit   Date Value Ref Range Status   05/01/2018 34.9 (L) 40.4 - 52.2 % Final   04/30/2018 32.5 (L) 40.4 - 52.2 % Final      Platlets No results found for: LABPLAT   MCV MCV   Date Value Ref Range Status   05/01/2018 90.2 79.8 - 96.2 fL Final   04/30/2018 90.0 " 79.8 - 96.2 fL Final          Sodium Sodium   Date Value Ref Range Status   05/01/2018 139 136 - 145 mmol/L Final   04/30/2018 139 136 - 145 mmol/L Final      Potassium Potassium   Date Value Ref Range Status   05/01/2018 4.1 3.5 - 5.2 mmol/L Final   04/30/2018 4.8 3.5 - 5.2 mmol/L Final      Chloride Chloride   Date Value Ref Range Status   05/01/2018 97 (L) 98 - 107 mmol/L Final   04/30/2018 98 98 - 107 mmol/L Final      CO2 CO2   Date Value Ref Range Status   05/01/2018 26.3 22.0 - 29.0 mmol/L Final   04/30/2018 25.9 22.0 - 29.0 mmol/L Final      BUN BUN   Date Value Ref Range Status   05/01/2018 54 (H) 6 - 20 mg/dL Final   04/30/2018 39 (H) 6 - 20 mg/dL Final      Creatinine Creatinine   Date Value Ref Range Status   05/01/2018 5.55 (H) 0.76 - 1.27 mg/dL Final   04/30/2018 4.43 (H) 0.76 - 1.27 mg/dL Final      Calcium Calcium   Date Value Ref Range Status   05/01/2018 9.6 8.6 - 10.5 mg/dL Final   04/30/2018 9.8 8.6 - 10.5 mg/dL Final      PO4 No results found for: CAPO4   Albumin No results found for: ALBUMIN   Magnesium Magnesium   Date Value Ref Range Status   05/01/2018 2.3 1.6 - 2.6 mg/dL Final   04/30/2018 2.1 1.6 - 2.6 mg/dL Final      Uric Acid No results found for: URICACID        Results Review:     I reviewed the patient's new clinical results.      allopurinol 150 mg Oral Daily   aspirin 81 mg Oral Daily   bisoprolol 5 mg Oral Daily   ceftriaxone 1 g Intravenous Q24H   furosemide 80 mg Oral BID   heparin (porcine) 5,000 Units Subcutaneous Q8H   hydrocortisone 25 mg Rectal BID   insulin aspart 0-12 Units Subcutaneous 4x Daily AC & at Bedtime   pantoprazole 40 mg Oral BID AC   rosuvastatin 5 mg Oral Daily       sodium chloride 9 mL/hr Last Rate: 9 mL/hr (04/30/18 0957)       Medication Review: reviewed    Assessment/Plan     Active Problems:    Hypertension    Hypertrophic obstructive cardiomyopathy    Type 2 diabetes mellitus    Stage 4 chronic kidney disease    Severe diabetic hypoglycemia     Atherosclerosis of native arteries of extremity with rest pain      esrd now.,with biopsy showing cholesterol emboli previously.   - obesity.   - nephrotic range proteinuria.   - DM   - history of recurrent pancreatitis.    - ECHO 04/14/2018, EF is 51 %, rvsp IS 41.2. Left ventricular wall thickness is consistent with mild concentric hypertrophy. Left ventricular diastolic function was indeterminate. Elevated left atrial pressure.  Cath:2015:normal coronaries with hypertrophic cardiomyopathy.  - non compliance with treatment.   - Hypoglycemia.   - smoker.  Duplex:Chronic left lower extremity superficial thrombophlebitis noted in the small saphenous. 4/4/18.  S/p brachial cephalic fistula creation by  4/19/18.     Plan:  3 ltrs uf yesterday on dialysis.  Urinary retention needing straight cath,will request urology consult if needed again.d/w nursing.  Vascular planning on samson today.pt cleared from renal standpoint for iv contrast.  Gi bleed with gi recommendations noted.  Cardiology following.    to arrange admission with dialysis  second shift at [facility].  Will follow.         Pedro Frye MD  05/02/18  6:31 AM

## 2018-05-02 NOTE — PROGRESS NOTES
[facility] INPATIENT PROGRESS NOTE         [facility] OR    2018      PATIENT IDENTIFICATION:  Name: Sergio Phan ADMIT: 2018   : 1970  PCP: ROBSON Melgoza    MRN: [id number] LOS: 7 days   AGE/SEX: 47 y.o. male  ROOM: [facility] OR/MAIN OR                     LOS 7    Reason for visit: Follow up respiratory failure      SUBJECTIVE:   Plan to go to the operating room today for intervention by vascular surgery for lower extremity ischemia. Denies cough, chest pain or shortness of breath at this time. Resting comfortably at the time of my visit.   Denies nausea or vomiting.  Chart reviewed.      Objective   OBJECTIVE:    Vital Sign Min/Max for last 24 hours  Temp  Min: 97.6 °F (36.4 °C)  Max: 98 °F (36.7 °C)   BP  Min: 111/83  Max: 150/83   Pulse  Min: 63  Max: 69   Resp  Min: 12  Max: 20   SpO2  Min: 96 %  Max: 100 %   No Data Recorded   No Data Recorded                   FiO2 (%): 30 %     Body mass index is 24.8 kg/m².    Intake/Output Summary (Last 24 hours) at 18 1436  Last data filed at 18 1402   Gross per 24 hour   Intake              100 ml   Output              260 ml   Net             -160 ml         Exam:  GEN:  No distress, appears stated age  LUNGS: Normal chest on inspection, palpation and auscultation  CV:  Normal S1S2, without murmur  ABD:  Non tender, non distended, no hepatosplenomegaly, +BS  EXT:  No cyanosis or clubbing.  trace+ edema    Scheduled meds:      [MAR Hold] allopurinol 150 mg Oral Daily   [MAR Hold] aspirin 81 mg Oral Daily   bisoprolol 5 mg Oral Daily   [MAR Hold] ceftriaxone 1 g Intravenous Q24H   [MAR Hold] furosemide 80 mg Oral BID   [MAR Hold] heparin (porcine) 5,000 Units Subcutaneous Q8H   [MAR Hold] hydrocortisone 25 mg Rectal BID   [MAR Hold] insulin aspart 0-12 Units Subcutaneous 4x Daily AC & at Bedtime   iopamidol 300 mL Intra-arterial Once in imaging   midazolam 2 mg Intravenous Once   [MAR Hold] pantoprazole 40 mg  Oral BID AC   [MAR Hold] rosuvastatin 5 mg Oral Daily     IV meds:                          sodium chloride 9 mL/hr Last Rate: 9 mL/hr (05/02/18 1046)     Data Review:    Results from last 7 days  Lab Units 05/02/18  0731 05/01/18  0536 04/30/18  0538 04/29/18  0515 04/28/18  0423   SODIUM mmol/L 136 139 139 141 137   POTASSIUM mmol/L 4.4 4.1 4.8 4.0 4.0   CHLORIDE mmol/L 95* 97* 98 99 95*   CO2 mmol/L 23.7 26.3 25.9 23.8 23.3   BUN mg/dL 41* 54* 39* 59* 85*   CREATININE mg/dL 5.03* 5.55* 4.43* 5.48* 6.52*   GLUCOSE mg/dL 90 129* 110* 102* 124*   CALCIUM mg/dL 9.9 9.6 9.8 9.5 8.9         Estimated Creatinine Clearance: 21.1 mL/min (by C-G formula based on SCr of 5.03 mg/dL (H)).    Results from last 7 days  Lab Units 05/02/18  0731 05/01/18  0536 04/30/18  0538 04/29/18  0515 04/28/18  0423   WBC 10*3/mm3 9.52 7.29 7.53 7.22 7.94   HEMOGLOBIN g/dL 11.3* 10.3* 9.9* 9.8* 8.6*   PLATELETS 10*3/mm3 236 232 244 200 175       Results from last 7 days  Lab Units 04/25/18  1806   INR  1.33*       Results from last 7 days  Lab Units 04/25/18  1806   ALT (SGPT) U/L <5   AST (SGOT) U/L 20       Results from last 7 days  Lab Units 04/26/18  1137 04/26/18  0420 04/25/18  1847   PH, ARTERIAL pH units 7.459* 7.427 7.412   PO2 ART mm Hg 63.0* 98.4 171.6*   PCO2, ARTERIAL mm Hg 34.9* 42.3 44.6   HCO3 ART mmol/L 24.7 27.8 28.4*             Chest x-ray 4/28 viewed shows significant improvement right lung base infiltrate    Microbiology reviewed:    Respiratory Culture - Sputum, Bronchus [[id number]] (Abnormal) Collected: 04/26/18 0013   Lab Status: Final result Specimen: Sputum from Bronchus Updated: 04/28/18 1052    Respiratory Culture --     Heavy growth (4+) Haemophilus influenzae (A)       • Dopplers reviewed showing severe ischemia    )Assessment   ASSESSMENT:      Active Hospital Problems (** Indicates Principal Problem)    Diagnosis Date Noted   • Atherosclerosis of native arteries of extremity with rest pain [I70.229]  04/30/2018   • Severe diabetic hypoglycemia [E11.649] 04/27/2018   • Stage 4 chronic kidney disease [N18.4] 04/19/2018   • Hypertrophic obstructive cardiomyopathy [I42.1] 05/05/2016   • Hypertension [I10] 04/20/2016   • Type 2 diabetes mellitus [E11.9] 04/20/2016      Resolved Hospital Problems    Diagnosis Date Noted Date Resolved   • Unconscious [R40.20] 04/25/2018 04/27/2018     Acute hypercapnic and hypoxemic respiratory failure due to airway compromise: Improved  Haemophilus influenza pneumonia right lower lobe  Metabolic encephalopathy: Improved  Acute on chronic kidney disease IV  Chronic pancreatic insufficiency  Asthma/COPD  Rectal bleeding with symptoms of hemorrhoids  Hypokalemia  Chronic anemia  Peripheral vascular disease with severe digital ischemia left greater than right      PLAN:  Plan angiogram for distal ischemia legs per vascular surgery.  Hemorrhoidal treatment per GI recommendations.  Dialysis plan per nephrology.  Will do dialysis in [place] chronic unit after discharge.  S/P for tunneled catheter 5/1 per vascular.  Encourage pulmonary toilet.  Discharge to [facility] skilled unit when okay with all.      Fernando Sandoval MD  Pulmonary and Critical Care Medicine  [facility], [facility]  5/2/2018    2:36 PM

## 2018-05-02 NOTE — SIGNIFICANT NOTE
05/02/18 1309   Rehab Treatment   Discipline physical therapist   Reason Treatment Not Performed other (see comments)  (Off floor to OR; will follow up tomorrow. )   Recommendation   PT - Next Appointment 05/03/18

## 2018-05-02 NOTE — OP NOTE
Date of Admission:  4/25/2018 05/02/18  Chuck Woodward MD    Preoperative Diagnosis:   Peripheral arterial disease with Rest pain    Postoperative Diagnosis:   Same    Procedure Performed:   Ultrasound guided vascular access of the right common femoral artery.  Right lower extremity runoff arteriogram.  AIF arteriogram.  Left lower extremity runoff arteriogram.  Left SFA and popliteal artery laser atherectomy with drug coated balloon angioplasty. (2.0 laser (60/60):  5x120 mm Stellerex)  IVUS ×2.    CPT:  10078, 32091-57, 10072-99, 32175, 30492, 18045    Surgeon:   Chuck Woodward MD    Assistant:    Gene COSBY    Anesthesia:   MAC with local    Estimated Blood Loss:   Minimal    Findings:    Successful revascularization endovascularly of a left SFA and popliteal artery occlusion with three-vessel runoff onto the foot.  Nonflow limiting right common iliac artery stenosis approximately 50%.  Occlusion of the right SFA at the abductor with reconstitution in the popliteal with multivessel runoff to the foot.    Specimen:   none    Complications:   none    Access:  Right common femoral artery with 6 [place] destination.    Closure:  6/7 Japanese MYNX closure device    Dispo:  To PACU    Indication for procedure:   47 y.o. male with ischemic rest pain with severe peripheral vascular disease on lower extremity arterial Doppler.  He was counseled on the risk, benefits, and alternatives to undergoing diagnostic arteriogram with possible intervention.  Informed consent was obtained.     Description of procedure:   Patient was taken to the operating room.  Anesthesia was administered through the IV in the monitored setting.  Surgical sites were prepped and draped in the usual sterile manner.  A full surgical time out was performed.  Under ultrasound guidance, the right common femoral artery was accessed using a Microneedle   A Microwire was placed without difficulty.  Placement was confirmed with  fluoroscopy.  A MicroSheath was placed over the Microwire.  An oblique angiogram was performed of the right common femoral that shows good anterior mid common femoral access.  Ultimately, a 5-Tanzanian sheath was placed over the wire.  A full right lower extremity diagnostic arteriogram was performed which showed common femoral, profunda, and proximal SFA widely open.  The SFA was occluded at the adductor with early reconstitution in the popliteal with multi vessel runoff in the right foot.  Ultimately, an Omni Flush catheter was placed up into the abdominal aorta and an angiogram of the AIF segment was performed.  This showed the common iliac artery stenosis.  Up and over wire and catheter access was obtained to the left side.  A full left lower extremity diagnostic arteriogram was performed that showed a left distal SFA and popliteal occlusion.  Ultimately, 7500 units of heparin were administered.  Destination sheath was placed up and over the bifurcation into the left SFA.  The lesion was crossed using a directional wire and catheter.  Through crossing was confirmed with angiogram.  Ultimately, a 2.0 Stellarex laser was used on high settings to perform atherectomy of this lesion followed by a 4 mm balloon angioplasty.  The lesion responded well to angioplasty on angiogram.  I used the IVUS in the popliteal and SFA to size the vessels which showed a 5 mm popliteal and slightly larger than that in the distal SFA.  A 5 x 120 Stellarex drug-coated balloon was used with a 3 mm balloon angioplasty.  This produced good angiographic results.  Remaining portions of the angiogram failed to demonstrate any stenoses.      Attention was turned towards the right common iliac artery lesion.  I elected to IVUS this. The flow lumen on the IVUS was 6 x 8 at the area of maximum stenosis. All of this did prove a roughly 50% stenosis and I did not feel that this was flow-limiting.    At this point, all wires, catheters and sheaths were  removed, and a 6 x 7 Mynx closure device was used for percutaneous closure.  The patient tolerated the procedure well.     Chuck Woodward MD  05/02/18    Active Hospital Problems (** Indicates Principal Problem)    Diagnosis Date Noted   • Atherosclerosis of native arteries of extremity with rest pain [I70.229] 04/30/2018   • Severe diabetic hypoglycemia [E11.649] 04/27/2018   • Stage 5 chronic kidney disease on chronic dialysis [N18.6, Z99.2] 04/19/2018   • Hypertrophic obstructive cardiomyopathy [I42.1] 05/05/2016   • Hypertension [I10] 04/20/2016   • Type 2 diabetes mellitus [E11.9] 04/20/2016      Resolved Hospital Problems    Diagnosis Date Noted Date Resolved   • Unconscious [R40.20] 04/25/2018 04/27/2018

## 2018-05-02 NOTE — PLAN OF CARE
Problem: Patient Care Overview  Goal: Plan of Care Review  Outcome: Ongoing (interventions implemented as appropriate)   05/02/18 1410   Coping/Psychosocial   Plan of Care Reviewed With patient   Plan of Care Review   Progress improving   OTHER   Outcome Summary VSS. pt more alert and stable this shift. N/V under controll. will cont to monitor     Goal: Individualization and Mutuality  Outcome: Ongoing (interventions implemented as appropriate)      Problem: Skin Injury Risk (Adult)  Goal: Identify Related Risk Factors and Signs and Symptoms  Outcome: Ongoing (interventions implemented as appropriate)    Goal: Skin Health and Integrity  Outcome: Ongoing (interventions implemented as appropriate)      Problem: Fall Risk (Adult)  Goal: Identify Related Risk Factors and Signs and Symptoms  Outcome: Ongoing (interventions implemented as appropriate)    Goal: Absence of Fall  Outcome: Ongoing (interventions implemented as appropriate)      Problem: Pain, Acute (Adult)  Goal: Identify Related Risk Factors and Signs and Symptoms  Outcome: Ongoing (interventions implemented as appropriate)

## 2018-05-02 NOTE — ANESTHESIA POSTPROCEDURE EVALUATION
"Patient: Sergio Phan    Procedure Summary     Date:  05/02/18 Room / Location:  [facility] OR 18 INV / [facility] HYBRID OR 18/19    Anesthesia Start:  1402 Anesthesia Stop:  1556    Procedure:  JEFFERY LOWER EXTREMITY ANGIOGRAM, LEFT SFA  AND POPLITEAL LASER ARTHRECTOMY WITH DRUG COATED BALLOON, IVUS X3 (Bilateral ) Diagnosis:      Provider:  Chuck Woodward MD Provider:  Marichuy Carbajal MD    Anesthesia Type:  MAC ASA Status:  4          Anesthesia Type: MAC  Last vitals  BP   150/83 (05/02/18 1017)   Temp   36.4 °C (97.6 °F) (05/02/18 1017)   Pulse   66 (05/02/18 1336)   Resp   20 (05/02/18 1336)     SpO2   98 % (05/02/18 1336)     Post Anesthesia Care and Evaluation    Patient location during evaluation: PACU  Patient participation: complete - patient participated  Level of consciousness: awake  Pain management: adequate  Airway patency: patent  Anesthetic complications: No anesthetic complications    Cardiovascular status: acceptable  Respiratory status: acceptable  Hydration status: acceptable    Comments: /83 (BP Location: Left arm, Patient Position: Lying)   Pulse 66   Temp 36.4 °C (97.6 °F) (Oral)   Resp 20   Ht 182.9 cm (72.01\")   Wt 83 kg (182 lb 14.4 oz)   SpO2 98%   BMI 24.80 kg/m²       "

## 2018-05-02 NOTE — PLAN OF CARE
Problem: Patient Care Overview  Goal: Plan of Care Review  Outcome: Ongoing (interventions implemented as appropriate)   05/02/18 0601   Coping/Psychosocial   Plan of Care Reviewed With patient   Plan of Care Review   Progress improving   OTHER   Outcome Summary VSS. Continues to have retention straight cathed once. Rested throughout the night . Had periods of confusion. Will continue to monitor.     Goal: Interprofessional Rounds/Family Conf  Outcome: Ongoing (interventions implemented as appropriate)      Problem: Skin Injury Risk (Adult)  Goal: Identify Related Risk Factors and Signs and Symptoms  Outcome: Ongoing (interventions implemented as appropriate)   05/01/18 1544   Skin Injury Risk (Adult)   Related Risk Factors (Skin Injury Risk) critical care admission;fluid intake inadequate     Goal: Skin Health and Integrity  Outcome: Ongoing (interventions implemented as appropriate)   05/02/18 0601   Skin Injury Risk (Adult)   Skin Health and Integrity making progress toward outcome       Problem: Fall Risk (Adult)  Goal: Identify Related Risk Factors and Signs and Symptoms  Outcome: Ongoing (interventions implemented as appropriate)   05/01/18 1547   Fall Risk (Adult)   Related Risk Factors (Fall Risk) gait/mobility problems;history of falls;environment unfamiliar   Signs and Symptoms (Fall Risk) presence of risk factors     Goal: Absence of Fall  Outcome: Ongoing (interventions implemented as appropriate)   05/02/18 0601   Fall Risk (Adult)   Absence of Fall making progress toward outcome       Problem: Restraint, Nonbehavioral (Nonviolent)  Goal: Rationale and Justification   05/02/18 0601   Restraint, Nonbehavioral (Nonviolent)   Rationale and Justification prevent harm to self;prevent line/tube removal;failure of less restrictive safety measures       Problem: Pain, Acute (Adult)  Goal: Identify Related Risk Factors and Signs and Symptoms  Outcome: Ongoing (interventions implemented as appropriate)   04/30/18  1815   Pain, Acute (Adult)   Related Risk Factors (Acute Pain) patient perception;procedure/treatment;knowledge deficit;disease process;surgery   Signs and Symptoms (Acute Pain) verbalization of pain descriptors;fatigue/weakness     Goal: Acceptable Pain Control/Comfort Level  Outcome: Ongoing (interventions implemented as appropriate)   05/02/18 0601   Pain, Acute (Adult)   Acceptable Pain Control/Comfort Level making progress toward outcome

## 2018-05-02 NOTE — ANESTHESIA PREPROCEDURE EVALUATION
Anesthesia Evaluation     Patient summary reviewed and Nursing notes reviewed   NPO Solid Status: > 8 hours  NPO Liquid Status: > 8 hours           Airway   Mallampati: II  Neck ROM: full  No difficulty expected  Dental - normal exam     Pulmonary     breath sounds clear to auscultation  (+) COPD moderate, asthma,   Cardiovascular     Rhythm: regular    (+) pacemaker ICD, hypertension, past MI , PVD, hyperlipidemia,       Neuro/Psych  (+) dizziness/light headedness, syncope, weakness, psychiatric history,     GI/Hepatic/Renal/Endo    (+)   renal disease dialysis, diabetes mellitus,     Musculoskeletal     Abdominal    Substance History      OB/GYN          Other                        Anesthesia Plan    ASA 4     MAC     intravenous induction   Anesthetic plan and risks discussed with patient.

## 2018-05-02 NOTE — PROGRESS NOTES
"  ENDOCRINE    Subjective   AND PLANS  Sergio Phan is a 47 y.o. male.     Follow-up diabetes.    No chest pain or shortness of breath.  Fasting glucose 91.  Random glucose 111-128.  No recurrence of hypoglycemia.  Do not restart glimepiride.  Continue on NovoLog as needed.    Arterial ultrasound noted.  Patient is scheduled for an angiogram today.    Objective   /78 (BP Location: Left arm, Patient Position: Lying)   Pulse 64   Temp 97.9 °F (36.6 °C) (Oral)   Resp 12   Ht 182.9 cm (72.01\")   Wt 83 kg (182 lb 14.4 oz)   SpO2 96%   BMI 24.80 kg/m²   Physical Exam    Awake, alert, not in distress.    No rales or wheezes.  Regular heart rate and rhythm.  Abdomen soft, nontender.  No cyanosis.    Lab Results (last 24 hours)     Procedure Component Value Units Date/Time    Basic Metabolic Panel [[id number]]  (Abnormal) Collected:  05/02/18 0731    Specimen:  Blood Updated:  05/02/18 0823     Glucose 90 mg/dL      BUN 41 (H) mg/dL      Creatinine 5.03 (H) mg/dL      Sodium 136 mmol/L      Potassium 4.4 mmol/L      Chloride 95 (L) mmol/L      CO2 23.7 mmol/L      Calcium 9.9 mg/dL      eGFR Non African Amer 12 (L) mL/min/1.73      BUN/Creatinine Ratio 8.2     Anion Gap 17.3 mmol/L     Narrative:       GFR Normal >60  Chronic Kidney Disease <60  Kidney Failure <15    Magnesium [[id number]]  (Normal) Collected:  05/02/18 0731    Specimen:  Blood Updated:  05/02/18 0823     Magnesium 2.3 mg/dL     Phosphorus [[id number]]  (Abnormal) Collected:  05/02/18 0731    Specimen:  Blood Updated:  05/02/18 0823     Phosphorus 4.6 (H) mg/dL     POC Glucose Once [[id number]]  (Normal) Collected:  05/02/18 0821    Specimen:  Blood Updated:  05/02/18 0822     Glucose 91 mg/dL     Narrative:       Meter: [id number] : [id number] Chad DOAN    CBC & Differential [[id number]] Collected:  05/02/18 0731    Specimen:  Blood Updated:  05/02/18 0810    Narrative:       The following orders were created for panel order CBC & " Differential.  Procedure                               Abnormality         Status                     ---------                               -----------         ------                     CBC Auto Differential[[id number]]        Abnormal            Final result                 Please view results for these tests on the individual orders.    CBC Auto Differential [[id number]]  (Abnormal) Collected:  05/02/18 0731    Specimen:  Blood Updated:  05/02/18 0810     WBC 9.52 10*3/mm3      RBC 4.14 (L) 10*6/mm3      Hemoglobin 11.3 (L) g/dL      Hematocrit 37.1 (L) %      MCV 89.6 fL      MCH 27.3 pg      MCHC 30.5 (L) g/dL      RDW 14.4 %      RDW-SD 46.6 fl      MPV 10.4 fL      Platelets 236 10*3/mm3      Neutrophil % 75.7 %      Lymphocyte % 11.6 (L) %      Monocyte % 10.2 %      Eosinophil % 2.2 %      Basophil % 0.3 %      Immature Grans % 0.4 %      Neutrophils, Absolute 7.21 10*3/mm3      Lymphocytes, Absolute 1.10 10*3/mm3      Monocytes, Absolute 0.97 10*3/mm3      Eosinophils, Absolute 0.21 10*3/mm3      Basophils, Absolute 0.03 10*3/mm3      Immature Grans, Absolute 0.04 (H) 10*3/mm3     POC Glucose Once [[id number]]  (Normal) Collected:  05/01/18 2211    Specimen:  Blood Updated:  05/01/18 2213     Glucose 128 mg/dL     Narrative:       Meter: [id number] : [id number] Wolf DOAN    POC Glucose Once [[id number]]  (Abnormal) Collected:  05/01/18 1652    Specimen:  Blood Updated:  05/01/18 1654     Glucose 177 (H) mg/dL     Narrative:       Meter: [id number] : [id number] Kenny Anaya G NA    POC Glucose Once [[id number]]  (Normal) Collected:  05/01/18 1337    Specimen:  Blood Updated:  05/01/18 1344     Glucose 111 mg/dL     Narrative:       Meter: [id number] : [id number] Yoel Valenzuela RN            Active Problems:    Hypertension    Hypertrophic obstructive cardiomyopathy    Type 2 diabetes mellitus    Stage 4 chronic kidney disease    Severe diabetic hypoglycemia    Atherosclerosis of native  arteries of extremity with rest pain    Continue NovoLog as needed.  Instruct on insulin administration for possible future use

## 2018-05-03 ENCOUNTER — APPOINTMENT (OUTPATIENT)
Dept: CARDIOLOGY | Facility: HOSPITAL | Age: 48
End: 2018-05-03
Attending: SURGERY

## 2018-05-03 ENCOUNTER — INPATIENT HOSPITAL (OUTPATIENT)
Dept: URBAN - METROPOLITAN AREA HOSPITAL 27 | Facility: HOSPITAL | Age: 48
End: 2018-05-03
Payer: MEDICARE

## 2018-05-03 VITALS
RESPIRATION RATE: 16 BRPM | DIASTOLIC BLOOD PRESSURE: 73 MMHG | TEMPERATURE: 98.1 F | WEIGHT: 179.7 LBS | HEIGHT: 72 IN | HEART RATE: 66 BPM | BODY MASS INDEX: 24.34 KG/M2 | OXYGEN SATURATION: 98 % | SYSTOLIC BLOOD PRESSURE: 130 MMHG

## 2018-05-03 DIAGNOSIS — K62.5 HEMORRHAGE OF ANUS AND RECTUM: ICD-10-CM

## 2018-05-03 DIAGNOSIS — D64.9 ANEMIA, UNSPECIFIED: ICD-10-CM

## 2018-05-03 DIAGNOSIS — N18.6 END STAGE RENAL DISEASE: ICD-10-CM

## 2018-05-03 DIAGNOSIS — I73.9 PERIPHERAL VASCULAR DISEASE, UNSPECIFIED: ICD-10-CM

## 2018-05-03 LAB
ACT BLD: 186 SECONDS (ref 82–152)
ANION GAP SERPL CALCULATED.3IONS-SCNC: 19.3 MMOL/L
BASOPHILS # BLD AUTO: 0.03 10*3/MM3 (ref 0–0.2)
BASOPHILS NFR BLD AUTO: 0.3 % (ref 0–1.5)
BH CV DIALYSIS ACCESS ARTERIAL ANASTOMOSIS DIAMETER PROXIMAL: 0.25 CM
BH CV DIALYSIS ACCESS ARTERIAL ANASTOMOSIS VELOCITY PROXIMAL: 602 CM/SEC
BH CV DIALYSIS ACCESS GRAFT DIAMETER DISTAL: 180 CM
BH CV DIALYSIS ACCESS GRAFT DIAMETER MID DISTAL: 0.63 CM
BH CV DIALYSIS ACCESS GRAFT DIAMETER MID: 0.67 CM
BH CV DIALYSIS ACCESS GRAFT DIAMETER PROXIMAL MID: 0.79 CM
BH CV DIALYSIS ACCESS GRAFT DIAMETER PROXIMAL: 0.46 CM
BH CV DIALYSIS ACCESS GRAFT FLOW VOLUME DISTAL: 1319 ML/MIN
BH CV DIALYSIS ACCESS GRAFT FLOW VOLUME MID DISTAL: 1510 ML/MIN
BH CV DIALYSIS ACCESS GRAFT FLOW VOLUME MID: 1861 ML/MIN
BH CV DIALYSIS ACCESS GRAFT FLOW VOLUME PROXIMAL MID: 1792 ML/MIN
BH CV DIALYSIS ACCESS GRAFT FLOW VOLUME PROXIMAL: 522 ML/MIN
BH CV DIALYSIS ACCESS GRAFT VELOCITY DISTAL: 0.49 CM/SEC
BH CV DIALYSIS ACCESS GRAFT VELOCITY MID DISTAL: 168 CM/SEC
BH CV DIALYSIS ACCESS GRAFT VELOCITY MID: 193 CM/SEC
BH CV DIALYSIS ACCESS GRAFT VELOCITY PROXIMAL MID: 337 CM/SEC
BH CV DIALYSIS ACCESS GRAFT VELOCITY PROXIMAL: 581 CM/SEC
BH CV DIALYSIS ACCESS INFLOW DIAMETER PRE INFLOW: 0.5 CM
BH CV DIALYSIS ACCESS INFLOW FLOW VOLUME PRE INFLOW: 585 ML/MIN
BH CV DIALYSIS ACCESS INFLOW VELOCITY PRE INFLOW: 364 CM/SEC
BH CV DIALYSIS ACCESS OUTFLOW DIAMETER POST OUTFLOW: 1.09 CM
BH CV DIALYSIS ACCESS OUTFLOW FLOW VOLUME POST OUTFLOW: 2146 ML/MIN
BH CV DIALYSIS ACCESS OUTFLOW VELOCITY POST OUTFLOW: 161 CM/SEC
BUN BLD-MCNC: 54 MG/DL (ref 6–20)
BUN/CREAT SERPL: 8.1 (ref 7–25)
CALCIUM SPEC-SCNC: 8.4 MG/DL (ref 8.6–10.5)
CHLORIDE SERPL-SCNC: 97 MMOL/L (ref 98–107)
CO2 SERPL-SCNC: 18.7 MMOL/L (ref 22–29)
CREAT BLD-MCNC: 6.68 MG/DL (ref 0.76–1.27)
DEPRECATED RDW RBC AUTO: 47.7 FL (ref 37–54)
EOSINOPHIL # BLD AUTO: 0.2 10*3/MM3 (ref 0–0.7)
EOSINOPHIL NFR BLD AUTO: 2 % (ref 0.3–6.2)
ERYTHROCYTE [DISTWIDTH] IN BLOOD BY AUTOMATED COUNT: 14.7 % (ref 11.5–14.5)
GFR SERPL CREATININE-BSD FRML MDRD: 9 ML/MIN/1.73
GLUCOSE BLD-MCNC: 108 MG/DL (ref 65–99)
GLUCOSE BLDC GLUCOMTR-MCNC: 136 MG/DL (ref 70–130)
GLUCOSE BLDC GLUCOMTR-MCNC: 153 MG/DL (ref 70–130)
HCT VFR BLD AUTO: 33.2 % (ref 40.4–52.2)
HGB BLD-MCNC: 9.9 G/DL (ref 13.7–17.6)
IMM GRANULOCYTES # BLD: 0.05 10*3/MM3 (ref 0–0.03)
IMM GRANULOCYTES NFR BLD: 0.5 % (ref 0–0.5)
LYMPHOCYTES # BLD AUTO: 1.26 10*3/MM3 (ref 0.9–4.8)
LYMPHOCYTES NFR BLD AUTO: 12.7 % (ref 19.6–45.3)
MAGNESIUM SERPL-MCNC: 2.2 MG/DL (ref 1.6–2.6)
MCH RBC QN AUTO: 26.8 PG (ref 27–32.7)
MCHC RBC AUTO-ENTMCNC: 29.8 G/DL (ref 32.6–36.4)
MCV RBC AUTO: 89.7 FL (ref 79.8–96.2)
MONOCYTES # BLD AUTO: 0.86 10*3/MM3 (ref 0.2–1.2)
MONOCYTES NFR BLD AUTO: 8.7 % (ref 5–12)
NEUTROPHILS # BLD AUTO: 7.56 10*3/MM3 (ref 1.9–8.1)
NEUTROPHILS NFR BLD AUTO: 76.3 % (ref 42.7–76)
NRBC BLD MANUAL-RTO: 0 /100 WBC (ref 0–0)
PHOSPHATE SERPL-MCNC: 5.8 MG/DL (ref 2.5–4.5)
PLATELET # BLD AUTO: 222 10*3/MM3 (ref 140–500)
PMV BLD AUTO: 10.2 FL (ref 6–12)
POTASSIUM BLD-SCNC: 4.1 MMOL/L (ref 3.5–5.2)
RBC # BLD AUTO: 3.7 10*6/MM3 (ref 4.6–6)
SODIUM BLD-SCNC: 135 MMOL/L (ref 136–145)
WBC NRBC COR # BLD: 9.91 10*3/MM3 (ref 4.5–10.7)

## 2018-05-03 PROCEDURE — 83735 ASSAY OF MAGNESIUM: CPT | Performed by: INTERNAL MEDICINE

## 2018-05-03 PROCEDURE — 93005 ELECTROCARDIOGRAM TRACING: CPT | Performed by: NURSE PRACTITIONER

## 2018-05-03 PROCEDURE — 99231 SBSQ HOSP IP/OBS SF/LOW 25: CPT | Performed by: INTERNAL MEDICINE

## 2018-05-03 PROCEDURE — 80048 BASIC METABOLIC PNL TOTAL CA: CPT | Performed by: INTERNAL MEDICINE

## 2018-05-03 PROCEDURE — 84100 ASSAY OF PHOSPHORUS: CPT | Performed by: INTERNAL MEDICINE

## 2018-05-03 PROCEDURE — 25010000002 HEPARIN (PORCINE) PER 1000 UNITS: Performed by: INTERNAL MEDICINE

## 2018-05-03 PROCEDURE — 25010000002 CEFTRIAXONE PER 250 MG: Performed by: INTERNAL MEDICINE

## 2018-05-03 PROCEDURE — 85025 COMPLETE CBC W/AUTO DIFF WBC: CPT | Performed by: INTERNAL MEDICINE

## 2018-05-03 PROCEDURE — 92526 ORAL FUNCTION THERAPY: CPT

## 2018-05-03 PROCEDURE — 93010 ELECTROCARDIOGRAM REPORT: CPT | Performed by: INTERNAL MEDICINE

## 2018-05-03 PROCEDURE — 99231 SBSQ HOSP IP/OBS SF/LOW 25: CPT | Performed by: NURSE PRACTITIONER

## 2018-05-03 PROCEDURE — 97164 PT RE-EVAL EST PLAN CARE: CPT

## 2018-05-03 PROCEDURE — 99231 SBSQ HOSP IP/OBS SF/LOW 25: CPT

## 2018-05-03 PROCEDURE — 82962 GLUCOSE BLOOD TEST: CPT

## 2018-05-03 PROCEDURE — 93990 DOPPLER FLOW TESTING: CPT

## 2018-05-03 PROCEDURE — 97110 THERAPEUTIC EXERCISES: CPT

## 2018-05-03 RX ORDER — CLOPIDOGREL BISULFATE 75 MG/1
75 TABLET ORAL DAILY
Qty: 30 TABLET | Refills: 0 | Status: SHIPPED | OUTPATIENT
Start: 2018-05-04

## 2018-05-03 RX ORDER — HYDROCORTISONE ACETATE 25 MG/1
25 SUPPOSITORY RECTAL 2 TIMES DAILY
Qty: 20 SUPPOSITORY | Refills: 0 | Status: SHIPPED | OUTPATIENT
Start: 2018-05-03 | End: 2018-08-21 | Stop reason: ALTCHOICE

## 2018-05-03 RX ADMIN — FUROSEMIDE 80 MG: 80 TABLET ORAL at 11:22

## 2018-05-03 RX ADMIN — OXYCODONE HYDROCHLORIDE 10 MG: 5 TABLET ORAL at 11:42

## 2018-05-03 RX ADMIN — HEPARIN SODIUM 5000 UNITS: 5000 INJECTION, SOLUTION INTRAVENOUS; SUBCUTANEOUS at 15:01

## 2018-05-03 RX ADMIN — CLOPIDOGREL 75 MG: 75 TABLET, FILM COATED ORAL at 11:22

## 2018-05-03 RX ADMIN — CEFTRIAXONE SODIUM 1 G: 1 INJECTION, SOLUTION INTRAVENOUS at 15:02

## 2018-05-03 RX ADMIN — ROSUVASTATIN CALCIUM 20 MG: 20 TABLET, FILM COATED ORAL at 11:22

## 2018-05-03 RX ADMIN — ASPIRIN 81 MG: 81 TABLET ORAL at 11:23

## 2018-05-03 RX ADMIN — HEPARIN SODIUM 5000 UNITS: 5000 INJECTION, SOLUTION INTRAVENOUS; SUBCUTANEOUS at 05:12

## 2018-05-03 RX ADMIN — ALLOPURINOL 150 MG: 300 TABLET ORAL at 11:22

## 2018-05-03 RX ADMIN — BISOPROLOL FUMARATE 5 MG: 5 TABLET ORAL at 11:22

## 2018-05-03 NOTE — PLAN OF CARE
Problem: Patient Care Overview  Goal: Plan of Care Review   05/03/18 6326   Coping/Psychosocial   Plan of Care Reviewed With patient   Plan of Care Review   Progress improving   OTHER   Outcome Summary Pt seen for re-eval, s/p L SFA and popliteal artherectomy with balloon angioplasty. Pt without pain; continues to demonstrate generalized weakness in LEs and impaired balance limiting mobility. Pt ambulated in hallways with CGA to min A and use of walker. Several small LOBs, but able to recover with minimal assist from therapist. Pt hopeful to DC home soon; educated pt and spouse on use of rwx for improved balance and safety; recommend  PT for continued rehabilitation.

## 2018-05-03 NOTE — PROGRESS NOTES
Date of Admission:  4/25/2018  Chuck Woodward MD     LOS: 8 days   Patient Care Team:  ROBSON Sampson as PCP - General  Chief Complaint   Patient presents with   • Loss of Consciousness     pt was last seen normal at 0530, per family he fell, then went to go take a nap. pt was found at 1715 unresponsive, EMS called, orally intubated     CC: Follow-up peripheral vascular disease.  Subjective     47 y.o. male patient doing well this morning.  Thinks that pain in left foot has diminished.  Denies chest pain or shortness of breath.  No pain at access site.    Review of Systems    Objective     Scheduled Medications:     allopurinol 150 mg Oral Daily   aspirin 81 mg Oral Daily   bisoprolol 5 mg Oral Daily   ceftriaxone 1 g Intravenous Q24H   clopidogrel 75 mg Oral Daily   furosemide 80 mg Oral BID   heparin (porcine) 5,000 Units Subcutaneous Q8H   hydrocortisone 25 mg Rectal BID   insulin aspart 0-12 Units Subcutaneous 4x Daily AC & at Bedtime   pantoprazole 40 mg Oral BID AC   rosuvastatin 20 mg Oral Daily       Active Infusions:    sodium chloride 9 mL/hr Last Rate: 9 mL/hr (05/02/18 1046)   sodium chloride 100 mL/hr Last Rate: 100 mL/hr (05/02/18 1827)       As Needed Medications:  •  acetaminophen  •  dextrose  •  dextrose  •  glucagon (human recombinant)  •  ipratropium-albuterol  •  magnesium hydroxide  •  ondansetron  •  oxyCODONE  •  sodium chloride  •  Insert peripheral IV **AND** sodium chloride    Vital Signs  Vital Signs Patient Vitals for the past 24 hrs:   BP Temp Temp src Pulse Resp SpO2 Weight   05/03/18 0421 - - - - - - 81.5 kg (179 lb 11.2 oz)   05/03/18 0300 122/79 97.8 °F (36.6 °C) Oral 62 17 98 % -   05/02/18 2300 112/63 98.3 °F (36.8 °C) Oral 60 16 100 % -   05/02/18 2044 117/76 - - 64 - - -   05/02/18 1900 111/57 98.1 °F (36.7 °C) Oral 69 16 99 % -   05/02/18 1806 137/78 97.9 °F (36.6 °C) Oral - 16 - -   05/02/18 1730 134/61 97.9 °F (36.6 °C) Oral 64 16 100 % -   05/02/18 171  130/68 - - 64 16 100 % -   05/02/18 1700 126/64 - - 62 16 96 % -   05/02/18 1630 130/80 - - 64 16 100 % -   05/02/18 1615 137/85 - - 63 16 100 % -   05/02/18 1605 131/74 - - 64 14 98 % -   05/02/18 1600 125/73 - - 62 14 97 % -   05/02/18 1555 120/71 98 °F (36.7 °C) Oral 65 14 98 % -   05/02/18 1336 - - - 66 20 98 % -   05/02/18 1150 - - - 69 19 100 % -   05/02/18 1017 150/83 97.6 °F (36.4 °C) Oral 69 14 100 % -   05/02/18 0822 131/78 97.9 °F (36.6 °C) Oral 66 12 - -     I/O:  I/O last 3 completed shifts:  In: 300 [I.V.:300]  Out: 260 [Urine:260]    Physical Exam:  Physical Exam   Constitutional: He is oriented to person, place, and time. He appears well-developed and well-nourished.   HENT:   Head: Normocephalic and atraumatic.   Eyes: Right eye exhibits no discharge. Left eye exhibits no discharge. No scleral icterus.   Vision grossly intact   Neck: Normal range of motion. No tracheal deviation present.   Pulmonary/Chest: Effort normal. No respiratory distress.   Abdominal: Soft. He exhibits no distension.   Neurological: He is alert and oriented to person, place, and time.   Skin: Skin is warm and dry.   Psychiatric: He has a normal mood and affect. His behavior is normal.   Vitals reviewed.    Strong multi phasic signals in the left foot ×2.    No complications of right groin percutaneous access.    Results Review:     CBC    Results from last 7 days  Lab Units 05/03/18  0426 05/02/18  0731 05/01/18  0536 04/30/18  0538 04/29/18  0515 04/28/18  0423 04/27/18  0420   WBC 10*3/mm3 9.91 9.52 7.29 7.53 7.22 7.94 11.13*   HEMOGLOBIN g/dL 9.9* 11.3* 10.3* 9.9* 9.8* 8.6* 9.4*   PLATELETS 10*3/mm3 222 236 232 244 200 175 201     BMP   Results from last 7 days  Lab Units 05/03/18  0426 05/02/18  0731 05/01/18  0536 04/30/18  0538 04/29/18  0515 04/28/18  0423 04/27/18  2133 04/27/18  0420   SODIUM mmol/L 135* 136 139 139 141 137  --  137   POTASSIUM mmol/L 4.1 4.4 4.1 4.8 4.0 4.0 3.9 3.5   CHLORIDE mmol/L 97* 95* 97* 98  99 95*  --  93*   CO2 mmol/L 18.7* 23.7 26.3 25.9 23.8 23.3  --  24.8   BUN mg/dL 54* 41* 54* 39* 59* 85*  --  107*   CREATININE mg/dL 6.68* 5.03* 5.55* 4.43* 5.48* 6.52*  --  8.40*   GLUCOSE mg/dL 108* 90 129* 110* 102* 124*  --  76   MAGNESIUM mg/dL 2.2 2.3 2.3 2.1 2.1 1.9 1.9 2.0   PHOSPHORUS mg/dL 5.8* 4.6* 5.1* 4.9* 4.9* 6.4*  --  7.7*       Assessment/Plan     Assessment & Plan    Active Problems:    Hypertension    Hypertrophic obstructive cardiomyopathy    Type 2 diabetes mellitus    Stage 5 chronic kidney disease on chronic dialysis    Severe diabetic hypoglycemia    Atherosclerosis of native arteries of extremity with rest pain      47 y.o. male with now end-stage renal disease with maturing fistula in right arm.  Duplex still pending.  Has tunneled catheter for hemodialysis currently.    Status post left lower extremity endovascular revascularization with good results.  Strong sounding signals in the foot.  Has right side distal SFA and popliteal occlusion.    From a vascular surgical standpoint patient okay for discharge today and can follow-up with me in the office in 2 weeks for management of his dialysis access as well as his peripheral vascular disease.    Chuck Woodward MD  05/03/18  7:19 AM    Please call my office with any question: [phone]    Active Hospital Problems (** Indicates Principal Problem)    Diagnosis Date Noted   • Atherosclerosis of native arteries of extremity with rest pain [I70.229] 04/30/2018   • Severe diabetic hypoglycemia [E11.649] 04/27/2018   • Stage 5 chronic kidney disease on chronic dialysis [N18.6, Z99.2] 04/19/2018   • Hypertrophic obstructive cardiomyopathy [I42.1] 05/05/2016   • Hypertension [I10] 04/20/2016   • Type 2 diabetes mellitus [E11.9] 04/20/2016      Resolved Hospital Problems    Diagnosis Date Noted Date Resolved   • Unconscious [R40.20] 04/25/2018 04/27/2018

## 2018-05-03 NOTE — DISCHARGE SUMMARY
PHYSICIAN DISCHARGE SUMMARY                                                                        [facility]    Date of admit: 4/25/2018  Date of Discharge:  5/3/2018    PCP: ROBSON Melgoza    Discharge Diagnosis:   Active Problems:    Hypertension    Hypertrophic obstructive cardiomyopathy    Type 2 diabetes mellitus    Stage 5 chronic kidney disease on chronic dialysis    Severe diabetic hypoglycemia    Atherosclerosis of native arteries of extremity with rest pain  Acute hypercapnic and hypoxemic respiratory failure due to airway compromise: Improved  Haemophilus influenza pneumonia right lower lobe  Metabolic encephalopathy: Improved  Acute on chronic kidney disease IV  Chronic pancreatic insufficiency  Asthma/COPD  Rectal bleeding with symptoms of hemorrhoids  Hypokalemia  Chronic anemia  Peripheral vascular disease with severe digital ischemia left greater than right/rest pain status post revascularization    Secondary Diagnoses:  Past Medical History:   Diagnosis Date   • Anxiety    • Asthma    • Chest pain    • COPD (chronic obstructive pulmonary disease)    • Depression    • Diabetes mellitus     TYPE II   • Fatigue    • Gout    • HOCM (hypertrophic obstructive cardiomyopathy)    • Hypertension    • Hypertriglyceridemia    • Myocardial infarction    • Pancreatitis    • Renal disorder    • Syncope        Procedures Performed  Procedure(s):  JEFFERY LOWER EXTREMITY ANGIOGRAM, LEFT SFA  AND POPLITEAL LASER ARTHRECTOMY WITH DRUG COATED BALLOON, IVUS X3         Consults:       Hospital Course  Patient is a 47 y.o. male presented with per H&P:    CC: Multiple falls, severe hypoglycemia     HPI: Mr. Hill is a 47-year-old white male with a extremely complicated past medical history significant for CKD stage V status post a fistula placement recently awaiting for maturation, uncontrolled hypertension, obesity, nephrotic  syndrome, history of recurrent pancreatitis with chronic pancreatic insufficiency, chronic diastolic heart failure, non-insulin-dependent diabetes mellitus, history of V. tach status post AICD  who was recently discharged from [facility] after being admitted for hypertensive urgency, acute hypoxic respiratory failure, pulmonary edema mainly due to medical noncompliance.  Patient was discharged on 4/20/2018 and the family says that he has been weak ever since and has had multiple mechanical falls .  This morning around 5:00 the patient had a mechanical fall but he got off the floor and went back to bed and did not want to go to the ER.  He reportedly was asleep for close to 12 hours and family got concerned and found him unresponsive and hence called EMS.  They have noted her to be breathing around 6 times a minute and having a blood sugar of 18.  Patient was orally intubated and brought to the ER     In the emergency room the patient was found to obese persistently hypoglycemic and was started on a D5 drip.  A CT head was done which did not show any acute findings.  Chest x-ray was within normal limits.  Patient was initially not having any spontaneous movements and this improved with the correction of hypoglycemia. Also noted was worsening renal failure and hypokalemia     Patient was admitted with:  ASSESSMENT:  Severe metabolic encephalopathy  Severe hypoglycemia  Acute hypoxic/hypercapnic respiratory failure  Acute on chronic kidney disease stage IV  Non-insulin-dependent diabetes mellitus   Acute on chronic diastolic heart failure  History of chronic pancreatic now with pancreatic insufficiency  Asthma/COPD not in exacerbation   History of V. tach status post AICD   History of HOCM   H/o Medical noncompliance     Patient was admitted to the intensive care unit with respiratory failure with airway compromise and altered mental status.  Mental status improved with ventilation.  Grew Haemophilus influenza  pneumonia from right lower lobe.  Had symptoms of COPD exacerbation improved with bronchodilators.  Was seen by nephrology and started on dialysis.  Tunneled catheter placed and long-term dialysis Center as an outpatient.  Continued in [place].  Had some hemorrhoidal bleeding and gastroenterology made recommendations for treatment.  No intervention was required.  He will follow-up as outpatient.  Vascular surgery also did angiogram for ischemia the legs on 5/2 for rest pain.  Revascularization with good results noted.  All consultants have cleared him for discharge home.        Condition on Discharge:  Stable.      Vital Signs  Temp:  [97.8 °F (36.6 °C)-98.3 °F (36.8 °C)] 98.1 °F (36.7 °C)  Heart Rate:  [60-69] 66  Resp:  [16-18] 16  BP: (111-137)/(57-79) 130/73    Physical Exam:  General Appearance: no acute distress, appears comfortable  Heart: regular rate and rhythm  Lungs: clear to auscultation bilaterally, respirations unlabored  Abdomen: soft, non-tender, non-distended, no guarding/rebound, nl BS  Neurology: speech normal, mental status normal, moving all four extremeties  Mental Status: alert, oriented        --  Consults:   IP CONSULT TO PULMONOLOGY  IP CONSULT TO NEPHROLOGY  IP CONSULT TO CASE MANAGEMENT   IP CONSULT TO NUTRITION SERVICES  IP CONSULT TO ENDOCRINOLOGY  IP CONSULT TO DIABETES EDUCATOR  IP CONSULT TO CARDIOLOGY  IP CONSULT TO CARDIOLOGY  IP CONSULT TO GASTROENTEROLOGY  IP CONSULT TO VASCULAR SURGERY  IP CONSULT TO CASE MANAGEMENT   IP CONSULT TO DIABETES EDUCATOR    Significant Discharge Diagnostics   Procedures Performed:  Procedure(s):  JEFFERY LOWER EXTREMITY ANGIOGRAM, LEFT SFA  AND POPLITEAL LASER ARTHRECTOMY WITH DRUG COATED BALLOON, IVUS X3       Pertinent Lab Results:    Results from last 7 days  Lab Units 05/03/18  0426 05/02/18  0731 05/01/18  0536 04/30/18  0538 04/29/18  0515 04/28/18  0423 04/27/18  2133 04/27/18  0420   SODIUM mmol/L 135* 136 139 139  141 137  --  137   POTASSIUM mmol/L 4.1 4.4 4.1 4.8 4.0 4.0 3.9 3.5   CHLORIDE mmol/L 97* 95* 97* 98 99 95*  --  93*   CO2 mmol/L 18.7* 23.7 26.3 25.9 23.8 23.3  --  24.8   BUN mg/dL 54* 41* 54* 39* 59* 85*  --  107*   CREATININE mg/dL 6.68* 5.03* 5.55* 4.43* 5.48* 6.52*  --  8.40*   GLUCOSE mg/dL 108* 90 129* 110* 102* 124*  --  76   CALCIUM mg/dL 8.4* 9.9 9.6 9.8 9.5 8.9  --  8.9           Results from last 7 days  Lab Units 05/03/18  0426 05/02/18  0731 05/01/18  0536 04/30/18  0538 04/29/18  0515 04/28/18  0423 04/27/18  0420   WBC 10*3/mm3 9.91 9.52 7.29 7.53 7.22 7.94 11.13*   HEMOGLOBIN g/dL 9.9* 11.3* 10.3* 9.9* 9.8* 8.6* 9.4*   HEMATOCRIT % 33.2* 37.1* 34.9* 32.5* 32.4* 28.8* 31.2*   PLATELETS 10*3/mm3 222 236 232 244 200 175 201   MCV fL 89.7 89.6 90.2 90.0 89.8 90.3 90.2   MCH pg 26.8* 27.3 26.6* 27.4 27.1 27.0 27.2   MCHC g/dL 29.8* 30.5* 29.5* 30.5* 30.2* 29.9* 30.1*   RDW % 14.7* 14.4 14.2 14.3 14.4 14.4 14.5   RDW-SD fl 47.7 46.6 46.7 47.1 47.2 47.7 47.5   MPV fL 10.2 10.4 10.5 10.4 10.9 11.0 10.6   NEUTROPHIL % % 76.3* 75.7 72.0 75.9 78.9* 79.4* 84.5*   LYMPHOCYTE % % 12.7* 11.6* 13.2* 12.1* 10.7* 9.3* 6.7*   MONOCYTES % % 8.7 10.2 11.0 10.2 8.4 9.7 6.9   EOSINOPHIL % % 2.0 2.2 3.3 1.7 1.9 1.3 1.4   BASOPHIL % % 0.3 0.3 0.1 0.1 0.1 0.3 0.2   IMM GRAN % % 0.5 0.4 0.4 0.3 0.3 0.0 0.3   NEUTROS ABS 10*3/mm3 7.56 7.21 5.25 5.71 5.69 6.31 9.40*   LYMPHS ABS 10*3/mm3 1.26 1.10 0.96 0.91 0.77* 0.74* 0.75*   MONOS ABS 10*3/mm3 0.86 0.97 0.80 0.77 0.61 0.77 0.77   EOS ABS 10*3/mm3 0.20 0.21 0.24 0.13 0.14 0.10 0.16   BASOS ABS 10*3/mm3 0.03 0.03 0.01 0.01 0.01 0.02 0.02   IMMATURE GRANS (ABS) 10*3/mm3 0.05* 0.04* 0.03 0.02 0.02 0.00 0.03   NRBC /100 WBC 0.0  --   --   --   --   --   --            Results from last 7 days  Lab Units 05/03/18  0426 05/02/18  0731 05/01/18  0536 04/30/18  0538 04/29/18  0515 04/28/18  0423 04/27/18  2133   MAGNESIUM mg/dL 2.2 2.3 2.3 2.1 2.1 1.9 1.9       Results from last 7  days  Lab Units 04/27/18  0420   CHOLESTEROL mg/dL 81   TRIGLYCERIDES mg/dL 82   HDL CHOL mg/dL 33*                                               Imaging Results:  Imaging Results (all)     Procedure Component Value Units Date/Time    Arteriogram (Autofinalize) [[id number]] Resulted:  05/02/18 1616     Updated:  05/02/18 1616    Narrative:       This procedure was auto-finalized with no dictation required.    Arteriogram (Autofinalize) [[id number]] Resulted:  04/30/18 1333     Updated:  04/30/18 1333    Narrative:       This procedure was auto-finalized with no dictation required.    XR Chest Post CVA Port [[id number]] Collected:  04/27/18 0925     Updated:  04/30/18 0920    Narrative:       AP PORTABLE CHEST - 04/27/2018      HISTORY: Central line placement.     Right internal jugular central venous catheter is seen with its tip  overlying the SVC. No pneumothorax is seen.     There is mild cardiomegaly. There is a moderate patchy area of  atelectasis or pneumonia in the right lung base, and there may be some  minimal atelectasis or infiltrate in the left lung base. Cardiac  pacemaker is seen in good position.       Impression:       1. Central venous catheter placement as described.  2. No pneumothorax is seen.     This report was finalized on 4/30/2018 9:17 AM by Dr. Jose Blandon MD.       XR Chest 1 View [[id number]] Collected:  04/27/18 0346     Updated:  04/29/18 2006    Narrative:       X-RAY CHEST 1 VIEW.     HISTORY: Intubated patient.     COMPARISON: 4/26/2018.     FINDINGS:  Cardiomediastinal silhouette is within normal limits. ET tube and NG  tube have been removed.     Lung volumes are low with pulmonary congestion, small bilateral  effusions.              Impression:       Opacities in the lung bases may be small effusions, lung volumes are  low. Follow-up to resolution is recommended.         This report was finalized on 4/29/2018 8:03 PM by Dr. Nickolas Pena M.D..       XR Chest 1 View  [[id number]] Collected:  04/28/18 0724     Updated:  04/29/18 0557    Narrative:       PORTABLE CHEST X-RAY     CLINICAL HISTORY: Intubated Patient; R40.20-Unspecified coma;  E16.2-Hypoglycemia, unspecified; N18.5-Chronic kidney disease, stage 5     COMPARISON: April 27, 2018.     FINDINGS: Portable AP view of the chest was obtained with overlying  monitor leads in place. Dual-lumen right IJ central line and left AICD  remain in place. Right lung base airspace disease has nearly resolved.  Left lung is clear. Mild cardiomegaly. No edema or significant pleural  fluid.             Impression:       Significant improvement in right lung base airspace disease,  likely resolving atelectasis.        This report was finalized on 4/29/2018 5:54 AM by Nicholas Cullen M.D..       XR Chest 1 View [[id number]] Collected:  04/26/18 0430     Updated:  04/26/18 2337    Narrative:       X-RAY CHEST 1 VIEW.     HISTORY: Intubated patient.     COMPARISON: 4/25/2018.     FINDINGS:  Cardiomediastinal silhouette is within normal limits. Low lung volumes.     Pulmonary congestion remains. Interval improvement of atelectasis in the  right lung base and repositioning of the nasogastric tube. The tip of  nasogastric tube is now in the stomach.          Impression:       Slight worsening of pulmonary congestion, congestive heart failure is  suspected, please clinically correlate.         This report was finalized on 4/26/2018 11:33 PM by Dr. Nickolas Pena M.D..       XR Abdomen KUB [[id number]] Collected:  04/25/18 2320     Updated:  04/26/18 2133    Narrative:       X-RAY ABDOMEN ONE VIEW KUB.     HISTORY:  OG tube placement.     COMPARISON:  4/25/2018.     FINDINGS:   Bowel gas pattern is unremarkable. No abnormal calcifications are seen.     No free air in the abdomen. Nasogastric tube has been repositioned, the  tip is now in the stomach.       Impression:       No acute findings in the abdomen.         This report was finalized on  4/26/2018 9:30 PM by Dr. Nickolas Pena M.D..       XR Abdomen KUB [[id number]] Collected:  04/25/18 2246     Updated:  04/26/18 2129    Narrative:       X-RAY ABDOMEN ONE VIEW KUB.     HISTORY:  Orogastric tube placement.     COMPARISON:  No prior studies for comparison.     FINDINGS:   Bowel gas pattern is unremarkable. No abnormal calcifications are seen.     Findings have been called to patient's nurse Ms. Guerra, 10:46 PM.     No free air in the abdomen. Tip of the nasogastric tube extends to the  right lung base. Atelectatic changes of the right lower lobe.       Impression:       No acute findings in the abdomen. Tip of the nasogastric tube is in the  right lung base, it needs to be repositioned with follow-up exam.     Findings called to patient's nurse Ms. Guerra, 10:46 PM.     This report was finalized on 4/26/2018 9:26 PM by Dr. Nickolas Pena M.D..       XR Chest 1 View [[id number]] Collected:  04/25/18 2245     Updated:  04/26/18 2128    Narrative:       X-RAY CHEST 1 VIEW.     HISTORY: ET tube placement.     COMPARISON: Today's examination, 6:44 PM.     FINDINGS:  Cardiomegaly, ET tube is unchanged. A nasogastric tube is in position,  the tip extends in the right lung base.         Suspect atelectasis at the right lung base. Pulmonary congestion. There  may be bilateral tiny effusions.              Impression:       Tip of the nasogastric tube extends into the right lung base and may be  repositioned with follow-up examination.  Mild congestive heart failure is suspected.     Findings called to patient's nurse, Mr. Guerra, 10:46 PM.     This report was finalized on 4/26/2018 9:25 PM by Dr. Nickolas Pena M.D..       XR Chest 1 View [[id number]] Collected:  04/25/18 1854     Updated:  04/25/18 1900    Narrative:       XR CHEST 1 VW-     HISTORY: Male who is 47 years-old,  endotracheal tube placement     TECHNIQUE: Frontal view of the chest     COMPARISON: 4/16/2018     FINDINGS: Endotracheal tube  tip is 4.8 cm above the connor. Left-sided  pacemaker and cardiac lead are seen. Heart size is borderline. Slight  prominence of vascular and interstitial markings. Partial obscuration of  the right hemidiaphragm may reflect atelectasis or infiltrate. No  pleural effusion, or pneumothorax. No acute osseous process.       Impression:       Endotracheal intubation. Partial obscuration of the right  hemidiaphragm, follow-up suggested. Borderline heart size with slight  vascular and interstitial prominence.     This report was finalized on 4/25/2018 6:57 PM by Dr. Minh Antonio M.D..       CT Head Without Contrast [[id number]] Collected:  04/25/18 1833     Updated:  04/25/18 1844    Narrative:       CT HEAD WO CONTRAST-, CT CERVICAL SPINE WO CONTRAST-     INDICATIONS: Fall     TECHNIQUE: Radiation dose reduction techniques were utilized, including  automated exposure control and exposure modulation based on body size.      Noncontrast head CT, cervical spine CT     COMPARISON: 6/16/2015     FINDINGS:      Head CT:     No acute intracranial hemorrhage, midline shift or mass effect. No acute  territorial infarct is identified.     Mild periventricular hypodensities suggest chronic small vessel ischemic  change in a patient this age.           Ventricles, cisterns, cerebral sulci are unremarkable for patient age.     Likely mucous retention cyst or polyp in right maxillary sinus. Small  ethmoid air cell mucosal thickening. The visualized paranasal sinuses,  orbits, mastoid air cells are otherwise unremarkable.     Cervical spine CT:  Alignment is preserved. No acute fracture is identified. Multilevel  cervical spondyloarthropathy appears similar to the prior exam, with  conspicuous left neural foraminal narrowing at C3/4.     Endotracheal intubation is noted. Minimal fibronodular change at the  right lung apex.                Impression:              No acute intracranial hemorrhage or hydrocephalus. No acute  cervical  fracture identified; degenerative changes of the cervical spine. If  there is further clinical concern, MRI could be considered for further  evaluation.     This report was finalized on 4/25/2018 6:41 PM by Dr. Minh Antonio M.D..       CT Cervical Spine Without Contrast [[id number]] Collected:  04/25/18 1833     Updated:  04/25/18 1844    Narrative:       CT HEAD WO CONTRAST-, CT CERVICAL SPINE WO CONTRAST-     INDICATIONS: Fall     TECHNIQUE: Radiation dose reduction techniques were utilized, including  automated exposure control and exposure modulation based on body size.      Noncontrast head CT, cervical spine CT     COMPARISON: 6/16/2015     FINDINGS:      Head CT:     No acute intracranial hemorrhage, midline shift or mass effect. No acute  territorial infarct is identified.     Mild periventricular hypodensities suggest chronic small vessel ischemic  change in a patient this age.           Ventricles, cisterns, cerebral sulci are unremarkable for patient age.     Likely mucous retention cyst or polyp in right maxillary sinus. Small  ethmoid air cell mucosal thickening. The visualized paranasal sinuses,  orbits, mastoid air cells are otherwise unremarkable.     Cervical spine CT:  Alignment is preserved. No acute fracture is identified. Multilevel  cervical spondyloarthropathy appears similar to the prior exam, with  conspicuous left neural foraminal narrowing at C3/4.     Endotracheal intubation is noted. Minimal fibronodular change at the  right lung apex.                Impression:              No acute intracranial hemorrhage or hydrocephalus. No acute cervical  fracture identified; degenerative changes of the cervical spine. If  there is further clinical concern, MRI could be considered for further  evaluation.     This report was finalized on 4/25/2018 6:41 PM by Dr. Minh Antonio M.D..           --    Discharge Disposition  Home or Self Care    Discharge Medications   Sylvester  Sergio BERNARD   Home Medication Instructions JOHNATHON:[id number]    Printed on:05/03/18 1360   Medication Information                      albuterol (PROVENTIL HFA;VENTOLIN HFA) 108 (90 BASE) MCG/ACT inhaler  Inhale 2 puffs every 4 (four) hours as needed for wheezing.             allopurinol (ZYLOPRIM) 300 MG tablet  Take 0.5 tablets by mouth Daily.             amLODIPine (NORVASC) 10 MG tablet  Take  by mouth daily.             aspirin 81 MG EC tablet  Take 81 mg by mouth Daily.             bisoprolol (ZEBeta) 5 MG tablet  Take 5 mg by mouth Daily.             budesonide-formoterol (SYMBICORT) 80-4.5 MCG/ACT inhaler  Inhale 2 puffs 2 (Two) Times a Day.             clopidogrel (PLAVIX) 75 MG tablet  Take 1 tablet by mouth Daily.             DULoxetine (CYMBALTA) 30 MG capsule  Take 60 mg by mouth Daily.             Ergocalciferol (VITAMIN D2) 400 UNITS tablet  Take 400 Units by mouth 1 (One) Time Per Week. Note: Per patient, no longer taking this medication.             fluticasone (VERAMYST) 27.5 MCG/SPRAY nasal spray  2 sprays into each nostril Daily.             furosemide (LASIX) 80 MG tablet  Take 1 tablet by mouth 2 (Two) Times a Day.             gabapentin (NEURONTIN) 800 MG tablet  Take 800 mg by mouth 3 (three) times a day.             glimepiride (AMARYL) 2 MG tablet  Take 2 mg by mouth 2 (Two) Times a Day.             hydrocortisone (ANUSOL-HC) 25 MG suppository  Insert 1 suppository into the rectum 2 (Two) Times a Day.             LORazepam (ATIVAN) 0.5 MG tablet  Take 1 mg by mouth Every 6 (Six) Hours As Needed for Anxiety.             nitroglycerin (NITROSTAT) 0.4 MG SL tablet  1 under the tongue as needed for angina, may repeat q5mins for up three doses             OXYCODONE HCL PO  Take 10 mg by mouth 4 (Four) Times a Day As Needed.             Pancrelipase, Lip-Prot-Amyl, 94476 units capsule delayed-release particles  Take 1 capsule by mouth 3 (Three) Times a Day.             pantoprazole (PROTONIX) 40 MG  EC tablet  Take 40 mg by mouth 2 (Two) Times a Day.             rosuvastatin (CRESTOR) 20 MG tablet  Take 40 mg by mouth Daily.             tiotropium (SPIRIVA) 18 MCG per inhalation capsule  Place 1 capsule into inhaler and inhale 1 (one) time daily.                 Discharge Diet: regular diet    Activity at Discharge:      Contact information for follow-up providers     Irineo Mercedes MD. Schedule an appointment as soon as possible for a visit in 3 month(s).    Specialty:  Gastroenterology  Contact information:  [address]  [facility] [address] [phone]             Noelle Rubin MD. Schedule an appointment as soon as possible for a visit in 3 week(s).    Specialty:  Cardiology  Contact information:  [address] [facility] [id number] [phone]             Yang Price MD. Schedule an appointment as soon as possible for a visit in 2 week(s).    Specialty:  Endocrinology  Contact information:  [address]  [facility] [id number] [phone]             Chuck Woodward MD Follow up in 2 week(s).    Specialty:  Vascular Surgery  Contact information:  [address]  [facility] [address]  [phone]             Hadley Garcia MD Follow up.    Specialty:  Nephrology  Why:  he will be following you in Dialysis  Contact information:  [address] [facility] [address] [facility] [address]  [phone]             ROBSON Melgoza .    Specialty:  Nurse Practitioner  Contact information:  [address] [facility] [id number] [phone]                   Contact information for after-discharge care     Destination     [facility] .    Contact information:  [address] [address] [phone]                           See primary care provider, ROBSON Melgoza, in 1-2 weeks        Test Results Pending at Discharge       Fernando Sandoval MD  [facility], [facility]  Pulmonary and Critical Care  Medicine  05/03/18  4:54 PM    Time: greater than 30 minutes.        Total time spent discharging patient including evaluation,post hospitalization follow up,  medication and post hospitalization instructions and education total time exceeds 30 minutes.

## 2018-05-03 NOTE — PROGRESS NOTES
"   LOS: 8 days    Patient Care Team:  ROBSON Sampson as PCP - General    Chief Complaint:  Weakness.    Subjective   No complaints.  Interval History:     Patient Complaints: fatigue,weakness  Patient Denies:  soa.  History taken from: patient chart family    Review of Systems:    All systems were reviewed and negative except for:  Constitution:  positive for fatigue and malaise    Objective     Vital Sign Min/Max for last 24 hours  Temp  Min: 97.8 °F (36.6 °C)  Max: 98.3 °F (36.8 °C)   BP  Min: 111/57  Max: 137/78   Pulse  Min: 60  Max: 69   Resp  Min: 16  Max: 18   SpO2  Min: 96 %  Max: 100 %   No Data Recorded   Weight  Min: 81.5 kg (179 lb 11.2 oz)  Max: 81.5 kg (179 lb 11.2 oz)     Flowsheet Rows    Flowsheet Row First Filed Value   Admission Height 177.8 cm (70\") Documented at 04/25/2018 1836   Admission Weight 96.8 kg (213 lb 6.5 oz) Documented at 04/25/2018 1836          I/O this shift:  In: 780 [P.O.:780]  Out: -   I/O last 3 completed shifts:  In: 300 [I.V.:300]  Out: 260 [Urine:260]    Physical Exam:    Constitutional: He appears well-developed.   HENT:   Head: Normocephalic and atraumatic.   Eyes: Pupils are equal, round, and reactive to light.   Neck: No JVD present.   Cardiovascular: Normal heart sounds.  Exam reveals no friction rub.    Pulmonary/Chest: Breath sounds normal. He has no wheezes. He has no rales.   Abdominal: Soft. There is no tenderness. There is no guarding.     WBC WBC   Date Value Ref Range Status   05/03/2018 9.91 4.50 - 10.70 10*3/mm3 Final   05/02/2018 9.52 4.50 - 10.70 10*3/mm3 Final   05/01/2018 7.29 4.50 - 10.70 10*3/mm3 Final      HGB Hemoglobin   Date Value Ref Range Status   05/03/2018 9.9 (L) 13.7 - 17.6 g/dL Final   05/02/2018 11.3 (L) 13.7 - 17.6 g/dL Final   05/01/2018 10.3 (L) 13.7 - 17.6 g/dL Final      HCT Hematocrit   Date Value Ref Range Status   05/03/2018 33.2 (L) 40.4 - 52.2 % Final   05/02/2018 37.1 (L) 40.4 - 52.2 % Final   05/01/2018 34.9 (L) " 40.4 - 52.2 % Final      Platlets No results found for: LABPLAT   MCV MCV   Date Value Ref Range Status   05/03/2018 89.7 79.8 - 96.2 fL Final   05/02/2018 89.6 79.8 - 96.2 fL Final   05/01/2018 90.2 79.8 - 96.2 fL Final          Sodium Sodium   Date Value Ref Range Status   05/03/2018 135 (L) 136 - 145 mmol/L Final   05/02/2018 136 136 - 145 mmol/L Final   05/01/2018 139 136 - 145 mmol/L Final      Potassium Potassium   Date Value Ref Range Status   05/03/2018 4.1 3.5 - 5.2 mmol/L Final   05/02/2018 4.4 3.5 - 5.2 mmol/L Final   05/01/2018 4.1 3.5 - 5.2 mmol/L Final      Chloride Chloride   Date Value Ref Range Status   05/03/2018 97 (L) 98 - 107 mmol/L Final   05/02/2018 95 (L) 98 - 107 mmol/L Final   05/01/2018 97 (L) 98 - 107 mmol/L Final      CO2 CO2   Date Value Ref Range Status   05/03/2018 18.7 (L) 22.0 - 29.0 mmol/L Final   05/02/2018 23.7 22.0 - 29.0 mmol/L Final   05/01/2018 26.3 22.0 - 29.0 mmol/L Final      BUN BUN   Date Value Ref Range Status   05/03/2018 54 (H) 6 - 20 mg/dL Final   05/02/2018 41 (H) 6 - 20 mg/dL Final   05/01/2018 54 (H) 6 - 20 mg/dL Final      Creatinine Creatinine   Date Value Ref Range Status   05/03/2018 6.68 (H) 0.76 - 1.27 mg/dL Final   05/02/2018 5.03 (H) 0.76 - 1.27 mg/dL Final   05/01/2018 5.55 (H) 0.76 - 1.27 mg/dL Final      Calcium Calcium   Date Value Ref Range Status   05/03/2018 8.4 (L) 8.6 - 10.5 mg/dL Final   05/02/2018 9.9 8.6 - 10.5 mg/dL Final   05/01/2018 9.6 8.6 - 10.5 mg/dL Final      PO4 No results found for: CAPO4   Albumin No results found for: ALBUMIN   Magnesium Magnesium   Date Value Ref Range Status   05/03/2018 2.2 1.6 - 2.6 mg/dL Final   05/02/2018 2.3 1.6 - 2.6 mg/dL Final   05/01/2018 2.3 1.6 - 2.6 mg/dL Final      Uric Acid No results found for: URICACID        Results Review:     I reviewed the patient's new clinical results.      allopurinol 150 mg Oral Daily   aspirin 81 mg Oral Daily   bisoprolol 5 mg Oral Daily   ceftriaxone 1 g Intravenous Q24H    clopidogrel 75 mg Oral Daily   furosemide 80 mg Oral BID   heparin (porcine) 5,000 Units Subcutaneous Q8H   hydrocortisone 25 mg Rectal BID   insulin aspart 0-12 Units Subcutaneous 4x Daily AC & at Bedtime   pantoprazole 40 mg Oral BID AC   rosuvastatin 20 mg Oral Daily       sodium chloride 9 mL/hr Last Rate: 9 mL/hr (05/02/18 1046)       Medication Review: reviewed    Assessment/Plan     Active Problems:    Hypertension    Hypertrophic obstructive cardiomyopathy    Type 2 diabetes mellitus    Stage 5 chronic kidney disease on chronic dialysis    Severe diabetic hypoglycemia    Atherosclerosis of native arteries of extremity with rest pain      esrd now.,with biopsy showing cholesterol emboli previously.   - obesity.   - nephrotic range proteinuria.   - DM   - history of recurrent pancreatitis.    - ECHO 04/14/2018, EF is 51 %, rvsp IS 41.2. Left ventricular wall thickness is consistent with mild concentric hypertrophy. Left ventricular diastolic function was indeterminate. Elevated left atrial pressure.  Cath:2015:normal coronaries with hypertrophic cardiomyopathy.  - non compliance with treatment.   - Hypoglycemia.   - smoker.  Duplex:Chronic left lower extremity superficial thrombophlebitis noted in the small saphenous. 4/4/18.  S/p brachial cephalic fistula creation by  4/19/18.  S/p Successful revascularization endovascularly of a left SFA and popliteal artery occlusion with three-vessel runoff onto the foot.  Nonflow limiting right common iliac artery stenosis approximately 50%.  Occlusion of the right SFA at the abductor with reconstitution in the popliteal with multivessel runoff to the foot.5/2/18.     Plan:  Satisfactory volume,waste products,stable from renal standpoiint for discharge with follow up in the dialysis unit and dialysis tomorrow scheduled at [facility].  Gi bleed with gi recommendations noted.  Cardiology following.   Will follow.         Pedro Frye MD  05/03/18  4:57  PM

## 2018-05-03 NOTE — PROGRESS NOTES
Cardiology Progress Note    Patient Identification:  Name: Sergio Phan  Age: 47 y.o.  Sex: male  :  1970  MRN: [id number]                 Follow Up / Chief Complaint: PVCs, h/o cardiac death, VT, ICD ([facility] [facility]), HTN, HOCM    Interval History:  He presented to the ED on  unresponsive and a recent h/o increased mechanical falls. He was intubated en route. Labs showed a bun/creat of 107/2.69, glucose 18, trop 0.080. Drug screen was positive for benzo and oxycodone (narcan was given by EMS). CXR and CT head were negative. EKG showed NSR with an occasional PVC. After glucose was corrected, he began to follow some commands     Subjective:  Denies chest pain, tightness or shortness of breath.  States he anticipates discharge home today.  He was advised to obtain a follow-up appointment at our office in 3 weeks.  I also discussed with him the importance of compliance with all prescribed medications and treatments.  He voiced understanding and agreement    Objective:  SR 60's-70s  BP 130s/ 70s  Afebrile  Sats >90% on room air    S/p left SFA PCI today    Past Medical History:  Past Medical History:   Diagnosis Date   • Anxiety    • Asthma    • Chest pain    • COPD (chronic obstructive pulmonary disease)    • Depression    • Diabetes mellitus     TYPE II   • Fatigue    • Gout    • HOCM (hypertrophic obstructive cardiomyopathy)    • Hypertension    • Hypertriglyceridemia    • Myocardial infarction    • Pancreatitis    • Renal disorder    • Syncope      Past Surgical History:  Past Surgical History:   Procedure Laterality Date   • ABDOMINAL SURGERY     • ARTERIOVENOUS FISTULA/SHUNT SURGERY Right 2018    Procedure: RT/ ARTERIOVENOUS FISTULA FORMATION;  Surgeon: Chuck Woodward MD;  Location: [facility];  Service: Vascular   • ATHRECTOMY ILIAC, FEMORAL, TIBIAL ARTERY Bilateral 2018    Procedure: JEFFERY LOWER EXTREMITY ANGIOGRAM, LEFT SFA  AND POPLITEAL LASER ARTHRECTOMY WITH DRUG COATED BALLOON,  IVUS X3;  Surgeon: Chuck Woodward MD;  Location: [facility] OR 18/19;  Service: Vascular   • CARDIAC CATHETERIZATION     • CARDIAC DEFIBRILLATOR PLACEMENT     • CARDIAC DEFIBRILLATOR PLACEMENT     • CHOLECYSTECTOMY     • ENDOSCOPY N/A 8/30/2016    Procedure: ESOPHAGOGASTRODUODENOSCOPY;  Surgeon: Irineo Mercedes MD;  Location: [facility];  Service:    • ERCP     • INSERT / REPLACE / REMOVE PACEMAKER      [facility]   • INSERTION HEMODIALYSIS CATHETER N/A 4/30/2018    Procedure: HEMODIALYSIS CATHETER INSERTION;  Surgeon: Mya Wilkinson Jr., MD;  Location: [facility] OR 18/19;  Service: Vascular   • LAPAROSCOPIC APPENDECTOMY     • OTHER SURGICAL HISTORY      NO REMOVAL OF APPENDIX  PATIENT HAS A APPENDIX   • UMBILICAL HERNIA REPAIR          Social History:   Social History   Substance Use Topics   • Smoking status: Current Some Day Smoker     Packs/day: 0.50     Years: 35.00     Types: Cigarettes   • Smokeless tobacco: Never Used   • Alcohol use No      Family History:  Family History   Problem Relation Age of Onset   • Heart block Mother    • Kidney disease Mother    • Diabetes Father    • Diabetes Maternal Grandmother    • Diabetes Maternal Grandfather    • COPD Maternal Grandfather    • Asthma Maternal Grandfather           Allergies:  No Known Allergies  Scheduled Meds:    allopurinol 150 mg Daily   aspirin 81 mg Daily   bisoprolol 5 mg Daily   ceftriaxone 1 g Q24H   clopidogrel 75 mg Daily   furosemide 80 mg BID   heparin (porcine) 5,000 Units Q8H   hydrocortisone 25 mg BID   insulin aspart 0-12 Units 4x Daily AC & at Bedtime   pantoprazole 40 mg BID AC   rosuvastatin 20 mg Daily           INTAKE AND OUTPUT:    Intake/Output Summary (Last 24 hours) at 05/03/18 1117  Last data filed at 05/02/18 1556   Gross per 24 hour   Intake              300 ml   Output                0 ml   Net              300 ml       Review of Systems:   GI:  No nausea or vomiting  Cardiac: No chest pain or  tightness  Pulmonary: No Shortness of breath or cough    Constitutional:  Temp:  [97.8 °F (36.6 °C)-98.3 °F (36.8 °C)] 98.2 °F (36.8 °C)  Heart Rate:  [60-69] 68  Resp:  [14-20] 18  BP: (111-137)/(57-85) 135/79    Physical Exam:  General:  Awake, alert sitting up at bedside Appears chronically ill, but in no acute distress  Eyes: PERTL,  HEENT:  Right subclavian tunnel HD catheter noted with.  Oral mucosa dry, no cyanosis  Respiratory: Respirations regular and unlabored at rest. BBS with good air entry in all fields. No crackles or wheezes auscultated  Cardiovascular: S1S2 Regular rate and rhythm no rub or gallop No pretibial pitting edema  Gastrointestinal: Abdomen soft,  non tender. Bowel sounds present.   Musculoskeletal: NO x4. No abnormal movements  Extremities: No digital clubbing.    Skin: Both LE warm to touch.  Numerous skin lacerations of various ages noted over both knees and shins..   Neuro: AAO x3 CN II-XII grossly intact. NO x4.   Psych: cooperative and pleasant          Cardiographics  Telemetry:         Echo 4/14/18  · Left ventricular wall thickness is consistent with mild concentric hypertrophy.  · Left ventricular systolic function is normal. Estimated EF = 51%.  · Mild mitral valve regurgitation is present  · Mild tricuspid valve regurgitation is present.  · Calculated right ventricular systolic pressure from tricuspid regurgitation is 41.2 mmHg.     Cardiac Cath 2015 CORONARY ANGIOGRAM:   1. The left main was normal, bifurcated into left anterior descending and circumflex in a normal fashion.   2. Left anterior descending was normal, including the diagonal and  branches.   3. Circumflex artery nondominant, free of any atherosclerotic  narrowing.   4. Right coronary artery was dominant, free of any atherosclerotic narrowing.   5. LV gram showed severe LV hypertrophy with almost obliteration of the LV       Lab Review     Results from last 7 days  Lab Units 05/03/18  4645   MAGNESIUM  "mg/dL 2.2       Results from last 7 days  Lab Units 05/03/18  0426   SODIUM mmol/L 135*   POTASSIUM mmol/L 4.1   BUN mg/dL 54*   CREATININE mg/dL 6.68*   CALCIUM mg/dL 8.4*       Results from last 7 days  Lab Units 05/03/18  0426 05/02/18  0731 05/01/18  0536   WBC 10*3/mm3 9.91 9.52 7.29   HEMOGLOBIN g/dL 9.9* 11.3* 10.3*   HEMATOCRIT % 33.2* 37.1* 34.9*   PLATELETS 10*3/mm3 222 236 232     Assessment:  - s/p PVCs  - a/c bravo HF  - uncontrolled HTN  - Hypertrophic obstructive CM with indwelling ICD  - h/o SCD 2015  - Haemophilus influenza pneumonia right lower lobe  - Metabolic encephalopathy: Improved  - CKD IV-> ESRD with HD  - Chronic anemia  - PAD  - s/p LLE PCI    - s/p hypoglycemia  - s./p acute hypercapnic and hypoxemic respiratory failure  - DM  - RAD/COPD  - Medical/pharmacologic noncompliance        Plan:  - PVCs - rare isolated PVCs.  Previous ectopy likely multi-factorial including: Hypoxemia, hypokalemia and hypoglycemia    - a/c diastolic dysfunction -  Clinically compensated.  Fluid management as per HD No evidence of an acute ischemic cardiac event.  Continue  Zebeta  and diuretic as per Renal.       - hypertension - BP 130s/ 58-70s on Zebeta.       - Hypertrophic obstructive CM with h/o SCD=> ICD  implant 2015. EF 51%.  AICD functioning normally    No chest pain.  Recent stress test negative.  Blood pressure controlled.  No further cardiac workup indicated at this time.  Patient needs to follow-up in office in about 3 weeks.  Discussed with him the importance of compliance with prescribed medications and treatments.  He voiced understanding          )5/3/2018  ROBSON Dawn Dragon/Transcription:   \"Dictated utilizing Dragon dictation\".     "

## 2018-05-03 NOTE — PLAN OF CARE
Problem: Patient Care Overview  Goal: Plan of Care Review  Outcome: Ongoing (interventions implemented as appropriate)   05/03/18 1429 05/03/18 1700   Coping/Psychosocial   Plan of Care Reviewed With patient --    Plan of Care Review   Progress improving --    OTHER   Outcome Summary --  VSS, d/c today, doppler signals sufficient, d/c this evening      05/03/18 1429 05/03/18 1700   Coping/Psychosocial   Plan of Care Reviewed With patient --    Plan of Care Review   Progress improving --    OTHER   Outcome Summary --  VSS, d/c today, doppler signals sufficient, d/c this evening     Goal: Individualization and Mutuality  Outcome: Ongoing (interventions implemented as appropriate)      Problem: Skin Injury Risk (Adult)  Goal: Identify Related Risk Factors and Signs and Symptoms  Outcome: Ongoing (interventions implemented as appropriate)    Goal: Skin Health and Integrity  Outcome: Ongoing (interventions implemented as appropriate)      Problem: Fall Risk (Adult)  Goal: Identify Related Risk Factors and Signs and Symptoms  Outcome: Ongoing (interventions implemented as appropriate)    Goal: Absence of Fall  Outcome: Ongoing (interventions implemented as appropriate)      Problem: Pain, Acute (Adult)  Goal: Identify Related Risk Factors and Signs and Symptoms  Outcome: Ongoing (interventions implemented as appropriate)    Goal: Acceptable Pain Control/Comfort Level  Outcome: Ongoing (interventions implemented as appropriate)

## 2018-05-03 NOTE — PROGRESS NOTES
[facility] INPATIENT PROGRESS NOTE         [address]    5/3/2018      PATIENT IDENTIFICATION:  Name: Sergio Phan ADMIT: 2018   : 1970  PCP: ROBSON Melgoza    MRN: [id number] LOS: 8 days   AGE/SEX: 47 y.o. male  ROOM: [facility]                     LOS 8    Reason for visit: Follow up respiratory failure      SUBJECTIVE:    Resting comfortably at the time of my visit.   Denies nausea or vomiting.  Chart reviewed.      Objective   OBJECTIVE:    Vital Sign Min/Max for last 24 hours  Temp  Min: 97.6 °F (36.4 °C)  Max: 98.3 °F (36.8 °C)   BP  Min: 111/57  Max: 150/83   Pulse  Min: 60  Max: 69   Resp  Min: 14  Max: 20   SpO2  Min: 96 %  Max: 100 %   No Data Recorded   Weight  Min: 81.5 kg (179 lb 11.2 oz)  Max: 81.5 kg (179 lb 11.2 oz)                   FiO2 (%): 30 %     Body mass index is 24.37 kg/m².    Intake/Output Summary (Last 24 hours) at 18 0918  Last data filed at 18 1556   Gross per 24 hour   Intake              300 ml   Output                0 ml   Net              300 ml         Exam:  GEN:  No distress, appears stated age  LUNGS: Normal chest on inspection, palpation and auscultation  CV:  Normal S1S2, without murmur  ABD:  Non tender, non distended, no hepatosplenomegaly, +BS  EXT:  No cyanosis or clubbing.  trace+ edema    Scheduled meds:      allopurinol 150 mg Oral Daily   aspirin 81 mg Oral Daily   bisoprolol 5 mg Oral Daily   ceftriaxone 1 g Intravenous Q24H   clopidogrel 75 mg Oral Daily   furosemide 80 mg Oral BID   heparin (porcine) 5,000 Units Subcutaneous Q8H   hydrocortisone 25 mg Rectal BID   insulin aspart 0-12 Units Subcutaneous 4x Daily AC & at Bedtime   pantoprazole 40 mg Oral BID AC   rosuvastatin 20 mg Oral Daily     IV meds:                          sodium chloride 9 mL/hr Last Rate: 9 mL/hr (18 1046)   sodium chloride 100 mL/hr Last Rate: 100 mL/hr (18 1827)     Data Review:    Results from last 7 days  Lab Units  05/03/18  0426 05/02/18  0731 05/01/18  0536 04/30/18  0538 04/29/18  0515   SODIUM mmol/L 135* 136 139 139 141   POTASSIUM mmol/L 4.1 4.4 4.1 4.8 4.0   CHLORIDE mmol/L 97* 95* 97* 98 99   CO2 mmol/L 18.7* 23.7 26.3 25.9 23.8   BUN mg/dL 54* 41* 54* 39* 59*   CREATININE mg/dL 6.68* 5.03* 5.55* 4.43* 5.48*   GLUCOSE mg/dL 108* 90 129* 110* 102*   CALCIUM mg/dL 8.4* 9.9 9.6 9.8 9.5         Estimated Creatinine Clearance: 15.6 mL/min (by C-G formula based on SCr of 6.68 mg/dL (H)).    Results from last 7 days  Lab Units 05/03/18  0426 05/02/18  0731 05/01/18  0536 04/30/18  0538 04/29/18  0515   WBC 10*3/mm3 9.91 9.52 7.29 7.53 7.22   HEMOGLOBIN g/dL 9.9* 11.3* 10.3* 9.9* 9.8*   PLATELETS 10*3/mm3 222 236 232 244 200               Results from last 7 days  Lab Units 04/26/18  1137   PH, ARTERIAL pH units 7.459*   PO2 ART mm Hg 63.0*   PCO2, ARTERIAL mm Hg 34.9*   HCO3 ART mmol/L 24.7             Chest x-ray 4/28 viewed shows significant improvement right lung base infiltrate    Microbiology reviewed:    Respiratory Culture - Sputum, Bronchus [[id number]] (Abnormal) Collected: 04/26/18 0013   Lab Status: Final result Specimen: Sputum from Bronchus Updated: 04/28/18 1052    Respiratory Culture --     Heavy growth (4+) Haemophilus influenzae (A)       • Dopplers reviewed showing severe ischemia    )Assessment   ASSESSMENT:      Active Hospital Problems (** Indicates Principal Problem)    Diagnosis Date Noted   • Atherosclerosis of native arteries of extremity with rest pain [I70.229] 04/30/2018   • Severe diabetic hypoglycemia [E11.649] 04/27/2018   • Stage 5 chronic kidney disease on chronic dialysis [N18.6, Z99.2] 04/19/2018   • Hypertrophic obstructive cardiomyopathy [I42.1] 05/05/2016   • Hypertension [I10] 04/20/2016   • Type 2 diabetes mellitus [E11.9] 04/20/2016      Resolved Hospital Problems    Diagnosis Date Noted Date Resolved   • Unconscious [R40.20] 04/25/2018 04/27/2018     Acute hypercapnic and hypoxemic  respiratory failure due to airway compromise: Improved  Haemophilus influenza pneumonia right lower lobe  Metabolic encephalopathy: Improved  Acute on chronic kidney disease IV  Chronic pancreatic insufficiency  Asthma/COPD  Rectal bleeding with symptoms of hemorrhoids  Hypokalemia  Chronic anemia  Peripheral vascular disease with severe digital ischemia left greater than right      PLAN:  S/P angiogram for distal ischemia legs per vascular surgery 5/2.  Hemorrhoidal treatment per GI recommendations.  Dialysis plan per nephrology.  Will do dialysis in [facility] unit after discharge.  S/P for tunneled catheter 5/1 per vascular.  Encourage pulmonary toilet.  Can d/c iv fluids post op order.  Taking po well.  Discharge to [facility] skilled unit when okay with all pending precert.      Fernando Sandoval MD  Pulmonary and Critical Care Medicine  [facility], [facility]  5/3/2018    9:59 AM

## 2018-05-03 NOTE — PLAN OF CARE
Problem: Patient Care Overview  Goal: Plan of Care Review  Outcome: Ongoing (interventions implemented as appropriate)   05/03/18 9505   Coping/Psychosocial   Plan of Care Reviewed With patient;spouse   OTHER   Outcome Summary Initiate insulin instructions with pt this afternoon at bedside. Anticipate pt to need supervision/assistance by his spouse with insulin administration. pls see DM ed flowsheet or note for more detail.

## 2018-05-03 NOTE — PLAN OF CARE
Problem: Patient Care Overview  Goal: Plan of Care Review  Outcome: Ongoing (interventions implemented as appropriate)   05/03/18 0301   Coping/Psychosocial   Plan of Care Reviewed With patient   Plan of Care Review   Progress improving   OTHER   Outcome Summary VSS. Pulses dopplerable. Right groin soft and no drainage on gauze. AO x 4. No c/o of pain. Will continue to monitor.      Goal: Interprofessional Rounds/Family Conf  Outcome: Ongoing (interventions implemented as appropriate)      Problem: Skin Injury Risk (Adult)  Goal: Identify Related Risk Factors and Signs and Symptoms  Outcome: Ongoing (interventions implemented as appropriate)   05/02/18 1410   Skin Injury Risk (Adult)   Related Risk Factors (Skin Injury Risk) critical care admission;tissue perfusion altered;cognitive impairment     Goal: Skin Health and Integrity  Outcome: Ongoing (interventions implemented as appropriate)   05/03/18 0301   Skin Injury Risk (Adult)   Skin Health and Integrity making progress toward outcome       Problem: Fall Risk (Adult)  Goal: Identify Related Risk Factors and Signs and Symptoms  Outcome: Ongoing (interventions implemented as appropriate)    Goal: Absence of Fall  Outcome: Ongoing (interventions implemented as appropriate)   05/03/18 0301   Fall Risk (Adult)   Absence of Fall making progress toward outcome       Problem: Pain, Acute (Adult)  Goal: Identify Related Risk Factors and Signs and Symptoms  Outcome: Ongoing (interventions implemented as appropriate)    Goal: Acceptable Pain Control/Comfort Level  Outcome: Ongoing (interventions implemented as appropriate)   05/03/18 0301   Pain, Acute (Adult)   Acceptable Pain Control/Comfort Level making progress toward outcome

## 2018-05-03 NOTE — THERAPY EVALUATION
Acute Care - Physical Therapy Re-Evaluation   [facility]     Patient Name: Sergio Phan  : 1970  MRN: [id number]  Today's Date: 5/3/2018   Onset of Illness/Injury or Date of Surgery: 18  Date of Referral to PT: 18  Referring Physician: Trace      Admit Date: 2018    Visit Dx:     ICD-10-CM ICD-9-CM   1. Unconscious R40.20 780.09   2. Hypoglycemia E16.2 251.2   3. Chronic renal failure, stage 5 N18.5 585.5   4. Impaired functional mobility, balance, gait, and endurance Z74.09 V49.89     Patient Active Problem List   Diagnosis   • AICD (automatic cardioverter/defibrillator) present   • Dizziness   • Fatigue   • Hypertension   • Hypertrophic obstructive cardiomyopathy   • Hypertriglyceridemia   • Kidney disorder   • Type 2 diabetes mellitus   • Syncope   • Vitamin D deficiency   • Weakness   • Idiopathic acute pancreatitis   • VT (ventricular tachycardia)   • Acute pancreatitis   • Pancreatitis, recurrent   • Hypertensive emergency   • Stage 5 chronic kidney disease on chronic dialysis   • Severe diabetic hypoglycemia   • Atherosclerosis of native arteries of extremity with rest pain     Past Medical History:   Diagnosis Date   • Anxiety    • Asthma    • Chest pain    • COPD (chronic obstructive pulmonary disease)    • Depression    • Diabetes mellitus     TYPE II   • Fatigue    • Gout    • HOCM (hypertrophic obstructive cardiomyopathy)    • Hypertension    • Hypertriglyceridemia    • Myocardial infarction    • Pancreatitis    • Renal disorder    • Syncope      Past Surgical History:   Procedure Laterality Date   • ABDOMINAL SURGERY     • ARTERIOVENOUS FISTULA/SHUNT SURGERY Right 2018    Procedure: RT/ ARTERIOVENOUS FISTULA FORMATION;  Surgeon: Chuck Woodward MD;  Location: [facility];  Service: Vascular   • ATHRECTOMY ILIAC, FEMORAL, TIBIAL ARTERY Bilateral 2018    Procedure: JEFFERY LOWER EXTREMITY ANGIOGRAM, LEFT SFA  AND POPLITEAL LASER ARTHRECTOMY WITH DRUG COATED  BALLOON, IVUS X3;  Surgeon: Chuck Woodward MD;  [facility];  Service: Vascular   • CARDIAC CATHETERIZATION     • CARDIAC DEFIBRILLATOR PLACEMENT     • CARDIAC DEFIBRILLATOR PLACEMENT     • CHOLECYSTECTOMY     • ENDOSCOPY N/A 8/30/2016    Procedure: ESOPHAGOGASTRODUODENOSCOPY;  Surgeon: Irineo Mercedes MD;  Location: [facility];  Service:    • ERCP     • INSERT / REPLACE / REMOVE PACEMAKER      ST [place]   • INSERTION HEMODIALYSIS CATHETER N/A 4/30/2018    Procedure: HEMODIALYSIS CATHETER INSERTION;  Surgeon: Mya Wilkinson Jr., MD;  [facility];  Service: Vascular   • LAPAROSCOPIC APPENDECTOMY     • OTHER SURGICAL HISTORY      NO REMOVAL OF APPENDIX  PATIENT HAS A APPENDIX   • UMBILICAL HERNIA REPAIR          PT ASSESSMENT (last 12 hours)      Physical Therapy Evaluation     Row Name 05/03/18 3064          PT Evaluation Time/Intention    Subjective Information no complaints  -EE     Document Type re-evaluation   see initial eval 4/29 for PMH/social hx  -EE     Patient Effort good  -EE     Symptoms Noted During/After Treatment none  -EE     Row Name 05/03/18 6327          General Information    Onset of Illness/Injury or Date of Surgery 05/02/18  -EE     Referring Physician Trace  -JESSICA     Patient Observations alert;cooperative;agree to therapy  -EE     Patient/Family Observations sitting up in chair in no acute distress; spouse present  -EE     Pertinent History of Current Functional Problem s/p B LE angiogram, L SFA and popliteal artery artherectomy with balloon angioplasty  -EE     Existing Precautions/Restrictions fall  -EE     Limitations/Impairments safety/cognitive  -EE     Barriers to Rehab medically complex  -EE     Row Name 05/03/18 0759          Relationship/Environment    Lives With spouse  -EE     Row Name 05/03/18 4523          Resource/Environmental Concerns    Current Living Arrangements home/apartment/condo  -EE     Row Name 05/03/18 7036           Cognitive Assessment/Interventions    Additional Documentation Cognitive Assessment/Intervention (Group)  -EE     Row Name 05/03/18 1355          Cognitive Assessment/Intervention- PT/OT    Orientation Status (Cognition) oriented x 3  -EE     Follows Commands (Cognition) does not follow one step commands;follows two step commands;over 90% accuracy;verbal cues/prompting required  -EE     Safety Deficit (Cognitive) mild deficit;awareness of need for assistance;insight into deficits/self awareness  -EE     Personal Safety Interventions fall prevention program maintained;gait belt;muscle strengthening facilitated;nonskid shoes/slippers when out of bed;supervised activity  -EE     Row Name 05/03/18 1358          Safety Issues, Functional Mobility    Safety Issues Affecting Function (Mobility) awareness of need for assistance;insight into deficits/self awareness  -EE     Impairments Affecting Function (Mobility) balance;endurance/activity tolerance;strength  -EE     Row Name 05/03/18 1352          Bed Mobility Assessment/Treatment    Comment (Bed Mobility) up in chair  -EE     Row Name 05/03/18 135          Transfer Assessment/Treatment    Transfer Assessment/Treatment sit-stand transfer;stand-sit transfer  -EE     Sit-Stand [place] (Transfers) contact guard  -EE     Stand-Sit [place] (Transfers) contact guard  -EE     Row Name 05/03/18 [facility]          Sit-Stand Transfer    Assistive Device (Sit-Stand Transfers) cane, quad  -EE     Row Name 05/03/18 [facility]          Gait/Stairs Assessment/Training    [place] Level (Gait) contact guard;minimum assist (75% patient effort);verbal cues  -EE     Assistive Device (Gait) cane, quad;walker, front-wheeled  -EE     Distance in Feet (Gait) 150  -EE     Deviations/Abnormal Patterns (Gait) hilaria decreased;base of support, narrow;stride length decreased  -EE     Bilateral Gait Deviations weight shift ability decreased  -EE     Comment (Gait/Stairs) Ambulated short  distance in room with quad cane; somewhat unsteady. Balance much improved with walker. Several instances of scissoring with R LE; able to maintain balance with CGA to min A. Discussed safety concerns with pt and spouse; recommend use of walker at home.  -EE     Row Name 05/03/18 1355          General ROM    GENERAL ROM COMMENTS B LEs grossly WFL  -EE     Row Name 05/03/18 1355          General Assessment (Manual Muscle Testing)    General Manual Muscle Testing (MMT) Assessment lower extremity strength deficits identified  -EE     Comment, General Manual Muscle Testing (MMT) Assessment B LEs grossly 3+ to 4-/5; generalized weakness observed with functional mobility  -EE     Row Name 05/03/18 1355          Pain Scale: Numbers Pre/Post-Treatment    Pain Scale: Numbers, Pretreatment 0/10 - no pain  -EE     Row Name             Wound 04/26/18 0800 coccyx    Wound - Properties Group Date first assessed: 04/26/18  -GS Time first assessed: 0800  -GS Location: coccyx  -GS Additional Comments: red non blanchable  -GS Wound outcome: Other  -GS    Row Name             Wound 04/30/18 1324 Other (See comments) chest incision    Wound - Properties Group Date first assessed: 04/30/18  -AC Time first assessed: 1324  -AC Side: Other (See comments)  -AC Location: chest  -AC Type: incision  -AC    Row Name             Wound 04/30/18 1324 Right neck incision    Wound - Properties Group Date first assessed: 04/30/18  -AC Time first assessed: 1324  -AC Side: Right  -AC Location: neck  -AC Type: incision  -AC    Row Name             Wound 05/02/18 1441 Right groin incision    Wound - Properties Group Date first assessed: 05/02/18  -SM Time first assessed: 1441  -SM Side: Right  -SM Location: groin  -SM Type: incision  -SM    Row Name 05/03/18 1355          Plan of Care Review    Plan of Care Reviewed With patient;spouse  -EE     Row Name 05/03/18 1355          Physical Therapy Clinical Impression    Patient/Family Goals Statement (PT  Clinical Impression) Go home  -EE     Criteria for Skilled Interventions Met (PT Clinical Impression) yes;treatment indicated  -EE     Pathology/Pathophysiology Noted (Describe Specifically for Each System) musculoskeletal  -EE     Impairments Found (describe specific impairments) gait, locomotion, and balance;aerobic capacity/endurance  -EE     Rehab Potential (PT Clinical Summary) good, to achieve stated therapy goals  -EE     Row Name 05/03/18 9588          Bed Mobility Goal 1 (PT)    Time Frame (Bed Mobility Goal 1, PT) 1 week  -EE     Progress/Outcomes (Bed Mobility Goal 1, PT) goal ongoing  -EE     Row Name 05/03/18 5835          Transfer Goal 1 (PT)    Time Frame (Transfer Goal 1, PT) 1 week  -EE     Progress/Outcome (Transfer Goal 1, PT) goal ongoing  -EE     Row Name 05/03/18 3175          Gait Training Goal 1 (PT)    Activity/Assistive Device (Gait Training Goal 1, PT) gait (walking locomotion);walker, rolling  -EE     [place]ion Level (Gait Training Goal 1, PT) contact guard assist  -EE     Distance (Gait Goal 1, PT) 300  -EE     Time Frame (Gait Training Goal 1, PT) 1 week  -EE     Progress/Outcome (Gait Training Goal 1, PT) goal revised this date  -EE     Row Name 05/03/18 0600          Positioning and Restraints    Pre-Treatment Position sitting in chair/recliner  -EE     Post Treatment Position chair  -EE     In Chair sitting;call light within reach;encouraged to call for assist;with family/caregiver;notified nsg  -EE     Row Name 05/03/18 6795          Living Environment    Home Accessibility --   no concerns  -EE       User Key  (r) = Recorded By, (t) = Taken By, (c) = Cosigned By    Initials Name Provider Type    AC Araseli Lobato RN Registered Nurse    JESSICA Arias, PT Physical Therapist    SINGH Haider RN Registered Nurse    DEBBI Cherry RN Registered Nurse          Physical Therapy Education     Title: PT OT SLP Therapies (Done)     Topic: Physical Therapy (Done)     Point:  Mobility training (Done)    Learning Progress Summary     Learner Status Readiness Method Response Comment Documented by    Patient Done Acceptance E,TB VU,NR  EE 05/03/18 1429     Done Acceptance E VU,NR  DO 04/29/18 1458    Family Done Acceptance E VU,NR  DO 04/29/18 1458          Point: Home exercise program (Done)    Learning Progress Summary     Learner Status Readiness Method Response Comment Documented by    Patient Done Acceptance E VU,NR  DO 04/29/18 1458    Family Done Acceptance E VU,NR  DO 04/29/18 1458          Point: Body mechanics (Done)    Learning Progress Summary     Learner Status Readiness Method Response Comment Documented by    Patient Done Acceptance E,TB VU,NR  EE 05/03/18 1429     Done Acceptance E VU,NR  DO 04/29/18 1458    Family Done Acceptance E VU,NR  DO 04/29/18 1458          Point: Precautions (Done)    Learning Progress Summary     Learner Status Readiness Method Response Comment Documented by    Patient Done Acceptance E,TB VU,NR  EE 05/03/18 1429     Done Acceptance E VU,NR  DO 04/29/18 1458    Family Done Acceptance E VU,NR  DO 04/29/18 1458                      User Key     Initials Effective Dates Name Provider Type Discipline    EE 04/03/18 -  Johanny Arias, PT Physical Therapist PT    DO 03/07/18 -  Marcelo Massey, PT Physical Therapist PT                PT Recommendation and Plan  Anticipated Discharge Disposition (PT): home with home health care  Planned Therapy Interventions (PT Eval): balance training, bed mobility training, gait training, home exercise program, patient/family education, stair training, strengthening, transfer training  Therapy Frequency (PT Clinical Impression): daily  Outcome Summary/Treatment Plan (PT)  Anticipated Discharge Disposition (PT): home with home health care  Plan of Care Reviewed With: patient  Progress: improving  Outcome Summary: Pt seen for re-eval, s/p L SFA and popliteal artherectomy with balloon angioplasty. Pt without pain; continues to  demonstrate generalized weakness in LEs and impaired balance limiting mobility. Pt ambulated in hallways with CGA to min A and use of walker. Several small LOBs, but able to recover with minimal assist from therapist. Pt hopeful to DC home soon; educated pt and spouse on use of rwx for improved balance and safety; recommend  PT for continued rehabilitation.           Outcome Measures     Row Name 05/03/18 1400             How much help from another person do you currently need...    Turning from your back to your side while in flat bed without using bedrails? 4  -EE      Moving from lying on back to sitting on the side of a flat bed without bedrails? 3  -EE      Moving to and from a bed to a chair (including a wheelchair)? 3  -EE      Standing up from a chair using your arms (e.g., wheelchair, bedside chair)? 3  -EE      Climbing 3-5 steps with a railing? 2  -EE      To walk in hospital room? 3  -EE      AM-PAC 6 Clicks Score 18  -EE         Functional Assessment    Outcome Measure Options AM-PAC 6 Clicks Basic Mobility (PT)  -EE        User Key  (r) = Recorded By, (t) = Taken By, (c) = Cosigned By    Initials Name Provider Type    EE Johanny Arias PT Physical Therapist           Time Calculation:         PT Charges     Row Name 05/03/18 1432             Time Calculation    Start Time 1355  -EE      Stop Time 1418  -EE      Time Calculation (min) 23 min  -EE      PT Received On 05/03/18  -EE      PT - Next Appointment 05/04/18  -EE      PT Goal Re-Cert Due Date 05/10/18  -EE        User Key  (r) = Recorded By, (t) = Taken By, (c) = Cosigned By    Initials Name Provider Type    EE Johanny Arias PT Physical Therapist          Therapy Charges for Today     Code Description Service Date Service Provider Modifiers Qty    20360952957 HC PT RE-EVAL ESTABLISHED PLAN 2 5/3/2018 Johanny Arias PT GP 1    99351640308 HC PT THER PROC EA 15 MIN 5/3/2018 Johanny Arias PT GP 1    88310986878 HC PT THER SUPP EA 15 MIN 5/3/2018 Johanny  Hugo, PT GP 1          PT G-Codes  Outcome Measure Options: AM-PAC 6 Clicks Basic Mobility (PT)      Johanny Arias, PT  5/3/2018

## 2018-05-03 NOTE — PROGRESS NOTES
"GI Daily Progress Note    Assessment/Plan:    Active Problems:    Hypertension    Hypertrophic obstructive cardiomyopathy    Type 2 diabetes mellitus    Stage 5 chronic kidney disease on chronic dialysis    Severe diabetic hypoglycemia    Atherosclerosis of native arteries of extremity with rest pain       LOS: 8 days     Sergio Phan is a 47 y.o. male who was admitted with Hypotebsion/Hypoglycemia. He reports his symptoms are improving with treatment. No further rectal bleeding and tolerated PVD procedure of course is now onPlavix and may see increased anorectal bleeding so will send HC cream for his Hemorrhoids and will await \"clearance \" to hold Plavix prior to elective colonoscopy    Subjective:    Patient expresses No GI complaints  Patient denies abdominal pain and bloody stools    Objective:    Vital signs in last 24 hours:  Temp:  [97.8 °F (36.6 °C)-98.3 °F (36.8 °C)] 98.2 °F (36.8 °C)  Heart Rate:  [60-69] 68  Resp:  [14-18] 18  BP: (111-137)/(57-85) 135/79    Intake/Output last 3 shifts:  I/O last 3 completed shifts:  In: 300 [I.V.:300]  Out: 260 [Urine:260]  Intake/Output this shift:  I/O this shift:  In: 780 [P.O.:780]  Out: -     Results from last 7 days  Lab Units 05/03/18  0426   WBC 10*3/mm3 9.91   HEMOGLOBIN g/dL 9.9*   HEMATOCRIT % 33.2*   PLATELETS 10*3/mm3 222       Results from last 7 days  Lab Units 05/03/18  0426 05/02/18  0731 05/01/18  0536   SODIUM mmol/L 135* 136 139   POTASSIUM mmol/L 4.1 4.4 4.1   CHLORIDE mmol/L 97* 95* 97*   CO2 mmol/L 18.7* 23.7 26.3   BUN mg/dL 54* 41* 54*   CREATININE mg/dL 6.68* 5.03* 5.55*   GLUCOSE mg/dL 108* 90 129*   CALCIUM mg/dL 8.4* 9.9 9.6       Physical Exam:Abdomen  Sounds Normal Active Bowel Sounds   Distension Soft   Tenderness Nontender     Rectal bleeding-Resolved  Anemia, Chronic  ESRD  PVD-S/P angioplasty/Stent to LLE  DM    OK to D/C and will ask for 3 month f/u and will send from office a Rx for HC cream.  "

## 2018-05-03 NOTE — PLAN OF CARE
Problem: Patient Care Overview  Goal: Plan of Care Review  Outcome: Ongoing (interventions implemented as appropriate)   05/03/18 1414   Coping/Psychosocial   Plan of Care Reviewed With patient   Plan of Care Review   Progress improving   OTHER   Outcome Summary Pt is tolerating a regular diet. ST to sign off at this time.

## 2018-05-03 NOTE — THERAPY DISCHARGE NOTE
Acute Care - Speech Language Pathology   Swallow Treatment Note/Discharge    [facility]     Patient Name: Sergio Phan  : 1970  MRN: [id number]  Today's Date: 5/3/2018        Referring Physician: Dr. Sandoval      Admit Date: 2018    Visit Dx:      ICD-10-CM ICD-9-CM   1. Unconscious R40.20 780.09   2. Hypoglycemia E16.2 251.2   3. Chronic renal failure, stage 5 N18.5 585.5   4. Impaired functional mobility, balance, gait, and endurance Z74.09 V49.89     Patient Active Problem List   Diagnosis   • AICD (automatic cardioverter/defibrillator) present   • Dizziness   • Fatigue   • Hypertension   • Hypertrophic obstructive cardiomyopathy   • Hypertriglyceridemia   • Kidney disorder   • Type 2 diabetes mellitus   • Syncope   • Vitamin D deficiency   • Weakness   • Idiopathic acute pancreatitis   • VT (ventricular tachycardia)   • Acute pancreatitis   • Pancreatitis, recurrent   • Hypertensive emergency   • Stage 5 chronic kidney disease on chronic dialysis   • Severe diabetic hypoglycemia   • Atherosclerosis of native arteries of extremity with rest pain       Therapy Treatment    Therapy Treatment / Health Promotion    Treatment Time/Intention  Discipline: speech language pathologist (18 1400 : MS XAVIER Reyes)  Document Type: therapy note (daily note) (18 1400 : MS XAVIER Reyes)  Subjective Information: no complaints (18 1400 : MS XAVIER Reyes)  Mode of Treatment: speech-language pathology (18 1400 : MS XAVIER Reyes)  Therapy Frequency (Swallow): PRN (18 1400 : MS XAVIER Reyes)  Patient Effort: good (18 1400 : MS XAVIER Reyes)  Plan of Care Review  Plan of Care Reviewed With: patient (18 1414 : MS XAVIER Reyes)    Vitals/Pain/Safety       Cognition, Communication, Swallow  Recommendations  Therapy Frequency (Swallow): PRN (18 1400 : MS XAVIER Reyes)  Anticipated Dischage Disposition: skilled  nursing facility (05/03/18 1413 : Adela Matias MS CCC-SLP)    Outcome Summary  Outcome Summary/Treatment Plan (SLP)  Anticipated Dischage Disposition: skilled nursing facility (05/03/18 1413 : Adela Matias MS CCC-SLP)  Reason for Discharge: all goals and outcomes met, no further needs identified (05/03/18 1413 : Adela Matias MS CCC-SLP)        SLP GOALS     Row Name 05/03/18 1400             Oral Nutrition/Hydration Goal 1 (SLP)    Oral Nutrition/Hydration Goal 1, SLP Pt is tolerating regular diet with no s/s of aspiration. Lungs are clear. Pt was finishing lunch and took his meds with water without diffiuclty. ST to sign off.  -AW        User Key  (r) = Recorded By, (t) = Taken By, (c) = Cosigned By    Initials Name Provider Type    SHAAN Matias MS CCC-SLP Speech and Language Pathologist          EDUCATION  The patient has been educated in the following areas:   Dysphagia (Swallowing Impairment) Oral Care/Hydration.    SLP Recommendation and Plan                    Anticipated Dischage Disposition: skilled nursing facility     Therapy Frequency (Swallow): PRN          Plan of Care Reviewed With: patient  Progress: improving  Plan of Care Reviewed With: patient           Time Calculation:         Time Calculation- SLP     Row Name 05/03/18 1412             Time Calculation- SLP    SLP Start Time 1115  -AW      SLP Stop Time 1145  -AW      SLP Time Calculation (min) 30 min  -        User Key  (r) = Recorded By, (t) = Taken By, (c) = Cosigned By    Initials Name Provider Type    SHAAN Matias MS CCC-SLP Speech and Language Pathologist          Therapy Charges for Today     Code Description Service Date Service Provider Modifiers Qty    59897924166  ST TREATMENT SWALLOW 2 5/3/2018 Adela Matias MS CCC-SLP GN 1               SLP Discharge Summary  Anticipated Dischage Disposition: skilled nursing facility  Reason for Discharge: all goals and outcomes met, no further needs identified  Progress Toward Achieving  Short/long Term Goals: all goals met within established timelines  Discharge Destination: SNF    Adela Matias MS CCC-SLP  5/3/2018

## 2018-05-03 NOTE — CONSULTS
"Diabetes Education  Assessment/Teaching    Patient Name:  Sergio Phan  YOB: 1970  MRN: [id number]  Admit Date:  4/25/2018      Assessment Date:  5/3/2018  Flowsheet Row Most Recent Value   General Information    Referral From: MD order Dr Price- for insulin teaching   Height 182.9 cm (72.01\")   Height Method Estimated   Weight 81.5 kg (179 lb 11.2 oz)   What type of diabetes do you have? Type 2   Length of Diabetes Diagnosis 10 + years   Do you test your blood sugar at home? yes   Who performs the test? -- pt's spouse.    Have you had low blood sugar? (<70mg/dl) yes   Do you have any diabetes complications? nephropathy, neuropathy   Education Preferences   Barriers to Learning -- pt presents with some cognitive impairment/verbal delays.   Assessment Topics   Taking Medication - Assessment Needs education [Dr Price has ordered teaching for insulin. ]   Healthy Coping - Assessment Competent       Flowsheet Row Most Recent Value   DM Education Needs   Meter Has own   Frequency of Testing -- [advise both pt and spouse to first check BG before administering insulin]   Medication Insulin, Novolog Action, Administration, Vial [Instruct pt & spouse on taking Novolog per vial/syringe per MD directions. ]   Problem Solving Hypoglycemia, Treatment   Healthy Coping Appropriate [supportive spouse arrived to pt room. ]   Discharge Plan Home [per pt]   Motivation Engaged   Teaching Method Explanation, Demonstration, Handouts, Teach back   Patient Response Verbalized understanding pt's spouse verbalized understanding and returns correct demo for drawing up insulin from a vial;  pt will need help with drawing up appropriate amount of insulin.    Advise pt and spouse to have spouse assist/supervise with any insulin administration at [place].       Electronically signed by:  Jessica Harvey, RN, BSN, CDE   05/03/18 1:38 PM  "

## 2018-05-03 NOTE — DISCHARGE INSTR - ACTIVITY
[facility]  Minh Blakely, Andrae Lucero Rachel, Scherrer, Thomas  [address]  [place] [address]  [phone]      Discharge Instructions for Angiogram    1. Go home, rest and take it easy today.      2. You may experience some dizziness or memory loss from the anesthesia.  This may last for the next 24 hours.  Someone should plan on staying with you for the first 24 hours for your safety.    3. Do not make any important legal decisions or sign any legal papers for the next 24 hours.      4. Eat and drink lightly today.  Start off with liquids, jello, soup, crackers or other bland foods at first. You may advance your diet tomorrow as tolerated as long as you do not experience any nausea or vomiting.    5. You may resume routine medications including blood thinners. However, Glucophage should be not be started for 72 hours after the dye is given.      6. No lifting over 10 pounds and no strenuous activity for the next 2-3 days.    7. Try not to strain or bear down when your bowels move or when you empty your bladder.    8. Limit going up and down steps for 2 days.    9. No driving for the remainder of the day.  You may resume limited driving tomorrow if necessary.     10.  You may shower tomorrow.  No bath or hot tubs for at least 2 days after the procedure.          11. Leave the pressure dressing on until tomorrow morning.  You may then take this off, as well as the small see through dressing with gauze tomorrow.  If it doesn’t come off easily, do not pull this off.  If it is stuck, shower and let the warm water loosen it before removal.       12. Check your groin dressing regularly for bleeding through the dressing (under the pressure dressing).  A small amount of blood contained by the gauze is normal; the whole dressing should not be filled with blood or leaking out under the sides.     13. If you experience bleeding through the gauze/clear sticky dressing, lay flat  and have someone apply direct pressure for 15 minutes.  If bleeding has stopped after this, you may put a clean gauze and tape over the area.  Continue to lie flat for 1-2 hours.  If bleeding continues after 15 minutes of pressure, call us at the number listed above.  There is an MD available after hours.      14. If you experience heavy bleeding or large swelling, continue to hold firm pressure and              call 911.  Do not call the MD on call.     15.  If you experience pain or discomfort you may take Tylenol or Ibuprofen, whichever you normally use for minor discomfort, unless otherwise instructed.       16.  If the MD gives you a prescription for narcotic pain pills (Tylenol #3, Vicodin, Hydrocodone, Oxycodone or Percocet), you cannot drive a vehicle or operate machinery while taking these.    17.  Severe pain is not expected after this procedure.  If you experience severe pain, please call our office at [phone].    18.  Remember to contact our office for any of the following:    • Fever > 101 degrees  • Severe pain that cannot be controlled by taking your pain pills  • Severe nausea or vomiting   • Significant bleeding of your incisions  • Drainage that has a bad smell or is yellow or green in appearance  • Any other questions or concerns

## 2018-05-03 NOTE — PROGRESS NOTES
"  ENDOCRINE    Subjective   AND PLANS  Sergio Phan is a 47 y.o. male.     Follow-up diabetes.    No nausea or vomiting.  Appetite good.  Fasting glucose 153.  Random glucose 114-196.  Continue NovoLog as needed.  Do not restart glimepiride.  Patient will need instructions on how to use insulin for possible future use.    Objective   /79 (BP Location: Left arm, Patient Position: Sitting)   Pulse 68   Temp 98.2 °F (36.8 °C) (Oral)   Resp 18   Ht 182.9 cm (72.01\")   Wt 81.5 kg (179 lb 11.2 oz)   SpO2 98%   BMI 24.37 kg/m²   Physical Exam    Awake, alert, not in distress.  No rales or wheezes.  Regular heart rate and rhythm.  Abdomen soft, nontender.  No cyanosis or clubbing.    Lab Results (last 24 hours)     Procedure Component Value Units Date/Time    POC Glucose Once [[id number]]  (Abnormal) Collected:  05/03/18 0736    Specimen:  Blood Updated:  05/03/18 0738     Glucose 153 (H) mg/dL     Narrative:       Meter: [id number] : [id number] Chad DOAN    POC Activated Clotting Time [[id number]]  (Abnormal) Collected:  05/02/18 1537    Specimen:  Blood Updated:  05/03/18 0711     Activated Clotting Time  186 (H) Seconds      Comment: Serial Number: [id number]:  0012       Phosphorus [[id number]]  (Abnormal) Collected:  05/03/18 0426    Specimen:  Blood Updated:  05/03/18 0657     Phosphorus 5.8 (H) mg/dL     Basic Metabolic Panel [[id number]]  (Abnormal) Collected:  05/03/18 0426    Specimen:  Blood Updated:  05/03/18 0638     Glucose 108 (H) mg/dL      BUN 54 (H) mg/dL      Creatinine 6.68 (H) mg/dL      Sodium 135 (L) mmol/L      Potassium 4.1 mmol/L      Chloride 97 (L) mmol/L      CO2 18.7 (L) mmol/L      Calcium 8.4 (L) mg/dL      eGFR Non African Amer 9 (L) mL/min/1.73      BUN/Creatinine Ratio 8.1     Anion Gap 19.3 mmol/L     Narrative:       GFR Normal >60  Chronic Kidney Disease <60  Kidney Failure <15    CBC & Differential [[id number]] Collected:  05/03/18 0426    Specimen:  Blood " Updated:  05/03/18 0602    Narrative:       The following orders were created for panel order CBC & Differential.  Procedure                               Abnormality         Status                     ---------                               -----------         ------                     CBC Auto Differential[[id number]]        Abnormal            Final result                 Please view results for these tests on the individual orders.    CBC Auto Differential [[id number]]  (Abnormal) Collected:  05/03/18 0426    Specimen:  Blood Updated:  05/03/18 0602     WBC 9.91 10*3/mm3      RBC 3.70 (L) 10*6/mm3      Hemoglobin 9.9 (L) g/dL      Hematocrit 33.2 (L) %      MCV 89.7 fL      MCH 26.8 (L) pg      MCHC 29.8 (L) g/dL      RDW 14.7 (H) %      RDW-SD 47.7 fl      MPV 10.2 fL      Platelets 222 10*3/mm3      Neutrophil % 76.3 (H) %      Lymphocyte % 12.7 (L) %      Monocyte % 8.7 %      Eosinophil % 2.0 %      Basophil % 0.3 %      Immature Grans % 0.5 %      Neutrophils, Absolute 7.56 10*3/mm3      Lymphocytes, Absolute 1.26 10*3/mm3      Monocytes, Absolute 0.86 10*3/mm3      Eosinophils, Absolute 0.20 10*3/mm3      Basophils, Absolute 0.03 10*3/mm3      Immature Grans, Absolute 0.05 (H) 10*3/mm3      nRBC 0.0 /100 WBC     Magnesium [[id number]]  (Normal) Collected:  05/03/18 0426    Specimen:  Blood Updated:  05/03/18 0550     Magnesium 2.2 mg/dL     POC Glucose Once [[id number]]  (Abnormal) Collected:  05/02/18 2313    Specimen:  Blood Updated:  05/02/18 2317     Glucose 196 (H) mg/dL     Narrative:       Meter: [id number] : [id number] Ayanna Kim RN    POC Glucose Once [[id number]]  (Normal) Collected:  05/02/18 1749    Specimen:  Blood Updated:  05/02/18 1751     Glucose 114 mg/dL     Narrative:       Meter: [id number] : [id number] José DOAN            Active Problems:    Hypertension    Hypertrophic obstructive cardiomyopathy    Type 2 diabetes mellitus    Stage 5 chronic kidney disease on chronic  dialysis    Severe diabetic hypoglycemia    Atherosclerosis of native arteries of extremity with rest pain    Diabetes therapy as discussed above.  Continue Crestor 5 mg once a day.

## 2018-05-04 ENCOUNTER — TELEPHONE (OUTPATIENT)
Dept: ENDOCRINOLOGY | Age: 48
End: 2018-05-04

## 2018-05-04 NOTE — TELEPHONE ENCOUNTER
----- Message from Ahsan Feldman sent at 5/4/2018 11:54 AM EDT -----  Contact: WIFE   WIFE STATED THAT THEY DO NOT HAVE A SCRIPT OF INSULIN AFTER PATIENT CAME HOME FROM HOSPITAL, I SEE NOTES THAT SPEAK ABOUT NovoLog, BUT THE PATIENT DID NOT GO HOME WITH A SCRIPT.     ANDREWJASWINDER [address] [place] [address] - [phone]  - [phone]  [phone] (Phone)  [phone] (Fax)    [facility] # [phone]    RECOMMENDATIONS: Discontinue glimepiride. Will check blood sugars intermittently and use NovoLog if needed 1 unit per 40 greater than 150. I will get a diabetes educator to see the patient and instruct on insulin use at home. Check lipid profile. Decrease Crestor to 1 mg once a day due to progression of his chronic kidney disease.

## 2018-05-04 NOTE — PROGRESS NOTES
Case Management Discharge Note    Final Note: Pt discharged home with [facility].  Evaluated by PT who state pt is safe for home with HH.  Spoke with [facility] who will follow.  Plans to start outpatient HD at [facility].  Appointment information given to wife.   No further needs.  ELMER Moreno RN    Destination - Selection Complete     Service Request Status Selected Specialties Address Phone Number Fax Number    [facility] [address] [place] [address] [phone]        Marichuy Alcantara RN 4/27/2018 1713    Spoke with [facility] Pending - Request Sent N/A [address] [facility] [address] [phone]        Marichuy Alcantara RN 4/27/2018 171    [facility]  cannot take a pt on HD even if family transports N/A [address] [place] [address] [phone]        Marichuy Alcantara RN 4/26/2018 1303    VM left for Eliza                 Durable Medical Equipment     No service coordination in this encounter.      Dialysis/Infusion     No service coordination in this encounter.      Home Medical Care - Selection Complete     Service Request Status Selected Specialties Address Phone Number Fax Number    [facility] [address], [facility] [address] [phone]      Social Care     No service coordination in this encounter.        Other:  (private auto)    Final Discharge Disposition Code: 06 - home with home health care

## 2018-05-11 ENCOUNTER — APPOINTMENT (OUTPATIENT)
Dept: PHYSICAL THERAPY | Facility: HOSPITAL | Age: 48
End: 2018-05-11

## 2018-08-21 ENCOUNTER — CLINICAL SUPPORT NO REQUIREMENTS (OUTPATIENT)
Dept: CARDIOLOGY | Facility: CLINIC | Age: 48
End: 2018-08-21

## 2018-08-21 ENCOUNTER — OFFICE VISIT (OUTPATIENT)
Dept: CARDIOLOGY | Facility: CLINIC | Age: 48
End: 2018-08-21

## 2018-08-21 VITALS
HEART RATE: 69 BPM | HEIGHT: 72 IN | WEIGHT: 196 LBS | SYSTOLIC BLOOD PRESSURE: 144 MMHG | DIASTOLIC BLOOD PRESSURE: 70 MMHG | BODY MASS INDEX: 26.55 KG/M2

## 2018-08-21 DIAGNOSIS — R53.1 WEAKNESS: ICD-10-CM

## 2018-08-21 DIAGNOSIS — Z95.810 AICD (AUTOMATIC CARDIOVERTER/DEFIBRILLATOR) PRESENT: ICD-10-CM

## 2018-08-21 DIAGNOSIS — I10 HYPERTENSION, ESSENTIAL: ICD-10-CM

## 2018-08-21 DIAGNOSIS — I10 ESSENTIAL HYPERTENSION: ICD-10-CM

## 2018-08-21 DIAGNOSIS — I42.9 CARDIOMYOPATHY, UNSPECIFIED TYPE (HCC): Primary | ICD-10-CM

## 2018-08-21 DIAGNOSIS — I42.1 HYPERTROPHIC OBSTRUCTIVE CARDIOMYOPATHY (HCC): ICD-10-CM

## 2018-08-21 DIAGNOSIS — R55 SYNCOPE AND COLLAPSE: ICD-10-CM

## 2018-08-21 DIAGNOSIS — Z95.810 AICD (AUTOMATIC CARDIOVERTER/DEFIBRILLATOR) PRESENT: Primary | ICD-10-CM

## 2018-08-21 PROCEDURE — 93289 INTERROG DEVICE EVAL HEART: CPT | Performed by: INTERNAL MEDICINE

## 2018-08-21 PROCEDURE — 99213 OFFICE O/P EST LOW 20 MIN: CPT | Performed by: INTERNAL MEDICINE

## 2018-08-21 RX ORDER — ONDANSETRON HYDROCHLORIDE 8 MG/1
TABLET, FILM COATED ORAL EVERY 8 HOURS PRN
COMMUNITY
End: 2020-01-01

## 2018-10-11 ENCOUNTER — HOSPITAL ENCOUNTER (OUTPATIENT)
Dept: CARDIOLOGY | Facility: HOSPITAL | Age: 48
Discharge: HOME | End: 2018-10-11
Attending: INTERNAL MEDICINE | Admitting: INTERNAL MEDICINE

## 2018-10-11 VITALS
WEIGHT: 196 LBS | DIASTOLIC BLOOD PRESSURE: 64 MMHG | SYSTOLIC BLOOD PRESSURE: 121 MMHG | BODY MASS INDEX: 26.55 KG/M2 | HEIGHT: 72 IN

## 2018-10-11 LAB
AORTIC DIMENSIONLESS INDEX: 0.8 (DI)
BH CV ECHO MEAS - ACS: 2.1 CM
BH CV ECHO MEAS - AO MAX PG (FULL): 2.9 MMHG
BH CV ECHO MEAS - AO MAX PG: 7.2 MMHG
BH CV ECHO MEAS - AO MEAN PG (FULL): 1 MMHG
BH CV ECHO MEAS - AO MEAN PG: 3 MMHG
BH CV ECHO MEAS - AO ROOT AREA (BSA CORRECTED): 1.6
BH CV ECHO MEAS - AO ROOT AREA: 9.1 CM^2
BH CV ECHO MEAS - AO ROOT DIAM: 3.4 CM
BH CV ECHO MEAS - AO V2 MAX: 134 CM/SEC
BH CV ECHO MEAS - AO V2 MEAN: 81.2 CM/SEC
BH CV ECHO MEAS - AO V2 VTI: 29 CM
BH CV ECHO MEAS - AVA(I,A): 3.1 CM^2
BH CV ECHO MEAS - AVA(I,D): 3.1 CM^2
BH CV ECHO MEAS - AVA(V,A): 2.7 CM^2
BH CV ECHO MEAS - AVA(V,D): 2.7 CM^2
BH CV ECHO MEAS - BSA(HAYCOCK): 2.1 M^2
BH CV ECHO MEAS - BSA: 2.1 M^2
BH CV ECHO MEAS - BZI_BMI: 26.6 KILOGRAMS/M^2
BH CV ECHO MEAS - BZI_METRIC_HEIGHT: 182.9 CM
BH CV ECHO MEAS - BZI_METRIC_WEIGHT: 88.9 KG
BH CV ECHO MEAS - EDV(CUBED): 121.3 ML
BH CV ECHO MEAS - EDV(MOD-SP2): 126 ML
BH CV ECHO MEAS - EDV(MOD-SP4): 146 ML
BH CV ECHO MEAS - EDV(TEICH): 115.5 ML
BH CV ECHO MEAS - EF(CUBED): 78.2 %
BH CV ECHO MEAS - EF(MOD-BP): 66 %
BH CV ECHO MEAS - EF(MOD-SP2): 67.3 %
BH CV ECHO MEAS - EF(MOD-SP4): 65.1 %
BH CV ECHO MEAS - EF(TEICH): 70.2 %
BH CV ECHO MEAS - ESV(CUBED): 26.5 ML
BH CV ECHO MEAS - ESV(MOD-SP2): 41.2 ML
BH CV ECHO MEAS - ESV(MOD-SP4): 51 ML
BH CV ECHO MEAS - ESV(TEICH): 34.4 ML
BH CV ECHO MEAS - FS: 39.8 %
BH CV ECHO MEAS - IVS/LVPW: 1.4
BH CV ECHO MEAS - IVSD: 1.6 CM
BH CV ECHO MEAS - LA DIMENSION: 4.9 CM
BH CV ECHO MEAS - LA/AO: 1.4
BH CV ECHO MEAS - LAT PEAK E' VEL: 10 CM/SEC
BH CV ECHO MEAS - LV DIASTOLIC VOL/BSA (35-75): 69.1 ML/M^2
BH CV ECHO MEAS - LV MASS(C)D: 266.4 GRAMS
BH CV ECHO MEAS - LV MASS(C)DI: 126.1 GRAMS/M^2
BH CV ECHO MEAS - LV MAX PG: 4.2 MMHG
BH CV ECHO MEAS - LV MEAN PG: 2 MMHG
BH CV ECHO MEAS - LV SYSTOLIC VOL/BSA (12-30): 24.1 ML/M^2
BH CV ECHO MEAS - LV V1 MAX: 103 CM/SEC
BH CV ECHO MEAS - LV V1 MEAN: 68.3 CM/SEC
BH CV ECHO MEAS - LV V1 VTI: 25.9 CM
BH CV ECHO MEAS - LVIDD: 5 CM
BH CV ECHO MEAS - LVIDS: 3 CM
BH CV ECHO MEAS - LVLD AP2: 9.7 CM
BH CV ECHO MEAS - LVLD AP4: 10.4 CM
BH CV ECHO MEAS - LVLS AP2: 8.9 CM
BH CV ECHO MEAS - LVLS AP4: 9.5 CM
BH CV ECHO MEAS - LVOT AREA (M): 3.5 CM^2
BH CV ECHO MEAS - LVOT AREA: 3.5 CM^2
BH CV ECHO MEAS - LVOT DIAM: 2.1 CM
BH CV ECHO MEAS - LVPWD: 1.1 CM
BH CV ECHO MEAS - MED PEAK E' VEL: 6 CM/SEC
BH CV ECHO MEAS - MR MAX PG: 85.4 MMHG
BH CV ECHO MEAS - MR MAX VEL: 462 CM/SEC
BH CV ECHO MEAS - MV A DUR: 0.17 SEC
BH CV ECHO MEAS - MV A MAX VEL: 33 CM/SEC
BH CV ECHO MEAS - MV DEC SLOPE: 558 CM/SEC^2
BH CV ECHO MEAS - MV DEC TIME: 0.18 SEC
BH CV ECHO MEAS - MV E MAX VEL: 112 CM/SEC
BH CV ECHO MEAS - MV E/A: 3.4
BH CV ECHO MEAS - MV MAX PG: 6.2 MMHG
BH CV ECHO MEAS - MV MEAN PG: 1 MMHG
BH CV ECHO MEAS - MV P1/2T MAX VEL: 122 CM/SEC
BH CV ECHO MEAS - MV P1/2T: 64 MSEC
BH CV ECHO MEAS - MV V2 MAX: 124 CM/SEC
BH CV ECHO MEAS - MV V2 MEAN: 48.8 CM/SEC
BH CV ECHO MEAS - MV V2 VTI: 31.5 CM
BH CV ECHO MEAS - MVA P1/2T LCG: 1.8 CM^2
BH CV ECHO MEAS - MVA(P1/2T): 3.4 CM^2
BH CV ECHO MEAS - MVA(VTI): 2.8 CM^2
BH CV ECHO MEAS - PA ACC TIME: 0.11 SEC
BH CV ECHO MEAS - PA MAX PG (FULL): 2 MMHG
BH CV ECHO MEAS - PA MAX PG: 3.1 MMHG
BH CV ECHO MEAS - PA PR(ACCEL): 30 MMHG
BH CV ECHO MEAS - PA V2 MAX: 88.6 CM/SEC
BH CV ECHO MEAS - PULM A REVS DUR: 0.15 SEC
BH CV ECHO MEAS - PULM A REVS VEL: 24.4 CM/SEC
BH CV ECHO MEAS - PULM DIAS VEL: 78 CM/SEC
BH CV ECHO MEAS - PULM S/D: 0.46
BH CV ECHO MEAS - PULM SYS VEL: 35.5 CM/SEC
BH CV ECHO MEAS - PVA(V,A): 3.9 CM^2
BH CV ECHO MEAS - PVA(V,D): 3.9 CM^2
BH CV ECHO MEAS - QP/QS: 0.91
BH CV ECHO MEAS - RAP SYSTOLE: 8 MMHG
BH CV ECHO MEAS - RV MAX PG: 1.1 MMHG
BH CV ECHO MEAS - RV MEAN PG: 1 MMHG
BH CV ECHO MEAS - RV V1 MAX: 52.5 CM/SEC
BH CV ECHO MEAS - RV V1 MEAN: 35.4 CM/SEC
BH CV ECHO MEAS - RV V1 VTI: 12.3 CM
BH CV ECHO MEAS - RVOT AREA: 6.6 CM^2
BH CV ECHO MEAS - RVOT DIAM: 2.9 CM
BH CV ECHO MEAS - RVSP: 65 MMHG
BH CV ECHO MEAS - SI(AO): 124.7 ML/M^2
BH CV ECHO MEAS - SI(CUBED): 44.9 ML/M^2
BH CV ECHO MEAS - SI(LVOT): 42.5 ML/M^2
BH CV ECHO MEAS - SI(MOD-SP2): 40.1 ML/M^2
BH CV ECHO MEAS - SI(MOD-SP4): 45 ML/M^2
BH CV ECHO MEAS - SI(TEICH): 38.4 ML/M^2
BH CV ECHO MEAS - SV(AO): 263.3 ML
BH CV ECHO MEAS - SV(CUBED): 94.8 ML
BH CV ECHO MEAS - SV(LVOT): 89.7 ML
BH CV ECHO MEAS - SV(MOD-SP2): 84.8 ML
BH CV ECHO MEAS - SV(MOD-SP4): 95 ML
BH CV ECHO MEAS - SV(RVOT): 81.2 ML
BH CV ECHO MEAS - SV(TEICH): 81.1 ML
BH CV ECHO MEAS - TAPSE (>1.6): 2.1 CM2
BH CV ECHO MEAS - TR MAX VEL: 377 CM/SEC
BH CV ECHO MEASUREMENTS AVERAGE E/E' RATIO: 14
BH CV VAS BP LEFT ARM: NORMAL MMHG
BH CV XLRA - RV BASE: 4.2 CM
BH CV XLRA - TDI S': 13 CM/SEC
LEFT ATRIUM VOLUME INDEX: 51 ML/M2
LEFT ATRIUM VOLUME: 98 CM3
MAXIMAL PREDICTED HEART RATE: 171 BPM
STRESS TARGET HR: 145 BPM

## 2018-10-11 PROCEDURE — 93306 TTE W/DOPPLER COMPLETE: CPT

## 2018-10-11 PROCEDURE — 93306 TTE W/DOPPLER COMPLETE: CPT | Performed by: INTERNAL MEDICINE

## 2018-10-25 ENCOUNTER — CLINICAL SUPPORT NO REQUIREMENTS (OUTPATIENT)
Dept: CARDIOLOGY | Facility: CLINIC | Age: 48
End: 2018-10-25

## 2018-10-25 DIAGNOSIS — Z95.810 AICD (AUTOMATIC CARDIOVERTER/DEFIBRILLATOR) PRESENT: Primary | ICD-10-CM

## 2018-11-27 ENCOUNTER — CLINICAL SUPPORT NO REQUIREMENTS (OUTPATIENT)
Dept: CARDIOLOGY | Facility: CLINIC | Age: 48
End: 2018-11-27

## 2018-11-27 ENCOUNTER — OFFICE VISIT (OUTPATIENT)
Dept: CARDIOLOGY | Facility: CLINIC | Age: 48
End: 2018-11-27

## 2018-11-27 VITALS
HEART RATE: 67 BPM | DIASTOLIC BLOOD PRESSURE: 73 MMHG | HEIGHT: 72 IN | WEIGHT: 191 LBS | SYSTOLIC BLOOD PRESSURE: 154 MMHG | BODY MASS INDEX: 25.87 KG/M2

## 2018-11-27 DIAGNOSIS — Z95.810 AICD (AUTOMATIC CARDIOVERTER/DEFIBRILLATOR) PRESENT: Primary | ICD-10-CM

## 2018-11-27 DIAGNOSIS — R07.2 PRECORDIAL PAIN: ICD-10-CM

## 2018-11-27 DIAGNOSIS — I10 HYPERTENSION, UNSPECIFIED TYPE: Primary | ICD-10-CM

## 2018-11-27 DIAGNOSIS — R55 SYNCOPE, UNSPECIFIED SYNCOPE TYPE: ICD-10-CM

## 2018-11-27 DIAGNOSIS — I47.20 VT (VENTRICULAR TACHYCARDIA) (HCC): ICD-10-CM

## 2018-11-27 DIAGNOSIS — R53.83 OTHER FATIGUE: ICD-10-CM

## 2018-11-27 PROCEDURE — 99213 OFFICE O/P EST LOW 20 MIN: CPT | Performed by: INTERNAL MEDICINE

## 2018-11-27 PROCEDURE — 93289 INTERROG DEVICE EVAL HEART: CPT | Performed by: INTERNAL MEDICINE

## 2018-12-06 ENCOUNTER — HOSPITAL ENCOUNTER (OUTPATIENT)
Dept: NUCLEAR MEDICINE | Facility: HOSPITAL | Age: 48
Discharge: HOME | End: 2018-12-06
Attending: INTERNAL MEDICINE

## 2018-12-06 ENCOUNTER — HOSPITAL ENCOUNTER (OUTPATIENT)
Dept: CARDIOLOGY | Facility: HOSPITAL | Age: 48
Discharge: HOME | End: 2018-12-06
Attending: INTERNAL MEDICINE

## 2018-12-06 LAB
BH CV STRESS BP STAGE 1: NORMAL
BH CV STRESS COMMENTS STAGE 1: NORMAL
BH CV STRESS DOSE REGADENOSON STAGE 1: 0.4
BH CV STRESS DURATION MIN STAGE 1: 0
BH CV STRESS DURATION SEC STAGE 1: 30
BH CV STRESS HR STAGE 1: 69
BH CV STRESS O2 STAGE 1: 90
BH CV STRESS PROTOCOL 1: NORMAL
BH CV STRESS RECOVERY BP: NORMAL MMHG
BH CV STRESS RECOVERY HR: 73 BPM
BH CV STRESS RECOVERY O2: 92 %
BH CV STRESS STAGE 1: 1
LV EF NUC BP: 52 %
MAXIMAL PREDICTED HEART RATE: 171 BPM
PERCENT MAX PREDICTED HR: 59.06 %
STRESS BASELINE BP: NORMAL MMHG
STRESS BASELINE HR: 62 BPM
STRESS O2 SAT REST: 97 %
STRESS PERCENT HR: 69 %
STRESS POST ESTIMATED WORKLOAD: 1 METS
STRESS POST EXERCISE DUR SEC: 30 SEC
STRESS POST O2 SAT PEAK: 100 %
STRESS POST PEAK BP: NORMAL MMHG
STRESS POST PEAK HR: 101 BPM
STRESS TARGET HR: 145 BPM

## 2018-12-06 PROCEDURE — 93017 CV STRESS TEST TRACING ONLY: CPT

## 2018-12-06 PROCEDURE — A9500 TC99M SESTAMIBI: HCPCS | Performed by: INTERNAL MEDICINE

## 2018-12-06 PROCEDURE — 93016 CV STRESS TEST SUPVJ ONLY: CPT | Performed by: INTERNAL MEDICINE

## 2018-12-06 PROCEDURE — 78452 HT MUSCLE IMAGE SPECT MULT: CPT

## 2018-12-06 PROCEDURE — 25010000002 REGADENOSON 0.4 MG/5ML SOLUTION: Performed by: INTERNAL MEDICINE

## 2018-12-06 PROCEDURE — 0 TECHNETIUM SESTAMIBI: Performed by: INTERNAL MEDICINE

## 2018-12-06 PROCEDURE — 93018 CV STRESS TEST I&R ONLY: CPT | Performed by: INTERNAL MEDICINE

## 2018-12-06 PROCEDURE — 78452 HT MUSCLE IMAGE SPECT MULT: CPT | Performed by: INTERNAL MEDICINE

## 2018-12-06 RX ADMIN — REGADENOSON 0.4 MG: 0.08 INJECTION, SOLUTION INTRAVENOUS at 09:12

## 2018-12-06 RX ADMIN — TECHNETIUM TC 99M SESTAMIBI 1 DOSE: 1 INJECTION INTRAVENOUS at 09:12

## 2018-12-06 RX ADMIN — TECHNETIUM TC 99M SESTAMIBI 1 DOSE: 1 INJECTION INTRAVENOUS at 07:14

## 2019-03-26 ENCOUNTER — OFFICE VISIT (OUTPATIENT)
Dept: CARDIOLOGY | Facility: CLINIC | Age: 49
End: 2019-03-26

## 2019-03-26 ENCOUNTER — CLINICAL SUPPORT NO REQUIREMENTS (OUTPATIENT)
Dept: CARDIOLOGY | Facility: CLINIC | Age: 49
End: 2019-03-26

## 2019-03-26 VITALS
WEIGHT: 204 LBS | DIASTOLIC BLOOD PRESSURE: 80 MMHG | HEIGHT: 72 IN | HEART RATE: 60 BPM | SYSTOLIC BLOOD PRESSURE: 170 MMHG | BODY MASS INDEX: 27.63 KG/M2

## 2019-03-26 DIAGNOSIS — Z95.810 AICD (AUTOMATIC CARDIOVERTER/DEFIBRILLATOR) PRESENT: Primary | ICD-10-CM

## 2019-03-26 DIAGNOSIS — I10 HYPERTENSION, UNSPECIFIED TYPE: Primary | ICD-10-CM

## 2019-03-26 DIAGNOSIS — Z95.810 AICD (AUTOMATIC CARDIOVERTER/DEFIBRILLATOR) PRESENT: ICD-10-CM

## 2019-03-26 PROCEDURE — 99213 OFFICE O/P EST LOW 20 MIN: CPT | Performed by: INTERNAL MEDICINE

## 2019-03-26 PROCEDURE — 93289 INTERROG DEVICE EVAL HEART: CPT | Performed by: INTERNAL MEDICINE

## 2019-03-26 RX ORDER — CYCLOBENZAPRINE HCL 10 MG
10 TABLET ORAL 2 TIMES DAILY PRN
COMMUNITY

## 2019-03-26 NOTE — PROGRESS NOTES
" Subjective:        Sergio Phan is a 48 y.o. male who here for follow up    CC  The follow-up after the BP medication stress test and echocardiogram  HPI  Mid 40s male with AICD and hypertension with a history of sudden cardiac death here for the follow-up after blood pressure medications were changed and patient had stress test has been doing well shortness of breath on exertion no different than before     Problem List Items Addressed This Visit        Cardiovascular and Mediastinum    AICD (automatic cardioverter/defibrillator) present    Hypertension - Primary        .    The following portions of the patient's history were reviewed and updated as appropriate: allergies, current medications, past family history, past medical history, past social history, past surgical history and problem list.    Past Medical History:   Diagnosis Date   • Anxiety    • Asthma    • Chest pain    • COPD (chronic obstructive pulmonary disease) (CMS/HCC)    • Depression    • Diabetes mellitus (CMS/HCC)     TYPE II   • Fatigue    • Gout    • HOCM (hypertrophic obstructive cardiomyopathy) (CMS/[facility])    • Hypertension    • Hypertriglyceridemia    • Myocardial infarction (CMS/[facility])    • Pancreatitis    • Renal disorder    • Syncope      reports that he has been smoking cigarettes.  He has a 17.50 pack-year smoking history. He has never used smokeless tobacco. He reports that he does not drink alcohol or use drugs.   Family History   Problem Relation Age of Onset   • Heart block Mother    • Kidney disease Mother    • Diabetes Father    • Diabetes Maternal Grandmother    • Diabetes Maternal Grandfather    • COPD Maternal Grandfather    • Asthma Maternal Grandfather        Review of Systems  Constitutional: No wt loss, fever, fatigue  Gastrointestinal: No nausea, abdominal pain  Behavioral/Psych: No insomnia or anxiety   Cardiovascular shortness of breath  Objective:       Physical Exam  /80   Pulse 60   Ht 182.9 cm (72\")   Wt " 92.5 kg (204 lb)   BMI 27.67 kg/m²   General appearance: No acute changes   Neck: Trachea midline; NECK, supple, no thyromegaly or lymphadenopathy   Lungs: Normal size and shape, normal breath sounds, equal distribution of air, no rales and rhonchi   CV: S1-S2 regular, no murmurs, no rub, no gallop   Abdomen: Soft, non-tender; no masses , no abnormal abdominal sounds   Extremities: No deformity , normal color , no peripheral edema   Skin: Normal temperature, turgor and texture; no rash, ulcers          Procedures      Echocardiogram:        Current Outpatient Medications:   •  albuterol (PROVENTIL HFA;VENTOLIN HFA) 108 (90 BASE) MCG/ACT inhaler, Inhale 2 puffs every 4 (four) hours as needed for wheezing., Disp: , Rfl:   •  allopurinol (ZYLOPRIM) 300 MG tablet, Take 0.5 tablets by mouth Daily., Disp: 30 tablet, Rfl: 0  •  amLODIPine (NORVASC) 10 MG tablet, Take  by mouth daily., Disp: , Rfl:   •  bisoprolol (ZEBeta) 5 MG tablet, Take 5 mg by mouth Daily., Disp: , Rfl:   •  budesonide-formoterol (SYMBICORT) 80-4.5 MCG/ACT inhaler, Inhale 2 puffs 2 (Two) Times a Day., Disp: , Rfl: 12  •  clopidogrel (PLAVIX) 75 MG tablet, Take 1 tablet by mouth Daily., Disp: 30 tablet, Rfl: 0  •  cyclobenzaprine (FLEXERIL) 5 MG tablet, Take 5 mg by mouth 2 (Two) Times a Day As Needed for Muscle Spasms., Disp: , Rfl:   •  DULoxetine (CYMBALTA) 30 MG capsule, Take 60 mg by mouth Daily., Disp: , Rfl:   •  fluticasone (VERAMYST) 27.5 MCG/SPRAY nasal spray, 2 sprays into each nostril Daily., Disp: , Rfl:   •  furosemide (LASIX) 80 MG tablet, Take 1 tablet by mouth 2 (Two) Times a Day., Disp: 60 tablet, Rfl: 0  •  gabapentin (NEURONTIN) 800 MG tablet, Take 800 mg by mouth 3 (three) times a day., Disp: , Rfl:   •  LORazepam (ATIVAN) 0.5 MG tablet, Take 1 mg by mouth Every 6 (Six) Hours As Needed for Anxiety., Disp: , Rfl:   •  nitroglycerin (NITROSTAT) 0.4 MG SL tablet, 1 under the tongue as needed for angina, may repeat q5mins for up three  doses, Disp: 25 tablet, Rfl: 0  •  ondansetron (ZOFRAN) 8 MG tablet, Take  by mouth Every 8 (Eight) Hours As Needed for Nausea or Vomiting., Disp: , Rfl:   •  OXYCODONE HCL PO, Take 10 mg by mouth 4 (Four) Times a Day As Needed., Disp: , Rfl:   •  pantoprazole (PROTONIX) 40 MG EC tablet, Take 40 mg by mouth 2 (Two) Times a Day., Disp: , Rfl:   •  rosuvastatin (CRESTOR) 20 MG tablet, Take 40 mg by mouth Daily., Disp: , Rfl:   •  tiotropium (SPIRIVA) 18 MCG per inhalation capsule, Place 1 capsule into inhaler and inhale 1 (one) time daily., Disp: , Rfl:    Assessment:        Patient Active Problem List   Diagnosis   • AICD (automatic cardioverter/defibrillator) present   • Dizziness   • Fatigue   • Hypertension   • Hypertrophic obstructive cardiomyopathy (CMS/HCC)   • Hypertriglyceridemia   • Kidney disorder   • Type 2 diabetes mellitus (CMS/HCC)   • Syncope   • Vitamin D deficiency   • Weakness   • Idiopathic acute pancreatitis   • VT (ventricular tachycardia) (CMS/HCC)   • Acute pancreatitis   • Pancreatitis, recurrent (CMS/HCC)   • Hypertensive emergency   • Stage 5 chronic kidney disease on chronic dialysis (CMS/HCC)   • Severe diabetic hypoglycemia (CMS/HCC)   • Atherosclerosis of native arteries of extremity with rest pain (CMS/HCC)     Interpretation Summary     · Technically difficult study.  · Left ventricular ejection fraction is normal (Calculated EF = 52%). There is hypokinesis of the anterior apex.  · Myocardial perfusion imaging indicates a small-sized infarct located in the apex with no significant ischemia noted.        Interpretation Summary     · Left ventricular wall thickness is consistent with mild-to-moderate concentric hypertrophy.  · Left atrial cavity size is moderately dilated.  · Mild-to-moderate mitral valve regurgitation is present  · Moderate tricuspid valve regurgitation is present.  · Calculated EF = 66%.  · There is no evidence of pericardial effusion.               Plan:             ICD-10-CM ICD-9-CM   1. Hypertension, unspecified type I10 401.9   2. AICD (automatic cardioverter/defibrillator) present Z95.810 V45.02     1. Hypertension, unspecified type  Blood pressure controlled    2. AICD (automatic cardioverter/defibrillator) present  AICD functioning normal       BP BETTER AT HOME    Specificity and sensitivity of the stress test/ cardiac workup has been explained. Pt has been explained if  Symptoms continue please go to ER, and further w/p will be required.    Also explained this does not rule out coronary artery disease or the future events, continue to emphasize on risk reductions for coronary artery disease    Pt also advised to contact PCP for other causes of symptoms    SEE IN 1 YR  COUNSELING:    Sergio Clifton was given to patient for the following topics: diagnostic results, risk factor reductions, impressions, risks and benefits of treatment options and importance of treatment compliance .       SMOKING COUNSELING:    Ready to quit: No  Counseling given: Yes      Dictated using Dragon dictation

## 2019-07-23 ENCOUNTER — CLINICAL SUPPORT NO REQUIREMENTS (OUTPATIENT)
Dept: CARDIOLOGY | Facility: CLINIC | Age: 49
End: 2019-07-23

## 2019-07-23 DIAGNOSIS — Z95.810 AICD (AUTOMATIC CARDIOVERTER/DEFIBRILLATOR) PRESENT: Primary | ICD-10-CM

## 2019-07-23 PROCEDURE — 93289 INTERROG DEVICE EVAL HEART: CPT | Performed by: INTERNAL MEDICINE

## 2019-08-10 VITALS
WEIGHT: 198 LBS | DIASTOLIC BLOOD PRESSURE: 74 MMHG | HEIGHT: 72 IN | SYSTOLIC BLOOD PRESSURE: 109 MMHG | HEART RATE: 62 BPM

## 2019-11-20 ENCOUNTER — OFFICE (OUTPATIENT)
Dept: URBAN - METROPOLITAN AREA CLINIC 75 | Facility: CLINIC | Age: 49
End: 2019-11-20
Payer: MEDICARE

## 2019-11-20 DIAGNOSIS — R11.2 NAUSEA WITH VOMITING, UNSPECIFIED: ICD-10-CM

## 2019-11-20 DIAGNOSIS — K86.1 OTHER CHRONIC PANCREATITIS: ICD-10-CM

## 2019-11-20 PROCEDURE — 99213 OFFICE O/P EST LOW 20 MIN: CPT

## 2019-11-20 RX ORDER — PROMETHAZINE HYDROCHLORIDE 25 MG/1
100 TABLET ORAL
Qty: 60 | Refills: 5 | Status: ACTIVE
Start: 2019-11-20

## 2019-11-20 RX ORDER — ERYTHROMYCIN 250 MG/1
750 TABLET, FILM COATED ORAL
Qty: 90 | Refills: 3 | Status: ACTIVE
Start: 2019-11-20

## 2020-01-01 ENCOUNTER — OFFICE VISIT (OUTPATIENT)
Dept: WOUND CARE | Facility: HOSPITAL | Age: 50
End: 2020-01-01

## 2020-01-01 ENCOUNTER — APPOINTMENT (OUTPATIENT)
Dept: WOUND CARE | Facility: HOSPITAL | Age: 50
End: 2020-01-01

## 2020-01-01 ENCOUNTER — APPOINTMENT (OUTPATIENT)
Dept: CARDIOLOGY | Facility: HOSPITAL | Age: 50
End: 2020-01-01

## 2020-01-01 ENCOUNTER — APPOINTMENT (OUTPATIENT)
Dept: CT IMAGING | Facility: HOSPITAL | Age: 50
End: 2020-01-01

## 2020-01-01 ENCOUNTER — HOSPITAL ENCOUNTER (INPATIENT)
Facility: HOSPITAL | Age: 50
LOS: 4 days | Discharge: HOME | End: 2020-02-02
Attending: EMERGENCY MEDICINE | Admitting: HOSPITALIST

## 2020-01-01 ENCOUNTER — LAB REQUISITION (OUTPATIENT)
Dept: LAB | Facility: HOSPITAL | Age: 50
End: 2020-01-01

## 2020-01-01 ENCOUNTER — TRANSCRIBE ORDERS (OUTPATIENT)
Dept: ADMINISTRATIVE | Facility: HOSPITAL | Age: 50
End: 2020-01-01

## 2020-01-01 ENCOUNTER — READMISSION MANAGEMENT (OUTPATIENT)
Dept: CALL CENTER | Facility: HOSPITAL | Age: 50
End: 2020-01-01

## 2020-01-01 ENCOUNTER — LAB (OUTPATIENT)
Dept: LAB | Facility: HOSPITAL | Age: 50
End: 2020-01-01

## 2020-01-01 ENCOUNTER — HOSPITAL ENCOUNTER (INPATIENT)
Facility: HOSPITAL | Age: 50
LOS: 4 days | Discharge: HOSPICE | End: 2020-08-27
Attending: EMERGENCY MEDICINE | Admitting: INTERNAL MEDICINE

## 2020-01-01 ENCOUNTER — HOSPITAL ENCOUNTER (INPATIENT)
Facility: HOSPITAL | Age: 50
LOS: 5 days | Discharge: HOME HEALTH | End: 2020-03-18
Attending: EMERGENCY MEDICINE | Admitting: HOSPITALIST

## 2020-01-01 ENCOUNTER — HOSPITAL ENCOUNTER (INPATIENT)
Facility: HOSPITAL | Age: 50
LOS: 2 days | End: 2020-08-29
Attending: INTERNAL MEDICINE | Admitting: INTERNAL MEDICINE

## 2020-01-01 ENCOUNTER — TELEPHONE (OUTPATIENT)
Dept: CARDIOLOGY | Facility: CLINIC | Age: 50
End: 2020-01-01

## 2020-01-01 ENCOUNTER — HOSPITAL ENCOUNTER (OUTPATIENT)
Dept: GENERAL RADIOLOGY | Facility: HOSPITAL | Age: 50
Discharge: HOME | End: 2020-02-26
Admitting: NURSE PRACTITIONER

## 2020-01-01 ENCOUNTER — APPOINTMENT (OUTPATIENT)
Dept: GENERAL RADIOLOGY | Facility: HOSPITAL | Age: 50
End: 2020-01-01

## 2020-01-01 ENCOUNTER — OFFICE VISIT (OUTPATIENT)
Dept: CARDIOLOGY | Facility: CLINIC | Age: 50
End: 2020-01-01

## 2020-01-01 ENCOUNTER — HOSPITAL ENCOUNTER (INPATIENT)
Facility: HOSPITAL | Age: 50
LOS: 4 days | Discharge: HOME HEALTH | End: 2020-07-04
Attending: EMERGENCY MEDICINE | Admitting: INTERNAL MEDICINE

## 2020-01-01 ENCOUNTER — CLINICAL SUPPORT NO REQUIREMENTS (OUTPATIENT)
Dept: CARDIOLOGY | Facility: CLINIC | Age: 50
End: 2020-01-01

## 2020-01-01 ENCOUNTER — HOSPITAL ENCOUNTER (OUTPATIENT)
Dept: GENERAL RADIOLOGY | Facility: HOSPITAL | Age: 50
Discharge: HOME | End: 2020-03-12
Admitting: NURSE PRACTITIONER

## 2020-01-01 ENCOUNTER — TELEMEDICINE (OUTPATIENT)
Dept: CARDIOLOGY | Facility: CLINIC | Age: 50
End: 2020-01-01

## 2020-01-01 ENCOUNTER — HOSPITAL ENCOUNTER (EMERGENCY)
Facility: HOSPITAL | Age: 50
End: 2020-01-01

## 2020-01-01 VITALS
BODY MASS INDEX: 24.38 KG/M2 | DIASTOLIC BLOOD PRESSURE: 86 MMHG | HEIGHT: 72 IN | WEIGHT: 180 LBS | SYSTOLIC BLOOD PRESSURE: 144 MMHG

## 2020-01-01 VITALS
OXYGEN SATURATION: 100 % | RESPIRATION RATE: 16 BRPM | DIASTOLIC BLOOD PRESSURE: 60 MMHG | HEIGHT: 71 IN | WEIGHT: 196.43 LBS | HEART RATE: 60 BPM | SYSTOLIC BLOOD PRESSURE: 104 MMHG | TEMPERATURE: 98.3 F | BODY MASS INDEX: 27.5 KG/M2

## 2020-01-01 VITALS
WEIGHT: 199 LBS | SYSTOLIC BLOOD PRESSURE: 154 MMHG | DIASTOLIC BLOOD PRESSURE: 82 MMHG | BODY MASS INDEX: 26.95 KG/M2 | HEART RATE: 132 BPM | HEIGHT: 72 IN

## 2020-01-01 VITALS
DIASTOLIC BLOOD PRESSURE: 69 MMHG | BODY MASS INDEX: 25.19 KG/M2 | SYSTOLIC BLOOD PRESSURE: 161 MMHG | HEIGHT: 72 IN | HEART RATE: 66 BPM | TEMPERATURE: 99.8 F | WEIGHT: 186 LBS

## 2020-01-01 VITALS
RESPIRATION RATE: 16 BRPM | WEIGHT: 195.77 LBS | HEART RATE: 76 BPM | TEMPERATURE: 96.8 F | HEIGHT: 72 IN | SYSTOLIC BLOOD PRESSURE: 177 MMHG | DIASTOLIC BLOOD PRESSURE: 82 MMHG | OXYGEN SATURATION: 90 % | BODY MASS INDEX: 26.52 KG/M2

## 2020-01-01 VITALS
HEART RATE: 65 BPM | TEMPERATURE: 99 F | OXYGEN SATURATION: 97 % | SYSTOLIC BLOOD PRESSURE: 108 MMHG | BODY MASS INDEX: 27.63 KG/M2 | DIASTOLIC BLOOD PRESSURE: 65 MMHG | HEIGHT: 72 IN | RESPIRATION RATE: 18 BRPM | WEIGHT: 204 LBS

## 2020-01-01 VITALS
RESPIRATION RATE: 16 BRPM | DIASTOLIC BLOOD PRESSURE: 81 MMHG | TEMPERATURE: 98.6 F | OXYGEN SATURATION: 97 % | HEART RATE: 81 BPM | SYSTOLIC BLOOD PRESSURE: 157 MMHG | WEIGHT: 187.83 LBS | BODY MASS INDEX: 25.44 KG/M2 | HEIGHT: 72 IN

## 2020-01-01 VITALS — TEMPERATURE: 99.5 F | OXYGEN SATURATION: 83 % | DIASTOLIC BLOOD PRESSURE: 99 MMHG | SYSTOLIC BLOOD PRESSURE: 147 MMHG

## 2020-01-01 DIAGNOSIS — Z95.810 AICD (AUTOMATIC CARDIOVERTER/DEFIBRILLATOR) PRESENT: Primary | ICD-10-CM

## 2020-01-01 DIAGNOSIS — M86.9 DIABETIC FOOT ULCER WITH OSTEOMYELITIS (HCC): ICD-10-CM

## 2020-01-01 DIAGNOSIS — Z95.810 AICD (AUTOMATIC CARDIOVERTER/DEFIBRILLATOR) PRESENT: ICD-10-CM

## 2020-01-01 DIAGNOSIS — L08.9 DIABETIC FOOT INFECTION (HCC): Primary | ICD-10-CM

## 2020-01-01 DIAGNOSIS — E08.42 DIABETIC POLYNEUROPATHY ASSOCIATED WITH DIABETES MELLITUS DUE TO UNDERLYING CONDITION (HCC): ICD-10-CM

## 2020-01-01 DIAGNOSIS — L97.519: Primary | ICD-10-CM

## 2020-01-01 DIAGNOSIS — E11.621 DIABETIC FOOT ULCER WITH OSTEOMYELITIS (HCC): Primary | ICD-10-CM

## 2020-01-01 DIAGNOSIS — I70.332: Primary | ICD-10-CM

## 2020-01-01 DIAGNOSIS — R07.2 PRECORDIAL PAIN: ICD-10-CM

## 2020-01-01 DIAGNOSIS — R09.2 RESPIRATORY ARREST (HCC): ICD-10-CM

## 2020-01-01 DIAGNOSIS — L97.509 DIABETIC FOOT ULCER WITH OSTEOMYELITIS (HCC): Primary | ICD-10-CM

## 2020-01-01 DIAGNOSIS — L97.509 DIABETIC FOOT ULCER WITH OSTEOMYELITIS (HCC): ICD-10-CM

## 2020-01-01 DIAGNOSIS — R00.1 BRADYCARDIA: ICD-10-CM

## 2020-01-01 DIAGNOSIS — N18.6 ESRD (END STAGE RENAL DISEASE) ON DIALYSIS (HCC): ICD-10-CM

## 2020-01-01 DIAGNOSIS — I10 ESSENTIAL HYPERTENSION: ICD-10-CM

## 2020-01-01 DIAGNOSIS — E11.621 DIABETIC FOOT ULCER WITH OSTEOMYELITIS (HCC): ICD-10-CM

## 2020-01-01 DIAGNOSIS — L97.509 TYPE 2 DIABETES MELLITUS WITH FOOT ULCER, UNSPECIFIED WHETHER LONG TERM INSULIN USE (HCC): ICD-10-CM

## 2020-01-01 DIAGNOSIS — E11.42 TYPE 2 DIABETES MELLITUS WITH DIABETIC POLYNEUROPATHY (HCC): ICD-10-CM

## 2020-01-01 DIAGNOSIS — Z99.2 ESRD (END STAGE RENAL DISEASE) ON DIALYSIS (HCC): Chronic | ICD-10-CM

## 2020-01-01 DIAGNOSIS — M86.9 DIABETIC FOOT ULCER WITH OSTEOMYELITIS (HCC): Primary | ICD-10-CM

## 2020-01-01 DIAGNOSIS — N18.6 ESRD (END STAGE RENAL DISEASE) ON DIALYSIS (HCC): Chronic | ICD-10-CM

## 2020-01-01 DIAGNOSIS — E87.20 METABOLIC ACIDOSIS: ICD-10-CM

## 2020-01-01 DIAGNOSIS — Z99.2 ESRD ON DIALYSIS (HCC): ICD-10-CM

## 2020-01-01 DIAGNOSIS — Z00.00 ENCOUNTER FOR GENERAL ADULT MEDICAL EXAMINATION WITHOUT ABNORMAL FINDINGS: ICD-10-CM

## 2020-01-01 DIAGNOSIS — I73.9 PAD (PERIPHERAL ARTERY DISEASE) (HCC): ICD-10-CM

## 2020-01-01 DIAGNOSIS — R55 SYNCOPE, UNSPECIFIED SYNCOPE TYPE: ICD-10-CM

## 2020-01-01 DIAGNOSIS — E11.69 DIABETIC FOOT ULCER WITH OSTEOMYELITIS (HCC): Primary | ICD-10-CM

## 2020-01-01 DIAGNOSIS — G93.40 ACUTE ENCEPHALOPATHY: Primary | ICD-10-CM

## 2020-01-01 DIAGNOSIS — Z91.199 NONCOMPLIANCE: ICD-10-CM

## 2020-01-01 DIAGNOSIS — E11.628 DIABETIC FOOT INFECTION (HCC): Primary | ICD-10-CM

## 2020-01-01 DIAGNOSIS — I70.342: Primary | ICD-10-CM

## 2020-01-01 DIAGNOSIS — G93.40 ENCEPHALOPATHY ACUTE: ICD-10-CM

## 2020-01-01 DIAGNOSIS — E11.69 DIABETIC FOOT ULCER WITH OSTEOMYELITIS (HCC): ICD-10-CM

## 2020-01-01 DIAGNOSIS — E78.1 HYPERTRIGLYCERIDEMIA: ICD-10-CM

## 2020-01-01 DIAGNOSIS — Z99.2 ESRD (END STAGE RENAL DISEASE) ON DIALYSIS (HCC): ICD-10-CM

## 2020-01-01 DIAGNOSIS — I87.331 CHRONIC VENOUS HYPERTENSION (IDIOPATHIC) WITH ULCER AND INFLAMMATION OF RIGHT LOWER EXTREMITY (CODE): ICD-10-CM

## 2020-01-01 DIAGNOSIS — F17.200 SMOKER: ICD-10-CM

## 2020-01-01 DIAGNOSIS — E87.5 HYPERKALEMIA: ICD-10-CM

## 2020-01-01 DIAGNOSIS — I42.1 HYPERTROPHIC OBSTRUCTIVE CARDIOMYOPATHY (HCC): Chronic | ICD-10-CM

## 2020-01-01 DIAGNOSIS — I48.91 ATRIAL FIBRILLATION, UNSPECIFIED TYPE (HCC): ICD-10-CM

## 2020-01-01 DIAGNOSIS — I46.9 CARDIAC ARREST (HCC): ICD-10-CM

## 2020-01-01 DIAGNOSIS — I42.1 HYPERTROPHIC OBSTRUCTIVE CARDIOMYOPATHY (HCC): Primary | Chronic | ICD-10-CM

## 2020-01-01 DIAGNOSIS — I47.20 VT (VENTRICULAR TACHYCARDIA) (HCC): Primary | ICD-10-CM

## 2020-01-01 DIAGNOSIS — I13.2 HYPERTENSIVE HEART AND CHRONIC KIDNEY DISEASE WITH HEART FAILURE AND WITH STAGE 5 CHRONIC KIDNEY DISEASE, OR END STAGE RENAL DISEASE (HCC): ICD-10-CM

## 2020-01-01 DIAGNOSIS — L97.519 NON-PRESSURE CHRONIC ULCER OF OTHER PART OF RIGHT FOOT WITH UNSPECIFIED SEVERITY (HCC): ICD-10-CM

## 2020-01-01 DIAGNOSIS — N18.6 ESRD ON DIALYSIS (HCC): ICD-10-CM

## 2020-01-01 DIAGNOSIS — L03.116 CELLULITIS OF LEFT LOWER LIMB: ICD-10-CM

## 2020-01-01 DIAGNOSIS — I48.0 PAROXYSMAL ATRIAL FIBRILLATION (HCC): ICD-10-CM

## 2020-01-01 DIAGNOSIS — L03.116 CELLULITIS OF BOTH LOWER EXTREMITIES: Primary | ICD-10-CM

## 2020-01-01 DIAGNOSIS — L03.115 CELLULITIS OF BOTH LOWER EXTREMITIES: Primary | ICD-10-CM

## 2020-01-01 DIAGNOSIS — N18.9 CHRONIC RENAL FAILURE, UNSPECIFIED CKD STAGE: ICD-10-CM

## 2020-01-01 DIAGNOSIS — E11.621 TYPE 2 DIABETES MELLITUS WITH FOOT ULCER, UNSPECIFIED WHETHER LONG TERM INSULIN USE (HCC): ICD-10-CM

## 2020-01-01 DIAGNOSIS — I49.01 VENTRICULAR FIBRILLATION (HCC): ICD-10-CM

## 2020-01-01 DIAGNOSIS — L97.519: ICD-10-CM

## 2020-01-01 DIAGNOSIS — E87.5 HYPERKALEMIA: Primary | ICD-10-CM

## 2020-01-01 LAB
ALBUMIN SERPL-MCNC: 2.9 G/DL (ref 3.5–5.2)
ALBUMIN SERPL-MCNC: 3 G/DL (ref 3.5–5.2)
ALBUMIN SERPL-MCNC: 3.2 G/DL (ref 3.5–5.2)
ALBUMIN SERPL-MCNC: 3.3 G/DL (ref 3.5–5.2)
ALBUMIN SERPL-MCNC: 3.5 G/DL (ref 3.5–5.2)
ALBUMIN SERPL-MCNC: 3.6 G/DL (ref 3.5–5.2)
ALBUMIN SERPL-MCNC: 3.7 G/DL (ref 3.5–5.2)
ALBUMIN SERPL-MCNC: 3.8 G/DL (ref 3.5–5.2)
ALBUMIN SERPL-MCNC: 3.8 G/DL (ref 3.5–5.2)
ALBUMIN SERPL-MCNC: 3.9 G/DL (ref 3.5–5.2)
ALBUMIN SERPL-MCNC: 4 G/DL (ref 3.5–5.2)
ALBUMIN SERPL-MCNC: 4.1 G/DL (ref 3.5–5.2)
ALBUMIN SERPL-MCNC: 4.5 G/DL (ref 3.5–5.2)
ALBUMIN/GLOB SERPL: 1 G/DL
ALBUMIN/GLOB SERPL: 1.3 G/DL
ALBUMIN/GLOB SERPL: 1.4 G/DL
ALBUMIN/GLOB SERPL: 1.6 G/DL
ALBUMIN/GLOB SERPL: 1.7 G/DL
ALP SERPL-CCNC: 136 U/L (ref 39–117)
ALP SERPL-CCNC: 142 U/L (ref 39–117)
ALP SERPL-CCNC: 163 U/L (ref 39–117)
ALP SERPL-CCNC: 163 U/L (ref 39–117)
ALP SERPL-CCNC: 165 U/L (ref 39–117)
ALP SERPL-CCNC: 172 U/L (ref 39–117)
ALP SERPL-CCNC: 207 U/L (ref 39–117)
ALT SERPL W P-5'-P-CCNC: 13 U/L (ref 1–41)
ALT SERPL W P-5'-P-CCNC: 14 U/L (ref 1–41)
ALT SERPL W P-5'-P-CCNC: 15 U/L (ref 1–41)
ALT SERPL W P-5'-P-CCNC: 18 U/L (ref 1–41)
ALT SERPL W P-5'-P-CCNC: 20 U/L (ref 1–41)
ALT SERPL W P-5'-P-CCNC: 58 U/L (ref 1–41)
ALT SERPL W P-5'-P-CCNC: 9 U/L (ref 1–41)
ANION GAP SERPL CALCULATED.3IONS-SCNC: 15.8 MMOL/L (ref 5–15)
ANION GAP SERPL CALCULATED.3IONS-SCNC: 16.8 MMOL/L (ref 5–15)
ANION GAP SERPL CALCULATED.3IONS-SCNC: 16.8 MMOL/L (ref 5–15)
ANION GAP SERPL CALCULATED.3IONS-SCNC: 17 MMOL/L (ref 5–15)
ANION GAP SERPL CALCULATED.3IONS-SCNC: 17.3 MMOL/L (ref 5–15)
ANION GAP SERPL CALCULATED.3IONS-SCNC: 17.7 MMOL/L (ref 5–15)
ANION GAP SERPL CALCULATED.3IONS-SCNC: 18.1 MMOL/L (ref 5–15)
ANION GAP SERPL CALCULATED.3IONS-SCNC: 18.3 MMOL/L (ref 5–15)
ANION GAP SERPL CALCULATED.3IONS-SCNC: 18.5 MMOL/L (ref 5–15)
ANION GAP SERPL CALCULATED.3IONS-SCNC: 19.6 MMOL/L (ref 5–15)
ANION GAP SERPL CALCULATED.3IONS-SCNC: 19.8 MMOL/L (ref 5–15)
ANION GAP SERPL CALCULATED.3IONS-SCNC: 19.8 MMOL/L (ref 5–15)
ANION GAP SERPL CALCULATED.3IONS-SCNC: 20.1 MMOL/L (ref 5–15)
ANION GAP SERPL CALCULATED.3IONS-SCNC: 20.4 MMOL/L (ref 5–15)
ANION GAP SERPL CALCULATED.3IONS-SCNC: 21 MMOL/L (ref 5–15)
ANION GAP SERPL CALCULATED.3IONS-SCNC: 21.1 MMOL/L (ref 5–15)
ANION GAP SERPL CALCULATED.3IONS-SCNC: 21.3 MMOL/L (ref 5–15)
ANION GAP SERPL CALCULATED.3IONS-SCNC: 21.6 MMOL/L (ref 5–15)
ANION GAP SERPL CALCULATED.3IONS-SCNC: 22.5 MMOL/L (ref 5–15)
ANION GAP SERPL CALCULATED.3IONS-SCNC: 23 MMOL/L (ref 5–15)
ANION GAP SERPL CALCULATED.3IONS-SCNC: 30 MMOL/L (ref 5–15)
ANION GAP SERPL CALCULATED.3IONS-SCNC: 33.7 MMOL/L (ref 5–15)
ANION GAP SERPL CALCULATED.3IONS-SCNC: 33.7 MMOL/L (ref 5–15)
ANISOCYTOSIS BLD QL: ABNORMAL
ANISOCYTOSIS BLD QL: ABNORMAL
APTT PPP: 103.8 SECONDS (ref 22.7–35.4)
APTT PPP: 43.6 SECONDS (ref 22.7–35.4)
APTT PPP: 44.7 SECONDS (ref 22.7–35.4)
APTT PPP: 45.6 SECONDS (ref 22.7–35.4)
APTT PPP: 53.4 SECONDS (ref 22.7–35.4)
APTT PPP: 55.3 SECONDS (ref 22.7–35.4)
APTT PPP: 72.5 SECONDS (ref 22.7–35.4)
APTT PPP: 76.7 SECONDS (ref 22.7–35.4)
APTT PPP: 85.8 SECONDS (ref 22.7–35.4)
APTT PPP: 95.1 SECONDS (ref 22.7–35.4)
ARTERIAL PATENCY WRIST A: ABNORMAL
ARTERIAL PATENCY WRIST A: POSITIVE
ARTERIAL PATENCY WRIST A: POSITIVE
AST SERPL-CCNC: 11 U/L (ref 1–40)
AST SERPL-CCNC: 12 U/L (ref 1–40)
AST SERPL-CCNC: 14 U/L (ref 1–40)
AST SERPL-CCNC: 30 U/L (ref 1–40)
AST SERPL-CCNC: 6 U/L (ref 1–40)
AST SERPL-CCNC: 9 U/L (ref 1–40)
AST SERPL-CCNC: 9 U/L (ref 1–40)
ATMOSPHERIC PRESS: 747 MMHG
ATMOSPHERIC PRESS: 752 MMHG
ATMOSPHERIC PRESS: 754.6 MMHG
B PARAPERT DNA SPEC QL NAA+PROBE: NOT DETECTED
B PERT DNA SPEC QL NAA+PROBE: NOT DETECTED
BACTERIA SPEC AEROBE CULT: ABNORMAL
BACTERIA SPEC AEROBE CULT: NORMAL
BACTERIA SPEC ANAEROBE CULT: ABNORMAL
BACTERIA SPEC ANAEROBE CULT: NORMAL
BASE EXCESS BLDA CALC-SCNC: -0.5 MMOL/L (ref 0–2)
BASE EXCESS BLDA CALC-SCNC: -1.8 MMOL/L (ref 0–2)
BASE EXCESS BLDA CALC-SCNC: -13.2 MMOL/L (ref 0–2)
BASOPHILS # BLD AUTO: 0.01 10*3/MM3 (ref 0–0.2)
BASOPHILS # BLD AUTO: 0.02 10*3/MM3 (ref 0–0.2)
BASOPHILS # BLD AUTO: 0.03 10*3/MM3 (ref 0–0.2)
BASOPHILS # BLD AUTO: 0.04 10*3/MM3 (ref 0–0.2)
BASOPHILS # BLD MANUAL: 0.04 10*3/MM3 (ref 0–0.2)
BASOPHILS NFR BLD AUTO: 0.1 % (ref 0–1.5)
BASOPHILS NFR BLD AUTO: 0.4 % (ref 0–1.5)
BASOPHILS NFR BLD AUTO: 0.5 % (ref 0–1.5)
BASOPHILS NFR BLD AUTO: 0.6 % (ref 0–1.5)
BASOPHILS NFR BLD AUTO: 0.7 % (ref 0–1.5)
BASOPHILS NFR BLD AUTO: 0.8 % (ref 0–1.5)
BASOPHILS NFR BLD AUTO: 0.9 % (ref 0–1.5)
BASOPHILS NFR BLD AUTO: 1 % (ref 0–1.5)
BDY SITE: ABNORMAL
BH CV ECHO MEAS - ACS: 2.4 CM
BH CV ECHO MEAS - AO MAX PG (FULL): 2.2 MMHG
BH CV ECHO MEAS - AO MAX PG: 5.3 MMHG
BH CV ECHO MEAS - AO MEAN PG (FULL): 0 MMHG
BH CV ECHO MEAS - AO MEAN PG: 2 MMHG
BH CV ECHO MEAS - AO ROOT AREA (BSA CORRECTED): 1.6
BH CV ECHO MEAS - AO ROOT AREA: 9.1 CM^2
BH CV ECHO MEAS - AO ROOT DIAM: 3.4 CM
BH CV ECHO MEAS - AO V2 MAX: 115 CM/SEC
BH CV ECHO MEAS - AO V2 MEAN: 71.6 CM/SEC
BH CV ECHO MEAS - AO V2 VTI: 24.9 CM
BH CV ECHO MEAS - ASC AORTA: 3.2 CM
BH CV ECHO MEAS - AVA(I,A): 2.2 CM^2
BH CV ECHO MEAS - AVA(I,D): 2.2 CM^2
BH CV ECHO MEAS - AVA(V,A): 2.4 CM^2
BH CV ECHO MEAS - AVA(V,D): 2.4 CM^2
BH CV ECHO MEAS - BSA(HAYCOCK): 2.2 M^2
BH CV ECHO MEAS - BSA: 2.2 M^2
BH CV ECHO MEAS - BZI_BMI: 28.6 KILOGRAMS/M^2
BH CV ECHO MEAS - BZI_METRIC_HEIGHT: 182.9 CM
BH CV ECHO MEAS - BZI_METRIC_WEIGHT: 95.7 KG
BH CV ECHO MEAS - EDV(CUBED): 50.7 ML
BH CV ECHO MEAS - EDV(MOD-SP2): 108 ML
BH CV ECHO MEAS - EDV(MOD-SP4): 117 ML
BH CV ECHO MEAS - EDV(TEICH): 58.1 ML
BH CV ECHO MEAS - EF(CUBED): 65.3 %
BH CV ECHO MEAS - EF(MOD-BP): 54 %
BH CV ECHO MEAS - EF(MOD-SP2): 49.1 %
BH CV ECHO MEAS - EF(MOD-SP4): 53.8 %
BH CV ECHO MEAS - EF(TEICH): 57.7 %
BH CV ECHO MEAS - ESV(CUBED): 17.6 ML
BH CV ECHO MEAS - ESV(MOD-SP2): 55 ML
BH CV ECHO MEAS - ESV(MOD-SP4): 54 ML
BH CV ECHO MEAS - ESV(TEICH): 24.6 ML
BH CV ECHO MEAS - FS: 29.7 %
BH CV ECHO MEAS - IVS/LVPW: 1.1
BH CV ECHO MEAS - IVSD: 1.9 CM
BH CV ECHO MEAS - LAT PEAK E' VEL: 7 CM/SEC
BH CV ECHO MEAS - LV DIASTOLIC VOL/BSA (35-75): 53.7 ML/M^2
BH CV ECHO MEAS - LV MASS(C)D: 295.6 GRAMS
BH CV ECHO MEAS - LV MASS(C)DI: 135.6 GRAMS/M^2
BH CV ECHO MEAS - LV MAX PG: 3.1 MMHG
BH CV ECHO MEAS - LV MEAN PG: 2 MMHG
BH CV ECHO MEAS - LV SYSTOLIC VOL/BSA (12-30): 24.8 ML/M^2
BH CV ECHO MEAS - LV V1 MAX: 88.6 CM/SEC
BH CV ECHO MEAS - LV V1 MEAN: 56 CM/SEC
BH CV ECHO MEAS - LV V1 VTI: 17.6 CM
BH CV ECHO MEAS - LVIDD: 3.7 CM
BH CV ECHO MEAS - LVIDS: 2.6 CM
BH CV ECHO MEAS - LVLD AP2: 8.1 CM
BH CV ECHO MEAS - LVLD AP4: 9.1 CM
BH CV ECHO MEAS - LVLS AP2: 7.5 CM
BH CV ECHO MEAS - LVLS AP4: 7.4 CM
BH CV ECHO MEAS - LVOT AREA (M): 3.1 CM^2
BH CV ECHO MEAS - LVOT AREA: 3.1 CM^2
BH CV ECHO MEAS - LVOT DIAM: 2 CM
BH CV ECHO MEAS - LVPWD: 1.8 CM
BH CV ECHO MEAS - MED PEAK E' VEL: 4 CM/SEC
BH CV ECHO MEAS - MR MAX PG: 60.5 MMHG
BH CV ECHO MEAS - MR MAX VEL: 389 CM/SEC
BH CV ECHO MEAS - MV A DUR: 0.1 SEC
BH CV ECHO MEAS - MV A MAX VEL: 41.6 CM/SEC
BH CV ECHO MEAS - MV DEC SLOPE: 486 CM/SEC^2
BH CV ECHO MEAS - MV DEC TIME: 199 SEC
BH CV ECHO MEAS - MV E MAX VEL: 132 CM/SEC
BH CV ECHO MEAS - MV E/A: 3.2
BH CV ECHO MEAS - MV MAX PG: 7.2 MMHG
BH CV ECHO MEAS - MV MEAN PG: 2 MMHG
BH CV ECHO MEAS - MV P1/2T MAX VEL: 124 CM/SEC
BH CV ECHO MEAS - MV P1/2T: 74.7 MSEC
BH CV ECHO MEAS - MV V2 MAX: 134 CM/SEC
BH CV ECHO MEAS - MV V2 MEAN: 56.7 CM/SEC
BH CV ECHO MEAS - MV V2 VTI: 24.8 CM
BH CV ECHO MEAS - MVA P1/2T LCG: 1.8 CM^2
BH CV ECHO MEAS - MVA(P1/2T): 2.9 CM^2
BH CV ECHO MEAS - MVA(VTI): 2.2 CM^2
BH CV ECHO MEAS - PA ACC TIME: 0.13 SEC
BH CV ECHO MEAS - PA MAX PG (FULL): 32.9 MMHG
BH CV ECHO MEAS - PA MAX PG: 34.3 MMHG
BH CV ECHO MEAS - PA PR(ACCEL): 18.7 MMHG
BH CV ECHO MEAS - PA V2 MAX: 293 CM/SEC
BH CV ECHO MEAS - PVA(V,A): 2.1 CM^2
BH CV ECHO MEAS - PVA(V,D): 2.1 CM^2
BH CV ECHO MEAS - QP/QS: 2.8
BH CV ECHO MEAS - RAP SYSTOLE: 15 MMHG
BH CV ECHO MEAS - RV MAX PG: 1.4 MMHG
BH CV ECHO MEAS - RV MEAN PG: 1 MMHG
BH CV ECHO MEAS - RV V1 MAX: 59.5 CM/SEC
BH CV ECHO MEAS - RV V1 MEAN: 38 CM/SEC
BH CV ECHO MEAS - RV V1 VTI: 15.4 CM
BH CV ECHO MEAS - RVOT AREA: 10.2 CM^2
BH CV ECHO MEAS - RVOT DIAM: 3.6 CM
BH CV ECHO MEAS - RVSP: 45.9 MMHG
BH CV ECHO MEAS - SI(AO): 103.7 ML/M^2
BH CV ECHO MEAS - SI(CUBED): 15.2 ML/M^2
BH CV ECHO MEAS - SI(LVOT): 25.4 ML/M^2
BH CV ECHO MEAS - SI(MOD-SP2): 24.3 ML/M^2
BH CV ECHO MEAS - SI(MOD-SP4): 28.9 ML/M^2
BH CV ECHO MEAS - SI(TEICH): 15.4 ML/M^2
BH CV ECHO MEAS - SV(AO): 226.1 ML
BH CV ECHO MEAS - SV(CUBED): 33.1 ML
BH CV ECHO MEAS - SV(LVOT): 55.3 ML
BH CV ECHO MEAS - SV(MOD-SP2): 53 ML
BH CV ECHO MEAS - SV(MOD-SP4): 63 ML
BH CV ECHO MEAS - SV(RVOT): 156.8 ML
BH CV ECHO MEAS - SV(TEICH): 33.5 ML
BH CV ECHO MEAS - TAPSE (>1.6): 1.6 CM
BH CV ECHO MEAS - TR MAX VEL: 278 CM/SEC
BH CV ECHO MEASUREMENTS AVERAGE E/E' RATIO: 24
BH CV GRAFT BRACHIAL PRESSURE LEFT: 11 MMHG
BH CV LEA LEFT ANT TIBIAL A DISTAL EDV: 5 CM/S
BH CV LEA LEFT ANT TIBIAL A DISTAL PSV: 12.4 CM/S
BH CV LEA LEFT CFA PROX EDV: 37.3 CM/S
BH CV LEA LEFT CFA PROX PSV: 186 CM/S
BH CV LEA LEFT DFA PROX EDV: 13 CM/S
BH CV LEA LEFT DFA PROX PSV: 123 CM/S
BH CV LEA LEFT DPA PRESSURE: 82 MMHG
BH CV LEA LEFT EXT ILIAC EDV: 37.3 CM/S
BH CV LEA LEFT EXT ILIAC PSV: 172 CM/S
BH CV LEA LEFT PERONEAL  PROX EDV: 4.3 CM/S
BH CV LEA LEFT PERONEAL  PROX PSV: 20 CM/S
BH CV LEA LEFT POPITEAL A  DISTAL EDV: 46 CM/S
BH CV LEA LEFT POPITEAL A  DISTAL PSV: 106 CM/S
BH CV LEA LEFT POPITEAL A  MID EDV: 26 CM/S
BH CV LEA LEFT POPITEAL A  MID PSV: 62.4 CM/S
BH CV LEA LEFT POPITEAL A  PROX EDV: 27 CM/S
BH CV LEA LEFT POPITEAL A  PROX PSV: 70 CM/S
BH CV LEA LEFT PTA DISTAL EDV: 15 CM/S
BH CV LEA LEFT PTA DISTAL PSV: 29 CM/S
BH CV LEA LEFT PTA PRESSURE: 74 MMHG
BH CV LEA LEFT PTA PROX EDV: 10.4 CM/S
BH CV LEA LEFT PTA PROX PSV: 33.2 CM/S
BH CV LEA LEFT SFA DISTAL EDV: 34.2 CM/S
BH CV LEA LEFT SFA DISTAL PSV: 87 CM/S
BH CV LEA LEFT SFA MID EDV: 26 CM/S
BH CV LEA LEFT SFA MID PSV: 142 CM/S
BH CV LEA LEFT SFA PROX EDV: 34 CM/S
BH CV LEA LEFT SFA PROX PSV: 189 CM/S
BH CV LEA LEFT TIBEOPERONEAL EDV: 30 CM/S
BH CV LEA LEFT TIBEOPERONEAL PSV: 76 CM/S
BH CV LEA RIGHT DPA PRESSURE: 61 MMHG
BH CV LEA RIGHT PTA PRESSURE: 51 MMHG
BH CV LOWER ARTERIAL LEFT ABI RATIO: 0.74
BH CV LOWER ARTERIAL LEFT ABI RATIO: 0.74
BH CV LOWER ARTERIAL LEFT DORSALIS PEDIS SYS MAX: 82 MMHG
BH CV LOWER ARTERIAL LEFT GREAT TOE SYS MAX: 61 MMHG
BH CV LOWER ARTERIAL LEFT POST TIBIAL SYS MAX: 74 MMHG
BH CV LOWER ARTERIAL LEFT TBI RATIO: 0.55
BH CV LOWER ARTERIAL RIGHT ABI RATIO: 0.55
BH CV LOWER ARTERIAL RIGHT ABI RATIO: 0.55
BH CV LOWER ARTERIAL RIGHT DORSALIS PEDIS SYS MAX: 61 MMHG
BH CV LOWER ARTERIAL RIGHT GREAT TOE SYS MAX: 57 MMHG
BH CV LOWER ARTERIAL RIGHT POST TIBIAL SYS MAX: 51 MMHG
BH CV LOWER ARTERIAL RIGHT TBI RATIO: 0.51
BH CV VAS BP RIGHT ARM: NORMAL MMHG
BH CV VAS SMA 1ST PP TIME: 15 MIN
BH CV VAS SMA 2ND PP TIME: 30 MIN
BH CV VAS SMA 3RD PP TIME: 45 MIN
BH CV VAS SMA AORTA PSV: 91.3 CM/S
BH CV VAS SMA CELIAC DIST EDV: 40 CM/S
BH CV VAS SMA CELIAC DIST PSV: 113 CM/S
BH CV VAS SMA CELIAC ORIGIN EDV: 36 CM/S
BH CV VAS SMA CELIAC ORIGIN PSV: 113 CM/S
BH CV VAS SMA CELIAC PROX EDV: 35.2 CM/S
BH CV VAS SMA CELIAC PROX PSV: 136 CM/S
BH CV VAS SMA HEPATIC EDV: 59 CM/S
BH CV VAS SMA HEPATIC PSV: 177 CM/S
BH CV VAS SMA ORIGIN EDV: 0 CM/S
BH CV VAS SMA ORIGIN PSV: 106 CM/S
BH CV VAS SMA SMA MID EDV: 0 CM/S
BH CV VAS SMA SMA MID PSV: 216 CM/S
BH CV VAS SMA SMA PROX EDV: 0 CM/S
BH CV VAS SMA SMA PROX PSV: 211 CM/S
BH CV VAS SMA SPLENIC EDV: 30.2 CM/S
BH CV VAS SMA SPLENIC PSV: 143 CM/S
BH CV XLRA - RV BASE: 4 CM
BH CV XLRA - RV LENGTH: 9.2 CM
BH CV XLRA - RV MID: 6.1 CM
BH CV XLRA - TDI S': 8 CM/SEC
BILIRUB SERPL-MCNC: 0.4 MG/DL (ref 0.2–1.2)
BILIRUB SERPL-MCNC: 0.6 MG/DL (ref 0–1.2)
BILIRUB SERPL-MCNC: 0.7 MG/DL (ref 0.2–1.2)
BILIRUB SERPL-MCNC: 0.8 MG/DL (ref 0.2–1.2)
BILIRUB SERPL-MCNC: 1 MG/DL (ref 0.2–1.2)
BILIRUB SERPL-MCNC: 1.3 MG/DL (ref 0.2–1.2)
BILIRUB SERPL-MCNC: 1.4 MG/DL (ref 0–1.2)
BUN BLD-MCNC: 27 MG/DL (ref 6–20)
BUN BLD-MCNC: 27 MG/DL (ref 6–20)
BUN BLD-MCNC: 31 MG/DL (ref 6–20)
BUN BLD-MCNC: 33 MG/DL (ref 6–20)
BUN BLD-MCNC: 38 MG/DL (ref 6–20)
BUN BLD-MCNC: 41 MG/DL (ref 6–20)
BUN BLD-MCNC: 51 MG/DL (ref 6–20)
BUN BLD-MCNC: 52 MG/DL (ref 6–20)
BUN BLD-MCNC: 53 MG/DL (ref 6–20)
BUN BLD-MCNC: 57 MG/DL (ref 6–20)
BUN BLD-MCNC: 58 MG/DL (ref 6–20)
BUN BLD-MCNC: 61 MG/DL (ref 6–20)
BUN BLD-MCNC: 63 MG/DL (ref 6–20)
BUN SERPL-MCNC: 154 MG/DL (ref 6–20)
BUN SERPL-MCNC: 154 MG/DL (ref 6–20)
BUN SERPL-MCNC: 174 MG/DL (ref 6–20)
BUN SERPL-MCNC: 30 MG/DL (ref 6–20)
BUN SERPL-MCNC: 37 MG/DL (ref 6–20)
BUN SERPL-MCNC: 41 MG/DL (ref 6–20)
BUN SERPL-MCNC: 48 MG/DL (ref 6–20)
BUN SERPL-MCNC: 57 MG/DL (ref 6–20)
BUN SERPL-MCNC: 83 MG/DL (ref 6–20)
BUN SERPL-MCNC: 93 MG/DL (ref 6–20)
BUN/CREAT SERPL: 11.4 (ref 7–25)
BUN/CREAT SERPL: 5 (ref 7–25)
BUN/CREAT SERPL: 5.1 (ref 7–25)
BUN/CREAT SERPL: 5.2 (ref 7–25)
BUN/CREAT SERPL: 6.3 (ref 7–25)
BUN/CREAT SERPL: 6.7 (ref 7–25)
BUN/CREAT SERPL: 6.9 (ref 7–25)
BUN/CREAT SERPL: 7.1 (ref 7–25)
BUN/CREAT SERPL: 7.2 (ref 7–25)
BUN/CREAT SERPL: 7.3 (ref 7–25)
BUN/CREAT SERPL: 7.3 (ref 7–25)
BUN/CREAT SERPL: 7.4 (ref 7–25)
BUN/CREAT SERPL: 7.4 (ref 7–25)
BUN/CREAT SERPL: 7.7 (ref 7–25)
BUN/CREAT SERPL: 7.7 (ref 7–25)
BUN/CREAT SERPL: 8.2 (ref 7–25)
BUN/CREAT SERPL: 8.3 (ref 7–25)
BUN/CREAT SERPL: 8.6 (ref 7–25)
BUN/CREAT SERPL: 8.9 (ref 7–25)
BUN/CREAT SERPL: 9.3 (ref 7–25)
C PNEUM DNA NPH QL NAA+NON-PROBE: NOT DETECTED
CALCIUM SPEC-SCNC: 7.9 MG/DL (ref 8.6–10.5)
CALCIUM SPEC-SCNC: 8 MG/DL (ref 8.6–10.5)
CALCIUM SPEC-SCNC: 8 MG/DL (ref 8.6–10.5)
CALCIUM SPEC-SCNC: 8.1 MG/DL (ref 8.6–10.5)
CALCIUM SPEC-SCNC: 8.2 MG/DL (ref 8.6–10.5)
CALCIUM SPEC-SCNC: 8.3 MG/DL (ref 8.6–10.5)
CALCIUM SPEC-SCNC: 8.3 MG/DL (ref 8.6–10.5)
CALCIUM SPEC-SCNC: 8.4 MG/DL (ref 8.6–10.5)
CALCIUM SPEC-SCNC: 8.5 MG/DL (ref 8.6–10.5)
CALCIUM SPEC-SCNC: 8.6 MG/DL (ref 8.6–10.5)
CALCIUM SPEC-SCNC: 8.7 MG/DL (ref 8.6–10.5)
CALCIUM SPEC-SCNC: 8.8 MG/DL (ref 8.6–10.5)
CALCIUM SPEC-SCNC: 9.2 MG/DL (ref 8.6–10.5)
CHLORIDE SERPL-SCNC: 100 MMOL/L (ref 98–107)
CHLORIDE SERPL-SCNC: 84 MMOL/L (ref 98–107)
CHLORIDE SERPL-SCNC: 87 MMOL/L (ref 98–107)
CHLORIDE SERPL-SCNC: 91 MMOL/L (ref 98–107)
CHLORIDE SERPL-SCNC: 92 MMOL/L (ref 98–107)
CHLORIDE SERPL-SCNC: 93 MMOL/L (ref 98–107)
CHLORIDE SERPL-SCNC: 94 MMOL/L (ref 98–107)
CHLORIDE SERPL-SCNC: 96 MMOL/L (ref 98–107)
CHLORIDE SERPL-SCNC: 97 MMOL/L (ref 98–107)
CHLORIDE SERPL-SCNC: 98 MMOL/L (ref 98–107)
CHLORIDE SERPL-SCNC: 99 MMOL/L (ref 98–107)
CO2 SERPL-SCNC: 12.3 MMOL/L (ref 22–29)
CO2 SERPL-SCNC: 12.3 MMOL/L (ref 22–29)
CO2 SERPL-SCNC: 19 MMOL/L (ref 22–29)
CO2 SERPL-SCNC: 21.6 MMOL/L (ref 22–29)
CO2 SERPL-SCNC: 21.7 MMOL/L (ref 22–29)
CO2 SERPL-SCNC: 21.9 MMOL/L (ref 22–29)
CO2 SERPL-SCNC: 21.9 MMOL/L (ref 22–29)
CO2 SERPL-SCNC: 22 MMOL/L (ref 22–29)
CO2 SERPL-SCNC: 22.2 MMOL/L (ref 22–29)
CO2 SERPL-SCNC: 22.4 MMOL/L (ref 22–29)
CO2 SERPL-SCNC: 23 MMOL/L (ref 22–29)
CO2 SERPL-SCNC: 23.5 MMOL/L (ref 22–29)
CO2 SERPL-SCNC: 23.9 MMOL/L (ref 22–29)
CO2 SERPL-SCNC: 24.2 MMOL/L (ref 22–29)
CO2 SERPL-SCNC: 24.3 MMOL/L (ref 22–29)
CO2 SERPL-SCNC: 24.7 MMOL/L (ref 22–29)
CO2 SERPL-SCNC: 25 MMOL/L (ref 22–29)
CO2 SERPL-SCNC: 25.2 MMOL/L (ref 22–29)
CO2 SERPL-SCNC: 26.2 MMOL/L (ref 22–29)
CO2 SERPL-SCNC: 26.2 MMOL/L (ref 22–29)
CO2 SERPL-SCNC: 26.5 MMOL/L (ref 22–29)
CO2 SERPL-SCNC: 27.4 MMOL/L (ref 22–29)
CO2 SERPL-SCNC: 27.7 MMOL/L (ref 22–29)
CREAT BLD-MCNC: 3.78 MG/DL (ref 0.76–1.27)
CREAT BLD-MCNC: 4.94 MG/DL (ref 0.76–1.27)
CREAT BLD-MCNC: 5.17 MG/DL (ref 0.76–1.27)
CREAT BLD-MCNC: 5.66 MG/DL (ref 0.76–1.27)
CREAT BLD-MCNC: 5.69 MG/DL (ref 0.76–1.27)
CREAT BLD-MCNC: 6.14 MG/DL (ref 0.76–1.27)
CREAT BLD-MCNC: 6.39 MG/DL (ref 0.76–1.27)
CREAT BLD-MCNC: 6.63 MG/DL (ref 0.76–1.27)
CREAT BLD-MCNC: 7.11 MG/DL (ref 0.76–1.27)
CREAT BLD-MCNC: 7.12 MG/DL (ref 0.76–1.27)
CREAT BLD-MCNC: 7.15 MG/DL (ref 0.76–1.27)
CREAT BLD-MCNC: 7.53 MG/DL (ref 0.76–1.27)
CREAT BLD-MCNC: 7.64 MG/DL (ref 0.76–1.27)
CREAT SERPL-MCNC: 15.2 MG/DL (ref 0.76–1.27)
CREAT SERPL-MCNC: 16.62 MG/DL (ref 0.76–1.27)
CREAT SERPL-MCNC: 16.62 MG/DL (ref 0.76–1.27)
CREAT SERPL-MCNC: 5.09 MG/DL (ref 0.76–1.27)
CREAT SERPL-MCNC: 5.55 MG/DL (ref 0.76–1.27)
CREAT SERPL-MCNC: 5.84 MG/DL (ref 0.76–1.27)
CREAT SERPL-MCNC: 6.94 MG/DL (ref 0.76–1.27)
CREAT SERPL-MCNC: 7.37 MG/DL (ref 0.76–1.27)
CREAT SERPL-MCNC: 8.94 MG/DL (ref 0.76–1.27)
CREAT SERPL-MCNC: 9.98 MG/DL (ref 0.76–1.27)
CRP SERPL-MCNC: 15.04 MG/DL (ref 0–0.5)
CRP SERPL-MCNC: 15.65 MG/DL (ref 0–0.5)
CRP SERPL-MCNC: 28.66 MG/DL (ref 0–0.5)
CRP SERPL-MCNC: 6.31 MG/DL (ref 0–0.5)
CRP SERPL-MCNC: 7.19 MG/DL (ref 0–0.5)
D-LACTATE SERPL-SCNC: 1.3 MMOL/L (ref 0.5–2)
DEPRECATED RDW RBC AUTO: 46.9 FL (ref 37–54)
DEPRECATED RDW RBC AUTO: 47 FL (ref 37–54)
DEPRECATED RDW RBC AUTO: 47.2 FL (ref 37–54)
DEPRECATED RDW RBC AUTO: 48.2 FL (ref 37–54)
DEPRECATED RDW RBC AUTO: 48.9 FL (ref 37–54)
DEPRECATED RDW RBC AUTO: 49 FL (ref 37–54)
DEPRECATED RDW RBC AUTO: 50.3 FL (ref 37–54)
DEPRECATED RDW RBC AUTO: 50.4 FL (ref 37–54)
DEPRECATED RDW RBC AUTO: 50.7 FL (ref 37–54)
DEPRECATED RDW RBC AUTO: 51 FL (ref 37–54)
DEPRECATED RDW RBC AUTO: 51.1 FL (ref 37–54)
DEPRECATED RDW RBC AUTO: 51.1 FL (ref 37–54)
DEPRECATED RDW RBC AUTO: 51.9 FL (ref 37–54)
DEPRECATED RDW RBC AUTO: 52.2 FL (ref 37–54)
DEPRECATED RDW RBC AUTO: 52.5 FL (ref 37–54)
DEPRECATED RDW RBC AUTO: 52.6 FL (ref 37–54)
DEPRECATED RDW RBC AUTO: 54.5 FL (ref 37–54)
DEPRECATED RDW RBC AUTO: 55.5 FL (ref 37–54)
DEPRECATED RDW RBC AUTO: 57.1 FL (ref 37–54)
DEPRECATED RDW RBC AUTO: 57.6 FL (ref 37–54)
EOSINOPHIL # BLD AUTO: 0.01 10*3/MM3 (ref 0–0.4)
EOSINOPHIL # BLD AUTO: 0.05 10*3/MM3 (ref 0–0.4)
EOSINOPHIL # BLD AUTO: 0.06 10*3/MM3 (ref 0–0.4)
EOSINOPHIL # BLD AUTO: 0.07 10*3/MM3 (ref 0–0.4)
EOSINOPHIL # BLD AUTO: 0.08 10*3/MM3 (ref 0–0.4)
EOSINOPHIL # BLD AUTO: 0.08 10*3/MM3 (ref 0–0.4)
EOSINOPHIL # BLD AUTO: 0.09 10*3/MM3 (ref 0–0.4)
EOSINOPHIL # BLD AUTO: 0.1 10*3/MM3 (ref 0–0.4)
EOSINOPHIL # BLD AUTO: 0.13 10*3/MM3 (ref 0–0.4)
EOSINOPHIL # BLD AUTO: 0.13 10*3/MM3 (ref 0–0.4)
EOSINOPHIL # BLD AUTO: 0.15 10*3/MM3 (ref 0–0.4)
EOSINOPHIL # BLD MANUAL: 0.12 10*3/MM3 (ref 0–0.4)
EOSINOPHIL NFR BLD AUTO: 0.1 % (ref 0.3–6.2)
EOSINOPHIL NFR BLD AUTO: 0.8 % (ref 0.3–6.2)
EOSINOPHIL NFR BLD AUTO: 1.2 % (ref 0.3–6.2)
EOSINOPHIL NFR BLD AUTO: 1.2 % (ref 0.3–6.2)
EOSINOPHIL NFR BLD AUTO: 1.4 % (ref 0.3–6.2)
EOSINOPHIL NFR BLD AUTO: 1.5 % (ref 0.3–6.2)
EOSINOPHIL NFR BLD AUTO: 1.8 % (ref 0.3–6.2)
EOSINOPHIL NFR BLD AUTO: 2.1 % (ref 0.3–6.2)
EOSINOPHIL NFR BLD AUTO: 2.2 % (ref 0.3–6.2)
EOSINOPHIL NFR BLD AUTO: 2.3 % (ref 0.3–6.2)
EOSINOPHIL NFR BLD AUTO: 2.3 % (ref 0.3–6.2)
EOSINOPHIL NFR BLD AUTO: 2.4 % (ref 0.3–6.2)
EOSINOPHIL NFR BLD AUTO: 2.5 % (ref 0.3–6.2)
EOSINOPHIL NFR BLD AUTO: 2.6 % (ref 0.3–6.2)
EOSINOPHIL NFR BLD AUTO: 3 % (ref 0.3–6.2)
EOSINOPHIL NFR BLD MANUAL: 2 % (ref 0.3–6.2)
ERYTHROCYTE [DISTWIDTH] IN BLOOD BY AUTOMATED COUNT: 14.4 % (ref 12.3–15.4)
ERYTHROCYTE [DISTWIDTH] IN BLOOD BY AUTOMATED COUNT: 14.5 % (ref 12.3–15.4)
ERYTHROCYTE [DISTWIDTH] IN BLOOD BY AUTOMATED COUNT: 14.5 % (ref 12.3–15.4)
ERYTHROCYTE [DISTWIDTH] IN BLOOD BY AUTOMATED COUNT: 15.6 % (ref 12.3–15.4)
ERYTHROCYTE [DISTWIDTH] IN BLOOD BY AUTOMATED COUNT: 15.6 % (ref 12.3–15.4)
ERYTHROCYTE [DISTWIDTH] IN BLOOD BY AUTOMATED COUNT: 15.9 % (ref 12.3–15.4)
ERYTHROCYTE [DISTWIDTH] IN BLOOD BY AUTOMATED COUNT: 16 % (ref 12.3–15.4)
ERYTHROCYTE [DISTWIDTH] IN BLOOD BY AUTOMATED COUNT: 16.1 % (ref 12.3–15.4)
ERYTHROCYTE [DISTWIDTH] IN BLOOD BY AUTOMATED COUNT: 16.1 % (ref 12.3–15.4)
ERYTHROCYTE [DISTWIDTH] IN BLOOD BY AUTOMATED COUNT: 16.2 % (ref 12.3–15.4)
ERYTHROCYTE [DISTWIDTH] IN BLOOD BY AUTOMATED COUNT: 16.3 % (ref 12.3–15.4)
ERYTHROCYTE [DISTWIDTH] IN BLOOD BY AUTOMATED COUNT: 16.5 % (ref 12.3–15.4)
ERYTHROCYTE [DISTWIDTH] IN BLOOD BY AUTOMATED COUNT: 16.6 % (ref 12.3–15.4)
ERYTHROCYTE [SEDIMENTATION RATE] IN BLOOD: 19 MM/HR (ref 0–15)
ERYTHROCYTE [SEDIMENTATION RATE] IN BLOOD: 62 MM/HR (ref 0–15)
ERYTHROCYTE [SEDIMENTATION RATE] IN BLOOD: 63 MM/HR (ref 0–15)
ERYTHROCYTE [SEDIMENTATION RATE] IN BLOOD: 63 MM/HR (ref 0–15)
ERYTHROCYTE [SEDIMENTATION RATE] IN BLOOD: 77 MM/HR (ref 0–15)
FERRITIN SERPL-MCNC: 2031 NG/ML (ref 30–400)
FLUAV H1 2009 PAND RNA NPH QL NAA+PROBE: NOT DETECTED
FLUAV H1 HA GENE NPH QL NAA+PROBE: NOT DETECTED
FLUAV H3 RNA NPH QL NAA+PROBE: NOT DETECTED
FLUAV SUBTYP SPEC NAA+PROBE: NOT DETECTED
FLUBV RNA ISLT QL NAA+PROBE: NOT DETECTED
GAS FLOW AIRWAY: 5 LPM
GFR SERPL CREATININE-BSD FRML MDRD: 10 ML/MIN/1.73
GFR SERPL CREATININE-BSD FRML MDRD: 10 ML/MIN/1.73
GFR SERPL CREATININE-BSD FRML MDRD: 11 ML/MIN/1.73
GFR SERPL CREATININE-BSD FRML MDRD: 12 ML/MIN/1.73
GFR SERPL CREATININE-BSD FRML MDRD: 12 ML/MIN/1.73
GFR SERPL CREATININE-BSD FRML MDRD: 13 ML/MIN/1.73
GFR SERPL CREATININE-BSD FRML MDRD: 17 ML/MIN/1.73
GFR SERPL CREATININE-BSD FRML MDRD: 3 ML/MIN/1.73
GFR SERPL CREATININE-BSD FRML MDRD: 6 ML/MIN/1.73
GFR SERPL CREATININE-BSD FRML MDRD: 6 ML/MIN/1.73
GFR SERPL CREATININE-BSD FRML MDRD: 8 ML/MIN/1.73
GFR SERPL CREATININE-BSD FRML MDRD: 9 ML/MIN/1.73
GFR SERPL CREATININE-BSD FRML MDRD: 9 ML/MIN/1.73
GFR SERPL CREATININE-BSD FRML MDRD: ABNORMAL ML/MIN/{1.73_M2}
GLOBULIN UR ELPH-MCNC: 2.3 GM/DL
GLOBULIN UR ELPH-MCNC: 2.3 GM/DL
GLOBULIN UR ELPH-MCNC: 2.9 GM/DL
GLOBULIN UR ELPH-MCNC: 3 GM/DL
GLOBULIN UR ELPH-MCNC: 3.1 GM/DL
GLUCOSE BLD-MCNC: 102 MG/DL (ref 65–99)
GLUCOSE BLD-MCNC: 103 MG/DL (ref 65–99)
GLUCOSE BLD-MCNC: 104 MG/DL (ref 65–99)
GLUCOSE BLD-MCNC: 108 MG/DL (ref 65–99)
GLUCOSE BLD-MCNC: 116 MG/DL (ref 65–99)
GLUCOSE BLD-MCNC: 120 MG/DL (ref 65–99)
GLUCOSE BLD-MCNC: 122 MG/DL (ref 65–99)
GLUCOSE BLD-MCNC: 129 MG/DL (ref 65–99)
GLUCOSE BLD-MCNC: 138 MG/DL (ref 65–99)
GLUCOSE BLD-MCNC: 155 MG/DL (ref 65–99)
GLUCOSE BLD-MCNC: 174 MG/DL (ref 65–99)
GLUCOSE BLD-MCNC: 192 MG/DL (ref 65–99)
GLUCOSE BLD-MCNC: 98 MG/DL (ref 65–99)
GLUCOSE BLDC GLUCOMTR-MCNC: 101 MG/DL (ref 70–130)
GLUCOSE BLDC GLUCOMTR-MCNC: 102 MG/DL (ref 70–130)
GLUCOSE BLDC GLUCOMTR-MCNC: 103 MG/DL (ref 70–130)
GLUCOSE BLDC GLUCOMTR-MCNC: 104 MG/DL (ref 70–130)
GLUCOSE BLDC GLUCOMTR-MCNC: 105 MG/DL (ref 70–130)
GLUCOSE BLDC GLUCOMTR-MCNC: 105 MG/DL (ref 70–130)
GLUCOSE BLDC GLUCOMTR-MCNC: 106 MG/DL (ref 70–130)
GLUCOSE BLDC GLUCOMTR-MCNC: 107 MG/DL (ref 70–130)
GLUCOSE BLDC GLUCOMTR-MCNC: 111 MG/DL (ref 70–130)
GLUCOSE BLDC GLUCOMTR-MCNC: 112 MG/DL (ref 70–130)
GLUCOSE BLDC GLUCOMTR-MCNC: 113 MG/DL (ref 70–130)
GLUCOSE BLDC GLUCOMTR-MCNC: 113 MG/DL (ref 70–130)
GLUCOSE BLDC GLUCOMTR-MCNC: 114 MG/DL (ref 70–130)
GLUCOSE BLDC GLUCOMTR-MCNC: 116 MG/DL (ref 70–130)
GLUCOSE BLDC GLUCOMTR-MCNC: 116 MG/DL (ref 70–130)
GLUCOSE BLDC GLUCOMTR-MCNC: 117 MG/DL (ref 70–130)
GLUCOSE BLDC GLUCOMTR-MCNC: 118 MG/DL (ref 70–130)
GLUCOSE BLDC GLUCOMTR-MCNC: 119 MG/DL (ref 70–130)
GLUCOSE BLDC GLUCOMTR-MCNC: 119 MG/DL (ref 70–130)
GLUCOSE BLDC GLUCOMTR-MCNC: 120 MG/DL (ref 70–130)
GLUCOSE BLDC GLUCOMTR-MCNC: 125 MG/DL (ref 70–130)
GLUCOSE BLDC GLUCOMTR-MCNC: 125 MG/DL (ref 70–130)
GLUCOSE BLDC GLUCOMTR-MCNC: 128 MG/DL (ref 70–130)
GLUCOSE BLDC GLUCOMTR-MCNC: 129 MG/DL (ref 70–130)
GLUCOSE BLDC GLUCOMTR-MCNC: 130 MG/DL (ref 70–130)
GLUCOSE BLDC GLUCOMTR-MCNC: 132 MG/DL (ref 70–130)
GLUCOSE BLDC GLUCOMTR-MCNC: 133 MG/DL (ref 70–130)
GLUCOSE BLDC GLUCOMTR-MCNC: 135 MG/DL (ref 70–130)
GLUCOSE BLDC GLUCOMTR-MCNC: 136 MG/DL (ref 70–130)
GLUCOSE BLDC GLUCOMTR-MCNC: 136 MG/DL (ref 70–130)
GLUCOSE BLDC GLUCOMTR-MCNC: 138 MG/DL (ref 70–130)
GLUCOSE BLDC GLUCOMTR-MCNC: 141 MG/DL (ref 70–130)
GLUCOSE BLDC GLUCOMTR-MCNC: 146 MG/DL (ref 70–130)
GLUCOSE BLDC GLUCOMTR-MCNC: 151 MG/DL (ref 70–130)
GLUCOSE BLDC GLUCOMTR-MCNC: 152 MG/DL (ref 70–130)
GLUCOSE BLDC GLUCOMTR-MCNC: 153 MG/DL (ref 70–130)
GLUCOSE BLDC GLUCOMTR-MCNC: 155 MG/DL (ref 70–130)
GLUCOSE BLDC GLUCOMTR-MCNC: 159 MG/DL (ref 70–130)
GLUCOSE BLDC GLUCOMTR-MCNC: 167 MG/DL (ref 70–130)
GLUCOSE BLDC GLUCOMTR-MCNC: 173 MG/DL (ref 70–130)
GLUCOSE BLDC GLUCOMTR-MCNC: 175 MG/DL (ref 70–130)
GLUCOSE BLDC GLUCOMTR-MCNC: 177 MG/DL (ref 70–130)
GLUCOSE BLDC GLUCOMTR-MCNC: 179 MG/DL (ref 70–130)
GLUCOSE BLDC GLUCOMTR-MCNC: 181 MG/DL (ref 70–130)
GLUCOSE BLDC GLUCOMTR-MCNC: 184 MG/DL (ref 70–130)
GLUCOSE BLDC GLUCOMTR-MCNC: 193 MG/DL (ref 70–130)
GLUCOSE BLDC GLUCOMTR-MCNC: 194 MG/DL (ref 70–130)
GLUCOSE BLDC GLUCOMTR-MCNC: 198 MG/DL (ref 70–130)
GLUCOSE BLDC GLUCOMTR-MCNC: 217 MG/DL (ref 70–130)
GLUCOSE BLDC GLUCOMTR-MCNC: 63 MG/DL (ref 70–130)
GLUCOSE BLDC GLUCOMTR-MCNC: 71 MG/DL (ref 70–130)
GLUCOSE BLDC GLUCOMTR-MCNC: 71 MG/DL (ref 70–130)
GLUCOSE BLDC GLUCOMTR-MCNC: 75 MG/DL (ref 70–130)
GLUCOSE BLDC GLUCOMTR-MCNC: 75 MG/DL (ref 70–130)
GLUCOSE BLDC GLUCOMTR-MCNC: 79 MG/DL (ref 70–130)
GLUCOSE BLDC GLUCOMTR-MCNC: 80 MG/DL (ref 70–130)
GLUCOSE BLDC GLUCOMTR-MCNC: 81 MG/DL (ref 70–130)
GLUCOSE BLDC GLUCOMTR-MCNC: 82 MG/DL (ref 70–130)
GLUCOSE BLDC GLUCOMTR-MCNC: 83 MG/DL (ref 70–130)
GLUCOSE BLDC GLUCOMTR-MCNC: 83 MG/DL (ref 70–130)
GLUCOSE BLDC GLUCOMTR-MCNC: 84 MG/DL (ref 70–130)
GLUCOSE BLDC GLUCOMTR-MCNC: 86 MG/DL (ref 70–130)
GLUCOSE BLDC GLUCOMTR-MCNC: 88 MG/DL (ref 70–130)
GLUCOSE BLDC GLUCOMTR-MCNC: 91 MG/DL (ref 70–130)
GLUCOSE BLDC GLUCOMTR-MCNC: 93 MG/DL (ref 70–130)
GLUCOSE BLDC GLUCOMTR-MCNC: 93 MG/DL (ref 70–130)
GLUCOSE BLDC GLUCOMTR-MCNC: 99 MG/DL (ref 70–130)
GLUCOSE SERPL-MCNC: 121 MG/DL (ref 65–99)
GLUCOSE SERPL-MCNC: 124 MG/DL (ref 65–99)
GLUCOSE SERPL-MCNC: 141 MG/DL (ref 65–99)
GLUCOSE SERPL-MCNC: 141 MG/DL (ref 65–99)
GLUCOSE SERPL-MCNC: 59 MG/DL (ref 65–99)
GLUCOSE SERPL-MCNC: 60 MG/DL (ref 65–99)
GLUCOSE SERPL-MCNC: 76 MG/DL (ref 65–99)
GLUCOSE SERPL-MCNC: 80 MG/DL (ref 65–99)
GLUCOSE SERPL-MCNC: 84 MG/DL (ref 65–99)
GLUCOSE SERPL-MCNC: 88 MG/DL (ref 65–99)
GRAM STN SPEC: ABNORMAL
HADV DNA SPEC NAA+PROBE: NOT DETECTED
HBA1C MFR BLD: 5.69 % (ref 4.8–5.6)
HBA1C MFR BLD: 5.69 % (ref 4.8–5.6)
HBV SURFACE AG SERPL QL IA: NORMAL
HCO3 BLDA-SCNC: 15.3 MMOL/L (ref 22–28)
HCO3 BLDA-SCNC: 24.9 MMOL/L (ref 22–28)
HCO3 BLDA-SCNC: 25.3 MMOL/L (ref 22–28)
HCOV 229E RNA SPEC QL NAA+PROBE: NOT DETECTED
HCOV HKU1 RNA SPEC QL NAA+PROBE: NOT DETECTED
HCOV NL63 RNA SPEC QL NAA+PROBE: NOT DETECTED
HCOV OC43 RNA SPEC QL NAA+PROBE: NOT DETECTED
HCT VFR BLD AUTO: 25.9 % (ref 37.5–51)
HCT VFR BLD AUTO: 26.2 % (ref 37.5–51)
HCT VFR BLD AUTO: 26.4 % (ref 37.5–51)
HCT VFR BLD AUTO: 26.9 % (ref 37.5–51)
HCT VFR BLD AUTO: 27.9 % (ref 37.5–51)
HCT VFR BLD AUTO: 28 % (ref 37.5–51)
HCT VFR BLD AUTO: 28.2 % (ref 37.5–51)
HCT VFR BLD AUTO: 28.4 % (ref 37.5–51)
HCT VFR BLD AUTO: 28.4 % (ref 37.5–51)
HCT VFR BLD AUTO: 28.5 % (ref 37.5–51)
HCT VFR BLD AUTO: 28.9 % (ref 37.5–51)
HCT VFR BLD AUTO: 29.3 % (ref 37.5–51)
HCT VFR BLD AUTO: 31.1 % (ref 37.5–51)
HCT VFR BLD AUTO: 31.2 % (ref 37.5–51)
HCT VFR BLD AUTO: 31.5 % (ref 37.5–51)
HCT VFR BLD AUTO: 32.1 % (ref 37.5–51)
HCT VFR BLD AUTO: 32.2 % (ref 37.5–51)
HCT VFR BLD AUTO: 32.9 % (ref 37.5–51)
HGB BLD-MCNC: 10 G/DL (ref 13–17.7)
HGB BLD-MCNC: 10.1 G/DL (ref 13–17.7)
HGB BLD-MCNC: 10.2 G/DL (ref 13–17.7)
HGB BLD-MCNC: 10.2 G/DL (ref 13–17.7)
HGB BLD-MCNC: 10.4 G/DL (ref 13–17.7)
HGB BLD-MCNC: 8 G/DL (ref 13–17.7)
HGB BLD-MCNC: 8.2 G/DL (ref 13–17.7)
HGB BLD-MCNC: 8.3 G/DL (ref 13–17.7)
HGB BLD-MCNC: 8.4 G/DL (ref 13–17.7)
HGB BLD-MCNC: 8.5 G/DL (ref 13–17.7)
HGB BLD-MCNC: 8.6 G/DL (ref 13–17.7)
HGB BLD-MCNC: 8.7 G/DL (ref 13–17.7)
HGB BLD-MCNC: 8.8 G/DL (ref 13–17.7)
HGB BLD-MCNC: 8.9 G/DL (ref 13–17.7)
HGB BLD-MCNC: 9 G/DL (ref 13–17.7)
HGB BLD-MCNC: 9.1 G/DL (ref 13–17.7)
HGB BLD-MCNC: 9.2 G/DL (ref 13–17.7)
HGB BLD-MCNC: 9.4 G/DL (ref 13–17.7)
HGB BLD-MCNC: 9.6 G/DL (ref 13–17.7)
HGB BLD-MCNC: 9.8 G/DL (ref 13–17.7)
HMPV RNA NPH QL NAA+NON-PROBE: NOT DETECTED
HOLD SPECIMEN: NORMAL
HPIV1 RNA SPEC QL NAA+PROBE: NOT DETECTED
HPIV2 RNA SPEC QL NAA+PROBE: NOT DETECTED
HPIV3 RNA NPH QL NAA+PROBE: NOT DETECTED
HPIV4 P GENE NPH QL NAA+PROBE: NOT DETECTED
IMM GRANULOCYTES # BLD AUTO: 0.01 10*3/MM3 (ref 0–0.05)
IMM GRANULOCYTES # BLD AUTO: 0.02 10*3/MM3 (ref 0–0.05)
IMM GRANULOCYTES # BLD AUTO: 0.03 10*3/MM3 (ref 0–0.05)
IMM GRANULOCYTES NFR BLD AUTO: 0.2 % (ref 0–0.5)
IMM GRANULOCYTES NFR BLD AUTO: 0.2 % (ref 0–0.5)
IMM GRANULOCYTES NFR BLD AUTO: 0.3 % (ref 0–0.5)
IMM GRANULOCYTES NFR BLD AUTO: 0.3 % (ref 0–0.5)
IMM GRANULOCYTES NFR BLD AUTO: 0.4 % (ref 0–0.5)
IMM GRANULOCYTES NFR BLD AUTO: 0.5 % (ref 0–0.5)
IMM GRANULOCYTES NFR BLD AUTO: 0.7 % (ref 0–0.5)
INHALED O2 CONCENTRATION: 100 %
INHALED O2 CONCENTRATION: 65 %
INR PPP: 1.4 (ref 0.9–1.1)
INR PPP: 2.32 (ref 0.9–1.1)
IRON 24H UR-MRATE: 43 MCG/DL (ref 59–158)
IRON SATN MFR SERPL: 24 % (ref 20–50)
LEFT ATRIUM VOLUME INDEX: 43 ML/M2
LYMPHOCYTES # BLD AUTO: 0.25 10*3/MM3 (ref 0.7–3.1)
LYMPHOCYTES # BLD AUTO: 0.26 10*3/MM3 (ref 0.7–3.1)
LYMPHOCYTES # BLD AUTO: 0.45 10*3/MM3 (ref 0.7–3.1)
LYMPHOCYTES # BLD AUTO: 0.49 10*3/MM3 (ref 0.7–3.1)
LYMPHOCYTES # BLD AUTO: 0.5 10*3/MM3 (ref 0.7–3.1)
LYMPHOCYTES # BLD AUTO: 0.5 10*3/MM3 (ref 0.7–3.1)
LYMPHOCYTES # BLD AUTO: 0.55 10*3/MM3 (ref 0.7–3.1)
LYMPHOCYTES # BLD AUTO: 0.55 10*3/MM3 (ref 0.7–3.1)
LYMPHOCYTES # BLD AUTO: 0.57 10*3/MM3 (ref 0.7–3.1)
LYMPHOCYTES # BLD AUTO: 0.6 10*3/MM3 (ref 0.7–3.1)
LYMPHOCYTES # BLD AUTO: 0.6 10*3/MM3 (ref 0.7–3.1)
LYMPHOCYTES # BLD AUTO: 0.61 10*3/MM3 (ref 0.7–3.1)
LYMPHOCYTES # BLD AUTO: 0.73 10*3/MM3 (ref 0.7–3.1)
LYMPHOCYTES # BLD AUTO: 0.84 10*3/MM3 (ref 0.7–3.1)
LYMPHOCYTES # BLD AUTO: 0.87 10*3/MM3 (ref 0.7–3.1)
LYMPHOCYTES # BLD MANUAL: 0.36 10*3/MM3 (ref 0.7–3.1)
LYMPHOCYTES # BLD MANUAL: 0.61 10*3/MM3 (ref 0.7–3.1)
LYMPHOCYTES NFR BLD AUTO: 10.5 % (ref 19.6–45.3)
LYMPHOCYTES NFR BLD AUTO: 10.8 % (ref 19.6–45.3)
LYMPHOCYTES NFR BLD AUTO: 10.9 % (ref 19.6–45.3)
LYMPHOCYTES NFR BLD AUTO: 11.5 % (ref 19.6–45.3)
LYMPHOCYTES NFR BLD AUTO: 12.4 % (ref 19.6–45.3)
LYMPHOCYTES NFR BLD AUTO: 12.9 % (ref 19.6–45.3)
LYMPHOCYTES NFR BLD AUTO: 13.2 % (ref 19.6–45.3)
LYMPHOCYTES NFR BLD AUTO: 17.3 % (ref 19.6–45.3)
LYMPHOCYTES NFR BLD AUTO: 18.6 % (ref 19.6–45.3)
LYMPHOCYTES NFR BLD AUTO: 21.3 % (ref 19.6–45.3)
LYMPHOCYTES NFR BLD AUTO: 24.4 % (ref 19.6–45.3)
LYMPHOCYTES NFR BLD AUTO: 3.4 % (ref 19.6–45.3)
LYMPHOCYTES NFR BLD AUTO: 3.4 % (ref 19.6–45.3)
LYMPHOCYTES NFR BLD AUTO: 6.8 % (ref 19.6–45.3)
LYMPHOCYTES NFR BLD AUTO: 9.9 % (ref 19.6–45.3)
LYMPHOCYTES NFR BLD MANUAL: 11 % (ref 5–12)
LYMPHOCYTES NFR BLD MANUAL: 14 % (ref 19.6–45.3)
LYMPHOCYTES NFR BLD MANUAL: 4 % (ref 5–12)
LYMPHOCYTES NFR BLD MANUAL: 6 % (ref 19.6–45.3)
Lab: 216 CM/SEC
M PNEUMO IGG SER IA-ACNC: NOT DETECTED
MAGNESIUM SERPL-MCNC: 2 MG/DL (ref 1.6–2.6)
MAGNESIUM SERPL-MCNC: 2.1 MG/DL (ref 1.6–2.6)
MAGNESIUM SERPL-MCNC: 2.2 MG/DL (ref 1.6–2.6)
MAXIMAL PREDICTED HEART RATE: 170 BPM
MCH RBC QN AUTO: 26.4 PG (ref 26.6–33)
MCH RBC QN AUTO: 27 PG (ref 26.6–33)
MCH RBC QN AUTO: 27 PG (ref 26.6–33)
MCH RBC QN AUTO: 27.1 PG (ref 26.6–33)
MCH RBC QN AUTO: 27.2 PG (ref 26.6–33)
MCH RBC QN AUTO: 27.4 PG (ref 26.6–33)
MCH RBC QN AUTO: 27.4 PG (ref 26.6–33)
MCH RBC QN AUTO: 27.5 PG (ref 26.6–33)
MCH RBC QN AUTO: 27.5 PG (ref 26.6–33)
MCH RBC QN AUTO: 27.6 PG (ref 26.6–33)
MCH RBC QN AUTO: 27.6 PG (ref 26.6–33)
MCH RBC QN AUTO: 27.9 PG (ref 26.6–33)
MCH RBC QN AUTO: 28.1 PG (ref 26.6–33)
MCH RBC QN AUTO: 28.3 PG (ref 26.6–33)
MCH RBC QN AUTO: 28.7 PG (ref 26.6–33)
MCH RBC QN AUTO: 28.7 PG (ref 26.6–33)
MCH RBC QN AUTO: 29.5 PG (ref 26.6–33)
MCH RBC QN AUTO: 29.9 PG (ref 26.6–33)
MCH RBC QN AUTO: 30.5 PG (ref 26.6–33)
MCH RBC QN AUTO: 30.6 PG (ref 26.6–33)
MCHC RBC AUTO-ENTMCNC: 29.9 G/DL (ref 31.5–35.7)
MCHC RBC AUTO-ENTMCNC: 30.1 G/DL (ref 31.5–35.7)
MCHC RBC AUTO-ENTMCNC: 30.5 G/DL (ref 31.5–35.7)
MCHC RBC AUTO-ENTMCNC: 30.5 G/DL (ref 31.5–35.7)
MCHC RBC AUTO-ENTMCNC: 30.7 G/DL (ref 31.5–35.7)
MCHC RBC AUTO-ENTMCNC: 30.9 G/DL (ref 31.5–35.7)
MCHC RBC AUTO-ENTMCNC: 30.9 G/DL (ref 31.5–35.7)
MCHC RBC AUTO-ENTMCNC: 31 G/DL (ref 31.5–35.7)
MCHC RBC AUTO-ENTMCNC: 31.1 G/DL (ref 31.5–35.7)
MCHC RBC AUTO-ENTMCNC: 31.2 G/DL (ref 31.5–35.7)
MCHC RBC AUTO-ENTMCNC: 31.4 G/DL (ref 31.5–35.7)
MCHC RBC AUTO-ENTMCNC: 31.5 G/DL (ref 31.5–35.7)
MCHC RBC AUTO-ENTMCNC: 31.7 G/DL (ref 31.5–35.7)
MCHC RBC AUTO-ENTMCNC: 32.1 G/DL (ref 31.5–35.7)
MCHC RBC AUTO-ENTMCNC: 32.2 G/DL (ref 31.5–35.7)
MCHC RBC AUTO-ENTMCNC: 32.4 G/DL (ref 31.5–35.7)
MCHC RBC AUTO-ENTMCNC: 33 G/DL (ref 31.5–35.7)
MCV RBC AUTO: 85.7 FL (ref 79–97)
MCV RBC AUTO: 85.9 FL (ref 79–97)
MCV RBC AUTO: 86 FL (ref 79–97)
MCV RBC AUTO: 86.6 FL (ref 79–97)
MCV RBC AUTO: 88 FL (ref 79–97)
MCV RBC AUTO: 88.2 FL (ref 79–97)
MCV RBC AUTO: 88.3 FL (ref 79–97)
MCV RBC AUTO: 88.5 FL (ref 79–97)
MCV RBC AUTO: 88.7 FL (ref 79–97)
MCV RBC AUTO: 88.9 FL (ref 79–97)
MCV RBC AUTO: 89.1 FL (ref 79–97)
MCV RBC AUTO: 90.9 FL (ref 79–97)
MCV RBC AUTO: 91.3 FL (ref 79–97)
MCV RBC AUTO: 91.5 FL (ref 79–97)
MCV RBC AUTO: 91.5 FL (ref 79–97)
MCV RBC AUTO: 92.4 FL (ref 79–97)
MCV RBC AUTO: 93.2 FL (ref 79–97)
MCV RBC AUTO: 93.4 FL (ref 79–97)
MCV RBC AUTO: 93.9 FL (ref 79–97)
MCV RBC AUTO: 94.4 FL (ref 79–97)
MODALITY: ABNORMAL
MONOCYTES # BLD AUTO: 0.17 10*3/MM3 (ref 0.1–0.9)
MONOCYTES # BLD AUTO: 0.38 10*3/MM3 (ref 0.1–0.9)
MONOCYTES # BLD AUTO: 0.39 10*3/MM3 (ref 0.1–0.9)
MONOCYTES # BLD AUTO: 0.4 10*3/MM3 (ref 0.1–0.9)
MONOCYTES # BLD AUTO: 0.41 10*3/MM3 (ref 0.1–0.9)
MONOCYTES # BLD AUTO: 0.47 10*3/MM3 (ref 0.1–0.9)
MONOCYTES # BLD AUTO: 0.49 10*3/MM3 (ref 0.1–0.9)
MONOCYTES # BLD AUTO: 0.55 10*3/MM3 (ref 0.1–0.9)
MONOCYTES # BLD AUTO: 0.55 10*3/MM3 (ref 0.1–0.9)
MONOCYTES # BLD AUTO: 0.61 10*3/MM3 (ref 0.1–0.9)
MONOCYTES # BLD AUTO: 0.64 10*3/MM3 (ref 0.1–0.9)
MONOCYTES # BLD AUTO: 0.65 10*3/MM3 (ref 0.1–0.9)
MONOCYTES # BLD AUTO: 0.69 10*3/MM3 (ref 0.1–0.9)
MONOCYTES # BLD AUTO: 0.7 10*3/MM3 (ref 0.1–0.9)
MONOCYTES # BLD AUTO: 0.8 10*3/MM3 (ref 0.1–0.9)
MONOCYTES NFR BLD AUTO: 11 % (ref 5–12)
MONOCYTES NFR BLD AUTO: 11.9 % (ref 5–12)
MONOCYTES NFR BLD AUTO: 12.1 % (ref 5–12)
MONOCYTES NFR BLD AUTO: 12.1 % (ref 5–12)
MONOCYTES NFR BLD AUTO: 12.7 % (ref 5–12)
MONOCYTES NFR BLD AUTO: 12.9 % (ref 5–12)
MONOCYTES NFR BLD AUTO: 15.2 % (ref 5–12)
MONOCYTES NFR BLD AUTO: 16.3 % (ref 5–12)
MONOCYTES NFR BLD AUTO: 17.1 % (ref 5–12)
MONOCYTES NFR BLD AUTO: 17.8 % (ref 5–12)
MONOCYTES NFR BLD AUTO: 5.4 % (ref 5–12)
MONOCYTES NFR BLD AUTO: 6.4 % (ref 5–12)
MONOCYTES NFR BLD AUTO: 7.8 % (ref 5–12)
MONOCYTES NFR BLD AUTO: 8.3 % (ref 5–12)
MONOCYTES NFR BLD AUTO: 9.7 % (ref 5–12)
NEUTROPHILS # BLD AUTO: 1.97 10*3/MM3 (ref 1.7–7)
NEUTROPHILS # BLD AUTO: 2.03 10*3/MM3 (ref 1.7–7)
NEUTROPHILS # BLD AUTO: 2.21 10*3/MM3 (ref 1.7–7)
NEUTROPHILS # BLD AUTO: 2.27 10*3/MM3 (ref 1.7–7)
NEUTROPHILS # BLD AUTO: 2.97 10*3/MM3 (ref 1.7–7)
NEUTROPHILS # BLD AUTO: 3.06 10*3/MM3 (ref 1.7–7)
NEUTROPHILS # BLD AUTO: 3.38 10*3/MM3 (ref 1.7–7)
NEUTROPHILS # BLD AUTO: 3.45 10*3/MM3 (ref 1.7–7)
NEUTROPHILS # BLD AUTO: 3.52 10*3/MM3 (ref 1.7–7)
NEUTROPHILS # BLD AUTO: 3.68 10*3/MM3 (ref 1.7–7)
NEUTROPHILS # BLD AUTO: 4 10*3/MM3 (ref 1.7–7)
NEUTROPHILS # BLD AUTO: 4.25 10*3/MM3 (ref 1.7–7)
NEUTROPHILS # BLD AUTO: 4.8 10*3/MM3 (ref 1.7–7)
NEUTROPHILS NFR BLD AUTO: 3.89 10*3/MM3 (ref 1.7–7)
NEUTROPHILS NFR BLD AUTO: 55.4 % (ref 42.7–76)
NEUTROPHILS NFR BLD AUTO: 57.6 % (ref 42.7–76)
NEUTROPHILS NFR BLD AUTO: 6.11 10*3/MM3 (ref 1.7–7)
NEUTROPHILS NFR BLD AUTO: 6.57 10*3/MM3 (ref 1.7–7)
NEUTROPHILS NFR BLD AUTO: 6.73 10*3/MM3 (ref 1.7–7)
NEUTROPHILS NFR BLD AUTO: 62.8 % (ref 42.7–76)
NEUTROPHILS NFR BLD AUTO: 67 % (ref 42.7–76)
NEUTROPHILS NFR BLD AUTO: 68.7 % (ref 42.7–76)
NEUTROPHILS NFR BLD AUTO: 71.6 % (ref 42.7–76)
NEUTROPHILS NFR BLD AUTO: 72.3 % (ref 42.7–76)
NEUTROPHILS NFR BLD AUTO: 73.2 % (ref 42.7–76)
NEUTROPHILS NFR BLD AUTO: 74.7 % (ref 42.7–76)
NEUTROPHILS NFR BLD AUTO: 75 % (ref 42.7–76)
NEUTROPHILS NFR BLD AUTO: 75.7 % (ref 42.7–76)
NEUTROPHILS NFR BLD AUTO: 78.3 % (ref 42.7–76)
NEUTROPHILS NFR BLD AUTO: 83.2 % (ref 42.7–76)
NEUTROPHILS NFR BLD AUTO: 89.3 % (ref 42.7–76)
NEUTROPHILS NFR BLD AUTO: 89.6 % (ref 42.7–76)
NEUTROPHILS NFR BLD MANUAL: 81 % (ref 42.7–76)
NEUTROPHILS NFR BLD MANUAL: 81 % (ref 42.7–76)
NRBC BLD AUTO-RTO: 0 /100 WBC (ref 0–0.2)
NRBC BLD AUTO-RTO: 0.1 /100 WBC (ref 0–0.2)
NRBC BLD AUTO-RTO: 0.2 /100 WBC (ref 0–0.2)
NRBC BLD AUTO-RTO: 0.3 /100 WBC (ref 0–0.2)
NRBC BLD AUTO-RTO: 0.3 /100 WBC (ref 0–0.2)
NT-PROBNP SERPL-MCNC: ABNORMAL PG/ML (ref 0–900)
O2 A-A PPRESDIFF RESPIRATORY: 0.4 MMHG
O2 A-A PPRESDIFF RESPIRATORY: 0.7 MMHG
PCO2 BLDA: 45.5 MM HG (ref 35–45)
PCO2 BLDA: 45.7 MM HG (ref 35–45)
PCO2 BLDA: 50.1 MM HG (ref 35–45)
PEEP RESPIRATORY: 5 CM[H2O]
PEEP RESPIRATORY: 5 CM[H2O]
PH BLDA: 7.13 PH UNITS (ref 7.35–7.45)
PH BLDA: 7.3 PH UNITS (ref 7.35–7.45)
PH BLDA: 7.35 PH UNITS (ref 7.35–7.45)
PHOSPHATE SERPL-MCNC: 14.1 MG/DL (ref 2.5–4.5)
PHOSPHATE SERPL-MCNC: 3.1 MG/DL (ref 2.5–4.5)
PHOSPHATE SERPL-MCNC: 4.4 MG/DL (ref 2.5–4.5)
PHOSPHATE SERPL-MCNC: 5.1 MG/DL (ref 2.5–4.5)
PHOSPHATE SERPL-MCNC: 5.2 MG/DL (ref 2.5–4.5)
PHOSPHATE SERPL-MCNC: 6.2 MG/DL (ref 2.5–4.5)
PHOSPHATE SERPL-MCNC: 6.5 MG/DL (ref 2.5–4.5)
PHOSPHATE SERPL-MCNC: 6.8 MG/DL (ref 2.5–4.5)
PHOSPHATE SERPL-MCNC: 6.9 MG/DL (ref 2.5–4.5)
PHOSPHATE SERPL-MCNC: 6.9 MG/DL (ref 2.5–4.5)
PHOSPHATE SERPL-MCNC: 7.9 MG/DL (ref 2.5–4.5)
PHOSPHATE SERPL-MCNC: 7.9 MG/DL (ref 2.5–4.5)
PHOSPHATE SERPL-MCNC: 8.1 MG/DL (ref 2.5–4.5)
PLAT MORPH BLD: NORMAL
PLAT MORPH BLD: NORMAL
PLATELET # BLD AUTO: 101 10*3/MM3 (ref 140–450)
PLATELET # BLD AUTO: 108 10*3/MM3 (ref 140–450)
PLATELET # BLD AUTO: 109 10*3/MM3 (ref 140–450)
PLATELET # BLD AUTO: 109 10*3/MM3 (ref 140–450)
PLATELET # BLD AUTO: 110 10*3/MM3 (ref 140–450)
PLATELET # BLD AUTO: 113 10*3/MM3 (ref 140–450)
PLATELET # BLD AUTO: 115 10*3/MM3 (ref 140–450)
PLATELET # BLD AUTO: 116 10*3/MM3 (ref 140–450)
PLATELET # BLD AUTO: 118 10*3/MM3 (ref 140–450)
PLATELET # BLD AUTO: 120 10*3/MM3 (ref 140–450)
PLATELET # BLD AUTO: 125 10*3/MM3 (ref 140–450)
PLATELET # BLD AUTO: 125 10*3/MM3 (ref 140–450)
PLATELET # BLD AUTO: 129 10*3/MM3 (ref 140–450)
PLATELET # BLD AUTO: 130 10*3/MM3 (ref 140–450)
PLATELET # BLD AUTO: 142 10*3/MM3 (ref 140–450)
PLATELET # BLD AUTO: 146 10*3/MM3 (ref 140–450)
PLATELET # BLD AUTO: 159 10*3/MM3 (ref 140–450)
PLATELET # BLD AUTO: 163 10*3/MM3 (ref 140–450)
PLATELET # BLD AUTO: 178 10*3/MM3 (ref 140–450)
PLATELET # BLD AUTO: 87 10*3/MM3 (ref 140–450)
PMV BLD AUTO: 10 FL (ref 6–12)
PMV BLD AUTO: 10.1 FL (ref 6–12)
PMV BLD AUTO: 10.2 FL (ref 6–12)
PMV BLD AUTO: 10.3 FL (ref 6–12)
PMV BLD AUTO: 10.4 FL (ref 6–12)
PMV BLD AUTO: 10.5 FL (ref 6–12)
PMV BLD AUTO: 10.6 FL (ref 6–12)
PMV BLD AUTO: 10.6 FL (ref 6–12)
PMV BLD AUTO: 10.7 FL (ref 6–12)
PMV BLD AUTO: 10.7 FL (ref 6–12)
PMV BLD AUTO: 10.8 FL (ref 6–12)
PMV BLD AUTO: 10.8 FL (ref 6–12)
PMV BLD AUTO: 10.9 FL (ref 6–12)
PMV BLD AUTO: 11.4 FL (ref 6–12)
PMV BLD AUTO: 9.2 FL (ref 6–12)
PMV BLD AUTO: 9.7 FL (ref 6–12)
PMV BLD AUTO: 9.8 FL (ref 6–12)
PMV BLD AUTO: 9.9 FL (ref 6–12)
PO2 BLDA: 189.1 MM HG (ref 80–100)
PO2 BLDA: 475 MM HG (ref 80–100)
PO2 BLDA: 94.1 MM HG (ref 80–100)
POLYCHROMASIA BLD QL SMEAR: ABNORMAL
POTASSIUM BLD-SCNC: 3.6 MMOL/L (ref 3.5–5.2)
POTASSIUM BLD-SCNC: 4.3 MMOL/L (ref 3.5–5.2)
POTASSIUM BLD-SCNC: 4.6 MMOL/L (ref 3.5–5.2)
POTASSIUM BLD-SCNC: 4.7 MMOL/L (ref 3.5–5.2)
POTASSIUM BLD-SCNC: 4.8 MMOL/L (ref 3.5–5.2)
POTASSIUM BLD-SCNC: 4.9 MMOL/L (ref 3.5–5.2)
POTASSIUM BLD-SCNC: 4.9 MMOL/L (ref 3.5–5.2)
POTASSIUM BLD-SCNC: 5 MMOL/L (ref 3.5–5.2)
POTASSIUM BLD-SCNC: 5.1 MMOL/L (ref 3.5–5.2)
POTASSIUM BLD-SCNC: 5.3 MMOL/L (ref 3.5–5.2)
POTASSIUM BLD-SCNC: 5.3 MMOL/L (ref 3.5–5.2)
POTASSIUM BLD-SCNC: 5.4 MMOL/L (ref 3.5–5.2)
POTASSIUM BLD-SCNC: 5.7 MMOL/L (ref 3.5–5.2)
POTASSIUM BLD-SCNC: 6 MMOL/L (ref 3.5–5.2)
POTASSIUM SERPL-SCNC: 3.9 MMOL/L (ref 3.5–5.2)
POTASSIUM SERPL-SCNC: 4.5 MMOL/L (ref 3.5–5.2)
POTASSIUM SERPL-SCNC: 4.7 MMOL/L (ref 3.5–5.2)
POTASSIUM SERPL-SCNC: 4.7 MMOL/L (ref 3.5–5.2)
POTASSIUM SERPL-SCNC: 5 MMOL/L (ref 3.5–5.2)
POTASSIUM SERPL-SCNC: 6.2 MMOL/L (ref 3.5–5.2)
POTASSIUM SERPL-SCNC: 6.3 MMOL/L (ref 3.5–5.2)
POTASSIUM SERPL-SCNC: 7.5 MMOL/L (ref 3.5–5.2)
POTASSIUM SERPL-SCNC: 8.9 MMOL/L (ref 3.5–5.2)
POTASSIUM SERPL-SCNC: 8.9 MMOL/L (ref 3.5–5.2)
PREALB SERPL-MCNC: 17.3 MG/DL (ref 20–40)
PROCALCITONIN SERPL-MCNC: 1.39 NG/ML (ref 0.1–0.25)
PROT SERPL-MCNC: 6 G/DL (ref 6–8.5)
PROT SERPL-MCNC: 6.1 G/DL (ref 6–8.5)
PROT SERPL-MCNC: 6.3 G/DL (ref 6–8.5)
PROT SERPL-MCNC: 6.6 G/DL (ref 6–8.5)
PROT SERPL-MCNC: 6.9 G/DL (ref 6–8.5)
PROT SERPL-MCNC: 7 G/DL (ref 6–8.5)
PROT SERPL-MCNC: 7.1 G/DL (ref 6–8.5)
PROTHROMBIN TIME: 16.8 SECONDS (ref 11.7–14.2)
PROTHROMBIN TIME: 24.8 SECONDS (ref 11.7–14.2)
RBC # BLD AUTO: 2.81 10*6/MM3 (ref 4.14–5.8)
RBC # BLD AUTO: 2.83 10*6/MM3 (ref 4.14–5.8)
RBC # BLD AUTO: 2.89 10*6/MM3 (ref 4.14–5.8)
RBC # BLD AUTO: 2.94 10*6/MM3 (ref 4.14–5.8)
RBC # BLD AUTO: 3.01 10*6/MM3 (ref 4.14–5.8)
RBC # BLD AUTO: 3.07 10*6/MM3 (ref 4.14–5.8)
RBC # BLD AUTO: 3.17 10*6/MM3 (ref 4.14–5.8)
RBC # BLD AUTO: 3.18 10*6/MM3 (ref 4.14–5.8)
RBC # BLD AUTO: 3.22 10*6/MM3 (ref 4.14–5.8)
RBC # BLD AUTO: 3.24 10*6/MM3 (ref 4.14–5.8)
RBC # BLD AUTO: 3.31 10*6/MM3 (ref 4.14–5.8)
RBC # BLD AUTO: 3.34 10*6/MM3 (ref 4.14–5.8)
RBC # BLD AUTO: 3.36 10*6/MM3 (ref 4.14–5.8)
RBC # BLD AUTO: 3.41 10*6/MM3 (ref 4.14–5.8)
RBC # BLD AUTO: 3.42 10*6/MM3 (ref 4.14–5.8)
RBC # BLD AUTO: 3.49 10*6/MM3 (ref 4.14–5.8)
RBC # BLD AUTO: 3.62 10*6/MM3 (ref 4.14–5.8)
RBC # BLD AUTO: 3.63 10*6/MM3 (ref 4.14–5.8)
RBC # BLD AUTO: 3.7 10*6/MM3 (ref 4.14–5.8)
RBC # BLD AUTO: 3.72 10*6/MM3 (ref 4.14–5.8)
RESP PATH DNA+RNA PNL NPH NAA+NON-PROBE: NOT DETECTED
RHINOVIRUS RNA SPEC NAA+PROBE: NOT DETECTED
RSV RNA NPH QL NAA+NON-PROBE: NOT DETECTED
SAO2 % BLDCOA: 100 % (ref 92–99)
SAO2 % BLDCOA: 96.9 % (ref 92–99)
SAO2 % BLDCOA: 99.5 % (ref 92–99)
SET MECH RESP RATE: 20
SET MECH RESP RATE: 25
SODIUM BLD-SCNC: 136 MMOL/L (ref 136–145)
SODIUM BLD-SCNC: 137 MMOL/L (ref 136–145)
SODIUM BLD-SCNC: 138 MMOL/L (ref 136–145)
SODIUM BLD-SCNC: 140 MMOL/L (ref 136–145)
SODIUM BLD-SCNC: 142 MMOL/L (ref 136–145)
SODIUM BLD-SCNC: 144 MMOL/L (ref 136–145)
SODIUM BLD-SCNC: 144 MMOL/L (ref 136–145)
SODIUM SERPL-SCNC: 133 MMOL/L (ref 136–145)
SODIUM SERPL-SCNC: 135 MMOL/L (ref 136–145)
SODIUM SERPL-SCNC: 136 MMOL/L (ref 136–145)
SODIUM SERPL-SCNC: 136 MMOL/L (ref 136–145)
SODIUM SERPL-SCNC: 137 MMOL/L (ref 136–145)
SODIUM SERPL-SCNC: 139 MMOL/L (ref 136–145)
SODIUM SERPL-SCNC: 139 MMOL/L (ref 136–145)
SODIUM SERPL-SCNC: 140 MMOL/L (ref 136–145)
SODIUM SERPL-SCNC: 144 MMOL/L (ref 136–145)
SODIUM SERPL-SCNC: 144 MMOL/L (ref 136–145)
STRESS TARGET HR: 145 BPM
TIBC SERPL-MCNC: 182 MCG/DL (ref 298–536)
TOTAL RATE: 20 BREATHS/MINUTE
TOTAL RATE: 25 BREATHS/MINUTE
TOTAL RATE: 26 BREATHS/MINUTE
TRANSFERRIN SERPL-MCNC: 122 MG/DL (ref 200–360)
TROPONIN T SERPL-MCNC: 0.07 NG/ML (ref 0–0.03)
TROPONIN T SERPL-MCNC: 0.1 NG/ML (ref 0–0.03)
TROPONIN T SERPL-MCNC: 0.11 NG/ML (ref 0–0.03)
TROPONIN T SERPL-MCNC: 0.17 NG/ML (ref 0–0.03)
TROPONIN T SERPL-MCNC: 0.19 NG/ML (ref 0–0.03)
UPPER ARTERIAL LEFT ARM BRACHIAL SYS MAX: 111 MMHG
VANCOMYCIN SERPL-MCNC: 17.3 MCG/ML (ref 5–40)
VANCOMYCIN SERPL-MCNC: 21.3 MCG/ML (ref 5–40)
VANCOMYCIN SERPL-MCNC: 23 MCG/ML (ref 5–40)
VANCOMYCIN SERPL-MCNC: 27.6 MCG/ML (ref 5–40)
VENTILATOR MODE: ABNORMAL
VENTILATOR MODE: ABNORMAL
VT ON VENT VENT: 554 ML
WBC # BLD AUTO: 4.17 10*3/MM3 (ref 3.4–10.8)
WBC # BLD AUTO: 4.97 10*3/MM3 (ref 3.4–10.8)
WBC # BLD AUTO: 5.39 10*3/MM3 (ref 3.4–10.8)
WBC # BLD AUTO: 5.87 10*3/MM3 (ref 3.4–10.8)
WBC # BLD AUTO: 5.92 10*3/MM3 (ref 3.4–10.8)
WBC # BLD AUTO: 7.34 10*3/MM3 (ref 3.4–10.8)
WBC # BLD AUTO: 7.35 10*3/MM3 (ref 3.4–10.8)
WBC # BLD AUTO: 7.54 10*3/MM3 (ref 3.4–10.8)
WBC MORPH BLD: NORMAL
WBC MORPH BLD: NORMAL
WBC NRBC COR # BLD: 3.23 10*3/MM3 (ref 3.4–10.8)
WBC NRBC COR # BLD: 3.3 10*3/MM3 (ref 3.4–10.8)
WBC NRBC COR # BLD: 3.56 10*3/MM3 (ref 3.4–10.8)
WBC NRBC COR # BLD: 3.92 10*3/MM3 (ref 3.4–10.8)
WBC NRBC COR # BLD: 3.94 10*3/MM3 (ref 3.4–10.8)
WBC NRBC COR # BLD: 4.27 10*3/MM3 (ref 3.4–10.8)
WBC NRBC COR # BLD: 4.35 10*3/MM3 (ref 3.4–10.8)
WBC NRBC COR # BLD: 4.61 10*3/MM3 (ref 3.4–10.8)
WBC NRBC COR # BLD: 4.92 10*3/MM3 (ref 3.4–10.8)
WBC NRBC COR # BLD: 5.03 10*3/MM3 (ref 3.4–10.8)
WBC NRBC COR # BLD: 5.53 10*3/MM3 (ref 3.4–10.8)
WBC NRBC COR # BLD: 5.69 10*3/MM3 (ref 3.4–10.8)
WHOLE BLOOD HOLD SPECIMEN: NORMAL
WHOLE BLOOD HOLD SPECIMEN: NORMAL

## 2020-01-01 PROCEDURE — 82962 GLUCOSE BLOOD TEST: CPT

## 2020-01-01 PROCEDURE — 25010000002 HYDROMORPHONE PER 4 MG: Performed by: INTERNAL MEDICINE

## 2020-01-01 PROCEDURE — 25010000002 PIPERACILLIN SOD-TAZOBACTAM PER 1 G: Performed by: NURSE PRACTITIONER

## 2020-01-01 PROCEDURE — 93005 ELECTROCARDIOGRAM TRACING: CPT | Performed by: EMERGENCY MEDICINE

## 2020-01-01 PROCEDURE — 94799 UNLISTED PULMONARY SVC/PX: CPT

## 2020-01-01 PROCEDURE — 25010000002 VANCOMYCIN PER 500 MG: Performed by: HOSPITALIST

## 2020-01-01 PROCEDURE — 85025 COMPLETE CBC W/AUTO DIFF WBC: CPT | Performed by: INTERNAL MEDICINE

## 2020-01-01 PROCEDURE — 25010000002 ONDANSETRON PER 1 MG: Performed by: INTERNAL MEDICINE

## 2020-01-01 PROCEDURE — 25010000002 ALBUMIN HUMAN 25% PER 50 ML: Performed by: INTERNAL MEDICINE

## 2020-01-01 PROCEDURE — 25010000002 HEPARIN (PORCINE) PER 1000 UNITS: Performed by: INTERNAL MEDICINE

## 2020-01-01 PROCEDURE — 80069 RENAL FUNCTION PANEL: CPT | Performed by: INTERNAL MEDICINE

## 2020-01-01 PROCEDURE — 99232 SBSQ HOSP IP/OBS MODERATE 35: CPT | Performed by: NURSE PRACTITIONER

## 2020-01-01 PROCEDURE — 85027 COMPLETE CBC AUTOMATED: CPT

## 2020-01-01 PROCEDURE — 85025 COMPLETE CBC W/AUTO DIFF WBC: CPT | Performed by: EMERGENCY MEDICINE

## 2020-01-01 PROCEDURE — 5A1D70Z PERFORMANCE OF URINARY FILTRATION, INTERMITTENT, LESS THAN 6 HOURS PER DAY: ICD-10-PCS | Performed by: INTERNAL MEDICINE

## 2020-01-01 PROCEDURE — 97162 PT EVAL MOD COMPLEX 30 MIN: CPT

## 2020-01-01 PROCEDURE — 25010000002 PROPOFOL 10 MG/ML EMULSION

## 2020-01-01 PROCEDURE — 85027 COMPLETE CBC AUTOMATED: CPT | Performed by: INTERNAL MEDICINE

## 2020-01-01 PROCEDURE — 93005 ELECTROCARDIOGRAM TRACING: CPT | Performed by: NURSE PRACTITIONER

## 2020-01-01 PROCEDURE — 85652 RBC SED RATE AUTOMATED: CPT | Performed by: PHYSICIAN ASSISTANT

## 2020-01-01 PROCEDURE — 87147 CULTURE TYPE IMMUNOLOGIC: CPT | Performed by: NURSE PRACTITIONER

## 2020-01-01 PROCEDURE — 25010000002 LORAZEPAM PER 2 MG: Performed by: INTERNAL MEDICINE

## 2020-01-01 PROCEDURE — 83735 ASSAY OF MAGNESIUM: CPT | Performed by: INTERNAL MEDICINE

## 2020-01-01 PROCEDURE — 36415 COLL VENOUS BLD VENIPUNCTURE: CPT | Performed by: NURSE PRACTITIONER

## 2020-01-01 PROCEDURE — 93923 UPR/LXTR ART STDY 3+ LVLS: CPT

## 2020-01-01 PROCEDURE — 87040 BLOOD CULTURE FOR BACTERIA: CPT | Performed by: EMERGENCY MEDICINE

## 2020-01-01 PROCEDURE — 25010000002 MORPHINE PER 10 MG: Performed by: INTERNAL MEDICINE

## 2020-01-01 PROCEDURE — 25010000002 VANCOMYCIN PER 500 MG: Performed by: NURSE PRACTITIONER

## 2020-01-01 PROCEDURE — 25010000002 ENOXAPARIN PER 10 MG: Performed by: INTERNAL MEDICINE

## 2020-01-01 PROCEDURE — 5A1935Z RESPIRATORY VENTILATION, LESS THAN 24 CONSECUTIVE HOURS: ICD-10-PCS | Performed by: EMERGENCY MEDICINE

## 2020-01-01 PROCEDURE — 25010000002 ONDANSETRON PER 1 MG: Performed by: NURSE PRACTITIONER

## 2020-01-01 PROCEDURE — 63710000001 INSULIN LISPRO (HUMAN) PER 5 UNITS: Performed by: INTERNAL MEDICINE

## 2020-01-01 PROCEDURE — 93926 LOWER EXTREMITY STUDY: CPT

## 2020-01-01 PROCEDURE — 84132 ASSAY OF SERUM POTASSIUM: CPT | Performed by: INTERNAL MEDICINE

## 2020-01-01 PROCEDURE — 84484 ASSAY OF TROPONIN QUANT: CPT | Performed by: INTERNAL MEDICINE

## 2020-01-01 PROCEDURE — 25010000002 VANCOMYCIN 750 MG RECONSTITUTED SOLUTION: Performed by: INTERNAL MEDICINE

## 2020-01-01 PROCEDURE — 83540 ASSAY OF IRON: CPT | Performed by: INTERNAL MEDICINE

## 2020-01-01 PROCEDURE — 99213 OFFICE O/P EST LOW 20 MIN: CPT | Performed by: INTERNAL MEDICINE

## 2020-01-01 PROCEDURE — 85007 BL SMEAR W/DIFF WBC COUNT: CPT | Performed by: INTERNAL MEDICINE

## 2020-01-01 PROCEDURE — 87186 SC STD MICRODIL/AGAR DIL: CPT | Performed by: NURSE PRACTITIONER

## 2020-01-01 PROCEDURE — P9047 ALBUMIN (HUMAN), 25%, 50ML: HCPCS | Performed by: INTERNAL MEDICINE

## 2020-01-01 PROCEDURE — 92610 EVALUATE SWALLOWING FUNCTION: CPT

## 2020-01-01 PROCEDURE — G0463 HOSPITAL OUTPT CLINIC VISIT: HCPCS

## 2020-01-01 PROCEDURE — 83880 ASSAY OF NATRIURETIC PEPTIDE: CPT | Performed by: EMERGENCY MEDICINE

## 2020-01-01 PROCEDURE — 93010 ELECTROCARDIOGRAM REPORT: CPT | Performed by: INTERNAL MEDICINE

## 2020-01-01 PROCEDURE — 25010000002 VANCOMYCIN PER 500 MG: Performed by: INTERNAL MEDICINE

## 2020-01-01 PROCEDURE — 97602 WOUND(S) CARE NON-SELECTIVE: CPT

## 2020-01-01 PROCEDURE — 87340 HEPATITIS B SURFACE AG IA: CPT | Performed by: INTERNAL MEDICINE

## 2020-01-01 PROCEDURE — 84484 ASSAY OF TROPONIN QUANT: CPT | Performed by: NURSE PRACTITIONER

## 2020-01-01 PROCEDURE — 82728 ASSAY OF FERRITIN: CPT | Performed by: INTERNAL MEDICINE

## 2020-01-01 PROCEDURE — 71045 X-RAY EXAM CHEST 1 VIEW: CPT

## 2020-01-01 PROCEDURE — 25010000002 EPOETIN ALFA-EPBX 10000 UNIT/ML SOLUTION: Performed by: INTERNAL MEDICINE

## 2020-01-01 PROCEDURE — 80048 BASIC METABOLIC PNL TOTAL CA: CPT | Performed by: EMERGENCY MEDICINE

## 2020-01-01 PROCEDURE — 25010000002 HYDROMORPHONE 1 MG/ML SOLUTION: Performed by: INTERNAL MEDICINE

## 2020-01-01 PROCEDURE — 94002 VENT MGMT INPAT INIT DAY: CPT

## 2020-01-01 PROCEDURE — 36600 WITHDRAWAL OF ARTERIAL BLOOD: CPT

## 2020-01-01 PROCEDURE — 97116 GAIT TRAINING THERAPY: CPT | Performed by: PHYSICAL THERAPIST

## 2020-01-01 PROCEDURE — 80202 ASSAY OF VANCOMYCIN: CPT | Performed by: INTERNAL MEDICINE

## 2020-01-01 PROCEDURE — 99232 SBSQ HOSP IP/OBS MODERATE 35: CPT | Performed by: INTERNAL MEDICINE

## 2020-01-01 PROCEDURE — 85730 THROMBOPLASTIN TIME PARTIAL: CPT | Performed by: INTERNAL MEDICINE

## 2020-01-01 PROCEDURE — 80053 COMPREHEN METABOLIC PANEL: CPT | Performed by: INTERNAL MEDICINE

## 2020-01-01 PROCEDURE — 80202 ASSAY OF VANCOMYCIN: CPT | Performed by: NURSE PRACTITIONER

## 2020-01-01 PROCEDURE — 31500 INSERT EMERGENCY AIRWAY: CPT

## 2020-01-01 PROCEDURE — 25010000002 PROPOFOL 10 MG/ML EMULSION: Performed by: INTERNAL MEDICINE

## 2020-01-01 PROCEDURE — 93975 VASCULAR STUDY: CPT

## 2020-01-01 PROCEDURE — 0202U NFCT DS 22 TRGT SARS-COV-2: CPT | Performed by: EMERGENCY MEDICINE

## 2020-01-01 PROCEDURE — 25010000002 EPINEPHRINE 1 MG/10ML SOLUTION PREFILLED SYRINGE: Performed by: EMERGENCY MEDICINE

## 2020-01-01 PROCEDURE — 87205 SMEAR GRAM STAIN: CPT | Performed by: NURSE PRACTITIONER

## 2020-01-01 PROCEDURE — 99292 CRITICAL CARE ADDL 30 MIN: CPT

## 2020-01-01 PROCEDURE — 86140 C-REACTIVE PROTEIN: CPT | Performed by: PHYSICIAN ASSISTANT

## 2020-01-01 PROCEDURE — 25010000002 VANCOMYCIN 10 G RECONSTITUTED SOLUTION: Performed by: PHYSICIAN ASSISTANT

## 2020-01-01 PROCEDURE — 84145 PROCALCITONIN (PCT): CPT | Performed by: EMERGENCY MEDICINE

## 2020-01-01 PROCEDURE — 85025 COMPLETE CBC W/AUTO DIFF WBC: CPT | Performed by: PHYSICIAN ASSISTANT

## 2020-01-01 PROCEDURE — 99291 CRITICAL CARE FIRST HOUR: CPT

## 2020-01-01 PROCEDURE — 83036 HEMOGLOBIN GLYCOSYLATED A1C: CPT | Performed by: INTERNAL MEDICINE

## 2020-01-01 PROCEDURE — 25010000002 CALCIUM GLUCONATE PER 10 ML: Performed by: EMERGENCY MEDICINE

## 2020-01-01 PROCEDURE — 0 DIATRIZOATE MEGLUMINE & SODIUM PER 1 ML: Performed by: INTERNAL MEDICINE

## 2020-01-01 PROCEDURE — 99238 HOSP IP/OBS DSCHRG MGMT 30/<: CPT | Performed by: INTERNAL MEDICINE

## 2020-01-01 PROCEDURE — 25010000002 AMIODARONE PER 30 MG: Performed by: EMERGENCY MEDICINE

## 2020-01-01 PROCEDURE — 87075 CULTR BACTERIA EXCEPT BLOOD: CPT | Performed by: NURSE PRACTITIONER

## 2020-01-01 PROCEDURE — 94003 VENT MGMT INPAT SUBQ DAY: CPT

## 2020-01-01 PROCEDURE — 83605 ASSAY OF LACTIC ACID: CPT | Performed by: EMERGENCY MEDICINE

## 2020-01-01 PROCEDURE — 80053 COMPREHEN METABOLIC PANEL: CPT | Performed by: PHYSICIAN ASSISTANT

## 2020-01-01 PROCEDURE — 99222 1ST HOSP IP/OBS MODERATE 55: CPT | Performed by: INTERNAL MEDICINE

## 2020-01-01 PROCEDURE — 85652 RBC SED RATE AUTOMATED: CPT

## 2020-01-01 PROCEDURE — 87070 CULTURE OTHR SPECIMN AEROBIC: CPT | Performed by: NURSE PRACTITIONER

## 2020-01-01 PROCEDURE — 82803 BLOOD GASES ANY COMBINATION: CPT

## 2020-01-01 PROCEDURE — 93295 DEV INTERROG REMOTE 1/2/MLT: CPT | Performed by: INTERNAL MEDICINE

## 2020-01-01 PROCEDURE — 87076 CULTURE ANAEROBE IDENT EACH: CPT | Performed by: NURSE PRACTITIONER

## 2020-01-01 PROCEDURE — 92950 HEART/LUNG RESUSCITATION CPR: CPT

## 2020-01-01 PROCEDURE — 80053 COMPREHEN METABOLIC PANEL: CPT | Performed by: EMERGENCY MEDICINE

## 2020-01-01 PROCEDURE — 25010000002 PERFLUTREN (DEFINITY) 8.476 MG IN SODIUM CHLORIDE 0.9 % 10 ML INJECTION: Performed by: INTERNAL MEDICINE

## 2020-01-01 PROCEDURE — 99284 EMERGENCY DEPT VISIT MOD MDM: CPT

## 2020-01-01 PROCEDURE — 94640 AIRWAY INHALATION TREATMENT: CPT

## 2020-01-01 PROCEDURE — 84484 ASSAY OF TROPONIN QUANT: CPT | Performed by: EMERGENCY MEDICINE

## 2020-01-01 PROCEDURE — 36415 COLL VENOUS BLD VENIPUNCTURE: CPT

## 2020-01-01 PROCEDURE — 85610 PROTHROMBIN TIME: CPT | Performed by: INTERNAL MEDICINE

## 2020-01-01 PROCEDURE — 86140 C-REACTIVE PROTEIN: CPT

## 2020-01-01 PROCEDURE — 25010000002 CEFTAZIDIME PER 500 MG: Performed by: INTERNAL MEDICINE

## 2020-01-01 PROCEDURE — 83735 ASSAY OF MAGNESIUM: CPT | Performed by: NURSE PRACTITIONER

## 2020-01-01 PROCEDURE — 99221 1ST HOSP IP/OBS SF/LOW 40: CPT | Performed by: INTERNAL MEDICINE

## 2020-01-01 PROCEDURE — 85730 THROMBOPLASTIN TIME PARTIAL: CPT | Performed by: HOSPITALIST

## 2020-01-01 PROCEDURE — 84466 ASSAY OF TRANSFERRIN: CPT | Performed by: INTERNAL MEDICINE

## 2020-01-01 PROCEDURE — 93306 TTE W/DOPPLER COMPLETE: CPT

## 2020-01-01 PROCEDURE — 85652 RBC SED RATE AUTOMATED: CPT | Performed by: EMERGENCY MEDICINE

## 2020-01-01 PROCEDURE — 74176 CT ABD & PELVIS W/O CONTRAST: CPT

## 2020-01-01 PROCEDURE — 86140 C-REACTIVE PROTEIN: CPT | Performed by: SURGERY

## 2020-01-01 PROCEDURE — 97530 THERAPEUTIC ACTIVITIES: CPT | Performed by: PHYSICAL THERAPIST

## 2020-01-01 PROCEDURE — 93005 ELECTROCARDIOGRAM TRACING: CPT | Performed by: INTERNAL MEDICINE

## 2020-01-01 PROCEDURE — 99285 EMERGENCY DEPT VISIT HI MDM: CPT

## 2020-01-01 PROCEDURE — 80053 COMPREHEN METABOLIC PANEL: CPT

## 2020-01-01 PROCEDURE — 25010000002 ENOXAPARIN PER 10 MG: Performed by: NURSE PRACTITIONER

## 2020-01-01 PROCEDURE — 87015 SPECIMEN INFECT AGNT CONCNTJ: CPT | Performed by: NURSE PRACTITIONER

## 2020-01-01 PROCEDURE — 63710000001 INSULIN LISPRO (HUMAN) PER 5 UNITS: Performed by: NURSE PRACTITIONER

## 2020-01-01 PROCEDURE — 93296 REM INTERROG EVL PM/IDS: CPT | Performed by: INTERNAL MEDICINE

## 2020-01-01 PROCEDURE — 73660 X-RAY EXAM OF TOE(S): CPT

## 2020-01-01 PROCEDURE — 86140 C-REACTIVE PROTEIN: CPT | Performed by: EMERGENCY MEDICINE

## 2020-01-01 PROCEDURE — 25010000002 MAGNESIUM SULFATE PER 500 MG OF MAGNESIUM: Performed by: EMERGENCY MEDICINE

## 2020-01-01 PROCEDURE — 97110 THERAPEUTIC EXERCISES: CPT

## 2020-01-01 PROCEDURE — 63710000001 INSULIN REGULAR HUMAN PER 5 UNITS: Performed by: INTERNAL MEDICINE

## 2020-01-01 PROCEDURE — 25010000002 VANCOMYCIN 10 G RECONSTITUTED SOLUTION: Performed by: EMERGENCY MEDICINE

## 2020-01-01 PROCEDURE — 80048 BASIC METABOLIC PNL TOTAL CA: CPT | Performed by: INTERNAL MEDICINE

## 2020-01-01 PROCEDURE — 84134 ASSAY OF PREALBUMIN: CPT

## 2020-01-01 PROCEDURE — 25010000002 PROPOFOL 10 MG/ML EMULSION: Performed by: EMERGENCY MEDICINE

## 2020-01-01 PROCEDURE — 73630 X-RAY EXAM OF FOOT: CPT

## 2020-01-01 PROCEDURE — 80069 RENAL FUNCTION PANEL: CPT | Performed by: EMERGENCY MEDICINE

## 2020-01-01 PROCEDURE — 83036 HEMOGLOBIN GLYCOSYLATED A1C: CPT | Performed by: NURSE PRACTITIONER

## 2020-01-01 PROCEDURE — 25010000002 FUROSEMIDE PER 20 MG: Performed by: EMERGENCY MEDICINE

## 2020-01-01 PROCEDURE — 85652 RBC SED RATE AUTOMATED: CPT | Performed by: SURGERY

## 2020-01-01 PROCEDURE — 97166 OT EVAL MOD COMPLEX 45 MIN: CPT

## 2020-01-01 PROCEDURE — 0BH17EZ INSERTION OF ENDOTRACHEAL AIRWAY INTO TRACHEA, VIA NATURAL OR ARTIFICIAL OPENING: ICD-10-PCS | Performed by: EMERGENCY MEDICINE

## 2020-01-01 PROCEDURE — 63710000001 INSULIN REGULAR HUMAN PER 5 UNITS: Performed by: EMERGENCY MEDICINE

## 2020-01-01 PROCEDURE — 93306 TTE W/DOPPLER COMPLETE: CPT | Performed by: INTERNAL MEDICINE

## 2020-01-01 PROCEDURE — 80048 BASIC METABOLIC PNL TOTAL CA: CPT | Performed by: NURSE PRACTITIONER

## 2020-01-01 PROCEDURE — 25010000002 PIPERACILLIN SOD-TAZOBACTAM PER 1 G: Performed by: EMERGENCY MEDICINE

## 2020-01-01 PROCEDURE — 99442 PR PHYS/QHP TELEPHONE EVALUATION 11-20 MIN: CPT | Performed by: INTERNAL MEDICINE

## 2020-01-01 RX ORDER — DIPHENHYDRAMINE HCL 25 MG
25 CAPSULE ORAL EVERY 6 HOURS PRN
Status: CANCELLED | OUTPATIENT
Start: 2020-01-01

## 2020-01-01 RX ORDER — SODIUM CHLORIDE 0.9 % (FLUSH) 0.9 %
10 SYRINGE (ML) INJECTION AS NEEDED
Status: DISCONTINUED | OUTPATIENT
Start: 2020-01-01 | End: 2020-01-01 | Stop reason: HOSPADM

## 2020-01-01 RX ORDER — FLUTICASONE PROPIONATE 50 MCG
1 SPRAY, SUSPENSION (ML) NASAL DAILY
Status: DISCONTINUED | OUTPATIENT
Start: 2020-01-01 | End: 2020-01-01 | Stop reason: HOSPADM

## 2020-01-01 RX ORDER — LORAZEPAM 2 MG/ML
0.5 INJECTION INTRAMUSCULAR
Status: DISCONTINUED | OUTPATIENT
Start: 2020-01-01 | End: 2020-01-01 | Stop reason: HOSPADM

## 2020-01-01 RX ORDER — MORPHINE SULFATE 10 MG/ML
6 INJECTION INTRAMUSCULAR; INTRAVENOUS; SUBCUTANEOUS
Status: CANCELLED | OUTPATIENT
Start: 2020-01-01 | End: 2020-09-05

## 2020-01-01 RX ORDER — LORAZEPAM 2 MG/ML
1 INJECTION INTRAMUSCULAR
Status: DISCONTINUED | OUTPATIENT
Start: 2020-01-01 | End: 2020-08-30 | Stop reason: HOSPADM

## 2020-01-01 RX ORDER — MORPHINE SULFATE 2 MG/ML
2 INJECTION, SOLUTION INTRAMUSCULAR; INTRAVENOUS
Status: DISCONTINUED | OUTPATIENT
Start: 2020-01-01 | End: 2020-01-01 | Stop reason: HOSPADM

## 2020-01-01 RX ORDER — CLOPIDOGREL BISULFATE 75 MG/1
75 TABLET ORAL DAILY
Status: DISCONTINUED | OUTPATIENT
Start: 2020-01-01 | End: 2020-01-01 | Stop reason: HOSPADM

## 2020-01-01 RX ORDER — IPRATROPIUM BROMIDE AND ALBUTEROL SULFATE 2.5; .5 MG/3ML; MG/3ML
3 SOLUTION RESPIRATORY (INHALATION) ONCE
Status: DISCONTINUED | OUTPATIENT
Start: 2020-01-01 | End: 2020-01-01 | Stop reason: HOSPADM

## 2020-01-01 RX ORDER — HYDROMORPHONE HYDROCHLORIDE 1 MG/ML
0.5 INJECTION, SOLUTION INTRAMUSCULAR; INTRAVENOUS; SUBCUTANEOUS
Status: DISCONTINUED | OUTPATIENT
Start: 2020-01-01 | End: 2020-08-30 | Stop reason: HOSPADM

## 2020-01-01 RX ORDER — ACETAMINOPHEN 325 MG/1
650 TABLET ORAL EVERY 4 HOURS PRN
Status: DISCONTINUED | OUTPATIENT
Start: 2020-01-01 | End: 2020-08-30 | Stop reason: HOSPADM

## 2020-01-01 RX ORDER — CINACALCET 30 MG/1
30 TABLET, FILM COATED ORAL DAILY
Status: DISCONTINUED | OUTPATIENT
Start: 2020-01-01 | End: 2020-01-01 | Stop reason: HOSPADM

## 2020-01-01 RX ORDER — AMMONIUM LACTATE 120 MG/G
CREAM TOPICAL DAILY
Status: DISCONTINUED | OUTPATIENT
Start: 2020-01-01 | End: 2020-08-30 | Stop reason: HOSPADM

## 2020-01-01 RX ORDER — CALCIUM CHLORIDE 100 MG/ML
INJECTION INTRAVENOUS; INTRAVENTRICULAR
Status: COMPLETED | OUTPATIENT
Start: 2020-01-01 | End: 2020-01-01

## 2020-01-01 RX ORDER — MORPHINE SULFATE 20 MG/ML
10 SOLUTION ORAL
Status: DISCONTINUED | OUTPATIENT
Start: 2020-01-01 | End: 2020-01-01 | Stop reason: HOSPADM

## 2020-01-01 RX ORDER — ACETAMINOPHEN 325 MG/1
650 TABLET ORAL EVERY 4 HOURS PRN
Status: DISCONTINUED | OUTPATIENT
Start: 2020-01-01 | End: 2020-01-01 | Stop reason: HOSPADM

## 2020-01-01 RX ORDER — MORPHINE SULFATE 20 MG/ML
10 SOLUTION ORAL
Status: DISCONTINUED | OUTPATIENT
Start: 2020-01-01 | End: 2020-01-01

## 2020-01-01 RX ORDER — FLUTICASONE PROPIONATE 50 MCG
2 SPRAY, SUSPENSION (ML) NASAL DAILY PRN
COMMUNITY
End: 2020-01-01

## 2020-01-01 RX ORDER — ACETAMINOPHEN 650 MG/1
650 SUPPOSITORY RECTAL EVERY 4 HOURS PRN
Status: DISCONTINUED | OUTPATIENT
Start: 2020-01-01 | End: 2020-08-30 | Stop reason: HOSPADM

## 2020-01-01 RX ORDER — MORPHINE SULFATE 20 MG/ML
5 SOLUTION ORAL
Status: DISCONTINUED | OUTPATIENT
Start: 2020-01-01 | End: 2020-01-01 | Stop reason: HOSPADM

## 2020-01-01 RX ORDER — ALBUMIN (HUMAN) 12.5 G/50ML
12.5 SOLUTION INTRAVENOUS ONCE
Status: COMPLETED | OUTPATIENT
Start: 2020-01-01 | End: 2020-01-01

## 2020-01-01 RX ORDER — HYDROXYZINE HYDROCHLORIDE 25 MG/1
25 TABLET, FILM COATED ORAL 3 TIMES DAILY PRN
Status: DISCONTINUED | OUTPATIENT
Start: 2020-01-01 | End: 2020-01-01 | Stop reason: HOSPADM

## 2020-01-01 RX ORDER — SODIUM CHLORIDE 0.9 % (FLUSH) 0.9 %
10 SYRINGE (ML) INJECTION EVERY 12 HOURS SCHEDULED
Status: DISCONTINUED | OUTPATIENT
Start: 2020-01-01 | End: 2020-01-01 | Stop reason: HOSPADM

## 2020-01-01 RX ORDER — MORPHINE SULFATE 2 MG/ML
2 INJECTION, SOLUTION INTRAMUSCULAR; INTRAVENOUS
Status: DISCONTINUED | OUTPATIENT
Start: 2020-01-01 | End: 2020-01-01

## 2020-01-01 RX ORDER — ROSUVASTATIN CALCIUM 40 MG/1
40 TABLET, COATED ORAL DAILY
Status: DISCONTINUED | OUTPATIENT
Start: 2020-01-01 | End: 2020-01-01 | Stop reason: HOSPADM

## 2020-01-01 RX ORDER — HYDROMORPHONE HCL 110MG/55ML
1.5 PATIENT CONTROLLED ANALGESIA SYRINGE INTRAVENOUS
Status: DISCONTINUED | OUTPATIENT
Start: 2020-01-01 | End: 2020-08-30 | Stop reason: HOSPADM

## 2020-01-01 RX ORDER — LORAZEPAM 2 MG/ML
1 INJECTION INTRAMUSCULAR
Status: DISCONTINUED | OUTPATIENT
Start: 2020-01-01 | End: 2020-01-01 | Stop reason: HOSPADM

## 2020-01-01 RX ORDER — SEVELAMER CARBONATE 800 MG/1
2400 TABLET, FILM COATED ORAL
Qty: 270 TABLET | Refills: 0 | Status: SHIPPED | OUTPATIENT
Start: 2020-01-01 | End: 2020-01-01

## 2020-01-01 RX ORDER — DEXTROSE MONOHYDRATE 25 G/50ML
50 INJECTION, SOLUTION INTRAVENOUS ONCE
Status: COMPLETED | OUTPATIENT
Start: 2020-01-01 | End: 2020-01-01

## 2020-01-01 RX ORDER — ALBUMIN (HUMAN) 12.5 G/50ML
12.5 SOLUTION INTRAVENOUS AS NEEDED
Status: ACTIVE | OUTPATIENT
Start: 2020-01-01 | End: 2020-01-01

## 2020-01-01 RX ORDER — ALBUMIN (HUMAN) 12.5 G/50ML
12.5 SOLUTION INTRAVENOUS
Status: DISCONTINUED | OUTPATIENT
Start: 2020-01-01 | End: 2020-01-01 | Stop reason: HOSPADM

## 2020-01-01 RX ORDER — MORPHINE SULFATE 20 MG/ML
5 SOLUTION ORAL
Status: DISCONTINUED | OUTPATIENT
Start: 2020-01-01 | End: 2020-01-01

## 2020-01-01 RX ORDER — DIPHENOXYLATE HYDROCHLORIDE AND ATROPINE SULFATE 2.5; .025 MG/1; MG/1
1 TABLET ORAL
Status: CANCELLED | OUTPATIENT
Start: 2020-01-01

## 2020-01-01 RX ORDER — ACETAMINOPHEN 160 MG/5ML
650 SOLUTION ORAL EVERY 4 HOURS PRN
Status: DISCONTINUED | OUTPATIENT
Start: 2020-01-01 | End: 2020-01-01 | Stop reason: HOSPADM

## 2020-01-01 RX ORDER — HEPARIN SODIUM 5000 [USP'U]/ML
5000 INJECTION, SOLUTION INTRAVENOUS; SUBCUTANEOUS EVERY 8 HOURS SCHEDULED
Status: DISCONTINUED | OUTPATIENT
Start: 2020-01-01 | End: 2020-01-01

## 2020-01-01 RX ORDER — GABAPENTIN 400 MG/1
400 CAPSULE ORAL 3 TIMES DAILY
Status: DISCONTINUED | OUTPATIENT
Start: 2020-01-01 | End: 2020-01-01 | Stop reason: HOSPADM

## 2020-01-01 RX ORDER — AMIODARONE HYDROCHLORIDE 50 MG/ML
INJECTION, SOLUTION INTRAVENOUS
Status: COMPLETED | OUTPATIENT
Start: 2020-01-01 | End: 2020-01-01

## 2020-01-01 RX ORDER — MORPHINE SULFATE 4 MG/ML
4 INJECTION, SOLUTION INTRAMUSCULAR; INTRAVENOUS
Status: DISCONTINUED | OUTPATIENT
Start: 2020-01-01 | End: 2020-01-01 | Stop reason: HOSPADM

## 2020-01-01 RX ORDER — LORAZEPAM 2 MG/ML
1 INJECTION INTRAMUSCULAR
Status: CANCELLED | OUTPATIENT
Start: 2020-01-01 | End: 2020-09-05

## 2020-01-01 RX ORDER — HALOPERIDOL 5 MG/ML
1 INJECTION INTRAMUSCULAR EVERY 4 HOURS PRN
Status: DISCONTINUED | OUTPATIENT
Start: 2020-01-01 | End: 2020-01-01

## 2020-01-01 RX ORDER — GABAPENTIN 100 MG/1
200 CAPSULE ORAL EVERY 12 HOURS SCHEDULED
Status: DISCONTINUED | OUTPATIENT
Start: 2020-01-01 | End: 2020-01-01 | Stop reason: HOSPADM

## 2020-01-01 RX ORDER — ALBUMIN (HUMAN) 12.5 G/50ML
12.5 SOLUTION INTRAVENOUS AS NEEDED
Status: DISPENSED | OUTPATIENT
Start: 2020-01-01 | End: 2020-01-01

## 2020-01-01 RX ORDER — CALCIUM CARBONATE 200(500)MG
1 TABLET,CHEWABLE ORAL 3 TIMES DAILY
Status: DISCONTINUED | OUTPATIENT
Start: 2020-01-01 | End: 2020-01-01 | Stop reason: HOSPADM

## 2020-01-01 RX ORDER — PROPOFOL 10 MG/ML
100 VIAL (ML) INTRAVENOUS ONCE
Status: COMPLETED | OUTPATIENT
Start: 2020-01-01 | End: 2020-01-01

## 2020-01-01 RX ORDER — PETROLATUM 420 MG/G
OINTMENT TOPICAL
Status: DISCONTINUED | OUTPATIENT
Start: 2020-01-01 | End: 2020-01-01

## 2020-01-01 RX ORDER — SODIUM CHLORIDE 0.9 % (FLUSH) 0.9 %
10 SYRINGE (ML) INJECTION AS NEEDED
Status: CANCELLED | OUTPATIENT
Start: 2020-01-01

## 2020-01-01 RX ORDER — DILTIAZEM HYDROCHLORIDE 180 MG/1
180 CAPSULE, COATED, EXTENDED RELEASE ORAL
Qty: 30 CAPSULE | Refills: 6 | Status: SHIPPED | OUTPATIENT
Start: 2020-01-01

## 2020-01-01 RX ORDER — SODIUM CHLORIDE 9 MG/ML
125 INJECTION, SOLUTION INTRAVENOUS CONTINUOUS
Status: DISCONTINUED | OUTPATIENT
Start: 2020-01-01 | End: 2020-01-01

## 2020-01-01 RX ORDER — DEXTROSE MONOHYDRATE 25 G/50ML
50 INJECTION, SOLUTION INTRAVENOUS
Status: DISCONTINUED | OUTPATIENT
Start: 2020-01-01 | End: 2020-01-01 | Stop reason: HOSPADM

## 2020-01-01 RX ORDER — HALOPERIDOL 5 MG/ML
1 INJECTION INTRAMUSCULAR EVERY 4 HOURS PRN
Status: CANCELLED | OUTPATIENT
Start: 2020-01-01

## 2020-01-01 RX ORDER — MORPHINE SULFATE 10 MG/ML
6 INJECTION INTRAMUSCULAR; INTRAVENOUS; SUBCUTANEOUS EVERY 6 HOURS PRN
Status: DISCONTINUED | OUTPATIENT
Start: 2020-01-01 | End: 2020-01-01

## 2020-01-01 RX ORDER — GABAPENTIN 400 MG/1
400 CAPSULE ORAL EVERY 12 HOURS SCHEDULED
Start: 2020-01-01

## 2020-01-01 RX ORDER — ALBUTEROL SULFATE 2.5 MG/3ML
2.5 SOLUTION RESPIRATORY (INHALATION) EVERY 6 HOURS PRN
Status: DISCONTINUED | OUTPATIENT
Start: 2020-01-01 | End: 2020-01-01 | Stop reason: HOSPADM

## 2020-01-01 RX ORDER — DULOXETIN HYDROCHLORIDE 60 MG/1
60 CAPSULE, DELAYED RELEASE ORAL DAILY
Status: DISCONTINUED | OUTPATIENT
Start: 2020-01-01 | End: 2020-01-01

## 2020-01-01 RX ORDER — ACETAMINOPHEN 650 MG/1
650 SUPPOSITORY RECTAL EVERY 4 HOURS PRN
Status: CANCELLED | OUTPATIENT
Start: 2020-01-01

## 2020-01-01 RX ORDER — MORPHINE SULFATE 20 MG/ML
20 SOLUTION ORAL
Status: DISCONTINUED | OUTPATIENT
Start: 2020-01-01 | End: 2020-01-01 | Stop reason: HOSPADM

## 2020-01-01 RX ORDER — DEXTROSE MONOHYDRATE 25 G/50ML
50 INJECTION, SOLUTION INTRAVENOUS
Status: CANCELLED | OUTPATIENT
Start: 2020-01-01

## 2020-01-01 RX ORDER — ALBUMIN (HUMAN) 12.5 G/50ML
12.5 SOLUTION INTRAVENOUS AS NEEDED
Status: CANCELLED | OUTPATIENT
Start: 2020-01-01 | End: 2020-01-01

## 2020-01-01 RX ORDER — LORAZEPAM 2 MG/ML
1 CONCENTRATE ORAL
Status: DISCONTINUED | OUTPATIENT
Start: 2020-01-01 | End: 2020-08-30 | Stop reason: HOSPADM

## 2020-01-01 RX ORDER — DIPHENHYDRAMINE HCL 25 MG
25 CAPSULE ORAL EVERY 6 HOURS PRN
Status: DISCONTINUED | OUTPATIENT
Start: 2020-01-01 | End: 2020-01-01

## 2020-01-01 RX ORDER — ALBUMIN (HUMAN) 12.5 G/50ML
SOLUTION INTRAVENOUS
Status: DISPENSED
Start: 2020-01-01 | End: 2020-01-01

## 2020-01-01 RX ORDER — MORPHINE SULFATE 2 MG/ML
2 INJECTION, SOLUTION INTRAMUSCULAR; INTRAVENOUS EVERY 4 HOURS PRN
Status: DISCONTINUED | OUTPATIENT
Start: 2020-01-01 | End: 2020-01-01

## 2020-01-01 RX ORDER — ONDANSETRON 2 MG/ML
4 INJECTION INTRAMUSCULAR; INTRAVENOUS EVERY 6 HOURS PRN
Status: DISCONTINUED | OUTPATIENT
Start: 2020-01-01 | End: 2020-01-01 | Stop reason: HOSPADM

## 2020-01-01 RX ORDER — LORAZEPAM 2 MG/ML
1 CONCENTRATE ORAL
Status: DISCONTINUED | OUTPATIENT
Start: 2020-01-01 | End: 2020-01-01 | Stop reason: HOSPADM

## 2020-01-01 RX ORDER — BISOPROLOL FUMARATE 5 MG/1
10 TABLET, FILM COATED ORAL DAILY
Status: DISCONTINUED | OUTPATIENT
Start: 2020-01-01 | End: 2020-01-01 | Stop reason: HOSPADM

## 2020-01-01 RX ORDER — FUROSEMIDE 10 MG/ML
INJECTION INTRAMUSCULAR; INTRAVENOUS
Status: COMPLETED | OUTPATIENT
Start: 2020-01-01 | End: 2020-01-01

## 2020-01-01 RX ORDER — FOLIC ACID/VIT B COMPLEX AND C 0.8 MG
1 TABLET ORAL DAILY
Status: DISCONTINUED | OUTPATIENT
Start: 2020-01-01 | End: 2020-01-01 | Stop reason: HOSPADM

## 2020-01-01 RX ORDER — NICOTINE 21 MG/24HR
1 PATCH, TRANSDERMAL 24 HOURS TRANSDERMAL
Status: DISCONTINUED | OUTPATIENT
Start: 2020-01-01 | End: 2020-01-01 | Stop reason: HOSPADM

## 2020-01-01 RX ORDER — CINACALCET 30 MG/1
90 TABLET, FILM COATED ORAL DAILY
COMMUNITY
End: 2020-01-01

## 2020-01-01 RX ORDER — DEXTROSE MONOHYDRATE 25 G/50ML
50 INJECTION, SOLUTION INTRAVENOUS
Status: DISCONTINUED | OUTPATIENT
Start: 2020-01-01 | End: 2020-01-01

## 2020-01-01 RX ORDER — HYDROMORPHONE HYDROCHLORIDE 1 MG/ML
0.5 INJECTION, SOLUTION INTRAMUSCULAR; INTRAVENOUS; SUBCUTANEOUS EVERY 4 HOURS PRN
Status: DISCONTINUED | OUTPATIENT
Start: 2020-01-01 | End: 2020-01-01 | Stop reason: HOSPADM

## 2020-01-01 RX ORDER — NICOTINE POLACRILEX 4 MG
15 LOZENGE BUCCAL
Status: DISCONTINUED | OUTPATIENT
Start: 2020-01-01 | End: 2020-01-01 | Stop reason: HOSPADM

## 2020-01-01 RX ORDER — DULOXETIN HYDROCHLORIDE 60 MG/1
60 CAPSULE, DELAYED RELEASE ORAL DAILY
COMMUNITY

## 2020-01-01 RX ORDER — NICOTINE 21 MG/24HR
1 PATCH, TRANSDERMAL 24 HOURS TRANSDERMAL EVERY 24 HOURS
Status: DISCONTINUED | OUTPATIENT
Start: 2020-01-01 | End: 2020-01-01 | Stop reason: HOSPADM

## 2020-01-01 RX ORDER — LORAZEPAM 2 MG/ML
0.5 CONCENTRATE ORAL
Status: CANCELLED | OUTPATIENT
Start: 2020-01-01 | End: 2020-09-05

## 2020-01-01 RX ORDER — LORAZEPAM 2 MG/ML
0.5 CONCENTRATE ORAL
Status: DISCONTINUED | OUTPATIENT
Start: 2020-01-01 | End: 2020-08-30 | Stop reason: HOSPADM

## 2020-01-01 RX ORDER — HYDROXYZINE HYDROCHLORIDE 25 MG/1
25 TABLET, FILM COATED ORAL EVERY 8 HOURS PRN
COMMUNITY
End: 2020-01-01 | Stop reason: HOSPADM

## 2020-01-01 RX ORDER — NOREPINEPHRINE BIT/0.9 % NACL 8 MG/250ML
.02-.3 INFUSION BOTTLE (ML) INTRAVENOUS
Status: DISCONTINUED | OUTPATIENT
Start: 2020-01-01 | End: 2020-01-01

## 2020-01-01 RX ORDER — PROMETHAZINE HYDROCHLORIDE 25 MG/1
25 TABLET ORAL EVERY 6 HOURS PRN
Status: DISCONTINUED | OUTPATIENT
Start: 2020-01-01 | End: 2020-01-01 | Stop reason: HOSPADM

## 2020-01-01 RX ORDER — ONDANSETRON 4 MG/1
4 TABLET, FILM COATED ORAL EVERY 6 HOURS PRN
Status: DISCONTINUED | OUTPATIENT
Start: 2020-01-01 | End: 2020-01-01 | Stop reason: HOSPADM

## 2020-01-01 RX ORDER — DILTIAZEM HYDROCHLORIDE 180 MG/1
180 CAPSULE, COATED, EXTENDED RELEASE ORAL
Qty: 30 CAPSULE | Refills: 0 | Status: SHIPPED | OUTPATIENT
Start: 2020-01-01 | End: 2020-01-01 | Stop reason: SDUPTHER

## 2020-01-01 RX ORDER — CASTOR OIL AND BALSAM, PERU 788; 87 MG/G; MG/G
OINTMENT TOPICAL EVERY 12 HOURS SCHEDULED
Status: DISCONTINUED | OUTPATIENT
Start: 2020-01-01 | End: 2020-01-01 | Stop reason: HOSPADM

## 2020-01-01 RX ORDER — ALBUMIN (HUMAN) 12.5 G/50ML
12.5 SOLUTION INTRAVENOUS AS NEEDED
Status: DISCONTINUED | OUTPATIENT
Start: 2020-01-01 | End: 2020-01-01 | Stop reason: HOSPADM

## 2020-01-01 RX ORDER — OXYCODONE HYDROCHLORIDE 5 MG/1
10 TABLET ORAL EVERY 6 HOURS PRN
Status: DISCONTINUED | OUTPATIENT
Start: 2020-01-01 | End: 2020-01-01 | Stop reason: HOSPADM

## 2020-01-01 RX ORDER — SEVELAMER CARBONATE 800 MG/1
2400 TABLET, FILM COATED ORAL
Status: DISCONTINUED | OUTPATIENT
Start: 2020-01-01 | End: 2020-01-01 | Stop reason: HOSPADM

## 2020-01-01 RX ORDER — MUSCLE RUB CREAM 100; 150 MG/G; MG/G
CREAM TOPICAL
Status: DISCONTINUED | OUTPATIENT
Start: 2020-01-01 | End: 2020-01-01 | Stop reason: HOSPADM

## 2020-01-01 RX ORDER — PANTOPRAZOLE SODIUM 40 MG/1
40 TABLET, DELAYED RELEASE ORAL 2 TIMES DAILY
Status: DISCONTINUED | OUTPATIENT
Start: 2020-01-01 | End: 2020-01-01 | Stop reason: HOSPADM

## 2020-01-01 RX ORDER — MORPHINE SULFATE 2 MG/ML
2 INJECTION, SOLUTION INTRAMUSCULAR; INTRAVENOUS
Status: CANCELLED | OUTPATIENT
Start: 2020-01-01 | End: 2020-09-05

## 2020-01-01 RX ORDER — HALOPERIDOL 1 MG/1
1 TABLET ORAL EVERY 4 HOURS PRN
Status: DISCONTINUED | OUTPATIENT
Start: 2020-01-01 | End: 2020-01-01

## 2020-01-01 RX ORDER — NICOTINE 21 MG/24HR
1 PATCH, TRANSDERMAL 24 HOURS TRANSDERMAL EVERY 24 HOURS
Qty: 30 PATCH | Refills: 0 | Status: SHIPPED | OUTPATIENT
Start: 2020-01-01 | End: 2020-01-01

## 2020-01-01 RX ORDER — SEVELAMER CARBONATE 800 MG/1
1600 TABLET, FILM COATED ORAL
Status: DISCONTINUED | OUTPATIENT
Start: 2020-01-01 | End: 2020-01-01

## 2020-01-01 RX ORDER — DULOXETIN HYDROCHLORIDE 60 MG/1
60 CAPSULE, DELAYED RELEASE ORAL DAILY
Status: DISCONTINUED | OUTPATIENT
Start: 2020-01-01 | End: 2020-01-01 | Stop reason: HOSPADM

## 2020-01-01 RX ORDER — BUSPIRONE HYDROCHLORIDE 10 MG/1
10 TABLET ORAL 2 TIMES DAILY
COMMUNITY
End: 2020-01-01 | Stop reason: HOSPADM

## 2020-01-01 RX ORDER — CLOPIDOGREL BISULFATE 75 MG/1
75 TABLET ORAL DAILY
Status: DISCONTINUED | OUTPATIENT
Start: 2020-01-01 | End: 2020-01-01

## 2020-01-01 RX ORDER — ONDANSETRON 2 MG/ML
4 INJECTION INTRAMUSCULAR; INTRAVENOUS EVERY 6 HOURS PRN
Status: DISCONTINUED | OUTPATIENT
Start: 2020-01-01 | End: 2020-01-01

## 2020-01-01 RX ORDER — CALCIUM CARBONATE 200(500)MG
2 TABLET,CHEWABLE ORAL 2 TIMES DAILY PRN
Status: DISCONTINUED | OUTPATIENT
Start: 2020-01-01 | End: 2020-01-01 | Stop reason: HOSPADM

## 2020-01-01 RX ORDER — MORPHINE SULFATE 20 MG/ML
20 SOLUTION ORAL
Status: CANCELLED | OUTPATIENT
Start: 2020-01-01 | End: 2020-09-05

## 2020-01-01 RX ORDER — GABAPENTIN 400 MG/1
800 CAPSULE ORAL EVERY 12 HOURS SCHEDULED
Status: DISCONTINUED | OUTPATIENT
Start: 2020-01-01 | End: 2020-01-01

## 2020-01-01 RX ORDER — AMMONIUM LACTATE 120 MG/G
CREAM TOPICAL DAILY
Status: DISCONTINUED | OUTPATIENT
Start: 2020-01-01 | End: 2020-01-01 | Stop reason: HOSPADM

## 2020-01-01 RX ORDER — ACETAMINOPHEN 650 MG/1
650 SUPPOSITORY RECTAL EVERY 4 HOURS PRN
Status: DISCONTINUED | OUTPATIENT
Start: 2020-01-01 | End: 2020-01-01 | Stop reason: HOSPADM

## 2020-01-01 RX ORDER — SODIUM CHLORIDE 0.9 % (FLUSH) 0.9 %
10 SYRINGE (ML) INJECTION EVERY 12 HOURS SCHEDULED
Status: CANCELLED | OUTPATIENT
Start: 2020-01-01

## 2020-01-01 RX ORDER — SODIUM CHLORIDE 0.9 % (FLUSH) 0.9 %
10 SYRINGE (ML) INJECTION EVERY 12 HOURS SCHEDULED
Status: DISCONTINUED | OUTPATIENT
Start: 2020-01-01 | End: 2020-01-01

## 2020-01-01 RX ORDER — LORAZEPAM 1 MG/1
1 TABLET ORAL
COMMUNITY
Start: 2020-01-01

## 2020-01-01 RX ORDER — FAMOTIDINE 10 MG/ML
20 INJECTION, SOLUTION INTRAVENOUS DAILY
Status: DISCONTINUED | OUTPATIENT
Start: 2020-01-01 | End: 2020-01-01

## 2020-01-01 RX ORDER — DILTIAZEM HYDROCHLORIDE 180 MG/1
180 CAPSULE, COATED, EXTENDED RELEASE ORAL
Status: DISCONTINUED | OUTPATIENT
Start: 2020-01-01 | End: 2020-01-01 | Stop reason: HOSPADM

## 2020-01-01 RX ORDER — NICOTINE 21 MG/24HR
1 PATCH, TRANSDERMAL 24 HOURS TRANSDERMAL
Status: CANCELLED | OUTPATIENT
Start: 2020-01-01

## 2020-01-01 RX ORDER — DOCUSATE SODIUM 100 MG/1
100 CAPSULE, LIQUID FILLED ORAL 2 TIMES DAILY PRN
Status: DISCONTINUED | OUTPATIENT
Start: 2020-01-01 | End: 2020-01-01 | Stop reason: HOSPADM

## 2020-01-01 RX ORDER — GABAPENTIN 100 MG/1
200 CAPSULE ORAL EVERY 12 HOURS SCHEDULED
Status: CANCELLED | OUTPATIENT
Start: 2020-01-01

## 2020-01-01 RX ORDER — MORPHINE SULFATE 2 MG/ML
4 INJECTION, SOLUTION INTRAMUSCULAR; INTRAVENOUS
Status: CANCELLED | OUTPATIENT
Start: 2020-01-01 | End: 2020-09-05

## 2020-01-01 RX ORDER — HYDROMORPHONE HYDROCHLORIDE 1 MG/ML
0.5 INJECTION, SOLUTION INTRAMUSCULAR; INTRAVENOUS; SUBCUTANEOUS
Status: CANCELLED | OUTPATIENT
Start: 2020-01-01 | End: 2020-09-05

## 2020-01-01 RX ORDER — SEVELAMER CARBONATE 800 MG/1
800 TABLET, FILM COATED ORAL AS NEEDED
Status: DISCONTINUED | OUTPATIENT
Start: 2020-01-01 | End: 2020-01-01 | Stop reason: HOSPADM

## 2020-01-01 RX ORDER — ACETAMINOPHEN 325 MG/1
650 TABLET ORAL EVERY 4 HOURS PRN
Status: CANCELLED | OUTPATIENT
Start: 2020-01-01

## 2020-01-01 RX ORDER — HYDROXYZINE HYDROCHLORIDE 25 MG/1
25 TABLET, FILM COATED ORAL EVERY 8 HOURS
Status: DISCONTINUED | OUTPATIENT
Start: 2020-01-01 | End: 2020-01-01 | Stop reason: HOSPADM

## 2020-01-01 RX ORDER — ALBUTEROL SULFATE 90 UG/1
6 AEROSOL, METERED RESPIRATORY (INHALATION)
Status: DISCONTINUED | OUTPATIENT
Start: 2020-01-01 | End: 2020-01-01

## 2020-01-01 RX ORDER — PANTOPRAZOLE SODIUM 40 MG/1
40 TABLET, DELAYED RELEASE ORAL
Status: DISCONTINUED | OUTPATIENT
Start: 2020-01-01 | End: 2020-01-01 | Stop reason: HOSPADM

## 2020-01-01 RX ORDER — DIPHENOXYLATE HYDROCHLORIDE AND ATROPINE SULFATE 2.5; .025 MG/1; MG/1
1 TABLET ORAL
Status: DISCONTINUED | OUTPATIENT
Start: 2020-01-01 | End: 2020-01-01

## 2020-01-01 RX ORDER — DEXTROSE MONOHYDRATE 25 G/50ML
25 INJECTION, SOLUTION INTRAVENOUS
Status: DISCONTINUED | OUTPATIENT
Start: 2020-01-01 | End: 2020-01-01 | Stop reason: HOSPADM

## 2020-01-01 RX ORDER — FLUTICASONE PROPIONATE 50 MCG
2 SPRAY, SUSPENSION (ML) NASAL DAILY
Qty: 1 BOTTLE | Refills: 0 | Status: SHIPPED | OUTPATIENT
Start: 2020-01-01 | End: 2020-01-01

## 2020-01-01 RX ORDER — EPINEPHRINE 0.1 MG/ML
SYRINGE (ML) INJECTION
Status: COMPLETED | OUTPATIENT
Start: 2020-01-01 | End: 2020-01-01

## 2020-01-01 RX ORDER — FLUTICASONE PROPIONATE 50 MCG
2 SPRAY, SUSPENSION (ML) NASAL DAILY PRN
Status: DISCONTINUED | OUTPATIENT
Start: 2020-01-01 | End: 2020-01-01 | Stop reason: HOSPADM

## 2020-01-01 RX ORDER — CHLORHEXIDINE GLUCONATE 0.12 MG/ML
15 RINSE ORAL EVERY 12 HOURS SCHEDULED
Status: DISCONTINUED | OUTPATIENT
Start: 2020-01-01 | End: 2020-01-01

## 2020-01-01 RX ORDER — NITROGLYCERIN 0.4 MG/1
0.4 TABLET SUBLINGUAL
Status: DISCONTINUED | OUTPATIENT
Start: 2020-01-01 | End: 2020-01-01 | Stop reason: HOSPADM

## 2020-01-01 RX ORDER — HYDROMORPHONE HYDROCHLORIDE 1 MG/ML
0.5 INJECTION, SOLUTION INTRAMUSCULAR; INTRAVENOUS; SUBCUTANEOUS
Status: DISCONTINUED | OUTPATIENT
Start: 2020-01-01 | End: 2020-01-01 | Stop reason: HOSPADM

## 2020-01-01 RX ORDER — LORAZEPAM 2 MG/ML
2 INJECTION INTRAMUSCULAR
Status: DISCONTINUED | OUTPATIENT
Start: 2020-01-01 | End: 2020-08-30 | Stop reason: HOSPADM

## 2020-01-01 RX ORDER — LORAZEPAM 2 MG/ML
2 CONCENTRATE ORAL
Status: DISCONTINUED | OUTPATIENT
Start: 2020-01-01 | End: 2020-08-30 | Stop reason: HOSPADM

## 2020-01-01 RX ORDER — BISACODYL 10 MG
10 SUPPOSITORY, RECTAL RECTAL DAILY PRN
Status: DISCONTINUED | OUTPATIENT
Start: 2020-01-01 | End: 2020-01-01 | Stop reason: HOSPADM

## 2020-01-01 RX ORDER — SODIUM CHLORIDE 0.9 % (FLUSH) 0.9 %
10 SYRINGE (ML) INJECTION AS NEEDED
Status: DISCONTINUED | OUTPATIENT
Start: 2020-01-01 | End: 2020-08-30 | Stop reason: HOSPADM

## 2020-01-01 RX ORDER — LORAZEPAM 2 MG/ML
2 INJECTION INTRAMUSCULAR
Status: CANCELLED | OUTPATIENT
Start: 2020-01-01 | End: 2020-09-05

## 2020-01-01 RX ORDER — ONDANSETRON 2 MG/ML
4 INJECTION INTRAMUSCULAR; INTRAVENOUS EVERY 4 HOURS PRN
Status: DISCONTINUED | OUTPATIENT
Start: 2020-01-01 | End: 2020-01-01 | Stop reason: HOSPADM

## 2020-01-01 RX ORDER — CYCLOBENZAPRINE HCL 10 MG
10 TABLET ORAL 2 TIMES DAILY PRN
Status: DISCONTINUED | OUTPATIENT
Start: 2020-01-01 | End: 2020-01-01 | Stop reason: HOSPADM

## 2020-01-01 RX ORDER — LORAZEPAM 2 MG/ML
2 CONCENTRATE ORAL
Status: CANCELLED | OUTPATIENT
Start: 2020-01-01 | End: 2020-09-05

## 2020-01-01 RX ORDER — LORAZEPAM 2 MG/ML
0.5 INJECTION INTRAMUSCULAR
Status: DISCONTINUED | OUTPATIENT
Start: 2020-01-01 | End: 2020-08-30 | Stop reason: HOSPADM

## 2020-01-01 RX ORDER — HEPARIN SODIUM 5000 [USP'U]/ML
30-42 INJECTION, SOLUTION INTRAVENOUS; SUBCUTANEOUS EVERY 6 HOURS PRN
Status: DISCONTINUED | OUTPATIENT
Start: 2020-01-01 | End: 2020-01-01

## 2020-01-01 RX ORDER — GABAPENTIN 400 MG/1
400 CAPSULE ORAL EVERY 12 HOURS SCHEDULED
Status: DISCONTINUED | OUTPATIENT
Start: 2020-01-01 | End: 2020-01-01 | Stop reason: HOSPADM

## 2020-01-01 RX ORDER — LORAZEPAM 2 MG/ML
2 CONCENTRATE ORAL
Status: DISCONTINUED | OUTPATIENT
Start: 2020-01-01 | End: 2020-01-01 | Stop reason: HOSPADM

## 2020-01-01 RX ORDER — DIPHENHYDRAMINE HYDROCHLORIDE 50 MG/ML
25 INJECTION INTRAMUSCULAR; INTRAVENOUS EVERY 6 HOURS PRN
Status: DISCONTINUED | OUTPATIENT
Start: 2020-01-01 | End: 2020-01-01

## 2020-01-01 RX ORDER — LORAZEPAM 2 MG/ML
0.5 CONCENTRATE ORAL
Status: DISCONTINUED | OUTPATIENT
Start: 2020-01-01 | End: 2020-01-01 | Stop reason: HOSPADM

## 2020-01-01 RX ORDER — HALOPERIDOL 1 MG/1
1 TABLET ORAL EVERY 4 HOURS PRN
Status: CANCELLED | OUTPATIENT
Start: 2020-01-01

## 2020-01-01 RX ORDER — PROPOFOL 10 MG/ML
VIAL (ML) INTRAVENOUS
Status: COMPLETED
Start: 2020-01-01 | End: 2020-01-01

## 2020-01-01 RX ORDER — NICOTINE 21 MG/24HR
1 PATCH, TRANSDERMAL 24 HOURS TRANSDERMAL
Status: DISCONTINUED | OUTPATIENT
Start: 2020-01-01 | End: 2020-01-01

## 2020-01-01 RX ORDER — PANTOPRAZOLE SODIUM 40 MG/1
40 TABLET, DELAYED RELEASE ORAL 2 TIMES DAILY
Status: DISCONTINUED | OUTPATIENT
Start: 2020-01-01 | End: 2020-01-01

## 2020-01-01 RX ORDER — OXYCODONE HYDROCHLORIDE 5 MG/1
10 TABLET ORAL EVERY 4 HOURS PRN
Status: DISCONTINUED | OUTPATIENT
Start: 2020-01-01 | End: 2020-01-01 | Stop reason: HOSPADM

## 2020-01-01 RX ORDER — HYDROCODONE BITARTRATE AND ACETAMINOPHEN 5; 325 MG/1; MG/1
1 TABLET ORAL EVERY 6 HOURS PRN
Status: DISCONTINUED | OUTPATIENT
Start: 2020-01-01 | End: 2020-01-01 | Stop reason: HOSPADM

## 2020-01-01 RX ORDER — AMIODARONE HYDROCHLORIDE 50 MG/ML
INJECTION, SOLUTION INTRAVENOUS
Status: DISPENSED
Start: 2020-01-01 | End: 2020-01-01

## 2020-01-01 RX ORDER — ACETAMINOPHEN 160 MG/5ML
650 SOLUTION ORAL EVERY 4 HOURS PRN
Status: DISCONTINUED | OUTPATIENT
Start: 2020-01-01 | End: 2020-08-30 | Stop reason: HOSPADM

## 2020-01-01 RX ORDER — MORPHINE SULFATE 20 MG/ML
20 SOLUTION ORAL
Status: DISCONTINUED | OUTPATIENT
Start: 2020-01-01 | End: 2020-01-01

## 2020-01-01 RX ORDER — SEVELAMER CARBONATE 800 MG/1
800 TABLET, FILM COATED ORAL AS NEEDED
Qty: 30 TABLET | Refills: 0 | Status: SHIPPED | OUTPATIENT
Start: 2020-01-01 | End: 2020-01-01

## 2020-01-01 RX ORDER — OXYCODONE HYDROCHLORIDE 5 MG/1
10 TABLET ORAL EVERY 6 HOURS PRN
Status: DISCONTINUED | OUTPATIENT
Start: 2020-01-01 | End: 2020-01-01

## 2020-01-01 RX ORDER — AMMONIUM LACTATE 120 MG/G
CREAM TOPICAL DAILY
Status: CANCELLED | OUTPATIENT
Start: 2020-01-01

## 2020-01-01 RX ORDER — ONDANSETRON 2 MG/ML
4 INJECTION INTRAMUSCULAR; INTRAVENOUS EVERY 4 HOURS PRN
Status: DISCONTINUED | OUTPATIENT
Start: 2020-01-01 | End: 2020-08-30 | Stop reason: HOSPADM

## 2020-01-01 RX ORDER — PROMETHAZINE HYDROCHLORIDE 25 MG/1
25 TABLET ORAL EVERY 6 HOURS PRN
COMMUNITY

## 2020-01-01 RX ORDER — NALOXONE HCL 0.4 MG/ML
0.4 VIAL (ML) INJECTION
Status: DISCONTINUED | OUTPATIENT
Start: 2020-01-01 | End: 2020-01-01 | Stop reason: HOSPADM

## 2020-01-01 RX ORDER — DIPHENOXYLATE HYDROCHLORIDE AND ATROPINE SULFATE 2.5; .025 MG/1; MG/1
1 TABLET ORAL
Status: DISCONTINUED | OUTPATIENT
Start: 2020-01-01 | End: 2020-01-01 | Stop reason: HOSPADM

## 2020-01-01 RX ORDER — HALOPERIDOL 2 MG/ML
1 SOLUTION ORAL EVERY 4 HOURS PRN
Status: DISCONTINUED | OUTPATIENT
Start: 2020-01-01 | End: 2020-01-01

## 2020-01-01 RX ORDER — MORPHINE SULFATE 10 MG/ML
6 INJECTION INTRAMUSCULAR; INTRAVENOUS; SUBCUTANEOUS
Status: DISCONTINUED | OUTPATIENT
Start: 2020-01-01 | End: 2020-01-01

## 2020-01-01 RX ORDER — FLUTICASONE PROPIONATE 50 MCG
2 SPRAY, SUSPENSION (ML) NASAL DAILY
Status: DISCONTINUED | OUTPATIENT
Start: 2020-01-01 | End: 2020-01-01 | Stop reason: HOSPADM

## 2020-01-01 RX ORDER — MAGNESIUM SULFATE HEPTAHYDRATE 500 MG/ML
INJECTION, SOLUTION INTRAMUSCULAR; INTRAVENOUS
Status: COMPLETED | OUTPATIENT
Start: 2020-01-01 | End: 2020-01-01

## 2020-01-01 RX ORDER — MORPHINE SULFATE 20 MG/ML
10 SOLUTION ORAL
Status: CANCELLED | OUTPATIENT
Start: 2020-01-01 | End: 2020-09-05

## 2020-01-01 RX ORDER — OXYCODONE HYDROCHLORIDE 5 MG/1
10 TABLET ORAL EVERY 6 HOURS PRN
Status: CANCELLED | OUTPATIENT
Start: 2020-01-01

## 2020-01-01 RX ORDER — GABAPENTIN 100 MG/1
200 CAPSULE ORAL EVERY 12 HOURS SCHEDULED
Status: DISCONTINUED | OUTPATIENT
Start: 2020-01-01 | End: 2020-01-01

## 2020-01-01 RX ORDER — CALCIUM CARBONATE 200(500)MG
1 TABLET,CHEWABLE ORAL
COMMUNITY
End: 2020-01-01

## 2020-01-01 RX ORDER — MORPHINE SULFATE 10 MG/ML
6 INJECTION INTRAMUSCULAR; INTRAVENOUS; SUBCUTANEOUS
Status: DISCONTINUED | OUTPATIENT
Start: 2020-01-01 | End: 2020-01-01 | Stop reason: HOSPADM

## 2020-01-01 RX ORDER — DIPHENHYDRAMINE HYDROCHLORIDE 50 MG/ML
25 INJECTION INTRAMUSCULAR; INTRAVENOUS EVERY 6 HOURS PRN
Status: CANCELLED | OUTPATIENT
Start: 2020-01-01

## 2020-01-01 RX ORDER — LORAZEPAM 2 MG/ML
1 CONCENTRATE ORAL
Status: CANCELLED | OUTPATIENT
Start: 2020-01-01 | End: 2020-09-05

## 2020-01-01 RX ORDER — NICOTINE 21 MG/24HR
1 PATCH, TRANSDERMAL 24 HOURS TRANSDERMAL
Qty: 30 EACH | Refills: 0 | Status: SHIPPED | OUTPATIENT
Start: 2020-01-01 | End: 2020-01-01

## 2020-01-01 RX ORDER — ACETAMINOPHEN 160 MG/5ML
650 SOLUTION ORAL EVERY 4 HOURS PRN
Status: CANCELLED | OUTPATIENT
Start: 2020-01-01

## 2020-01-01 RX ORDER — HEPARIN SODIUM 10000 [USP'U]/100ML
12 INJECTION, SOLUTION INTRAVENOUS
Status: DISCONTINUED | OUTPATIENT
Start: 2020-01-01 | End: 2020-01-01

## 2020-01-01 RX ORDER — HYDROMORPHONE HCL 110MG/55ML
1.5 PATIENT CONTROLLED ANALGESIA SYRINGE INTRAVENOUS
Status: DISCONTINUED | OUTPATIENT
Start: 2020-01-01 | End: 2020-01-01 | Stop reason: HOSPADM

## 2020-01-01 RX ORDER — MORPHINE SULFATE 20 MG/ML
5 SOLUTION ORAL
Status: CANCELLED | OUTPATIENT
Start: 2020-01-01 | End: 2020-09-05

## 2020-01-01 RX ORDER — IPRATROPIUM BROMIDE AND ALBUTEROL SULFATE 2.5; .5 MG/3ML; MG/3ML
3 SOLUTION RESPIRATORY (INHALATION) EVERY 6 HOURS PRN
Status: DISCONTINUED | OUTPATIENT
Start: 2020-01-01 | End: 2020-01-01 | Stop reason: HOSPADM

## 2020-01-01 RX ORDER — UREA 10 %
1 LOTION (ML) TOPICAL NIGHTLY PRN
Status: DISCONTINUED | OUTPATIENT
Start: 2020-01-01 | End: 2020-01-01 | Stop reason: HOSPADM

## 2020-01-01 RX ORDER — LORAZEPAM 2 MG/ML
0.5 INJECTION INTRAMUSCULAR
Status: CANCELLED | OUTPATIENT
Start: 2020-01-01 | End: 2020-09-05

## 2020-01-01 RX ORDER — ALBUTEROL SULFATE 90 UG/1
8 AEROSOL, METERED RESPIRATORY (INHALATION) ONCE
Status: COMPLETED | OUTPATIENT
Start: 2020-01-01 | End: 2020-01-01

## 2020-01-01 RX ORDER — MORPHINE SULFATE 4 MG/ML
4 INJECTION, SOLUTION INTRAMUSCULAR; INTRAVENOUS
Status: DISCONTINUED | OUTPATIENT
Start: 2020-01-01 | End: 2020-01-01

## 2020-01-01 RX ORDER — SODIUM CHLORIDE 0.9 % (FLUSH) 0.9 %
10 SYRINGE (ML) INJECTION AS NEEDED
Status: DISCONTINUED | OUTPATIENT
Start: 2020-01-01 | End: 2020-01-01

## 2020-01-01 RX ORDER — ONDANSETRON 2 MG/ML
4 INJECTION INTRAMUSCULAR; INTRAVENOUS EVERY 4 HOURS PRN
Status: CANCELLED | OUTPATIENT
Start: 2020-01-01

## 2020-01-01 RX ORDER — LORAZEPAM 2 MG/ML
2 INJECTION INTRAMUSCULAR
Status: DISCONTINUED | OUTPATIENT
Start: 2020-01-01 | End: 2020-01-01 | Stop reason: HOSPADM

## 2020-01-01 RX ORDER — IPRATROPIUM BROMIDE AND ALBUTEROL SULFATE 2.5; .5 MG/3ML; MG/3ML
3 SOLUTION RESPIRATORY (INHALATION)
Status: DISCONTINUED | OUTPATIENT
Start: 2020-01-01 | End: 2020-01-01

## 2020-01-01 RX ORDER — DULOXETIN HYDROCHLORIDE 20 MG/1
20 CAPSULE, DELAYED RELEASE ORAL DAILY
Status: DISCONTINUED | OUTPATIENT
Start: 2020-01-01 | End: 2020-01-01 | Stop reason: HOSPADM

## 2020-01-01 RX ORDER — HALOPERIDOL 2 MG/ML
1 SOLUTION ORAL EVERY 4 HOURS PRN
Status: CANCELLED | OUTPATIENT
Start: 2020-01-01

## 2020-01-01 RX ADMIN — GABAPENTIN 400 MG: 400 CAPSULE ORAL at 09:14

## 2020-01-01 RX ADMIN — SILVER SULFADIAZINE: 10 CREAM TOPICAL at 09:16

## 2020-01-01 RX ADMIN — GABAPENTIN 400 MG: 400 CAPSULE ORAL at 20:55

## 2020-01-01 RX ADMIN — DILTIAZEM HYDROCHLORIDE 180 MG: 180 CAPSULE, COATED, EXTENDED RELEASE ORAL at 08:22

## 2020-01-01 RX ADMIN — PANTOPRAZOLE SODIUM 40 MG: 40 TABLET, DELAYED RELEASE ORAL at 21:56

## 2020-01-01 RX ADMIN — ONDANSETRON 4 MG: 2 INJECTION INTRAMUSCULAR; INTRAVENOUS at 04:45

## 2020-01-01 RX ADMIN — SODIUM CHLORIDE, PRESERVATIVE FREE 10 ML: 5 INJECTION INTRAVENOUS at 08:03

## 2020-01-01 RX ADMIN — SODIUM CHLORIDE, PRESERVATIVE FREE 10 ML: 5 INJECTION INTRAVENOUS at 21:29

## 2020-01-01 RX ADMIN — METOPROLOL TARTRATE 25 MG: 25 TABLET, FILM COATED ORAL at 09:54

## 2020-01-01 RX ADMIN — MORPHINE SULFATE 6 MG: 10 INJECTION INTRAVENOUS at 14:36

## 2020-01-01 RX ADMIN — Medication 1 TABLET: at 20:22

## 2020-01-01 RX ADMIN — SODIUM CHLORIDE, PRESERVATIVE FREE 10 ML: 5 INJECTION INTRAVENOUS at 08:40

## 2020-01-01 RX ADMIN — HYDROXYZINE HYDROCHLORIDE 25 MG: 25 TABLET, FILM COATED ORAL at 06:12

## 2020-01-01 RX ADMIN — HYDROMORPHONE HYDROCHLORIDE 0.5 MG: 1 INJECTION, SOLUTION INTRAMUSCULAR; INTRAVENOUS; SUBCUTANEOUS at 13:07

## 2020-01-01 RX ADMIN — HYDROMORPHONE HYDROCHLORIDE 1 MG: 1 INJECTION, SOLUTION INTRAMUSCULAR; INTRAVENOUS; SUBCUTANEOUS at 06:17

## 2020-01-01 RX ADMIN — ALBUTEROL SULFATE 6 PUFF: 90 AEROSOL, METERED RESPIRATORY (INHALATION) at 11:38

## 2020-01-01 RX ADMIN — SODIUM CHLORIDE, PRESERVATIVE FREE 10 ML: 5 INJECTION INTRAVENOUS at 08:34

## 2020-01-01 RX ADMIN — Medication: at 09:00

## 2020-01-01 RX ADMIN — Medication 1 TABLET: at 15:50

## 2020-01-01 RX ADMIN — SODIUM CHLORIDE, PRESERVATIVE FREE 10 ML: 5 INJECTION INTRAVENOUS at 09:20

## 2020-01-01 RX ADMIN — HYDROMORPHONE HYDROCHLORIDE 0.5 MG: 1 INJECTION, SOLUTION INTRAMUSCULAR; INTRAVENOUS; SUBCUTANEOUS at 04:53

## 2020-01-01 RX ADMIN — Medication 1 TABLET: at 08:40

## 2020-01-01 RX ADMIN — SODIUM CHLORIDE, PRESERVATIVE FREE 10 ML: 5 INJECTION INTRAVENOUS at 08:23

## 2020-01-01 RX ADMIN — PANTOPRAZOLE SODIUM 40 MG: 40 TABLET, DELAYED RELEASE ORAL at 20:12

## 2020-01-01 RX ADMIN — LORAZEPAM 1 MG: 2 INJECTION INTRAMUSCULAR; INTRAVENOUS at 09:45

## 2020-01-01 RX ADMIN — CINACALCET 30 MG: 30 TABLET ORAL at 09:48

## 2020-01-01 RX ADMIN — PANTOPRAZOLE SODIUM 40 MG: 40 TABLET, DELAYED RELEASE ORAL at 08:48

## 2020-01-01 RX ADMIN — OXYCODONE HYDROCHLORIDE 10 MG: 5 TABLET ORAL at 04:21

## 2020-01-01 RX ADMIN — DEXTROSE MONOHYDRATE 25 G: 25 INJECTION, SOLUTION INTRAVENOUS at 05:37

## 2020-01-01 RX ADMIN — SEVELAMER CARBONATE 2400 MG: 800 TABLET, FILM COATED ORAL at 13:52

## 2020-01-01 RX ADMIN — ROSUVASTATIN CALCIUM 40 MG: 40 TABLET, FILM COATED ORAL at 08:22

## 2020-01-01 RX ADMIN — SILVER SULFADIAZINE: 10 CREAM TOPICAL at 00:45

## 2020-01-01 RX ADMIN — CLOPIDOGREL 75 MG: 75 TABLET, FILM COATED ORAL at 08:22

## 2020-01-01 RX ADMIN — ROSUVASTATIN CALCIUM 40 MG: 40 TABLET, FILM COATED ORAL at 09:10

## 2020-01-01 RX ADMIN — BISOPROLOL FUMARATE 10 MG: 5 TABLET ORAL at 09:15

## 2020-01-01 RX ADMIN — Medication 1 TABLET: at 08:32

## 2020-01-01 RX ADMIN — ONDANSETRON 4 MG: 2 INJECTION INTRAMUSCULAR; INTRAVENOUS at 10:28

## 2020-01-01 RX ADMIN — SODIUM BICARBONATE 50 MEQ: 84 INJECTION, SOLUTION INTRAVENOUS at 17:55

## 2020-01-01 RX ADMIN — PANTOPRAZOLE SODIUM 40 MG: 40 TABLET, DELAYED RELEASE ORAL at 20:55

## 2020-01-01 RX ADMIN — APIXABAN 5 MG: 5 TABLET, FILM COATED ORAL at 20:22

## 2020-01-01 RX ADMIN — INSULIN LISPRO 2 UNITS: 100 INJECTION, SOLUTION INTRAVENOUS; SUBCUTANEOUS at 17:47

## 2020-01-01 RX ADMIN — Medication 1 TABLET: at 08:48

## 2020-01-01 RX ADMIN — HYDROMORPHONE HYDROCHLORIDE 1 MG: 1 INJECTION, SOLUTION INTRAMUSCULAR; INTRAVENOUS; SUBCUTANEOUS at 12:14

## 2020-01-01 RX ADMIN — INSULIN LISPRO 2 UNITS: 100 INJECTION, SOLUTION INTRAVENOUS; SUBCUTANEOUS at 21:45

## 2020-01-01 RX ADMIN — HYDROXYZINE HYDROCHLORIDE 25 MG: 25 TABLET, FILM COATED ORAL at 18:04

## 2020-01-01 RX ADMIN — OXYCODONE HYDROCHLORIDE 10 MG: 5 TABLET ORAL at 19:40

## 2020-01-01 RX ADMIN — ONDANSETRON 4 MG: 2 INJECTION INTRAMUSCULAR; INTRAVENOUS at 05:01

## 2020-01-01 RX ADMIN — MAGNESIUM SULFATE HEPTAHYDRATE 2 G: 500 INJECTION, SOLUTION INTRAMUSCULAR; INTRAVENOUS at 18:07

## 2020-01-01 RX ADMIN — ALBUTEROL SULFATE 6 PUFF: 90 AEROSOL, METERED RESPIRATORY (INHALATION) at 20:02

## 2020-01-01 RX ADMIN — EPOETIN ALFA-EPBX 10000 UNITS: 10000 INJECTION, SOLUTION INTRAVENOUS; SUBCUTANEOUS at 11:43

## 2020-01-01 RX ADMIN — HYDROMORPHONE HYDROCHLORIDE 1 MG: 1 INJECTION, SOLUTION INTRAMUSCULAR; INTRAVENOUS; SUBCUTANEOUS at 18:07

## 2020-01-01 RX ADMIN — Medication 1 TABLET: at 20:56

## 2020-01-01 RX ADMIN — NICOTINE 1 PATCH: 21 PATCH, EXTENDED RELEASE TRANSDERMAL at 16:40

## 2020-01-01 RX ADMIN — CYCLOBENZAPRINE 10 MG: 10 TABLET, FILM COATED ORAL at 08:28

## 2020-01-01 RX ADMIN — SODIUM CHLORIDE, PRESERVATIVE FREE 10 ML: 5 INJECTION INTRAVENOUS at 09:15

## 2020-01-01 RX ADMIN — Medication 1 TABLET: at 15:06

## 2020-01-01 RX ADMIN — FUROSEMIDE 80 MG: 10 INJECTION, SOLUTION INTRAMUSCULAR; INTRAVENOUS at 18:10

## 2020-01-01 RX ADMIN — BISOPROLOL FUMARATE 10 MG: 5 TABLET ORAL at 08:39

## 2020-01-01 RX ADMIN — SODIUM CHLORIDE, PRESERVATIVE FREE 10 ML: 5 INJECTION INTRAVENOUS at 20:44

## 2020-01-01 RX ADMIN — DILTIAZEM HYDROCHLORIDE 180 MG: 180 CAPSULE, COATED, EXTENDED RELEASE ORAL at 08:33

## 2020-01-01 RX ADMIN — ENOXAPARIN SODIUM 30 MG: 30 INJECTION SUBCUTANEOUS at 20:55

## 2020-01-01 RX ADMIN — GABAPENTIN 400 MG: 400 CAPSULE ORAL at 08:51

## 2020-01-01 RX ADMIN — ALBUMIN HUMAN 12.5 G: 0.25 SOLUTION INTRAVENOUS at 16:30

## 2020-01-01 RX ADMIN — SILVER SULFADIAZINE: 10 CREAM TOPICAL at 09:34

## 2020-01-01 RX ADMIN — EPINEPHRINE 1 MG: 0.1 INJECTION, SOLUTION ENDOTRACHEAL; INTRACARDIAC; INTRAVENOUS at 18:04

## 2020-01-01 RX ADMIN — NICOTINE 1 PATCH: 21 PATCH, EXTENDED RELEASE TRANSDERMAL at 14:35

## 2020-01-01 RX ADMIN — HYDROXYZINE HYDROCHLORIDE 25 MG: 25 TABLET, FILM COATED ORAL at 05:20

## 2020-01-01 RX ADMIN — HYDROMORPHONE HYDROCHLORIDE 1 MG: 1 INJECTION, SOLUTION INTRAMUSCULAR; INTRAVENOUS; SUBCUTANEOUS at 04:10

## 2020-01-01 RX ADMIN — SODIUM CHLORIDE, PRESERVATIVE FREE 10 ML: 5 INJECTION INTRAVENOUS at 22:26

## 2020-01-01 RX ADMIN — CLOPIDOGREL 75 MG: 75 TABLET, FILM COATED ORAL at 08:33

## 2020-01-01 RX ADMIN — ALBUMIN HUMAN 25 G: 0.25 SOLUTION INTRAVENOUS at 23:19

## 2020-01-01 RX ADMIN — GABAPENTIN 400 MG: 400 CAPSULE ORAL at 20:22

## 2020-01-01 RX ADMIN — INSULIN LISPRO 3 UNITS: 100 INJECTION, SOLUTION INTRAVENOUS; SUBCUTANEOUS at 20:33

## 2020-01-01 RX ADMIN — TAZOBACTAM SODIUM AND PIPERACILLIN SODIUM 3.38 G: 375; 3 INJECTION, SOLUTION INTRAVENOUS at 21:56

## 2020-01-01 RX ADMIN — HYDROCODONE BITARTRATE AND ACETAMINOPHEN 1 TABLET: 5; 325 TABLET ORAL at 14:23

## 2020-01-01 RX ADMIN — Medication 1 TABLET: at 08:28

## 2020-01-01 RX ADMIN — CYCLOBENZAPRINE 10 MG: 10 TABLET, FILM COATED ORAL at 20:55

## 2020-01-01 RX ADMIN — OXYCODONE 10 MG: 5 TABLET ORAL at 06:12

## 2020-01-01 RX ADMIN — SODIUM CHLORIDE, PRESERVATIVE FREE 10 ML: 5 INJECTION INTRAVENOUS at 08:22

## 2020-01-01 RX ADMIN — CALCIUM CHLORIDE 1 G: 100 INJECTION, SOLUTION INTRAVENOUS at 18:04

## 2020-01-01 RX ADMIN — VANCOMYCIN HYDROCHLORIDE 500 MG: 500 INJECTION, POWDER, LYOPHILIZED, FOR SOLUTION INTRAVENOUS at 18:04

## 2020-01-01 RX ADMIN — SODIUM CHLORIDE, PRESERVATIVE FREE 10 ML: 5 INJECTION INTRAVENOUS at 08:05

## 2020-01-01 RX ADMIN — NICOTINE 1 PATCH: 21 PATCH, EXTENDED RELEASE TRANSDERMAL at 18:03

## 2020-01-01 RX ADMIN — LORAZEPAM 2 MG: 2 INJECTION INTRAMUSCULAR; INTRAVENOUS at 04:10

## 2020-01-01 RX ADMIN — BISOPROLOL FUMARATE 10 MG: 5 TABLET ORAL at 15:05

## 2020-01-01 RX ADMIN — LORAZEPAM 2 MG: 2 INJECTION INTRAMUSCULAR; INTRAVENOUS at 06:17

## 2020-01-01 RX ADMIN — HEPARIN SODIUM 5000 UNITS: 5000 INJECTION INTRAVENOUS; SUBCUTANEOUS at 08:03

## 2020-01-01 RX ADMIN — LORAZEPAM 2 MG: 2 INJECTION INTRAMUSCULAR; INTRAVENOUS at 20:52

## 2020-01-01 RX ADMIN — PANTOPRAZOLE SODIUM 40 MG: 40 TABLET, DELAYED RELEASE ORAL at 05:09

## 2020-01-01 RX ADMIN — ENOXAPARIN SODIUM 30 MG: 30 INJECTION SUBCUTANEOUS at 17:36

## 2020-01-01 RX ADMIN — PROPOFOL 10 MCG/KG/MIN: 10 INJECTION, EMULSION INTRAVENOUS at 18:52

## 2020-01-01 RX ADMIN — SILVER SULFADIAZINE: 10 CREAM TOPICAL at 08:40

## 2020-01-01 RX ADMIN — GABAPENTIN 400 MG: 400 CAPSULE ORAL at 21:56

## 2020-01-01 RX ADMIN — SODIUM CHLORIDE, PRESERVATIVE FREE 10 ML: 5 INJECTION INTRAVENOUS at 19:30

## 2020-01-01 RX ADMIN — ALBUMIN HUMAN 12.5 G: 0.25 SOLUTION INTRAVENOUS at 10:30

## 2020-01-01 RX ADMIN — SILVER SULFADIAZINE: 10 CREAM TOPICAL at 01:36

## 2020-01-01 RX ADMIN — NICOTINE 1 PATCH: 21 PATCH, EXTENDED RELEASE TRANSDERMAL at 21:39

## 2020-01-01 RX ADMIN — SODIUM BICARBONATE 50 MEQ: 84 INJECTION, SOLUTION INTRAVENOUS at 19:44

## 2020-01-01 RX ADMIN — OXYCODONE HYDROCHLORIDE 10 MG: 5 TABLET ORAL at 08:39

## 2020-01-01 RX ADMIN — SEVELAMER CARBONATE 1600 MG: 800 TABLET, FILM COATED ORAL at 12:03

## 2020-01-01 RX ADMIN — LORAZEPAM 2 MG: 2 INJECTION INTRAMUSCULAR; INTRAVENOUS at 16:10

## 2020-01-01 RX ADMIN — ROSUVASTATIN CALCIUM 40 MG: 40 TABLET, FILM COATED ORAL at 08:48

## 2020-01-01 RX ADMIN — HYDROMORPHONE HYDROCHLORIDE 1 MG: 1 INJECTION, SOLUTION INTRAMUSCULAR; INTRAVENOUS; SUBCUTANEOUS at 01:16

## 2020-01-01 RX ADMIN — CINACALCET 30 MG: 30 TABLET ORAL at 08:39

## 2020-01-01 RX ADMIN — GABAPENTIN 400 MG: 400 CAPSULE ORAL at 08:22

## 2020-01-01 RX ADMIN — NICOTINE 1 PATCH: 14 PATCH TRANSDERMAL at 08:46

## 2020-01-01 RX ADMIN — SODIUM CHLORIDE, PRESERVATIVE FREE 10 ML: 5 INJECTION INTRAVENOUS at 07:30

## 2020-01-01 RX ADMIN — DULOXETINE HYDROCHLORIDE 20 MG: 20 CAPSULE, DELAYED RELEASE ORAL at 09:14

## 2020-01-01 RX ADMIN — VANCOMYCIN HYDROCHLORIDE 500 MG: 500 INJECTION, POWDER, LYOPHILIZED, FOR SOLUTION INTRAVENOUS at 20:55

## 2020-01-01 RX ADMIN — CLOPIDOGREL 75 MG: 75 TABLET, FILM COATED ORAL at 08:28

## 2020-01-01 RX ADMIN — ACETAMINOPHEN 650 MG: 325 TABLET, FILM COATED ORAL at 02:20

## 2020-01-01 RX ADMIN — OXYCODONE 10 MG: 5 TABLET ORAL at 20:12

## 2020-01-01 RX ADMIN — DULOXETINE HYDROCHLORIDE 60 MG: 60 CAPSULE, DELAYED RELEASE ORAL at 09:10

## 2020-01-01 RX ADMIN — ALBUMIN HUMAN 25 G: 0.25 SOLUTION INTRAVENOUS at 17:00

## 2020-01-01 RX ADMIN — CYCLOBENZAPRINE 10 MG: 10 TABLET, FILM COATED ORAL at 05:20

## 2020-01-01 RX ADMIN — MORPHINE SULFATE 2 MG: 2 INJECTION, SOLUTION INTRAMUSCULAR; INTRAVENOUS at 10:28

## 2020-01-01 RX ADMIN — Medication 1 TABLET: at 09:10

## 2020-01-01 RX ADMIN — HYDROXYZINE HYDROCHLORIDE 25 MG: 25 TABLET, FILM COATED ORAL at 04:01

## 2020-01-01 RX ADMIN — MORPHINE SULFATE 2 MG: 2 INJECTION, SOLUTION INTRAMUSCULAR; INTRAVENOUS at 06:16

## 2020-01-01 RX ADMIN — SODIUM CHLORIDE, PRESERVATIVE FREE 10 ML: 5 INJECTION INTRAVENOUS at 20:12

## 2020-01-01 RX ADMIN — Medication 1 TABLET: at 17:48

## 2020-01-01 RX ADMIN — AMIODARONE HYDROCHLORIDE 150 MG: 50 INJECTION, SOLUTION INTRAVENOUS at 18:10

## 2020-01-01 RX ADMIN — SEVELAMER CARBONATE 2400 MG: 800 TABLET, FILM COATED ORAL at 08:22

## 2020-01-01 RX ADMIN — INSULIN LISPRO 2 UNITS: 100 INJECTION, SOLUTION INTRAVENOUS; SUBCUTANEOUS at 09:14

## 2020-01-01 RX ADMIN — TAZOBACTAM SODIUM AND PIPERACILLIN SODIUM 3.38 G: 375; 3 INJECTION, SOLUTION INTRAVENOUS at 22:21

## 2020-01-01 RX ADMIN — OXYCODONE 10 MG: 5 TABLET ORAL at 15:50

## 2020-01-01 RX ADMIN — OXYCODONE 10 MG: 5 TABLET ORAL at 17:56

## 2020-01-01 RX ADMIN — Medication 1 TABLET: at 08:22

## 2020-01-01 RX ADMIN — SODIUM CHLORIDE, PRESERVATIVE FREE 10 ML: 5 INJECTION INTRAVENOUS at 12:13

## 2020-01-01 RX ADMIN — SODIUM CHLORIDE, PRESERVATIVE FREE 10 ML: 5 INJECTION INTRAVENOUS at 08:44

## 2020-01-01 RX ADMIN — HYDROMORPHONE HYDROCHLORIDE 1 MG: 1 INJECTION, SOLUTION INTRAMUSCULAR; INTRAVENOUS; SUBCUTANEOUS at 08:25

## 2020-01-01 RX ADMIN — DILTIAZEM HYDROCHLORIDE 180 MG: 180 CAPSULE, COATED, EXTENDED RELEASE ORAL at 09:10

## 2020-01-01 RX ADMIN — HYDROMORPHONE HYDROCHLORIDE 1 MG: 1 INJECTION, SOLUTION INTRAMUSCULAR; INTRAVENOUS; SUBCUTANEOUS at 06:48

## 2020-01-01 RX ADMIN — SODIUM CHLORIDE, PRESERVATIVE FREE 10 ML: 5 INJECTION INTRAVENOUS at 21:41

## 2020-01-01 RX ADMIN — CASTOR OIL AND BALSAM, PERU 5 G: 788; 87 OINTMENT TOPICAL at 15:14

## 2020-01-01 RX ADMIN — ALBUMIN HUMAN 12.5 G: 0.25 SOLUTION INTRAVENOUS at 10:40

## 2020-01-01 RX ADMIN — HEPARIN SODIUM 4000 UNITS: 5000 INJECTION INTRAVENOUS; SUBCUTANEOUS at 19:14

## 2020-01-01 RX ADMIN — INSULIN LISPRO 2 UNITS: 100 INJECTION, SOLUTION INTRAVENOUS; SUBCUTANEOUS at 08:33

## 2020-01-01 RX ADMIN — CASTOR OIL AND BALSAM, PERU 5 G: 788; 87 OINTMENT TOPICAL at 08:04

## 2020-01-01 RX ADMIN — GABAPENTIN 400 MG: 400 CAPSULE ORAL at 21:37

## 2020-01-01 RX ADMIN — VANCOMYCIN HYDROCHLORIDE 500 MG: 500 INJECTION, POWDER, LYOPHILIZED, FOR SOLUTION INTRAVENOUS at 19:10

## 2020-01-01 RX ADMIN — Medication 1 TABLET: at 17:30

## 2020-01-01 RX ADMIN — SODIUM CHLORIDE 2000 MG: 9 INJECTION, SOLUTION INTRAVENOUS at 15:10

## 2020-01-01 RX ADMIN — ENOXAPARIN SODIUM 30 MG: 30 INJECTION SUBCUTANEOUS at 20:33

## 2020-01-01 RX ADMIN — ENOXAPARIN SODIUM 30 MG: 30 INJECTION SUBCUTANEOUS at 22:27

## 2020-01-01 RX ADMIN — SODIUM CHLORIDE, PRESERVATIVE FREE 10 ML: 5 INJECTION INTRAVENOUS at 08:20

## 2020-01-01 RX ADMIN — SILVER SULFADIAZINE: 10 CREAM TOPICAL at 18:04

## 2020-01-01 RX ADMIN — PROPOFOL 100 MG: 10 INJECTION, EMULSION INTRAVENOUS at 18:50

## 2020-01-01 RX ADMIN — GABAPENTIN 400 MG: 400 CAPSULE ORAL at 15:11

## 2020-01-01 RX ADMIN — OXYCODONE HYDROCHLORIDE 10 MG: 5 TABLET ORAL at 06:00

## 2020-01-01 RX ADMIN — SODIUM CHLORIDE, PRESERVATIVE FREE 10 ML: 5 INJECTION INTRAVENOUS at 09:00

## 2020-01-01 RX ADMIN — CLOPIDOGREL 75 MG: 75 TABLET, FILM COATED ORAL at 08:40

## 2020-01-01 RX ADMIN — NICOTINE 1 PATCH: 14 PATCH TRANSDERMAL at 11:32

## 2020-01-01 RX ADMIN — TAZOBACTAM SODIUM AND PIPERACILLIN SODIUM 3.38 G: 375; 3 INJECTION, SOLUTION INTRAVENOUS at 09:11

## 2020-01-01 RX ADMIN — MORPHINE SULFATE 2 MG: 2 INJECTION, SOLUTION INTRAMUSCULAR; INTRAVENOUS at 21:46

## 2020-01-01 RX ADMIN — HEPARIN SODIUM 2900 UNITS: 5000 INJECTION INTRAVENOUS; SUBCUTANEOUS at 02:12

## 2020-01-01 RX ADMIN — DULOXETINE HYDROCHLORIDE 60 MG: 60 CAPSULE, DELAYED RELEASE ORAL at 08:22

## 2020-01-01 RX ADMIN — SODIUM BICARBONATE 50 MEQ: 84 INJECTION INTRAVENOUS at 18:24

## 2020-01-01 RX ADMIN — GABAPENTIN 400 MG: 400 CAPSULE ORAL at 20:56

## 2020-01-01 RX ADMIN — ONDANSETRON 4 MG: 2 INJECTION INTRAMUSCULAR; INTRAVENOUS at 02:07

## 2020-01-01 RX ADMIN — TAZOBACTAM SODIUM AND PIPERACILLIN SODIUM 3.38 G: 375; 3 INJECTION, SOLUTION INTRAVENOUS at 22:24

## 2020-01-01 RX ADMIN — SODIUM CHLORIDE, PRESERVATIVE FREE 10 ML: 5 INJECTION INTRAVENOUS at 09:11

## 2020-01-01 RX ADMIN — BISOPROLOL FUMARATE 10 MG: 5 TABLET ORAL at 08:22

## 2020-01-01 RX ADMIN — CLOPIDOGREL 75 MG: 75 TABLET, FILM COATED ORAL at 17:29

## 2020-01-01 RX ADMIN — ALBUTEROL SULFATE 8 PUFF: 90 AEROSOL, METERED RESPIRATORY (INHALATION) at 18:41

## 2020-01-01 RX ADMIN — SODIUM CHLORIDE, PRESERVATIVE FREE 10 ML: 5 INJECTION INTRAVENOUS at 00:01

## 2020-01-01 RX ADMIN — SODIUM CHLORIDE, PRESERVATIVE FREE 10 ML: 5 INJECTION INTRAVENOUS at 20:57

## 2020-01-01 RX ADMIN — SEVELAMER CARBONATE 2400 MG: 800 TABLET, FILM COATED ORAL at 17:44

## 2020-01-01 RX ADMIN — SODIUM CHLORIDE, PRESERVATIVE FREE 10 ML: 5 INJECTION INTRAVENOUS at 20:23

## 2020-01-01 RX ADMIN — HYDROMORPHONE HYDROCHLORIDE 1 MG: 1 INJECTION, SOLUTION INTRAMUSCULAR; INTRAVENOUS; SUBCUTANEOUS at 00:34

## 2020-01-01 RX ADMIN — ALBUMIN HUMAN 12.5 G: 0.25 SOLUTION INTRAVENOUS at 23:00

## 2020-01-01 RX ADMIN — ONDANSETRON 4 MG: 2 INJECTION INTRAMUSCULAR; INTRAVENOUS at 16:10

## 2020-01-01 RX ADMIN — DILTIAZEM HYDROCHLORIDE 180 MG: 180 CAPSULE, COATED, EXTENDED RELEASE ORAL at 15:06

## 2020-01-01 RX ADMIN — PROPOFOL 50 MCG/KG/MIN: 10 INJECTION, EMULSION INTRAVENOUS at 22:56

## 2020-01-01 RX ADMIN — TAZOBACTAM SODIUM AND PIPERACILLIN SODIUM 3.38 G: 375; 3 INJECTION, SOLUTION INTRAVENOUS at 08:32

## 2020-01-01 RX ADMIN — GABAPENTIN 400 MG: 400 CAPSULE ORAL at 08:39

## 2020-01-01 RX ADMIN — DULOXETINE HYDROCHLORIDE 60 MG: 60 CAPSULE, DELAYED RELEASE ORAL at 08:33

## 2020-01-01 RX ADMIN — SEVELAMER CARBONATE 1600 MG: 800 TABLET, FILM COATED ORAL at 08:33

## 2020-01-01 RX ADMIN — NICOTINE 1 PATCH: 21 PATCH, EXTENDED RELEASE TRANSDERMAL at 14:23

## 2020-01-01 RX ADMIN — LORAZEPAM 0.5 MG: 2 INJECTION INTRAMUSCULAR; INTRAVENOUS at 18:04

## 2020-01-01 RX ADMIN — FAMOTIDINE 20 MG: 10 INJECTION, SOLUTION INTRAVENOUS at 08:03

## 2020-01-01 RX ADMIN — ONDANSETRON 4 MG: 2 INJECTION INTRAMUSCULAR; INTRAVENOUS at 06:16

## 2020-01-01 RX ADMIN — SODIUM CHLORIDE, PRESERVATIVE FREE 10 ML: 5 INJECTION INTRAVENOUS at 12:26

## 2020-01-01 RX ADMIN — CYCLOBENZAPRINE 10 MG: 10 TABLET, FILM COATED ORAL at 09:48

## 2020-01-01 RX ADMIN — HYDROMORPHONE HYDROCHLORIDE 1 MG: 1 INJECTION, SOLUTION INTRAMUSCULAR; INTRAVENOUS; SUBCUTANEOUS at 20:52

## 2020-01-01 RX ADMIN — OXYCODONE 10 MG: 5 TABLET ORAL at 21:56

## 2020-01-01 RX ADMIN — PANTOPRAZOLE SODIUM 40 MG: 40 TABLET, DELAYED RELEASE ORAL at 21:37

## 2020-01-01 RX ADMIN — APIXABAN 2.5 MG: 2.5 TABLET, FILM COATED ORAL at 23:51

## 2020-01-01 RX ADMIN — DEXTROSE MONOHYDRATE 50 ML: 25 INJECTION, SOLUTION INTRAVENOUS at 09:03

## 2020-01-01 RX ADMIN — DOXYLAMINE SUCCINATE: 25 TABLET ORAL at 22:27

## 2020-01-01 RX ADMIN — NICOTINE 1 PATCH: 14 PATCH TRANSDERMAL at 13:56

## 2020-01-01 RX ADMIN — HEPARIN SODIUM 5000 UNITS: 5000 INJECTION INTRAVENOUS; SUBCUTANEOUS at 15:13

## 2020-01-01 RX ADMIN — LORAZEPAM 2 MG: 2 INJECTION INTRAMUSCULAR; INTRAVENOUS at 08:25

## 2020-01-01 RX ADMIN — INSULIN LISPRO 2 UNITS: 100 INJECTION, SOLUTION INTRAVENOUS; SUBCUTANEOUS at 08:48

## 2020-01-01 RX ADMIN — PROPOFOL 30 MCG/KG/MIN: 10 INJECTION, EMULSION INTRAVENOUS at 09:02

## 2020-01-01 RX ADMIN — Medication 1 TABLET: at 22:24

## 2020-01-01 RX ADMIN — ROSUVASTATIN CALCIUM 40 MG: 40 TABLET, FILM COATED ORAL at 08:40

## 2020-01-01 RX ADMIN — GLYCOPYRROLATE 0.2 MG: 0.2 INJECTION, SOLUTION INTRAMUSCULAR; INTRAVITREAL at 12:47

## 2020-01-01 RX ADMIN — ALBUMIN HUMAN 12.5 G: 0.25 SOLUTION INTRAVENOUS at 14:30

## 2020-01-01 RX ADMIN — ROSUVASTATIN CALCIUM 40 MG: 40 TABLET, FILM COATED ORAL at 08:29

## 2020-01-01 RX ADMIN — HEPARIN SODIUM 5000 UNITS: 5000 INJECTION INTRAVENOUS; SUBCUTANEOUS at 13:15

## 2020-01-01 RX ADMIN — HYDROMORPHONE HYDROCHLORIDE 1.5 MG: 2 INJECTION, SOLUTION INTRAMUSCULAR; INTRAVENOUS; SUBCUTANEOUS at 12:47

## 2020-01-01 RX ADMIN — SEVELAMER CARBONATE 1600 MG: 800 TABLET, FILM COATED ORAL at 09:10

## 2020-01-01 RX ADMIN — SODIUM CHLORIDE, PRESERVATIVE FREE 10 ML: 5 INJECTION INTRAVENOUS at 20:34

## 2020-01-01 RX ADMIN — Medication 1 TABLET: at 15:11

## 2020-01-01 RX ADMIN — CLOPIDOGREL 75 MG: 75 TABLET, FILM COATED ORAL at 09:14

## 2020-01-01 RX ADMIN — OXYCODONE HYDROCHLORIDE 10 MG: 5 TABLET ORAL at 12:40

## 2020-01-01 RX ADMIN — HYDROMORPHONE HYDROCHLORIDE 1 MG: 1 INJECTION, SOLUTION INTRAMUSCULAR; INTRAVENOUS; SUBCUTANEOUS at 08:43

## 2020-01-01 RX ADMIN — ALBUMIN HUMAN 12.5 G: 0.25 SOLUTION INTRAVENOUS at 12:29

## 2020-01-01 RX ADMIN — Medication: at 03:57

## 2020-01-01 RX ADMIN — SODIUM CHLORIDE, PRESERVATIVE FREE 10 ML: 5 INJECTION INTRAVENOUS at 22:00

## 2020-01-01 RX ADMIN — SEVELAMER CARBONATE 2400 MG: 800 TABLET, FILM COATED ORAL at 17:51

## 2020-01-01 RX ADMIN — MORPHINE SULFATE 2 MG: 2 INJECTION, SOLUTION INTRAMUSCULAR; INTRAVENOUS at 02:08

## 2020-01-01 RX ADMIN — SEVELAMER CARBONATE 1600 MG: 800 TABLET, FILM COATED ORAL at 17:29

## 2020-01-01 RX ADMIN — OXYCODONE 10 MG: 5 TABLET ORAL at 17:55

## 2020-01-01 RX ADMIN — FAMOTIDINE 20 MG: 10 INJECTION, SOLUTION INTRAVENOUS at 17:03

## 2020-01-01 RX ADMIN — DIATRIZOATE MEGLUMINE AND DIATRIZOATE SODIUM 30 ML: 600; 100 SOLUTION ORAL; RECTAL at 16:32

## 2020-01-01 RX ADMIN — CLOPIDOGREL 75 MG: 75 TABLET, FILM COATED ORAL at 08:44

## 2020-01-01 RX ADMIN — HYDROXYZINE HYDROCHLORIDE 25 MG: 25 TABLET, FILM COATED ORAL at 11:39

## 2020-01-01 RX ADMIN — HYDROXYZINE HYDROCHLORIDE 25 MG: 25 TABLET, FILM COATED ORAL at 11:47

## 2020-01-01 RX ADMIN — PANTOPRAZOLE SODIUM 40 MG: 40 TABLET, DELAYED RELEASE ORAL at 15:07

## 2020-01-01 RX ADMIN — GABAPENTIN 400 MG: 400 CAPSULE ORAL at 20:12

## 2020-01-01 RX ADMIN — CINACALCET 30 MG: 30 TABLET ORAL at 09:14

## 2020-01-01 RX ADMIN — SODIUM CHLORIDE, PRESERVATIVE FREE 10 ML: 5 INJECTION INTRAVENOUS at 13:09

## 2020-01-01 RX ADMIN — ONDANSETRON 4 MG: 2 INJECTION INTRAMUSCULAR; INTRAVENOUS at 12:11

## 2020-01-01 RX ADMIN — LORAZEPAM 0.5 MG: 2 INJECTION INTRAMUSCULAR; INTRAVENOUS at 14:19

## 2020-01-01 RX ADMIN — HEPARIN SODIUM 5000 UNITS: 5000 INJECTION INTRAVENOUS; SUBCUTANEOUS at 21:11

## 2020-01-01 RX ADMIN — HYDROMORPHONE HYDROCHLORIDE 1.5 MG: 2 INJECTION, SOLUTION INTRAMUSCULAR; INTRAVENOUS; SUBCUTANEOUS at 16:21

## 2020-01-01 RX ADMIN — CEFTAZIDIME 2 G: 2 INJECTION, POWDER, FOR SOLUTION INTRAVENOUS at 15:24

## 2020-01-01 RX ADMIN — HYDROMORPHONE HYDROCHLORIDE 1 MG: 1 INJECTION, SOLUTION INTRAMUSCULAR; INTRAVENOUS; SUBCUTANEOUS at 20:54

## 2020-01-01 RX ADMIN — PANTOPRAZOLE SODIUM 40 MG: 40 TABLET, DELAYED RELEASE ORAL at 20:22

## 2020-01-01 RX ADMIN — GABAPENTIN 400 MG: 400 CAPSULE ORAL at 22:27

## 2020-01-01 RX ADMIN — SEVELAMER CARBONATE 1600 MG: 800 TABLET, FILM COATED ORAL at 20:56

## 2020-01-01 RX ADMIN — PERFLUTREN 3 ML: 6.52 INJECTION, SUSPENSION INTRAVENOUS at 07:55

## 2020-01-01 RX ADMIN — SODIUM CHLORIDE, PRESERVATIVE FREE 10 ML: 5 INJECTION INTRAVENOUS at 23:00

## 2020-01-01 RX ADMIN — TAZOBACTAM SODIUM AND PIPERACILLIN SODIUM 3.38 G: 375; 3 INJECTION, SOLUTION INTRAVENOUS at 20:12

## 2020-01-01 RX ADMIN — OXYCODONE 10 MG: 5 TABLET ORAL at 21:27

## 2020-01-01 RX ADMIN — SODIUM CHLORIDE, PRESERVATIVE FREE 10 ML: 5 INJECTION INTRAVENOUS at 01:17

## 2020-01-01 RX ADMIN — LORAZEPAM 2 MG: 2 INJECTION INTRAMUSCULAR; INTRAVENOUS at 12:48

## 2020-01-01 RX ADMIN — TAZOBACTAM SODIUM AND PIPERACILLIN SODIUM 3.38 G: 375; 3 INJECTION, SOLUTION INTRAVENOUS at 12:55

## 2020-01-01 RX ADMIN — Medication 1 MG: at 21:10

## 2020-01-01 RX ADMIN — HYDROMORPHONE HYDROCHLORIDE 1 MG: 1 INJECTION, SOLUTION INTRAMUSCULAR; INTRAVENOUS; SUBCUTANEOUS at 14:19

## 2020-01-01 RX ADMIN — HEPARIN SODIUM 18 UNITS/KG/HR: 10000 INJECTION, SOLUTION INTRAVENOUS at 07:16

## 2020-01-01 RX ADMIN — HYDROMORPHONE HYDROCHLORIDE 1 MG: 1 INJECTION, SOLUTION INTRAMUSCULAR; INTRAVENOUS; SUBCUTANEOUS at 10:18

## 2020-01-01 RX ADMIN — GABAPENTIN 400 MG: 400 CAPSULE ORAL at 17:48

## 2020-01-01 RX ADMIN — NICOTINE 1 PATCH: 21 PATCH, EXTENDED RELEASE TRANSDERMAL at 13:51

## 2020-01-01 RX ADMIN — NICOTINE 1 PATCH: 21 PATCH, EXTENDED RELEASE TRANSDERMAL at 20:56

## 2020-01-01 RX ADMIN — OXYCODONE 10 MG: 5 TABLET ORAL at 00:33

## 2020-01-01 RX ADMIN — HYDROMORPHONE HYDROCHLORIDE 0.5 MG: 1 INJECTION, SOLUTION INTRAMUSCULAR; INTRAVENOUS; SUBCUTANEOUS at 17:24

## 2020-01-01 RX ADMIN — MORPHINE SULFATE 2 MG: 2 INJECTION, SOLUTION INTRAMUSCULAR; INTRAVENOUS at 21:53

## 2020-01-01 RX ADMIN — ONDANSETRON 4 MG: 2 INJECTION INTRAMUSCULAR; INTRAVENOUS at 09:45

## 2020-01-01 RX ADMIN — APIXABAN 2.5 MG: 2.5 TABLET, FILM COATED ORAL at 09:00

## 2020-01-01 RX ADMIN — DEXTROSE MONOHYDRATE 50 ML: 500 INJECTION PARENTERAL at 19:31

## 2020-01-01 RX ADMIN — Medication 1 TABLET: at 21:56

## 2020-01-01 RX ADMIN — HEPARIN SODIUM 5000 UNITS: 5000 INJECTION INTRAVENOUS; SUBCUTANEOUS at 06:23

## 2020-01-01 RX ADMIN — ALBUTEROL SULFATE 6 PUFF: 90 AEROSOL, METERED RESPIRATORY (INHALATION) at 07:04

## 2020-01-01 RX ADMIN — CLOPIDOGREL 75 MG: 75 TABLET, FILM COATED ORAL at 09:10

## 2020-01-01 RX ADMIN — FLUTICASONE PROPIONATE 2 SPRAY: 50 SPRAY, METERED NASAL at 17:30

## 2020-01-01 RX ADMIN — CYCLOBENZAPRINE 10 MG: 10 TABLET, FILM COATED ORAL at 09:41

## 2020-01-01 RX ADMIN — OXYCODONE HYDROCHLORIDE 10 MG: 5 TABLET ORAL at 18:19

## 2020-01-01 RX ADMIN — HEPARIN SODIUM 5000 UNITS: 5000 INJECTION INTRAVENOUS; SUBCUTANEOUS at 21:27

## 2020-01-01 RX ADMIN — HYDROMORPHONE HYDROCHLORIDE 1 MG: 1 INJECTION, SOLUTION INTRAMUSCULAR; INTRAVENOUS; SUBCUTANEOUS at 00:46

## 2020-01-01 RX ADMIN — GABAPENTIN 400 MG: 400 CAPSULE ORAL at 15:50

## 2020-01-01 RX ADMIN — DEXTROSE MONOHYDRATE 50 ML: 25 INJECTION, SOLUTION INTRAVENOUS at 18:23

## 2020-01-01 RX ADMIN — DULOXETINE HYDROCHLORIDE 60 MG: 60 CAPSULE, DELAYED RELEASE ORAL at 17:29

## 2020-01-01 RX ADMIN — HEPARIN SODIUM 12 UNITS/KG/HR: 10000 INJECTION, SOLUTION INTRAVENOUS at 22:07

## 2020-01-01 RX ADMIN — GLYCOPYRROLATE 0.2 MG: 0.2 INJECTION, SOLUTION INTRAMUSCULAR; INTRAVITREAL at 16:20

## 2020-01-01 RX ADMIN — LORAZEPAM 0.5 MG: 2 INJECTION INTRAMUSCULAR; INTRAVENOUS at 08:43

## 2020-01-01 RX ADMIN — HYDROXYZINE HYDROCHLORIDE 25 MG: 25 TABLET, FILM COATED ORAL at 10:54

## 2020-01-01 RX ADMIN — OXYCODONE HYDROCHLORIDE 10 MG: 5 TABLET ORAL at 09:37

## 2020-01-01 RX ADMIN — SEVELAMER CARBONATE 1600 MG: 800 TABLET, FILM COATED ORAL at 11:50

## 2020-01-01 RX ADMIN — ROSUVASTATIN CALCIUM 40 MG: 40 TABLET, FILM COATED ORAL at 09:15

## 2020-01-01 RX ADMIN — SEVELAMER CARBONATE 1600 MG: 800 TABLET, FILM COATED ORAL at 17:48

## 2020-01-01 RX ADMIN — VANCOMYCIN HYDROCHLORIDE 2000 MG: 10 INJECTION, POWDER, LYOPHILIZED, FOR SOLUTION INTRAVENOUS at 13:28

## 2020-01-01 RX ADMIN — PANTOPRAZOLE SODIUM 40 MG: 40 TABLET, DELAYED RELEASE ORAL at 08:22

## 2020-01-01 RX ADMIN — SODIUM CHLORIDE 5 MG/HR: 900 INJECTION, SOLUTION INTRAVENOUS at 21:37

## 2020-01-01 RX ADMIN — INSULIN LISPRO 2 UNITS: 100 INJECTION, SOLUTION INTRAVENOUS; SUBCUTANEOUS at 22:22

## 2020-01-01 RX ADMIN — OXYCODONE HYDROCHLORIDE 10 MG: 5 TABLET ORAL at 05:20

## 2020-01-01 RX ADMIN — DEXTROSE MONOHYDRATE 50 ML: 25 INJECTION, SOLUTION INTRAVENOUS at 07:29

## 2020-01-01 RX ADMIN — SODIUM CHLORIDE, PRESERVATIVE FREE 10 ML: 5 INJECTION INTRAVENOUS at 20:24

## 2020-01-01 RX ADMIN — LORAZEPAM 2 MG: 2 INJECTION INTRAMUSCULAR; INTRAVENOUS at 00:34

## 2020-01-01 RX ADMIN — SODIUM CHLORIDE, PRESERVATIVE FREE 10 ML: 5 INJECTION INTRAVENOUS at 21:37

## 2020-01-01 RX ADMIN — CHLORHEXIDINE GLUCONATE 15 ML: 1.2 RINSE ORAL at 08:04

## 2020-01-01 RX ADMIN — ENOXAPARIN SODIUM 30 MG: 30 INJECTION SUBCUTANEOUS at 20:24

## 2020-01-01 RX ADMIN — PANTOPRAZOLE SODIUM 40 MG: 40 TABLET, DELAYED RELEASE ORAL at 09:14

## 2020-01-01 RX ADMIN — INSULIN LISPRO 2 UNITS: 100 INJECTION, SOLUTION INTRAVENOUS; SUBCUTANEOUS at 08:40

## 2020-01-01 RX ADMIN — FLUTICASONE PROPIONATE 2 SPRAY: 50 SPRAY, METERED NASAL at 08:52

## 2020-01-01 RX ADMIN — ONDANSETRON 4 MG: 2 INJECTION INTRAMUSCULAR; INTRAVENOUS at 12:24

## 2020-01-01 RX ADMIN — SODIUM CHLORIDE, PRESERVATIVE FREE 10 ML: 5 INJECTION INTRAVENOUS at 00:02

## 2020-01-01 RX ADMIN — HEPARIN SODIUM 13 UNITS/KG/HR: 10000 INJECTION, SOLUTION INTRAVENOUS at 14:34

## 2020-01-01 RX ADMIN — INSULIN HUMAN 10 UNITS: 100 INJECTION, SOLUTION PARENTERAL at 18:22

## 2020-01-01 RX ADMIN — CALCIUM GLUCONATE 1 G: 98 INJECTION, SOLUTION INTRAVENOUS at 18:25

## 2020-01-01 RX ADMIN — HYDROMORPHONE HYDROCHLORIDE 1 MG: 1 INJECTION, SOLUTION INTRAMUSCULAR; INTRAVENOUS; SUBCUTANEOUS at 16:10

## 2020-01-01 RX ADMIN — OXYCODONE HYDROCHLORIDE 10 MG: 5 TABLET ORAL at 11:25

## 2020-01-01 RX ADMIN — GABAPENTIN 400 MG: 400 CAPSULE ORAL at 08:32

## 2020-01-01 RX ADMIN — LORAZEPAM 2 MG: 2 INJECTION INTRAMUSCULAR; INTRAVENOUS at 12:14

## 2020-01-01 RX ADMIN — CHLORHEXIDINE GLUCONATE 15 ML: 1.2 RINSE ORAL at 21:10

## 2020-01-01 RX ADMIN — TAZOBACTAM SODIUM AND PIPERACILLIN SODIUM 3.38 G: 375; 3 INJECTION, SOLUTION INTRAVENOUS at 20:22

## 2020-01-01 RX ADMIN — HYDROMORPHONE HYDROCHLORIDE 0.5 MG: 1 INJECTION, SOLUTION INTRAMUSCULAR; INTRAVENOUS; SUBCUTANEOUS at 21:24

## 2020-01-01 RX ADMIN — HYDROMORPHONE HYDROCHLORIDE 1 MG: 1 INJECTION, SOLUTION INTRAMUSCULAR; INTRAVENOUS; SUBCUTANEOUS at 11:56

## 2020-01-01 RX ADMIN — LORAZEPAM 2 MG: 2 INJECTION INTRAMUSCULAR; INTRAVENOUS at 10:18

## 2020-01-01 RX ADMIN — BISOPROLOL FUMARATE 10 MG: 5 TABLET ORAL at 15:04

## 2020-01-01 RX ADMIN — CLOPIDOGREL 75 MG: 75 TABLET, FILM COATED ORAL at 08:48

## 2020-01-01 RX ADMIN — HEPARIN SODIUM 4000 UNITS: 5000 INJECTION INTRAVENOUS; SUBCUTANEOUS at 05:04

## 2020-01-01 RX ADMIN — SODIUM CHLORIDE, PRESERVATIVE FREE 10 ML: 5 INJECTION INTRAVENOUS at 21:57

## 2020-01-01 RX ADMIN — DULOXETINE HYDROCHLORIDE 20 MG: 20 CAPSULE, DELAYED RELEASE ORAL at 08:40

## 2020-01-01 RX ADMIN — SEVELAMER CARBONATE 1600 MG: 800 TABLET, FILM COATED ORAL at 08:48

## 2020-01-01 RX ADMIN — NICOTINE 1 PATCH: 21 PATCH, EXTENDED RELEASE TRANSDERMAL at 18:56

## 2020-01-01 RX ADMIN — SODIUM CHLORIDE, PRESERVATIVE FREE 10 ML: 5 INJECTION INTRAVENOUS at 12:24

## 2020-01-01 RX ADMIN — INSULIN HUMAN 10 UNITS: 100 INJECTION, SOLUTION PARENTERAL at 19:31

## 2020-01-01 RX ADMIN — DILTIAZEM HYDROCHLORIDE 180 MG: 180 CAPSULE, COATED, EXTENDED RELEASE ORAL at 10:52

## 2020-01-01 RX ADMIN — BISOPROLOL FUMARATE 10 MG: 5 TABLET ORAL at 08:28

## 2020-01-01 RX ADMIN — PROPOFOL 50 MCG/KG/MIN: 10 INJECTION, EMULSION INTRAVENOUS at 02:15

## 2020-01-01 RX ADMIN — Medication 1 TABLET: at 08:33

## 2020-01-01 RX ADMIN — BISOPROLOL FUMARATE 10 MG: 5 TABLET ORAL at 17:28

## 2020-01-01 RX ADMIN — METOPROLOL TARTRATE 25 MG: 25 TABLET, FILM COATED ORAL at 20:33

## 2020-01-01 RX ADMIN — SEVELAMER CARBONATE 2400 MG: 800 TABLET, FILM COATED ORAL at 15:07

## 2020-01-01 RX ADMIN — INSULIN LISPRO 3 UNITS: 100 INJECTION, SOLUTION INTRAVENOUS; SUBCUTANEOUS at 13:15

## 2020-01-01 RX ADMIN — HYDROMORPHONE HYDROCHLORIDE 1 MG: 1 INJECTION, SOLUTION INTRAMUSCULAR; INTRAVENOUS; SUBCUTANEOUS at 05:56

## 2020-01-01 RX ADMIN — Medication 1 TABLET: at 09:14

## 2020-01-01 RX ADMIN — TAZOBACTAM SODIUM AND PIPERACILLIN SODIUM 3.38 G: 375; 3 INJECTION, SOLUTION INTRAVENOUS at 08:48

## 2020-01-01 RX ADMIN — SODIUM CHLORIDE, PRESERVATIVE FREE 10 ML: 5 INJECTION INTRAVENOUS at 08:52

## 2020-01-01 RX ADMIN — DULOXETINE HYDROCHLORIDE 20 MG: 20 CAPSULE, DELAYED RELEASE ORAL at 08:22

## 2020-01-01 RX ADMIN — SILVER SULFADIAZINE: 10 CREAM TOPICAL at 08:29

## 2020-01-01 RX ADMIN — GABAPENTIN 200 MG: 100 CAPSULE ORAL at 08:44

## 2020-01-01 RX ADMIN — ONDANSETRON 4 MG: 2 INJECTION INTRAMUSCULAR; INTRAVENOUS at 21:52

## 2020-01-01 RX ADMIN — HYDROMORPHONE HYDROCHLORIDE 1 MG: 1 INJECTION, SOLUTION INTRAMUSCULAR; INTRAVENOUS; SUBCUTANEOUS at 09:45

## 2020-01-01 RX ADMIN — ALBUMIN HUMAN 12.5 G: 0.25 SOLUTION INTRAVENOUS at 11:09

## 2020-01-01 RX ADMIN — CASTOR OIL AND BALSAM, PERU 5 G: 788; 87 OINTMENT TOPICAL at 20:34

## 2020-01-01 RX ADMIN — ALBUMIN HUMAN 12.5 G: 0.25 SOLUTION INTRAVENOUS at 14:00

## 2020-01-01 RX ADMIN — PANTOPRAZOLE SODIUM 40 MG: 40 TABLET, DELAYED RELEASE ORAL at 20:56

## 2020-01-01 RX ADMIN — HEPARIN SODIUM 16 UNITS/KG/HR: 10000 INJECTION, SOLUTION INTRAVENOUS at 10:18

## 2020-01-01 RX ADMIN — DOCUSATE SODIUM 100 MG: 100 CAPSULE, LIQUID FILLED ORAL at 20:55

## 2020-01-01 RX ADMIN — ALBUMIN HUMAN 12.5 G: 0.25 SOLUTION INTRAVENOUS at 10:20

## 2020-01-01 RX ADMIN — SODIUM CHLORIDE, PRESERVATIVE FREE 10 ML: 5 INJECTION INTRAVENOUS at 08:06

## 2020-01-01 RX ADMIN — OXYCODONE 10 MG: 5 TABLET ORAL at 09:37

## 2020-01-01 RX ADMIN — BISOPROLOL FUMARATE 10 MG: 5 TABLET ORAL at 08:33

## 2020-01-01 RX ADMIN — MORPHINE SULFATE 2 MG: 2 INJECTION, SOLUTION INTRAMUSCULAR; INTRAVENOUS at 02:17

## 2020-01-01 RX ADMIN — DULOXETINE HYDROCHLORIDE 60 MG: 60 CAPSULE, DELAYED RELEASE ORAL at 08:48

## 2020-01-01 RX ADMIN — NICOTINE 1 PATCH: 21 PATCH, EXTENDED RELEASE TRANSDERMAL at 19:09

## 2020-01-01 RX ADMIN — Medication 1 TABLET: at 20:12

## 2020-01-01 RX ADMIN — OXYCODONE HYDROCHLORIDE 10 MG: 5 TABLET ORAL at 22:27

## 2020-01-01 RX ADMIN — HYDROXYZINE HYDROCHLORIDE 25 MG: 25 TABLET, FILM COATED ORAL at 03:25

## 2020-01-01 RX ADMIN — LORAZEPAM 2 MG: 2 INJECTION INTRAMUSCULAR; INTRAVENOUS at 16:21

## 2020-01-01 RX ADMIN — METOPROLOL TARTRATE 25 MG: 25 TABLET, FILM COATED ORAL at 23:51

## 2020-01-01 RX ADMIN — SODIUM CHLORIDE, PRESERVATIVE FREE 10 ML: 5 INJECTION INTRAVENOUS at 09:13

## 2020-01-01 RX ADMIN — OXYCODONE HYDROCHLORIDE 10 MG: 5 TABLET ORAL at 08:22

## 2020-01-01 RX ADMIN — PANTOPRAZOLE SODIUM 40 MG: 40 TABLET, DELAYED RELEASE ORAL at 22:27

## 2020-01-01 RX ADMIN — Medication 1 MG: at 22:27

## 2020-01-01 RX ADMIN — HYDROMORPHONE HYDROCHLORIDE 0.5 MG: 1 INJECTION, SOLUTION INTRAMUSCULAR; INTRAVENOUS; SUBCUTANEOUS at 18:04

## 2020-01-01 RX ADMIN — HYDROMORPHONE HYDROCHLORIDE 1 MG: 1 INJECTION, SOLUTION INTRAMUSCULAR; INTRAVENOUS; SUBCUTANEOUS at 04:45

## 2020-01-01 RX ADMIN — DULOXETINE HYDROCHLORIDE 60 MG: 60 CAPSULE, DELAYED RELEASE ORAL at 15:04

## 2020-01-01 RX ADMIN — CASTOR OIL AND BALSAM, PERU 5 G: 788; 87 OINTMENT TOPICAL at 21:12

## 2020-01-01 RX ADMIN — SODIUM CHLORIDE, PRESERVATIVE FREE 10 ML: 5 INJECTION INTRAVENOUS at 16:10

## 2020-01-01 RX ADMIN — ONDANSETRON 4 MG: 2 INJECTION INTRAMUSCULAR; INTRAVENOUS at 14:36

## 2020-01-01 RX ADMIN — Medication 100 MG: at 18:50

## 2020-01-01 RX ADMIN — OXYCODONE 10 MG: 5 TABLET ORAL at 11:59

## 2020-01-01 RX ADMIN — GLYCOPYRROLATE 0.2 MG: 0.2 INJECTION, SOLUTION INTRAMUSCULAR; INTRAVITREAL at 20:52

## 2020-01-01 RX ADMIN — SILVER SULFADIAZINE: 10 CREAM TOPICAL at 13:56

## 2020-01-01 RX ADMIN — OXYCODONE HYDROCHLORIDE 10 MG: 5 TABLET ORAL at 20:55

## 2020-01-01 RX ADMIN — HYDROXYZINE HYDROCHLORIDE 25 MG: 25 TABLET, FILM COATED ORAL at 18:16

## 2020-01-01 RX ADMIN — PANTOPRAZOLE SODIUM 40 MG: 40 TABLET, DELAYED RELEASE ORAL at 08:28

## 2020-01-01 RX ADMIN — HYDROXYZINE HYDROCHLORIDE 25 MG: 25 TABLET, FILM COATED ORAL at 19:09

## 2020-01-01 RX ADMIN — SODIUM CHLORIDE 750 MG: 900 INJECTION, SOLUTION INTRAVENOUS at 17:45

## 2020-01-01 RX ADMIN — GABAPENTIN 400 MG: 400 CAPSULE ORAL at 08:28

## 2020-01-01 RX ADMIN — ONDANSETRON 4 MG: 2 INJECTION INTRAMUSCULAR; INTRAVENOUS at 14:24

## 2020-01-01 RX ADMIN — PANTOPRAZOLE SODIUM 40 MG: 40 TABLET, DELAYED RELEASE ORAL at 15:14

## 2020-01-01 RX ADMIN — BISOPROLOL FUMARATE 10 MG: 5 TABLET ORAL at 08:48

## 2020-01-01 RX ADMIN — SILVER SULFADIAZINE: 10 CREAM TOPICAL at 09:15

## 2020-01-01 RX ADMIN — ROSUVASTATIN CALCIUM 40 MG: 40 TABLET, FILM COATED ORAL at 08:33

## 2020-01-01 RX ADMIN — MORPHINE SULFATE 2 MG: 2 INJECTION, SOLUTION INTRAMUSCULAR; INTRAVENOUS at 06:31

## 2020-01-01 RX ADMIN — PANTOPRAZOLE SODIUM 40 MG: 40 TABLET, DELAYED RELEASE ORAL at 09:10

## 2020-01-01 RX ADMIN — PANTOPRAZOLE SODIUM 40 MG: 40 TABLET, DELAYED RELEASE ORAL at 08:39

## 2020-01-01 RX ADMIN — SODIUM CHLORIDE, PRESERVATIVE FREE 10 ML: 5 INJECTION INTRAVENOUS at 08:02

## 2020-01-01 RX ADMIN — METOPROLOL TARTRATE 25 MG: 25 TABLET, FILM COATED ORAL at 12:57

## 2020-01-01 RX ADMIN — SILVER SULFADIAZINE: 10 CREAM TOPICAL at 04:20

## 2020-01-01 RX ADMIN — GABAPENTIN 400 MG: 400 CAPSULE ORAL at 09:10

## 2020-01-01 RX ADMIN — SILVER SULFADIAZINE: 10 CREAM TOPICAL at 12:06

## 2020-01-01 RX ADMIN — Medication 1 TABLET: at 17:36

## 2020-01-01 RX ADMIN — ROSUVASTATIN CALCIUM 40 MG: 40 TABLET, FILM COATED ORAL at 17:28

## 2020-01-01 RX ADMIN — HEPARIN SODIUM 4000 UNITS: 5000 INJECTION INTRAVENOUS; SUBCUTANEOUS at 07:21

## 2020-01-01 RX ADMIN — OXYCODONE 10 MG: 5 TABLET ORAL at 08:29

## 2020-01-01 RX ADMIN — SODIUM CHLORIDE, PRESERVATIVE FREE 10 ML: 5 INJECTION INTRAVENOUS at 09:45

## 2020-01-01 RX ADMIN — Medication 1 APPLICATION: at 08:05

## 2020-01-01 RX ADMIN — CASTOR OIL AND BALSAM, PERU 5 G: 788; 87 OINTMENT TOPICAL at 09:01

## 2020-01-01 RX ADMIN — ALBUMIN HUMAN 25 G: 0.25 SOLUTION INTRAVENOUS at 16:09

## 2020-01-01 RX ADMIN — ALBUTEROL SULFATE 6 PUFF: 90 AEROSOL, METERED RESPIRATORY (INHALATION) at 22:50

## 2020-01-01 RX ADMIN — SILVER SULFADIAZINE: 10 CREAM TOPICAL at 22:30

## 2020-01-01 RX ADMIN — SODIUM CHLORIDE, PRESERVATIVE FREE 10 ML: 5 INJECTION INTRAVENOUS at 13:07

## 2020-01-01 RX ADMIN — ROSUVASTATIN CALCIUM 40 MG: 40 TABLET, FILM COATED ORAL at 15:06

## 2020-01-01 RX ADMIN — TAZOBACTAM SODIUM AND PIPERACILLIN SODIUM 3.38 G: 375; 3 INJECTION, SOLUTION INTRAVENOUS at 08:23

## 2020-01-01 RX ADMIN — DULOXETINE HYDROCHLORIDE 20 MG: 20 CAPSULE, DELAYED RELEASE ORAL at 08:28

## 2020-01-01 RX ADMIN — PANTOPRAZOLE SODIUM 40 MG: 40 TABLET, DELAYED RELEASE ORAL at 08:32

## 2020-01-29 PROBLEM — Z72.0 TOBACCO ABUSE: Chronic | Status: ACTIVE | Noted: 2020-01-01

## 2020-01-29 PROBLEM — E11.40 DIABETIC NEUROPATHY (HCC): Status: ACTIVE | Noted: 2020-01-01

## 2020-01-29 PROBLEM — L03.116 CELLULITIS OF BOTH LOWER EXTREMITIES: Status: ACTIVE | Noted: 2020-01-01

## 2020-01-29 PROBLEM — Z99.2 ESRD (END STAGE RENAL DISEASE) ON DIALYSIS (HCC): Chronic | Status: ACTIVE | Noted: 2020-01-01

## 2020-01-29 PROBLEM — N18.6 ESRD (END STAGE RENAL DISEASE) ON DIALYSIS (HCC): Chronic | Status: ACTIVE | Noted: 2020-01-01

## 2020-01-29 PROBLEM — L00 SCALDED SKIN SYNDROME: Status: ACTIVE | Noted: 2020-01-01

## 2020-01-29 PROBLEM — L03.115 CELLULITIS OF BOTH LOWER EXTREMITIES: Status: ACTIVE | Noted: 2020-01-01

## 2020-02-01 NOTE — THERAPY TREATMENT NOTE
Patient Name: Sergio Phan  : 1970    MRN: [id number]                              Today's Date: 2020       Admit Date: 2020    Visit Dx:     ICD-10-CM ICD-9-CM   1. Cellulitis of both lower extremities L03.115 682.6    L03.116    2. PAD (peripheral artery disease) (CMS/[facility]) I73.9 443.9   3. Chronic renal failure, unspecified CKD stage N18.9 585.9     Patient Active Problem List   Diagnosis   • AICD (automatic cardioverter/defibrillator) present   • Dizziness   • Fatigue   • Hypertension   • Hypertrophic obstructive cardiomyopathy (CMS/HCC)   • Hypertriglyceridemia   • Kidney disorder   • Type 2 diabetes mellitus (CMS/[facility])   • Syncope   • Vitamin D deficiency   • Weakness   • Idiopathic acute pancreatitis   • VT (ventricular tachycardia) (CMS/[facility])   • Acute pancreatitis   • Pancreatitis, recurrent   • Hypertensive emergency   • Stage 5 chronic kidney disease on chronic dialysis (CMS/[facility])   • Severe diabetic hypoglycemia (CMS/[facility])   • Atherosclerosis of native arteries of extremity with rest pain (CMS/[facility])   • Cellulitis of both lower extremities   • ESRD (end stage renal disease) on dialysis   • Tobacco abuse   • Scalded skin syndrome   • Diabetic neuropathy (CMS/HCC)     Past Medical History:   Diagnosis Date   • Anxiety    • Asthma    • Chest pain    • COPD (chronic obstructive pulmonary disease) (CMS/[facility])    • Depression    • Diabetes mellitus (CMS/HCC)     TYPE II   • Fatigue    • Gout    • HOCM (hypertrophic obstructive cardiomyopathy) (CMS/[facility])    • Hypertension    • Hypertriglyceridemia    • Myocardial infarction (CMS/[facility])    • Pancreatitis    • Renal disorder    • Syncope      Past Surgical History:   Procedure Laterality Date   • ABDOMINAL SURGERY     • ARTERIOVENOUS FISTULA/SHUNT SURGERY Right 2018    Procedure: RT/ ARTERIOVENOUS FISTULA FORMATION;  Surgeon: Chuck Woodward MD;  Location: [facility];  Service: Vascular   • ATHRECTOMY ILIAC, FEMORAL, TIBIAL ARTERY Bilateral  5/2/2018    Procedure: JEFFERY LOWER EXTREMITY ANGIOGRAM, LEFT SFA  AND POPLITEAL LASER ARTHRECTOMY WITH DRUG COATED BALLOON, IVUS X3;  Surgeon: Chuck Woodward MD;  Location: [facility] OR 18/19;  Service: Vascular   • CARDIAC CATHETERIZATION     • CARDIAC DEFIBRILLATOR PLACEMENT     • CARDIAC DEFIBRILLATOR PLACEMENT     • CHOLECYSTECTOMY     • ENDOSCOPY N/A 8/30/2016    Procedure: ESOPHAGOGASTRODUODENOSCOPY;  Surgeon: Irineo Mercedes MD;  Location[facility];  Service:    • ERCP     • INSERT / REPLACE / REMOVE PACEMAKER      [facility] [place]   • INSERTION HEMODIALYSIS CATHETER N/A 4/30/2018    Procedure: HEMODIALYSIS CATHETER INSERTION;  Surgeon: Mya Wilkinson Jr., MD;  Location: [facility] OR 18/19;  Service: Vascular   • LAPAROSCOPIC APPENDECTOMY     • OTHER SURGICAL HISTORY      NO REMOVAL OF APPENDIX  PATIENT HAS A APPENDIX   • UMBILICAL HERNIA REPAIR       General Information     Row Name 02/01/20 1611          PT Evaluation Time/Intention    Document Type  therapy note (daily note)  -     Mode of Treatment  physical therapy  -     Row Name 02/01/20 1611          General Information    Barriers to Rehab  physical barrier  -CF       User Key  (r) = Recorded By, (t) = Taken By, (c) = Cosigned By    Initials Name Provider Type    CF Eric Jc, PT Physical Therapist        Mobility     Row Name 02/01/20 1612          Bed Mobility Assessment/Treatment    Bed Mobility Assessment/Treatment  supine-sit  -CF     Supine-Sit [place] (Bed Mobility)  minimum assist (75% patient effort);1 person assist  -CF     Row Name 02/01/20 1612          Sit-Stand Transfer    Sit-Stand [place] (Transfers)  minimum assist (75% patient effort);1 person assist  -CF     Assistive Device (Sit-Stand Transfers)  walker, front-wheeled  -CF     Row Name 02/01/20 1612          Gait/Stairs Assessment/Training    Gait/Stairs Assessment/Training  gait/ambulation assistive device  -CF     [place] Level (Gait)   contact guard;minimum assist (75% patient effort);1 person assist  -CF     Assistive Device (Gait)  walker, front-wheeled  -CF     Distance in Feet (Gait)  20  -CF     Pattern (Gait)  step-through  -CF     Deviations/Abnormal Patterns (Gait)  base of support, wide;stride length decreased;gait speed decreased  -CF     Bilateral Gait Deviations  forward flexed posture;heel strike decreased  -CF       User Key  (r) = Recorded By, (t) = Taken By, (c) = Cosigned By    Initials Name Provider Type     Eric Jc, PT Physical Therapist        Obj/Interventions     Row Name 02/01/20 1615          Static Sitting Balance    Level of [place] (Unsupported Sitting, Static Balance)  conditional independence  -CF     Sitting Position (Unsupported Sitting, Static Balance)  sitting on edge of bed  -CF     Time Able to Maintain Position (Unsupported Sitting, Static Balance)  15 to 30 seconds  -CF     Row Name 02/01/20 1615          Sensory Assessment/Intervention    Sensory General Assessment  no sensation deficits identified  -CF       User Key  (r) = Recorded By, (t) = Taken By, (c) = Cosigned By    Initials Name Provider Type     Eric Jc, PT Physical Therapist        Goals/Plan     Row Name 02/01/20 1618          Bed Mobility Goal 1 (PT)    Activity/Assistive Device (Bed Mobility Goal 1, PT)  bed mobility activities, all  -CF     [place] Level/Cues Needed (Bed Mobility Goal 1, PT)  conditional independence  -CF     Time Frame (Bed Mobility Goal 1, PT)  short term goal (STG);2 days  -CF       User Key  (r) = Recorded By, (t) = Taken By, (c) = Cosigned By    Initials Name Provider Type     Eric Jc, PT Physical Therapist        Clinical Impression     Row Name 02/01/20 1615          Pain Assessment    Additional Documentation  Pain Scale: Numbers Pre/Post-Treatment (Group)  -CF     Row Name 02/01/20 1615          Pain Scale: Numbers Pre/Post-Treatment    Pain Scale: Numbers, Pretreatment   6/10  -CF     Pain Scale: Numbers, Post-Treatment  6/10  -CF     Pain Location - Side  Bilateral  -CF     Pain Location  other (see comments) lower legs  -CF     Pain Intervention(s)  Elevated;Repositioned  -CF     Row Name 02/01/20 1615          Plan of Care Review    Plan of Care Reviewed With  patient;father  -CF     Progress  improving  -CF     Outcome Summary  Patient more tolerable to therapy activity this session. Was not sure if he could stand to bear weight, but encouraged to try. Ended up performing gait across room to sitting in recliner. Mobility and gait required min assist. Gait was mostly on toes and slowed.  -CF     Row Name 02/01/20 1615          Physical Therapy Clinical Impression    Criteria for Skilled Interventions Met (PT Clinical Impression)  treatment indicated  -CF     Rehab Potential (PT Clinical Summary)  good, to achieve stated therapy goals  -CF       User Key  (r) = Recorded By, (t) = Taken By, (c) = Cosigned By    Initials Name Provider Type    Eric Webster, PT Physical Therapist        Outcome Measures     Row Name 02/01/20 1618          How much help from another person do you currently need...    Turning from your back to your side while in flat bed without using bedrails?  3  -CF     Moving from lying on back to sitting on the side of a flat bed without bedrails?  3  -CF     Moving to and from a bed to a chair (including a wheelchair)?  2  -CF     Standing up from a chair using your arms (e.g., wheelchair, bedside chair)?  3  -CF     Climbing 3-5 steps with a railing?  1  -CF     To walk in hospital room?  3  -CF     AM-PAC 6 Clicks Score (PT)  15  -CF     Row Name 02/01/20 1618          Functional Assessment    Outcome Measure Options  AM-PAC 6 Clicks Basic Mobility (PT)  -CF       User Key  (r) = Recorded By, (t) = Taken By, (c) = Cosigned By    Initials Name Provider Type    Eric Webster, PT Physical Therapist          PT Recommendation and Plan     Outcome  Summary/Treatment Plan (PT)  Anticipated Equipment Needs at Discharge (PT): front wheeled walker  Anticipated Discharge Disposition (PT): home with assist  Plan of Care Reviewed With: patient, father  Progress: improving  Outcome Summary: Patient more tolerable to therapy activity this session. Was not sure if he could stand to bear weight, but encouraged to try. Ended up performing gait across room to sitting in recliner. Mobility and gait required min assist. Gait was mostly on toes and slowed.     Time Calculation:   PT Charges     Row Name 02/01/20 1619             Time Calculation    Start Time  1555  -CF      Stop Time  1615  -CF      Time Calculation (min)  20 min  -CF         Time Calculation- PT    Total Timed Code Minutes- PT  20 minute(s)  -CF        User Key  (r) = Recorded By, (t) = Taken By, (c) = Cosigned By    Initials Name Provider Type    CF Eric Jc, PT Physical Therapist        Therapy Charges for Today     Code Description Service Date Service Provider Modifiers Qty    [id number] HC GAIT TRAINING EA 15 MIN 2/1/2020 Eric Jc, PT GP 1    [id number]  PT THERAPEUTIC ACT EA 15 MIN 2/1/2020 Eric Jc, PT GP 1          PT G-Codes  Outcome Measure Options: AM-PAC 6 Clicks Basic Mobility (PT)  AM-PAC 6 Clicks Score (PT): 15  AM-PAC 6 Clicks Score (OT): 15    Eric Jc PT  2/1/2020

## 2020-02-01 NOTE — PLAN OF CARE
Pt received pain med twice during shift.  R. Should has been bothering pt more today.  Ordered some Bengay from pharm.  PT has pt up in recliner.   Possible DC 1-2 days.  VSS.  Will cont to monitor.    Problem: Patient Care Overview  Goal: Plan of Care Review  Outcome: Ongoing (interventions implemented as appropriate)  Goal: Individualization and Mutuality  Outcome: Ongoing (interventions implemented as appropriate)  Goal: Discharge Needs Assessment  Outcome: Ongoing (interventions implemented as appropriate)  Goal: Interprofessional Rounds/Family Conf  Outcome: Ongoing (interventions implemented as appropriate)     Problem: Fall Risk (Adult)  Goal: Absence of Fall  Outcome: Ongoing (interventions implemented as appropriate)     Problem: Skin Injury Risk (Adult)  Goal: Skin Health and Integrity  Outcome: Ongoing (interventions implemented as appropriate)     Problem: Pain, Chronic (Adult)  Goal: Acceptable Pain/Comfort Level and Functional Ability  Outcome: Ongoing (interventions implemented as appropriate)     Problem: Hemodialysis (Adult)  Goal: Signs and Symptoms of Listed Potential Problems Will be Absent, Minimized or Managed (Hemodialysis)  Outcome: Ongoing (interventions implemented as appropriate)     Problem: Infection, Risk/Actual (Adult)  Goal: Infection Prevention/Resolution  Outcome: Ongoing (interventions implemented as appropriate)

## 2020-02-01 NOTE — PROGRESS NOTES
NEPHROLOGY PROGRESS NOTE    PATIENT IDENTIFICATION:   Name:  Sergio Phan      MRN:  [id number]     49 y.o.  male             Reason for visit: ESRD    SUBJECTIVE:   The patient is feeling much better today had dialysis yesterday without any difficulties denies any chest pain or shortness of air, no orthopnea or PND, no nausea or vomiting, no dysuria, his lower extremities are wrapped but he stated that the skin improving with the current treatment.    OBJECTIVE:  Vitals:    01/31/20 2004 01/31/20 2258 02/01/20 0512 02/01/20 0733   BP: 114/67 100/57  102/67   BP Location: Left arm Left arm     Patient Position: Lying Lying     Pulse: 85 62  64   Resp: 18 18  16   Temp: 98.1 °F (36.7 °C) 98.3 °F (36.8 °C)  98 °F (36.7 °C)   TempSrc: Oral Oral  Oral   SpO2:    95%   Weight:   90.6 kg (199 lb 11.8 oz)    Height:               Body mass index is 27.87 kg/m².    Intake/Output Summary (Last 24 hours) at 2/1/2020 1051  Last data filed at 2/1/2020 0839  Gross per 24 hour   Intake 240 ml   Output 3000 ml   Net -2760 ml     Wt Readings from Last 1 Encounters:   02/01/20 0512 90.6 kg (199 lb 11.8 oz)   01/31/20 0511 93 kg (205 lb)   01/30/20 1744 90.7 kg (199 lb 15.3 oz)   01/30/20 0636 93.7 kg (206 lb 9.1 oz)   01/29/20 1802 96 kg (211 lb 11 oz)   01/29/20 1134 95.9 kg (211 lb 8 oz)     Wt Readings from Last 3 Encounters:   02/01/20 90.6 kg (199 lb 11.8 oz)   03/26/19 92.5 kg (204 lb)   11/27/18 86.6 kg (191 lb)         Exam:  General Appearance: alert, oriented x 3, no acute distress, appears to be chronically ill  Skin: warm and dry  HEENT: pupils round and reactive to light, oral mucosa normal,   Neck: supple, no JVD, trachea midline, bilateral carotid bruit  Lungs: Bilateral rhonchi, unlabored breathing effort  Heart: RRR, normal S1 and S2, no S3, no rub  Abdomen: soft, non-tender,  present bowel sounds to auscultation  : no palpable bladder,  Extremities: Lower extremities are wrapped, has functioning AV  fistula in the right upper arm  Neuro: normal speech and mental status      Scheduled meds:      ammonium lactate  Topical Daily   b complex-vitamin c-folic acid 1 tablet Oral Daily   bisoprolol 10 mg Oral Daily   cinacalcet 30 mg Oral Daily   clopidogrel 75 mg Oral Daily   DULoxetine 20 mg Oral Daily   enoxaparin 30 mg Subcutaneous Nightly   fluticasone 1 spray Each Nare Daily   gabapentin 400 mg Oral Q12H   hydrOXYzine 25 mg Oral Q8H   insulin lispro 0-7 Units Subcutaneous 4x Daily With Meals & Nightly   nicotine 1 patch Transdermal Q24H   pantoprazole 40 mg Oral BID   rosuvastatin 40 mg Oral Daily   silver sulfadiazine  Topical Q24H   sodium chloride 10 mL Intravenous Q12H   Vancomycin Pharmacy Intermittent Dosing  Does not apply Daily     IV meds:                          Pharmacy to dose vancomycin        Data Review:    Results from last 7 days   Lab Units 02/01/20  0554 01/31/20  0555 01/30/20  0604 01/29/20  1156   SODIUM mmol/L 140 138 138 142   POTASSIUM mmol/L 5.0 4.8 3.6 5.3*   CHLORIDE mmol/L 98 96* 96* 100   CO2 mmol/L 24.7 21.9* 26.2 22.4   BUN mg/dL 31* 51* 27* 58*   CREATININE mg/dL 4.94* 6.14* 3.78* 7.53*   CALCIUM mg/dL 8.6 8.3* 8.4* 8.5*   BILIRUBIN mg/dL  --   --   --  0.4   ALK PHOS U/L  --   --   --  207*   ALT (SGPT) U/L  --   --   --  18   AST (SGOT) U/L  --   --   --  9   GLUCOSE mg/dL 174* 155* 98 138*     Estimated Creatinine Clearance: 20.6 mL/min (A) (by C-G formula based on SCr of 4.94 mg/dL (H)).      Results from last 7 days   Lab Units 02/01/20  0554 01/31/20  0555 01/30/20  0604   MAGNESIUM mg/dL  --  2.1 2.0   PHOSPHORUS mg/dL 4.4 5.2* 3.1       Results from last 7 days   Lab Units 02/01/20  0554 01/31/20  0555 01/30/20  0604 01/29/20  1156   WBC 10*3/mm3 4.35 4.27 4.61 5.69   HEMOGLOBIN g/dL 8.0* 8.3* 8.8* 9.4*   PLATELETS 10*3/mm3 125* 110* 116* 163                   ASSESSMENT:     AICD (automatic cardioverter/defibrillator) present    Hypertension    Hypertrophic obstructive  cardiomyopathy (CMS/HCC)    Type 2 diabetes mellitus (CMS/HCC)    Cellulitis of both lower extremities    ESRD (end stage renal disease) on dialysis    Tobacco abuse    Scalded skin syndrome    Diabetic neuropathy (CMS/HCC)    1.  ESRD: Had dialysis yesterday with significant improvement, potassium is 5, creatinine 4.94.  2.  Leg weakness and inability to walk  3.  Bilateral leg wounds with severe venous stasis dermatitis and cellulitis  4.  IDDM  5.  COPD with active tobacco abuse  6.  Chronic pancreatitis  7.  Anemia, hemoglobin today is 8, unfortunately the patient would not be responsive to HENRIK until the inflammatory process of his lower extremities is resolved because inflammation renders HENRIK's ineffective  8.  Elevated troponin, of uncertain significance  9.  HTN, much better with Valium removal with dialysis  10.  PVD/CAD  11.  Thrombocytopenia, platelet today 125,000        PLAN:  1.  Continue the same treatment  2.  No indication for ultrafiltration today for any.  3.  Surveillance    Blaise Morgan MD  2/1/2020    10:51 AM

## 2020-02-01 NOTE — PLAN OF CARE
Problem: Patient Care Overview  Goal: Plan of Care Review  Outcome: Ongoing (interventions implemented as appropriate)  Flowsheets (Taken 2/1/2020 1615)  Progress: improving  Plan of Care Reviewed With: patient;father  Outcome Summary: Patient more tolerable to therapy activity this session. Was not sure if he could stand to bear weight, but encouraged to try. Ended up performing gait across room to sitting in recliner. Mobility and gait required min assist. Gait was mostly on toes and slowed.

## 2020-02-01 NOTE — PROGRESS NOTES
"  Infectious Diseases Progress Note    Rodney Ortiz MD     [facility]  [place]: 3 days  Patient Identification:  Name: Sregio Phan  Age: 49 y.o.  Sex: male  :  1970  MRN: [id number]         Primary Care Physician: Kimberlee Longoria APRN            Subjective: Feeling much better than yesterday.  Denies any fever and chills.  Appetite is improving.  Interval History: See consultation note.    Objective:    Scheduled Meds:    ammonium lactate  Topical Daily   b complex-vitamin c-folic acid 1 tablet Oral Daily   bisoprolol 10 mg Oral Daily   cinacalcet 30 mg Oral Daily   clopidogrel 75 mg Oral Daily   DULoxetine 20 mg Oral Daily   enoxaparin 30 mg Subcutaneous Nightly   fluticasone 1 spray Each Nare Daily   gabapentin 400 mg Oral Q12H   hydrOXYzine 25 mg Oral Q8H   insulin lispro 0-7 Units Subcutaneous 4x Daily With Meals & Nightly   nicotine 1 patch Transdermal Q24H   pantoprazole 40 mg Oral BID   rosuvastatin 40 mg Oral Daily   silver sulfadiazine  Topical Q24H   sodium chloride 10 mL Intravenous Q12H   Vancomycin Pharmacy Intermittent Dosing  Does not apply Daily     Continuous Infusions:    Pharmacy to dose vancomycin        Vital signs in last 24 hours:  Temp:  [98 °F (36.7 °C)-98.3 °F (36.8 °C)] 98.2 °F (36.8 °C)  Heart Rate:  [58-85] 58  Resp:  [16-18] 16  BP: (100-114)/(57-67) 102/60    Intake/Output:    Intake/Output Summary (Last 24 hours) at 2020 1816  Last data filed at 2020 1700  Gross per 24 hour   Intake 920 ml   Output 3000 ml   Net -2080 ml       Exam:  /60   Pulse 58   Temp 98.2 °F (36.8 °C) (Oral)   Resp 16   Ht 180.3 cm (70.98\")   Wt 90.6 kg (199 lb 11.8 oz)   SpO2 95%   BMI 27.87 kg/m²     General Appearance:  Chronically ill but does not appear to be in any acute distress                          Head:    Normocephalic, without obvious abnormality, atraumatic                           Eyes:    PERRL, conjunctivae/corneas clear, EOM's intact, " both eyes                         Throat:   Lips, tongue, gums normal; oral mucosa pink and moist                           Neck:   Supple, symmetrical, trachea midline, no JVD                         Lungs:    Clear to auscultation bilaterally, respirations unlabored                 Chest Wall:    No tenderness or deformity                          Heart:    Regular rate and rhythm, S1 and S2 normal                  Abdomen:     Soft, non-tender, bowel sounds active                 Extremities: Dressed and unchanged.  Right arm AV fistula intact                        Pulses:   Pulses palpable in all extremities                            Skin: Lower extremity skin lesions dressed.                  Neurologic: Grossly nonfocal       Data Review:    I reviewed the patient's new clinical results.  Results from last 7 days   Lab Units 02/01/20  0554 01/31/20  0555 01/30/20  0604 01/29/20  1156   WBC 10*3/mm3 4.35 4.27 4.61 5.69   HEMOGLOBIN g/dL 8.0* 8.3* 8.8* 9.4*   PLATELETS 10*3/mm3 125* 110* 116* 163     Results from last 7 days   Lab Units 02/01/20  0554 01/31/20  0555 01/30/20  0604 01/29/20  1156   SODIUM mmol/L 140 138 138 142   POTASSIUM mmol/L 5.0 4.8 3.6 5.3*   CHLORIDE mmol/L 98 96* 96* 100   CO2 mmol/L 24.7 21.9* 26.2 22.4   BUN mg/dL 31* 51* 27* 58*   CREATININE mg/dL 4.94* 6.14* 3.78* 7.53*   CALCIUM mg/dL 8.6 8.3* 8.4* 8.5*   GLUCOSE mg/dL 174* 155* 98 138*     Microbiology Results (last 10 days)     ** No results found for the last 240 hours. **            Assessment:    AICD (automatic cardioverter/defibrillator) present    Hypertension    Hypertrophic obstructive cardiomyopathy (CMS/HCC)    Type 2 diabetes mellitus (CMS/HCC)    Cellulitis of both lower extremities    ESRD (end stage renal disease) on dialysis    Tobacco abuse    Scalded skin syndrome    Diabetic neuropathy (CMS/HCC)    Recommendations:   · Continue with local care and IV vancomycin based on the renal function for dialysis schedule.   In this situation a very precarious blood flow due to peripheral vascular disease and precarious nature of absorption of oral antibiotics, selection of agents such as doxycycline or Zyvox may not be reasonable in this situation and hence IV vancomycin with hemodialysis would be a better way to go about it to address soft tissue infection while him being prepared for definitive intervention.  · Would recommend couple of weeks of IV vancomycin based on the levels under the direction of our nephrology colleagues.    · Plan for vascular intervention per vascular surgery service.    Rodney Ortiz MD  2/1/2020  6:16 PM    Much of this encounter note is an electronic transcription/translation of spoken language to printed text. The electronic translation of spoken language may permit erroneous, or at times, nonsensical words or phrases to be inadvertently transcribed; Although I have reviewed the note for such errors, some may still exist

## 2020-02-01 NOTE — PROGRESS NOTES
NEPHROLOGY PROGRESS NOTE    PATIENT IDENTIFICATION:   Name:  Sergio Phan      MRN:  [id number]     49 y.o.  male             Reason for visit: ESRD    SUBJECTIVE:   Breathing is better; feels less puffy; had HD earlier today with 3 L removed; appetite is good; still has pain in both lower extremities; he is not able to walk due to leg pain    OBJECTIVE:  Vitals:    01/31/20 0511 01/31/20 0756 01/31/20 1335 01/31/20 1755   BP:  121/67 107/55 133/72   BP Location:  Left arm Right arm Left arm   Patient Position:  Sitting Lying Lying   Pulse:  71 66 75   Resp:  18 18 16   Temp:  98.2 °F (36.8 °C) 98 °F (36.7 °C) 98.1 °F (36.7 °C)   TempSrc:  Oral Oral Oral   SpO2:  96%     Weight: 93 kg (205 lb)      Height:               Body mass index is 28.6 kg/m².    Intake/Output Summary (Last 24 hours) at 1/31/2020 1926  Last data filed at 1/31/2020 1824  Gross per 24 hour   Intake 360 ml   Output 3000 ml   Net -2640 ml     Wt Readings from Last 1 Encounters:   01/31/20 0511 93 kg (205 lb)   01/30/20 1744 90.7 kg (199 lb 15.3 oz)   01/30/20 0636 93.7 kg (206 lb 9.1 oz)   01/29/20 1802 96 kg (211 lb 11 oz)   01/29/20 1134 95.9 kg (211 lb 8 oz)     Wt Readings from Last 3 Encounters:   01/31/20 93 kg (205 lb)   03/26/19 92.5 kg (204 lb)   11/27/18 86.6 kg (191 lb)         Exam:  NAD; pleasant; appropriate; looks older than stated age  Chronically ill-appearing  MMM; AT/NC   No eye discharge; no scleral icterus; slight periorbital edema  +JVD; bilateral carotid bruits  Coarse bilat; diminished in bases; edema of chest and abdominal wall  RRR, no rub  Soft, NT, +D, BS+  + 2 edema extending into hips and abdominal wall  Legs wrapped   No asterixis  Moves all extremities  Mood and affect are normal  Speech is fluent    Scheduled meds:      ammonium lactate  Topical Daily   b complex-vitamin c-folic acid 1 tablet Oral Daily   bisoprolol 10 mg Oral Daily   cinacalcet 30 mg Oral Daily   clopidogrel 75 mg Oral Daily    DULoxetine 20 mg Oral Daily   enoxaparin 30 mg Subcutaneous Nightly   fluticasone 1 spray Each Nare Daily   gabapentin 400 mg Oral Q12H   hydrOXYzine 25 mg Oral Q8H   insulin lispro 0-7 Units Subcutaneous 4x Daily With Meals & Nightly   nicotine 1 patch Transdermal Q24H   pantoprazole 40 mg Oral BID   rosuvastatin 40 mg Oral Daily   silver sulfadiazine  Topical Q24H   sodium chloride 10 mL Intravenous Q12H   vancomycin 500 mg Intravenous Once   Vancomycin Pharmacy Intermittent Dosing  Does not apply Daily     IV meds:                          Pharmacy to dose vancomycin        Data Review:    Results from last 7 days   Lab Units 01/31/20  0555 01/30/20  0604 01/29/20  1156   SODIUM mmol/L 138 138 142   POTASSIUM mmol/L 4.8 3.6 5.3*   CHLORIDE mmol/L 96* 96* 100   CO2 mmol/L 21.9* 26.2 22.4   BUN mg/dL 51* 27* 58*   CREATININE mg/dL 6.14* 3.78* 7.53*   CALCIUM mg/dL 8.3* 8.4* 8.5*   BILIRUBIN mg/dL  --   --  0.4   ALK PHOS U/L  --   --  207*   ALT (SGPT) U/L  --   --  18   AST (SGOT) U/L  --   --  9   GLUCOSE mg/dL 155* 98 138*     Estimated Creatinine Clearance: 16.8 mL/min (A) (by C-G formula based on SCr of 6.14 mg/dL (H)).      Results from last 7 days   Lab Units 01/31/20  0555 01/30/20  0604   MAGNESIUM mg/dL 2.1 2.0   PHOSPHORUS mg/dL 5.2* 3.1       Results from last 7 days   Lab Units 01/31/20  0555 01/30/20  0604 01/29/20  1156   WBC 10*3/mm3 4.27 4.61 5.69   HEMOGLOBIN g/dL 8.3* 8.8* 9.4*   PLATELETS 10*3/mm3 110* 116* 163                   ASSESSMENT:     AICD (automatic cardioverter/defibrillator) present    Hypertension    Hypertrophic obstructive cardiomyopathy (CMS/HCC)    Type 2 diabetes mellitus (CMS/HCC)    Cellulitis of both lower extremities    ESRD (end stage renal disease) on dialysis    Tobacco abuse    Scalded skin syndrome    Diabetic neuropathy (CMS/HCC)    1.  ESRD: huge volume excess, peripherally and centrally; + AG metabolic acidosis  2.  Leg weakness and inability to walk  3.   Bilateral leg wounds with severe venous stasis dermatitis and cellulitis  4.  IDDM  5.  COPD with active tobacco abuse  6.  Chronic pancreatitis  7.  Anemia with likely component of dilution  8.  Elevated troponin, of uncertain significance  9.  HTN, aggravated by vol excess  10.  PVD/CAD  11.  Thrombocytopenia        PLAN:  1.  HD M WF  2.  Will evaluate for possible iUF this weekend for additional fluid removal  3.  Wound care    Simone Gonzalez MD  1/31/2020    7:26 PM

## 2020-02-01 NOTE — PROGRESS NOTES
LOS: 3 days     Name: Sergio Phan  Age/Sex: 49 y.o. male  :  1970        PCP: Kimberlee Longoria APRN  Chief Complaint   Patient presents with   • Wound Infection     bilateral leg wounds      Subjective   Overall he is feeling better.  He is happy to have a day off dialysis today.  He is feeling pretty tired and fatigued today.  Denies any fevers or chills overnight pain is controlled.  General: No Fever or Chills, Cardiac: No Chest Pain or Palpitations, Resp: No Cough or SOA, GI: No Nausea, Vomiting, or Diarrhea and Other: No bleeding      ammonium lactate  Topical Daily   b complex-vitamin c-folic acid 1 tablet Oral Daily   bisoprolol 10 mg Oral Daily   cinacalcet 30 mg Oral Daily   clopidogrel 75 mg Oral Daily   DULoxetine 20 mg Oral Daily   enoxaparin 30 mg Subcutaneous Nightly   fluticasone 1 spray Each Nare Daily   gabapentin 400 mg Oral Q12H   hydrOXYzine 25 mg Oral Q8H   insulin lispro 0-7 Units Subcutaneous 4x Daily With Meals & Nightly   nicotine 1 patch Transdermal Q24H   pantoprazole 40 mg Oral BID   rosuvastatin 40 mg Oral Daily   silver sulfadiazine  Topical Q24H   sodium chloride 10 mL Intravenous Q12H   Vancomycin Pharmacy Intermittent Dosing  Does not apply Daily       Pharmacy to dose vancomycin        Objective   Vital Signs  Temp:  [98 °F (36.7 °C)-98.3 °F (36.8 °C)] 98 °F (36.7 °C)  Heart Rate:  [62-85] 64  Resp:  [16-18] 16  BP: (100-133)/(55-72) 102/67  Body mass index is 27.87 kg/m².    Intake/Output Summary (Last 24 hours) at 2020 1316  Last data filed at 2020 0839  Gross per 24 hour   Intake 240 ml   Output 3000 ml   Net -2760 ml       Physical Exam   Constitutional: He is oriented to person, place, and time. He appears well-developed and well-nourished.   HENT:   Head: Normocephalic and atraumatic.   Cardiovascular: Normal rate, regular rhythm and normal heart sounds.   Pulmonary/Chest: Effort normal and breath sounds normal.   Abdominal: Soft. Bowel  sounds are normal.   Musculoskeletal: Normal range of motion. He exhibits no edema.   Neurological: He is alert and oriented to person, place, and time. No cranial nerve deficit.   Psychiatric: He has a normal mood and affect. His behavior is normal.   Nursing note and vitals reviewed.            Results Review:       I reviewed the patient's new clinical results.  Results from last 7 days   Lab Units 02/01/20  0554 01/31/20  0555 01/30/20  0604 01/29/20  1156   WBC 10*3/mm3 4.35 4.27 4.61 5.69   HEMOGLOBIN g/dL 8.0* 8.3* 8.8* 9.4*   PLATELETS 10*3/mm3 125* 110* 116* 163     Results from last 7 days   Lab Units 02/01/20  0554 01/31/20  0555 01/30/20  0604 01/29/20  1156   SODIUM mmol/L 140 138 138 142   POTASSIUM mmol/L 5.0 4.8 3.6 5.3*   CHLORIDE mmol/L 98 96* 96* 100   CO2 mmol/L 24.7 21.9* 26.2 22.4   BUN mg/dL 31* 51* 27* 58*   CREATININE mg/dL 4.94* 6.14* 3.78* 7.53*   CALCIUM mg/dL 8.6 8.3* 8.4* 8.5*   MAGNESIUM mg/dL  --  2.1 2.0  --    PHOSPHORUS mg/dL 4.4 5.2* 3.1  --    Estimated Creatinine Clearance: 20.6 mL/min (A) (by C-G formula based on SCr of 4.94 mg/dL (H)).  Lab Results   Component Value Date    HGBA1C 5.3 12/13/2018    HGBA1C 5.48 04/26/2018    HGBA1C 7.90 (H) 03/20/2017     Glucose   Date/Time Value Ref Range Status   02/01/2020 1127 133 (H) 70 - 130 mg/dL Final   02/01/2020 0553 177 (H) 70 - 130 mg/dL Final   01/31/2020 2023 193 (H) 70 - 130 mg/dL Final   01/31/2020 1142 106 70 - 130 mg/dL Final   01/31/2020 0641 184 (H) 70 - 130 mg/dL Final   01/30/2020 2155 194 (H) 70 - 130 mg/dL Final   01/30/2020 1754 113 70 - 130 mg/dL Final   01/30/2020 1117 217 (H) 70 - 130 mg/dL Final     Assessment/Plan     AICD (automatic cardioverter/defibrillator) present    Hypertension    Hypertrophic obstructive cardiomyopathy (CMS/HCC)    Type 2 diabetes mellitus (CMS/HCC)    Cellulitis of both lower extremities    ESRD (end stage renal disease) on dialysis    Tobacco abuse    Scalded skin syndrome    Diabetic  neuropathy (CMS/[facility])    PLAN  -This is a difficult situation.  Continue IV antibiotics for the superficial skin infection, hopefully able to transition to oral antibiotics in a day or 2  -Vascular surgery recommending outpatient follow-up in their office.  They are wanting the infection to be cleared up and managed prior to determining any interventions required  -Continue dialysis per nephrology recommendations  -Wound care place and moisturizing lotions and then placed zinc and into wraps yesterday continue further care  -Nephrology following for dialysis  -Glucose is ok continue current management    Disposition  Home Sunday or Monday depending on progress    Irineo Zayas MD  [facility]  02/01/20  1:16 PM

## 2020-02-01 NOTE — PROGRESS NOTES
"Pharmacokinetic Consult - Vancomycin Dosing (Follow-up Note)    Sergio Phan is a 49 y.o. male who is on day 4 pharmacy to dose vancomycin for SSTI, cellulitis of BLE  Pharmacy dosing vancomycin per Dr. Haider's request.   Goal pre-IHD level: 15-25 mcg/mL, Typical HD schedule MWF  ID following (Dr. Ortiz)    Current Vancomycin dose: intermittent    HPI: Hx of peripheral vascular disease with stents, end-stage renal disease on dialysis, diabetes who presents to the ER with bilateral legs red weeping and painful.  Patient missed dialysis on 1/29 and states that his legs have been red and then weeping and wounds for weeks but worse over the last week.  He is got severe diabetic neuropathy and he is been unable to ambulate for the last week.  His mom has been changing the bandages 3 times a week.  She says she has to wheel him in a wheelchair.    Relevant clinical data and objective history reviewed:  180.3 cm (70.98\")  90.6 kg (199 lb 11.8 oz)  Body mass index is 27.87 kg/m².   He has a past medical history of Anxiety, Asthma, Chest pain, COPD (chronic obstructive pulmonary disease) (CMS/HCC), Depression, Diabetes mellitus (CMS/HCC), Fatigue, Gout, HOCM (hypertrophic obstructive cardiomyopathy) (CMS/HCC), Hypertension, Hypertriglyceridemia, Myocardial infarction (CMS/HCC), Pancreatitis, Renal disorder, and Syncope.    Allergies as of 01/29/2020   • (No Known Allergies)     Vital Signs (last 24 hours)       01/31 0700  -  02/01 0659 02/01 0700  -  02/01 0825   Most Recent    Temp (°F) 98 -  98.3      98     98 (36.7)    Heart Rate 62 -  85      64     64    Resp 16 -  18      16     16    /57 -  133/72      102/67     102/67    SpO2 (%)   96      95     95        Estimated CrCl < 20 mL/min (HD)  Results from last 7 days   Lab Units 02/01/20  0554 01/31/20  0555 01/30/20  0604   CREATININE mg/dL 4.94* 6.14* 3.78*     Results from last 7 days   Lab Units 02/01/20  0554 01/31/20  0555 01/30/20  0604   WBC " 10*3/mm3 4.35 4.27 4.61     Recent Vancomycin dosing history:  1/29 1510 2000mg IV x1   1/30 0604 Random = 17.3 mcg/mL  1/30 1804 500mg IV x1   1/31 0555 Random = 23 mcg/mL  1/31 1910 500mg IV x1 (Post-HD)  Plan for Vancomycin random level 2/2 AM    Assessment/Plan  Possible iUF this weekend.  Vancomycin duration TBD.      1) Will adjust Vancomycin random level to Monday AM (pre-HD) and recommend levels every 5-7 days thereafter unless HD schedule or clinical condition changes.    2) Encourage hydration as allowed by MD to help prevent toxic accumulation; monitor for s/sxn of toxicity including increase in SCr and decrease in UOP.    Pharmacy will continue to follow daily while on vancomycin and adjust as needed.     Thanks, Dario Styles, PharmD, BCPS

## 2020-02-02 NOTE — PROGRESS NOTES
[facility] Renal Progress Note      No soa or cp, wants to go home        Current Facility-Administered Medications:   •  acetaminophen (TYLENOL) tablet 650 mg, 650 mg, Oral, Q4H PRN **OR** acetaminophen (TYLENOL) 160 MG/5ML solution 650 mg, 650 mg, Oral, Q4H PRN **OR** acetaminophen (TYLENOL) suppository 650 mg, 650 mg, Rectal, Q4H PRN, Hillary Haider MD  •  albuterol (PROVENTIL) nebulizer solution 0.083% 2.5 mg/3mL, 2.5 mg, Nebulization, Q6H PRN, Hillary Haider MD  •  ammonium lactate (AMLACTIN) cream, , Topical, Daily, Nicholas Lucero MD, Stopped at 01/30/20 1204  •  b complex-vitamin c-folic acid (NEPHRO-ALEJANDRO) tablet 1 tablet, 1 tablet, Oral, Daily, Hillary Haider MD, 1 tablet at 02/02/20 0914  •  bisacodyl (DULCOLAX) suppository 10 mg, 10 mg, Rectal, Daily PRN, Hillary Haider MD  •  bisoprolol (ZEBeta) tablet 10 mg, 10 mg, Oral, Daily, Hillary Haider MD, 10 mg at 02/02/20 0915  •  calcium carbonate (TUMS) chewable tablet 500 mg (200 mg elemental), 2 tablet, Oral, BID PRN, Hillary Haider MD  •  cinacalcet (SENSIPAR) tablet 30 mg, 30 mg, Oral, Daily, Hillary Haider MD, 30 mg at 02/02/20 0914  •  clopidogrel (PLAVIX) tablet 75 mg, 75 mg, Oral, Daily, Hillary Haider MD, 75 mg at 02/02/20 0914  •  cyclobenzaprine (FLEXERIL) tablet 10 mg, 10 mg, Oral, BID PRN, Hillary Haider MD, 10 mg at 02/02/20 0520  •  dextrose (D50W) 25 g/ 50mL Intravenous Solution 25 g, 25 g, Intravenous, Q15 Min PRN, Hillary Haider MD  •  dextrose (GLUTOSE) oral gel 15 g, 15 g, Oral, Q15 Min PRN, Hillary Haider MD  •  docusate sodium (COLACE) capsule 100 mg, 100 mg, Oral, BID PRN, Hillary Haider MD, 100 mg at 01/31/20 2055  •  DULoxetine (CYMBALTA) DR capsule 20 mg, 20 mg, Oral, Daily, Hillary Haider MD, 20 mg at 02/02/20 0914  •  enoxaparin (LOVENOX) syringe 30 mg, 30 mg, Subcutaneous, Nightly, Hillary Haider MD, 30 mg at  02/01/20 2227  •  fluticasone (FLONASE) 50 MCG/ACT nasal spray 1 spray, 1 spray, Each Nare, Daily, Hillary Haider MD  •  gabapentin (NEURONTIN) capsule 400 mg, 400 mg, Oral, Q12H, Simone Gonzalez MD, 400 mg at 02/02/20 0914  •  glucagon (human recombinant) (GLUCAGEN DIAGNOSTIC) injection 1 mg, 1 mg, Subcutaneous, Q15 Min PRN, Hillary Haider MD  •  hydrOXYzine (ATARAX) tablet 25 mg, 25 mg, Oral, Q8H, Hillary Haider MD, 25 mg at 02/02/20 0520  •  insulin lispro (humaLOG) injection 0-7 Units, 0-7 Units, Subcutaneous, 4x Daily With Meals & Nightly, Hillary Haider MD, 2 Units at 02/02/20 0914  •  ipratropium-albuterol (DUO-NEB) nebulizer solution 3 mL, 3 mL, Nebulization, Q6H PRN, Hillary Haider MD  •  melatonin tablet 1 mg, 1 mg, Oral, Nightly PRN, Hillary Haider MD, 1 mg at 02/01/20 2227  •  morphine injection 2 mg, 2 mg, Intravenous, Q3H PRN **AND** naloxone (NARCAN) injection 0.4 mg, 0.4 mg, Intravenous, Q5 Min PRN, Hillary Haider MD  •  muscle rub (Bharath-Gonzalez) 10-15 % cream, , Topical, Q1H PRN, Irineo Zayas MD  •  nicotine (NICODERM CQ) 21 MG/24HR patch 1 patch, 1 patch, Transdermal, Q24H, Hillary Haider MD, 1 patch at 02/01/20 1856  •  ondansetron (ZOFRAN) tablet 4 mg, 4 mg, Oral, Q6H PRN **OR** ondansetron (ZOFRAN) injection 4 mg, 4 mg, Intravenous, Q6H PRN, Hillary Haider MD  •  oxyCODONE (ROXICODONE) immediate release tablet 10 mg, 10 mg, Oral, Q4H PRN, Hillary Haider MD, 10 mg at 02/02/20 0520  •  pantoprazole (PROTONIX) EC tablet 40 mg, 40 mg, Oral, BID, Hillary Haider MD, 40 mg at 02/02/20 0914  •  Pharmacy to dose vancomycin, , Does not apply, Continuous PRN, Hillary Haider MD  •  rosuvastatin (CRESTOR) tablet 40 mg, 40 mg, Oral, Daily, Hillary Haider MD, 40 mg at 02/02/20 0915  •  silver sulfadiazine (SILVADENE, SSD) 1 % cream, , Topical, Q24H, Chuck Woodward MD  •   [COMPLETED] Insert peripheral IV, , , Once **AND** sodium chloride 0.9 % flush 10 mL, 10 mL, Intravenous, PRN, Hillary Haider MD  •  sodium chloride 0.9 % flush 10 mL, 10 mL, Intravenous, Q12H, Hillary Haider MD, 10 mL at 02/02/20 0915  •  sodium chloride 0.9 % flush 10 mL, 10 mL, Intravenous, PRN, Hillary Haider MD  •  Vancomycin Pharmacy Intermittent Dosing, , Does not apply, Daily, Hillary Haider MD          Temp:  [97.3 °F (36.3 °C)-98.3 °F (36.8 °C)] 98.3 °F (36.8 °C)  Heart Rate:  [58-60] 60  Resp:  [16-18] 16  BP: (102-106)/(56-60) 104/60  I/O last 3 completed shifts:  In: 920 [P.O.:920]  Out: -   I/O this shift:  In: 360 [P.O.:360]  Out: -     Physical Exam   Constitutional: He is oriented to person, place, and time. He appears well-developed and well-nourished.   HENT:   Head: Normocephalic and atraumatic.   Neck: No JVD present.   Cardiovascular: Normal rate and normal heart sounds.   Pulmonary/Chest: Effort normal and breath sounds normal.   Abdominal: Soft. Bowel sounds are normal.   Musculoskeletal: He exhibits edema.   Neurological: He is alert and oriented to person, place, and time.   Skin: Skin is warm and dry. Rash noted.   Nursing note and vitals reviewed.    Lab Results (last 24 hours)     Procedure Component Value Units Date/Time    Renal Function Panel [[id number]]  (Abnormal) Collected:  02/02/20 0655    Specimen:  Blood from Arm, Left Updated:  02/02/20 0738     Glucose 129 mg/dL      BUN 57 mg/dL      Creatinine 6.39 mg/dL      Sodium 140 mmol/L      Potassium 5.7 mmol/L      Chloride 98 mmol/L      CO2 21.6 mmol/L      Calcium 8.2 mg/dL      Albumin 3.20 g/dL      Phosphorus 6.5 mg/dL      Anion Gap 20.4 mmol/L      BUN/Creatinine Ratio 8.9     eGFR Non African Amer 9 mL/min/1.73      Comment: <15 Indicative of kidney failure.        eGFR   Amer --     Comment: <15 Indicative of kidney failure.       Narrative:       GFR Normal >60  Chronic Kidney  Disease <60  Kidney Failure <15      CBC & Differential [[id number]] Collected:  02/02/20 0655    Specimen:  Blood Updated:  02/02/20 0711    Narrative:       The following orders were created for panel order CBC & Differential.  Procedure                               Abnormality         Status                     ---------                               -----------         ------                     CBC Auto Differential[[id number]]        Abnormal            Final result                 Please view results for these tests on the individual orders.    CBC Auto Differential [[id number]]  (Abnormal) Collected:  02/02/20 0655    Specimen:  Blood Updated:  02/02/20 0711     WBC 4.92 10*3/mm3      RBC 2.94 10*6/mm3      Hemoglobin 8.2 g/dL      Hematocrit 26.9 %      MCV 91.5 fL      MCH 27.9 pg      MCHC 30.5 g/dL      RDW 14.4 %      RDW-SD 47.0 fl      MPV 10.0 fL      Platelets 146 10*3/mm3      Neutrophil % 68.7 %      Lymphocyte % 12.4 %      Monocyte % 16.3 %      Eosinophil % 1.4 %      Basophil % 0.8 %      Immature Grans % 0.4 %      Neutrophils, Absolute 3.38 10*3/mm3      Lymphocytes, Absolute 0.61 10*3/mm3      Monocytes, Absolute 0.80 10*3/mm3      Eosinophils, Absolute 0.07 10*3/mm3      Basophils, Absolute 0.04 10*3/mm3      Immature Grans, Absolute 0.02 10*3/mm3      nRBC 0.0 /100 WBC     POC Glucose Once [[id number]]  (Abnormal) Collected:  02/02/20 0554    Specimen:  Blood Updated:  02/02/20 0555     Glucose 151 mg/dL     POC Glucose Once [[id number]]  (Abnormal) Collected:  02/01/20 1959    Specimen:  Blood Updated:  02/01/20 2001     Glucose 136 mg/dL     POC Glucose Once [[id number]]  (Abnormal) Collected:  02/01/20 1657    Specimen:  Blood Updated:  02/01/20 1658     Glucose 136 mg/dL     POC Glucose Once [[id number]]  (Abnormal) Collected:  02/01/20 1127    Specimen:  Blood Updated:  02/01/20 1133     Glucose 133 mg/dL             AICD (automatic cardioverter/defibrillator) present    Hypertension     Hypertrophic obstructive cardiomyopathy (CMS/HCC)    Type 2 diabetes mellitus (CMS/HCC)    Cellulitis of both lower extremities    ESRD (end stage renal disease) on dialysis    Tobacco abuse    Scalded skin syndrome    Diabetic neuropathy (CMS/HCC)    esrd- dialysis tomorrow here or as outpatient, MA home today is ok from a renal standpoint, f/u tomorrow for dialysis; i'll leave dialysis orders for tomorrow in case he is not discharged; lengthy d/w pt and family, all questions answered in detail

## 2020-02-02 NOTE — NURSING NOTE
Spoke to Dr. Morgan and he said for pt to have the 500mg IV after each Dialysis and to have the Center call his office each time that he has Dialysis with IV Vancomycin.

## 2020-02-02 NOTE — PLAN OF CARE
Pt. Alert and oriented, follows commanded. Pain medicine given PRN. No nausea noticed. Attarax held due to patient resting quietly with eyes closed. Dressings on legs clean, dry, and intact. Monitoring blood glucose. No acute distress noted. Will continue to monitor.    No Yes

## 2020-02-02 NOTE — DISCHARGE SUMMARY
Patient Name: Sergio Phan  : 1970  MRN: [id number]    Date of Admission: 2020  Date of Discharge:  2020  Primary Care Physician: Kimberlee Longoria APRN      Chief Complaint:   Wound Infection (bilateral leg wounds)      Discharge Diagnoses     Active Hospital Problems    Diagnosis  POA   • Cellulitis of both lower extremities [L03.115, L03.116]  Yes   • ESRD (end stage renal disease) on dialysis [N18.6, Z99.2]  Not Applicable   • Tobacco abuse [Z72.0]  Yes   • Scalded skin syndrome [L00]  Yes   • Diabetic neuropathy (CMS/HCC) [E11.40]  Yes   • Hypertrophic obstructive cardiomyopathy (CMS/HCC) [I42.1]  Yes   • Type 2 diabetes mellitus (CMS/HCC) [E11.9]  Yes   • Hypertension [I10]  Yes   • AICD (automatic cardioverter/defibrillator) present [Z95.810]  Yes      Resolved Hospital Problems   No resolved problems to display.        Hospital Course     Mr. Phan is a 49 y.o. male with a history of end-stage renal disease, peripheral vascular disease, tobacco abuse, type 2 diabetes, and diabetic neuropathy who presented to [facility] initially complaining of bilateral leg pain.  Please see the admitting history and physical for further details.  He was found to have bilateral cellulitis and was admitted to the hospital for further evaluation and treatment.  He was admitted to the hospital bilateral lower extremity cellulitis and possible streptococcal scalded skin syndrome.  He was seen and evaluated by infectious disease vascular surgery over the course the hospitalization.  Vascular surgery worked him up for possible vascular etiologies for his worsening rashes and's persistent cellulitic changes.  He does have some evidence of vascular disease but they like to see his wounds and infection cleared up before proceeding with any further work-up or intervention.  They plan to follow him up in the office in a few weeks.  Infectious disease evaluated the patient and placed  him on IV vancomycin here in the hospital and the plan is to continue that at discharge with dialysis.  Nephrology has arranged for him to receive the remainder of his antibiotics with dialysis over the next 10 days.  The patient has worked with physical therapy and was able to ambulate and is improving over the course the hospitalization and at this point is stable for discharge home.  Referrals have been made to the outpatient wound care center for further evaluation and management of his lower extremity wounds.           Day of Discharge     Overall he is feeling better no fevers or chills overnight appetite is improved tolerated dialysis yesterday.  Plan is to resume dialysis tomorrow at his home dialysis center.    Physical Exam:  Temp:  [97.3 °F (36.3 °C)-98.3 °F (36.8 °C)] 98.3 °F (36.8 °C)  Heart Rate:  [58-60] 60  Resp:  [16-18] 16  BP: (102-106)/(56-60) 104/60  Body mass index is 27.41 kg/m².  Physical Exam   Constitutional: He is oriented to person, place, and time. He appears well-developed and well-nourished.   HENT:   Head: Normocephalic and atraumatic.   Cardiovascular: Normal rate, regular rhythm and normal heart sounds.   Pulmonary/Chest: Effort normal and breath sounds normal.   Abdominal: Soft. Bowel sounds are normal. He exhibits no distension.   Musculoskeletal: Normal range of motion. He exhibits no edema.   Neurological: He is alert and oriented to person, place, and time.   Skin:   Still with a lot of dry skin but this is greatly improved since admission.   Psychiatric: He has a normal mood and affect. His behavior is normal.   Nursing note and vitals reviewed.      Consultants     Consult Orders (all) (From admission, onward)     Start     Ordered    01/30/20 1655  Inpatient Infectious Diseases Consult  Once     Specialty:  Infectious Diseases  Provider:  Rodney Ortiz MD    01/30/20 1655    01/29/20 1822  Inpatient Vascular Surgery Consult  Once     Specialty:  Vascular Surgery  Provider:   Chuck Woodward MD    01/29/20 1823    01/29/20 1817  Inpatient Nephrology Consult  Once     Specialty:  Nephrology  Provider:  Simone Gonzalez MD    01/29/20 1823    01/29/20 1815  Inpatient Consult to Case Management   Once     Provider:  (Not yet assigned)    01/29/20 1823    01/29/20 1721  Inpatient General Surgery Consult  Once     Specialty:  General Surgery  Provider:  Emiliano Wilkinson Jr., MD    01/29/20 1721    01/29/20 1617  Nephrology (on -call MD unless specified)  Once     Specialty:  Nephrology  Provider:  (Not yet assigned)    01/29/20 1616    01/29/20 1507  [facility] (on-call MD unless specified) Details  Once     Specialty:  Hospitalist  Provider:  (Not yet assigned)    01/29/20 1506              Procedures     Imaging Results (All)     None          Pertinent Labs     Results from last 7 days   Lab Units 02/02/20  0655 02/01/20  0554 01/31/20  0555 01/30/20  0604   WBC 10*3/mm3 4.92 4.35 4.27 4.61   HEMOGLOBIN g/dL 8.2* 8.0* 8.3* 8.8*   PLATELETS 10*3/mm3 146 125* 110* 116*     Results from last 7 days   Lab Units 02/02/20  0655 02/01/20  0554 01/31/20  0555 01/30/20  0604   SODIUM mmol/L 140 140 138 138   POTASSIUM mmol/L 5.7* 5.0 4.8 3.6   CHLORIDE mmol/L 98 98 96* 96*   CO2 mmol/L 21.6* 24.7 21.9* 26.2   BUN mg/dL 57* 31* 51* 27*   CREATININE mg/dL 6.39* 4.94* 6.14* 3.78*   GLUCOSE mg/dL 129* 174* 155* 98   Estimated Creatinine Clearance: 17.4 mL/min (A) (by C-G formula based on SCr of 6.39 mg/dL (H)).  Results from last 7 days   Lab Units 02/02/20  0655 02/01/20  0554 01/31/20  0555 01/30/20  0604 01/29/20  1156   ALBUMIN g/dL 3.20* 3.00* 3.20* 2.90* 3.20*   BILIRUBIN mg/dL  --   --   --   --  0.4   ALK PHOS U/L  --   --   --   --  207*   AST (SGOT) U/L  --   --   --   --  9   ALT (SGPT) U/L  --   --   --   --  18     Results from last 7 days   Lab Units 02/02/20  0655 02/01/20  0554 01/31/20  0555 01/30/20  0604   CALCIUM mg/dL 8.2* 8.6 8.3* 8.4*   ALBUMIN g/dL 3.20* 3.00*  3.20* 2.90*   MAGNESIUM mg/dL  --   --  2.1 2.0   PHOSPHORUS mg/dL 6.5* 4.4 5.2* 3.1       Results from last 7 days   Lab Units 01/30/20  0604 01/30/20  0023 01/29/20  1833   TROPONIN T ng/mL 0.108* 0.096* 0.074*           Invalid input(s): LDLCALC        Test Results Pending at Discharge       Discharge Details        Discharge Medications      New Medications      Instructions Start Date   gabapentin 400 MG capsule  Commonly known as:  NEURONTIN  Replaces:  gabapentin 800 MG tablet   400 mg, Oral, Every 12 Hours Scheduled      VANCOMYCIN PHARMACY INTERMITTENT DOSING   Does not apply, Daily         Changes to Medications      Instructions Start Date   nitroglycerin 0.4 MG SL tablet  Commonly known as:  NITROSTAT  What changed:    · how much to take  · how to take this  · when to take this  · reasons to take this   1 under the tongue as needed for angina, may repeat q5mins for up three doses         Continue These Medications      Instructions Start Date   albuterol sulfate  (90 Base) MCG/ACT inhaler  Commonly known as:  PROVENTIL HFA;VENTOLIN HFA;PROAIR HFA   2 puffs, Inhalation, Every 4 Hours PRN      AURYXIA 1  MG(Fe) tablet  Generic drug:  Ferric Citrate   30 mg, Oral, 3 Times Daily With Meals      bisoprolol 10 MG tablet  Commonly known as:  ZEBeta   10 mg, Oral, Daily      cinacalcet 30 MG tablet  Commonly known as:  SENSIPAR   30 mg, Oral, Daily      clopidogrel 75 MG tablet  Commonly known as:  PLAVIX   75 mg, Oral, Daily      cyclobenzaprine 10 MG tablet  Commonly known as:  FLEXERIL   10 mg, Oral, 2 Times Daily PRN      DULoxetine HCl 60 MG capsule  Commonly known as:  DRIZALMA   60 mg, Oral, Daily      fluticasone 27.5 MCG/SPRAY nasal spray  Commonly known as:  VERAMYST   2 sprays, Nasal, Daily      hydrOXYzine 50 MG tablet  Commonly known as:  ATARAX   25 mg, Oral, Every 8 Hours      insulin aspart 100 UNIT/ML injection  Commonly known as:  novoLOG   Subcutaneous, 3 Times Daily Before Meals,  Sliding scale if 151, give 1 unit, if 152-171 give 2 units, and then for every 20 over 171, give 1 additional unit of insulin      oxyCODONE 10 MG tablet  Commonly known as:  ROXICODONE   10 mg, Oral, 4 Times Daily PRN      pantoprazole 40 MG EC tablet  Commonly known as:  PROTONIX   40 mg, Oral, 2 Times Daily      promethazine 25 MG tablet  Commonly known as:  PHENERGAN   25 mg, Oral, Every 6 Hours PRN      TAMI-ALEJANDRO PO   1 tablet, Oral, Daily      rosuvastatin 40 MG tablet  Commonly known as:  CRESTOR   40 mg, Oral, Daily         Stop These Medications    amLODIPine 10 MG tablet  Commonly known as:  NORVASC     gabapentin 800 MG tablet  Commonly known as:  NEURONTIN  Replaced by:  gabapentin 400 MG capsule            No Known Allergies      Discharge Disposition:  Home or Self Care    Discharge Diet:  Diet Order   Procedures   • Diet Regular; Consistent Carbohydrate, Low Sodium; 2,000 mg Na       Discharge Activity:   Activity Instructions     Activity as Tolerated            CODE STATUS:    Code Status and Medical Interventions:   Ordered at: 01/29/20 1823     Code Status:    CPR     Medical Interventions (Level of Support Prior to Arrest):    Full     Comments:    PLEASE CLARIFY WITH PATIENT THAT THIS IS ACCURATE WITH WISHES OR LIVING WILL       Future Appointments   Date Time Provider Department Center   2/11/2020  1:00 PM [facility] None   5/12/2020  1:15 PM [facility] None   5/12/2020  1:30 PM Noelle Rubin MD [facility] None     Additional Instructions for the Follow-ups that You Need to Schedule     Discharge Follow-up with PCP   As directed       Currently Documented PCP:    Kimberlee Longoria APRN    PCP Phone Number:    [phone]     Follow Up Details:  1-2 weeks         Discharge Follow-up with Specified Provider: Dr Lucero; 1 Month   As directed      To:  Dr Lucero    Follow Up:  1 Month         Referral to Wound Clinic   As directed           Follow-up Information     Kimberlee Longoria APRN .    Specialty:  Nurse Practitioner  Why:  1-2 weeks  Contact information:  [address] [facility] [address] [phone]             [facility] .    Specialty:  Wound Care  Contact information:  [address] [facility] [address] [phone]                 Additional Instructions for the Follow-ups that You Need to Schedule     Discharge Follow-up with PCP   As directed       Currently Documented PCP:    Kimberlee Longoria APRN    PCP Phone Number:    [phone]     Follow Up Details:  1-2 weeks         Discharge Follow-up with Specified Provider: Dr Lucero; 1 Month   As directed      To:  Dr Lucero    Follow Up:  1 Month         Referral to Wound Clinic   As directed        Time Spent on Discharge:  Greater than 30 minutes      Irineo Zayas MD  [facility]  02/02/20  11:07 AM

## 2020-02-03 NOTE — PROGRESS NOTES
"  Infectious Diseases Progress Note    Rodney Ortiz MD     [facility]  [place]: 4 days  Patient Identification:  Name: Sergio Phan  Age: 49 y.o.  Sex: male  :  1970  MRN: [id number]         Primary Care Physician: Kimberlee Longoria APRN            Subjective: over all improved and feeling better  Interval History: See consultation note.    Objective:    Scheduled Meds:     ammonium lactate   Topical Daily   b complex-vitamin c-folic acid 1 tablet Oral Daily   bisoprolol 10 mg Oral Daily   cinacalcet 30 mg Oral Daily   clopidogrel 75 mg Oral Daily   DULoxetine 20 mg Oral Daily   enoxaparin 30 mg Subcutaneous Nightly   fluticasone 1 spray Each Nare Daily   gabapentin 400 mg Oral Q12H   hydrOXYzine 25 mg Oral Q8H   insulin lispro 0-7 Units Subcutaneous 4x Daily With Meals & Nightly   nicotine 1 patch Transdermal Q24H   pantoprazole 40 mg Oral BID   rosuvastatin 40 mg Oral Daily   silver sulfadiazine   Topical Q24H   sodium chloride 10 mL Intravenous Q12H   Vancomycin Pharmacy Intermittent Dosing   Does not apply Daily       Continuous Infusions:    Vital signs in last 24 hours:  Temp:  [97.4 °F (36.3 °C)-98.3 °F (36.8 °C)] 98.3 °F (36.8 °C)  Heart Rate:  [60] 60  Resp:  [16-18] 16  BP: (103-104)/(56-60) 104/60    Intake/Output:    Intake/Output Summary (Last 24 hours) at 2020  Last data filed at 2020 1339  Gross per 24 hour   Intake 570 ml   Output --   Net 570 ml       Exam:  /60 (BP Location: Left arm, Patient Position: Lying)   Pulse 60   Temp 98.3 °F (36.8 °C) (Oral)   Resp 16   Ht 180.3 cm (70.98\")   Wt 89.1 kg (196 lb 6.9 oz)   SpO2 100%   BMI 27.41 kg/m²     General Appearance:  Chronically ill but does not appear to be in any acute distress                          Head:    Normocephalic, without obvious abnormality, atraumatic                           Eyes:    PERRL, conjunctivae/corneas clear, EOM's intact, both eyes                         Throat:  "  Lips, tongue, gums normal; oral mucosa pink and moist                           Neck:   Supple, symmetrical, trachea midline, no JVD                         Lungs:    Clear to auscultation bilaterally, respirations unlabored                 Chest Wall:    No tenderness or deformity                          Heart:    Regular rate and rhythm, S1 and S2 normal                  Abdomen:     Soft, non-tender, bowel sounds active                 Extremities: Dressed and unchanged.  Right arm AV fistula intact                        Pulses:   Pulses palpable in all extremities                            Skin: Lower extremity skin lesions dressed.                  Neurologic: Grossly nonfocal       Data Review:    I reviewed the patient's new clinical results.  Results from last 7 days   Lab Units 02/02/20  0655 02/01/20  0554 01/31/20  0555 01/30/20  0604 01/29/20  1156   WBC 10*3/mm3 4.92 4.35 4.27 4.61 5.69   HEMOGLOBIN g/dL 8.2* 8.0* 8.3* 8.8* 9.4*   PLATELETS 10*3/mm3 146 125* 110* 116* 163     Results from last 7 days   Lab Units 02/02/20  0655 02/01/20  0554 01/31/20  0555 01/30/20  0604 01/29/20  1156   SODIUM mmol/L 140 140 138 138 142   POTASSIUM mmol/L 5.7* 5.0 4.8 3.6 5.3*   CHLORIDE mmol/L 98 98 96* 96* 100   CO2 mmol/L 21.6* 24.7 21.9* 26.2 22.4   BUN mg/dL 57* 31* 51* 27* 58*   CREATININE mg/dL 6.39* 4.94* 6.14* 3.78* 7.53*   CALCIUM mg/dL 8.2* 8.6 8.3* 8.4* 8.5*   GLUCOSE mg/dL 129* 174* 155* 98 138*     Microbiology Results (last 10 days)     ** No results found for the last 240 hours. **            Assessment:    AICD (automatic cardioverter/defibrillator) present    Hypertension    Hypertrophic obstructive cardiomyopathy (CMS/HCC)    Type 2 diabetes mellitus (CMS/HCC)    Cellulitis of both lower extremities    ESRD (end stage renal disease) on dialysis    Tobacco abuse    Scalded skin syndrome    Diabetic neuropathy (CMS/HCC)    Recommendations:   · Continue with local care and IV vancomycin based on the  renal function for dialysis schedule.  In this situation a very precarious blood flow due to peripheral vascular disease and precarious nature of absorption of oral antibiotics, selection of agents such as doxycycline or Zyvox may not be reasonable in this situation and hence IV vancomycin with hemodialysis would be a better way to go about it to address soft tissue infection while him being prepared for definitive intervention.  · Would recommend couple of weeks of IV vancomycin based on the levels under the direction of our nephrology colleagues.    · Plan for vascular intervention per vascular surgery service.  · Discussed with Dr. Zayas.    Rodney Ortiz MD  2/2/2020  7:40 PM    Much of this encounter note is an electronic transcription/translation of spoken language to printed text. The electronic translation of spoken language may permit erroneous, or at times, nonsensical words or phrases to be inadvertently transcribed; Although I have reviewed the note for such errors, some may still exist

## 2020-02-03 NOTE — PROGRESS NOTES
Case Management Discharge Note      Final Note: Pt discharged home on 2/2.   CAMACHO Baca RN         Destination      No service has been selected for the patient.      Durable Medical Equipment      No service has been selected for the patient.      Dialysis/Infusion      No service has been selected for the patient.      Home Medical Care      No service has been selected for the patient.      Therapy      No service has been selected for the patient.      Community Resources      No service has been selected for the patient.        Transportation Services  Private: Car    Final Discharge Disposition Code: 01 - home or self-care

## 2020-02-04 NOTE — OUTREACH NOTE
Prep Survey      Responses   Facility patient discharged from?  [place]   Is patient eligible?  Yes   Discharge diagnosis  Cellulitis of BLE, ESRD on HD, tobacco abuse, scalded skin syndrome, diabetic neuropathy, Hypertrophic obstructive cardiomyopathy, DM II, HTN, AICD   Does the patient have one of the following disease processes/diagnoses(primary or secondary)?  Other   Does the patient have Home health ordered?  No   What is the Home health agency?   Pt to received IV Vancomycin with each dialysis   Is there a DME ordered?  No   Comments regarding appointments  See AVS   Prep survey completed?  Yes          Melanie Hoff RN

## 2020-02-05 NOTE — OUTREACH NOTE
Medical Week 1 Survey      Responses   Facility patient discharged from?  [place]   Does the patient have one of the following disease processes/diagnoses(primary or secondary)?  Other   Is there a successful TCM telephone encounter documented?  No   Week 1 attempt successful?  Yes   Call start time  0814   Call end time  0820   Discharge diagnosis  Cellulitis of both lower extremities    Person spoke with today (if not patient) and relationship  Zoey-spouse   Meds reviewed with patient/caregiver?  Yes   Is the patient having any side effects they believe may be caused by any medication additions or changes?  No   Does the patient have all medications ordered at discharge?  Yes   Is the patient taking all medications as directed (includes completed medication regime)?  Yes   Medication comments  Vancomycin is given while at dialysis on M, W, and F   Comments regarding appointments  Wound care appt 2/6/20   Does the patient have a primary care provider?   Yes   Does the patient have an appointment with their PCP within 7 days of discharge?  Yes   Comments regarding PCP  Appt with PCP 2/6/20   Has the patient kept scheduled appointments due by today?  N/A   Home health comments  Mrs. Phan will ask PCP at appt on 2/6/20 at home health   Psychosocial issues?  No   Did the patient receive a copy of their discharge instructions?  Yes   Nursing interventions  Reviewed instructions with patient, Educated on MyChart   What is the patient's perception of their health status since discharge?  Improving [Family is changing bandages daily. He's sleeping and eating well. ]   Is the patient/caregiver able to teach back signs and symptoms related to disease process for when to call PCP?  Yes   Is the patient/caregiver able to teach back signs and symptoms related to disease process for when to call 911?  Yes   Is the patient/caregiver able to teach back the hierarchy of who to call/visit for symptoms/problems? PCP,  Specialist, Home health nurse, Urgent Care, ED, 911  Yes   If the patient is a current smoker, are they able to teach back resources for cessation?  [id number], Smoking cessation medications [Pt is a smoker]   Week 1 call completed?  Yes   Wrap up additional comments  Patient has a [employer] nurse calling every 2-3 days          Sandie iSn RN

## 2020-02-12 NOTE — OUTREACH NOTE
Medical Week 2 Survey      Responses   Facility patient discharged from?  [place]   Does the patient have one of the following disease processes/diagnoses(primary or secondary)?  Other   Week 2 attempt successful?  Yes   Call start time  1050   Call end time  1054   Person spoke with today (if not patient) and relationship  Zoey-spouse   Meds reviewed with patient/caregiver?  Yes   Is the patient taking all medications as directed (includes completed medication regime)?  Yes   Has the patient kept scheduled appointments due by today?  Yes   Comments  dialysis 3 mon/wed/fri, dressing changes at wound care, wheel chair,  nurse comes   What is the patient's perception of their health status since discharge?  Improving   Week 2 Call Completed?  Yes   Wrap up additional comments  doing some better, wife states.           Sondra Montiel, RN

## 2020-02-21 NOTE — OUTREACH NOTE
Medical Week 3 Survey      Responses   Facility patient discharged from?  [place]   Does the patient have one of the following disease processes/diagnoses(primary or secondary)?  Other   Week 3 attempt successful?  Yes   Call start time  0736   Call end time  0738   Person spoke with today (if not patient) and relationship  Zoey-spouse   Meds reviewed with patient/caregiver?  Yes   Is the patient taking all medications as directed (includes completed medication regime)?  Yes   Has the patient kept scheduled appointments due by today?  Yes   What is the patient's perception of their health status since discharge?  Improving   Week 3 Call Completed?  Yes   Graduated  Yes   Did the patient feel the follow up calls were helpful during their recovery period?  Yes   Was the number of calls appropriate?  Yes   Graduated/Revoked comments  He is doing fine, no need for calls, will call if needs anything.          Sondra Montiel RN

## 2020-03-13 PROBLEM — L08.9 DIABETIC FOOT INFECTION (HCC): Status: ACTIVE | Noted: 2020-01-01

## 2020-03-13 PROBLEM — I83.029 VENOUS ULCERS OF BOTH LOWER EXTREMITIES (HCC): Status: ACTIVE | Noted: 2020-01-01

## 2020-03-13 PROBLEM — I70.342: Status: ACTIVE | Noted: 2018-04-30

## 2020-03-13 PROBLEM — J44.9 COPD (CHRONIC OBSTRUCTIVE PULMONARY DISEASE) (HCC): Status: ACTIVE | Noted: 2020-01-01

## 2020-03-13 PROBLEM — E11.628 DIABETIC FOOT INFECTION (HCC): Status: ACTIVE | Noted: 2020-01-01

## 2020-03-13 PROBLEM — I70.332: Status: ACTIVE | Noted: 2018-04-30

## 2020-03-13 PROBLEM — L97.929 VENOUS ULCERS OF BOTH LOWER EXTREMITIES (HCC): Status: ACTIVE | Noted: 2020-01-01

## 2020-03-13 PROBLEM — L97.919 VENOUS ULCERS OF BOTH LOWER EXTREMITIES (HCC): Status: ACTIVE | Noted: 2020-01-01

## 2020-03-13 PROBLEM — I83.019 VENOUS ULCERS OF BOTH LOWER EXTREMITIES (HCC): Status: ACTIVE | Noted: 2020-01-01

## 2020-03-13 PROBLEM — M86.9 OSTEOMYELITIS OF LEFT FOOT (HCC): Status: ACTIVE | Noted: 2020-01-01

## 2020-03-13 PROBLEM — L89.612 PRESSURE INJURY OF RIGHT HEEL, STAGE 2 (HCC): Status: ACTIVE | Noted: 2020-01-01

## 2020-03-19 NOTE — OUTREACH NOTE
Prep Survey      Responses   [facility] facility patient discharged from?  [place]   Is LACE score < 7 ?  No   Eligibility  Readm Mgmt   Discharge diagnosis  Osteomyelitis of left foot, present admission, to continue on outpatient cefepime   Does the patient have one of the following disease processes/diagnoses(primary or secondary)?  Other   Does the patient have Home health ordered?  Yes   What is the Home health agency?   [facility]    Medication alerts for this patient  Eliquis    General alerts for this patient  COPD and HD pt    Prep survey completed?  Yes          Krysta Gómez RN

## 2020-03-24 NOTE — OUTREACH NOTE
Medical Week 1 Survey      Responses   [facility] patient discharged from?  [place]   Does the patient have one of the following disease processes/diagnoses(primary or secondary)?  Other   Is there a successful TCM telephone encounter documented?  No   Week 1 attempt successful?  Yes   Call start time  0939   Call end time  0944   General alerts for this patient  COPD and HD pt    Discharge diagnosis  Osteomyelitis of left foot, present admission, to continue on outpatient cefepime   Is patient permission given to speak with other caregiver?  Yes   List who call center can speak with  wife   Meds reviewed with patient/caregiver?  Yes   Is the patient having any side effects they believe may be caused by any medication additions or changes?  No   Does the patient have all medications ordered at discharge?  Yes   Is the patient taking all medications as directed (includes completed medication regime)?  Yes   Comments regarding appointments  Pt to see APRN today but instructed to call beore to confirm he will be seen due to COVID.   Week 1 call completed?  Yes   Wrap up additional comments  Wife reports he is doing well and is using a wheelchair. She also reports that he is goint to dialysis.          Della Sam RN

## 2020-04-01 NOTE — TELEPHONE ENCOUNTER
Ms. Phan called regarding Mr. Phan and wasn't sure who called her from our office.  The only thing I can think of was to reschedule an echo appt that was down for 3/16/20.  It looks like Mr. Phan is a patient of Dr. Rubin. -[facility]    # [phone]

## 2020-04-01 NOTE — OUTREACH NOTE
Medical Week 2 Survey      Responses   [facility] patient discharged from?  [place]   Does the patient have one of the following disease processes/diagnoses(primary or secondary)?  Other   Week 2 attempt successful?  No   Unsuccessful attempts  Attempt 1          Mikhail Abel RN

## 2020-04-03 NOTE — OUTREACH NOTE
Medical Week 2 Survey      Responses   [facility] patient discharged from?  [facility]   Does the patient have one of the following disease processes/diagnoses(primary or secondary)?  Other   Week 2 attempt successful?  Yes   Call start time  1512   General alerts for this patient  COPD and HD pt    Discharge diagnosis  Osteomyelitis of left foot, present admission, to continue on outpatient cefepime   Call end time  1513   Is patient permission given to speak with other caregiver?  Yes   List who call center can speak with  spouse- Zoey   Person spoke with today (if not patient) and relationship  spouse- Zoey   What is the patient's perception of their health status since discharge?  Improving   Is the patient/caregiver able to teach back the hierarchy of who to call/visit for symptoms/problems? PCP, Specialist, Home health nurse, Urgent Care, ED, 911  Yes   Additional teach back comments  Per wife, pt is doing really well, no questions or concerns at this time.   Week 2 Call Completed?  Yes   Graduated  Yes   Did the patient feel the follow up calls were helpful during their recovery period?  Yes   Was the number of calls appropriate?  Yes   Graduated/Revoked comments  goals met- back to baseline          Tesha Diallo RN

## 2020-05-12 NOTE — PROGRESS NOTES
5 nonsustained V events on AICD check. Patient reports some chst discomfort at times reported as throbbing and sharp. Discussed with Dr Rubin and orders received for nuclear stress test  ALP

## 2020-06-30 PROBLEM — E87.5 HYPERKALEMIA: Status: ACTIVE | Noted: 2020-01-01

## 2020-07-01 NOTE — NURSING NOTE
[employer] contacted. Spoke with rep who will send local rep ([place]) to see Mr. Phan. Requested as non-urgent.

## 2020-07-01 NOTE — PROGRESS NOTES
Discharge Planning Assessment  [facility]     Patient Name: Sergio Phan  MRN: [id number]  Today's Date: 7/1/2020    Admit Date: 6/30/2020    Discharge Needs Assessment     Row Name 07/01/20 1246       Living Environment    Lives With  spouse    Current Living Arrangements  home/apartment/condo    Potentially Unsafe Housing Conditions  unable to assess    Primary Care Provided by  spouse/significant other;homecare agency    Provides Primary Care For  no one    Family Caregiver if Needed  spouse    Quality of Family Relationships  supportive;helpful;involved    Able to Return to Prior Arrangements  yes       Resource/Environmental Concerns    Resource/Environmental Concerns  none       Transition Planning    Patient/Family Anticipates Transition to  home with family    Patient/Family Anticipated Services at Transition  none    Transportation Anticipated  family or friend will provide       Discharge Needs Assessment    Concerns to be Addressed  other (see comments)    Equipment Currently Used at Home  wheelchair;wound care supplies    Anticipated Changes Related to Illness  none    Equipment Needed After Discharge  none    Discharge Facility/Level of Care Needs  home with home health        Discharge Plan     Row Name 07/01/20 1247       Plan    Plan  Home with [facility]  Pt is archana    Plan Comments  IMM noted   CCP spoke to patient's wife Zoey [phone] at bedside to discuss d/c planning. Face sheet verified. CCP role explained.  Pt's PCP is Kimberlee CHANCE.  Pt emergency contact is his wife.   Pt lives in house with his wife.  He uses a wheel chair to ambulate.  He is dependent with ADL's.  He has a current  history of Home Health with [facility]  He has not been to rehab in the past.  He obtains his medications from [facility] pharmacy in [place].  Plan is home with [facility]  He obtains dialysis at [facility] in [place].  CCP following           Destination      Coordination has not  been started for this encounter.      Durable Medical Equipment      Coordination has not been started for this encounter.      Dialysis/Infusion      Coordination has not been started for this encounter.      Home Medical Care      Service Provider Request Status Selected Services Address Phone Number Fax Number    [facility] Pending - Request Sent N/A [address] [place] [id number] [phone]      Therapy      Coordination has not been started for this encounter.      Community Resources      Coordination has not been started for this encounter.          Demographic Summary    No documentation.       Functional Status    No documentation.       Psychosocial    No documentation.       Abuse/Neglect    No documentation.       Legal    No documentation.       Substance Abuse    No documentation.       Patient Forms    No documentation.           Madison Tanner RN

## 2020-07-01 NOTE — PROGRESS NOTES
"Dr. SANDRA Rolle    [facility] CORONARY CARE    7/1/2020    Patient ID:  Name:  Sergio Phan  MRN:  [id number]  1970  49 y.o.  male            CC/Reason for visit: s/p resuscitated cardiac arrest due to medical noncompliance, hyperkalemia, life-threatening arrhythmias, end-stage renal disease, acute respiratory failure    Interval hx: Patient is intubated, mechanically ventilated.  Not following any commands.  Somewhat sedated    ROS: Unobtainable.    Mechanical ventilator settings were reviewed and adjusted by me today    Vitals:  Vitals:    07/01/20 0900 07/01/20 0930 07/01/20 1000 07/01/20 1010   BP: 141/80 164/90 147/83    Pulse: 72 72 71    Resp:       Temp:       TempSrc:       SpO2: 98% 100% 99%    Weight:    89.1 kg (196 lb 6.9 oz)   Height:    182.9 cm (72.01\")     FiO2 (%): 40 %     Body mass index is 26.63 kg/m².    Intake/Output Summary (Last 24 hours) at 7/1/2020 1033  Last data filed at 7/1/2020 0559  Gross per 24 hour   Intake 245 ml   Output 5000 ml   Net -4755 ml       Exam:  GEN:  Intubated, restless when stimulated with painful stimuli.  Does not follow commands  Oral exam shows endotracheal tube in good position  LUNGS: Clear breath sounds bilat, no use of accessory muscles  CV:  Normal S1S2, without murmur, 1+ leg edema  ABD:  Non tender, no enlarged liver or masses  Skin: Significant thickening and hyperpigmentation of the skin of the legs, open sores and ulcers over skin from knees down to feet bilaterally    Scheduled meds:    albuterol sulfate HFA 6 puff Vent nebulization Q6H - RT   clopidogrel 75 mg Oral Daily   DULoxetine 60 mg Oral Daily   enoxaparin 30 mg Subcutaneous Q24H   insulin lispro 0-24 Units Subcutaneous Q6H   ipratropium-albuterol 3 mL Nebulization Once   pantoprazole 40 mg Oral BID   sodium chloride 10 mL Intravenous Q12H     IV meds:                        norepinephrine 0.02-0.3 mcg/kg/min Last Rate: Stopped (06/30/20 1932)   propofol 5-50 mcg/kg/min " Last Rate: 30 mcg/kg/min (07/01/20 0902)       Data Review:   I reviewed the patient's medications and new clinical results.    No results found for: COVID19      Lab Results   Component Value Date    CALCIUM 8.6 07/01/2020    PHOS 7.9 (H) 07/01/2020    MG 2.1 03/18/2020    MG 2.1 03/17/2020    MG 2.0 03/17/2020     Results from last 7 days   Lab Units 07/01/20  0538 06/30/20  1615   SODIUM mmol/L 133* 137   POTASSIUM mmol/L 6.2* 7.5*   CHLORIDE mmol/L 87* 84*   CO2 mmol/L 26.2 23.0   BUN mg/dL 83* 174*   CREATININE mg/dL 8.94* 15.20*   CALCIUM mg/dL 8.6 8.0*   BILIRUBIN mg/dL  --  1.0   ALK PHOS U/L  --  163*   ALT (SGPT) U/L  --  14   AST (SGOT) U/L  --  11   GLUCOSE mg/dL 76 124*   WBC 10*3/mm3 5.92 7.35   HEMOGLOBIN g/dL 9.1* 10.2*   PLATELETS 10*3/mm3 118* 159   PROCALCITONIN ng/mL  --  1.39*               Results from last 7 days   Lab Units 06/30/20  1615   TROPONIN T ng/mL 0.167*     Results from last 7 days   Lab Units 06/30/20  1835   PH, ARTERIAL pH units 7.305*   PCO2, ARTERIAL mm Hg 50.1*   PO2 ART mm Hg 189.1*   MODALITY  Adult Vent   O2 SATURATION CALC % 99.5*       Estimated Creatinine Clearance: 12.5 mL/min (A) (by C-G formula based on SCr of 8.94 mg/dL (H)).      ASSESSMENT:   Resuscitated cardiac arrest in the hospital  Life-threatening hyperkalemia with arrhythmia  End-stage renal disease, patient noncompliant with hemodialysis  Diabetes mellitus type 2  Peripheral artery disease  COPD  Smoker  Nonhealing skin ulcers of lower extremities      PLAN:  Patient is critically ill.  He is intubated, mechanically ventilated.  I will check ABG and chest x-ray.  I will continue to adjust mechanical ventilation.  Discussed face-to-face with Dr. Morgan from nephrology.  He will receive hemodialysis again today due to persistent hyperkalemia.  Continue monitoring hemodynamics in CCU for any reoccurring arrhythmias.  Patient already had cardiac arrest due to hyperkalemia yesterday.  Administer insulin to  treat his hyperglycemia.  Consult his cardiologist.  Patient has a history of AICD and pacemaker in place.    Total critical care time 32 minutes excluding any separately billable procedure time      Liang Rolle MD  7/1/2020

## 2020-07-01 NOTE — NURSING NOTE
CWOCN consult- patient admitted with abrasions to his knees and pressure injury to right trochanter and coccyx. Both pressure injuries are DTI. Recommend to apply venelex to keep the skin soft. Also continue turning, which staff is doing. Right knee has open areas- a couple with yellow tissue. Patient's skin is dry, flaky. I think it would be difficult to keep a dressing on related to the condition of his skin. Recommend to paint with betadine to try to dry and clean the tissue. Aquaphor to BLE for dry skin.      07/01/20 1015   Wound 06/30/20 Right knee Abrasion   Date first assessed: 06/30/20   Present on Hospital Admission: Yes  Side: Right  Location: knee  Primary Wound Type: Abrasion   Dressing Appearance open to air   Base moist;pink;scab;slough;yellow  (scattered abrasions)   Periwound dry;excoriated   Edges irregular   Care, Wound   (recommend to paint with betadine)   Wound 06/30/20 Left knee Blisters   Date first assessed: 06/30/20   Present on Hospital Admission: Yes  Side: Left  Location: knee  Primary Wound Type: Blisters   Dressing Appearance open to air   Base other (see comments)  (intact blisters)   Care, Wound   (leave open to air)

## 2020-07-01 NOTE — PROGRESS NOTES
"Adult Nutrition  Assessment/PES    Patient Name:  Sergio Phan  YOB: 1970  MRN: [id number]  Admit Date:  6/30/2020    Assessment Date:  7/1/2020    Comments:  Mr. Phan is a 50 yo male s/p cardiac arrest and intubated. Hx includes ESRD, DM, peripheral artery disease, COPD, smoker, and skin ulcers. BMI 29.69. Skin issues noted. proprofol 14.4ml/hr. Will discuss in CCU rounds and continue to follow for some form of nutrition as medical conditions allow.    Reason for Assessment     Row Name 07/01/20 1007          Reason for Assessment    Reason For Assessment  diagnosis/disease state     Diagnosis  -- Resuscitated cardiac arrest, Intubated, ESRD, COPD, DM           Anthropometrics     Row Name 07/01/20 1010 07/01/20 0700       Anthropometrics    Height  182.9 cm (72.01\")  --    Weight  89.1 kg (196 lb 6.9 oz) not weighed by RD  89.1 kg (196 lb 6.9 oz)       Ideal Body Weight (IBW)    Ideal Body Weight (IBW) (kg)  82.09  --    % Ideal Body Weight  108.54  --       Body Mass Index (BMI)    BMI (kg/m2)  26.69  --    BMI Assessment  BMI 25-29.9: overweight  --        Labs/Tests/Procedures/Meds     Row Name 07/01/20 1011          Labs/Procedures/Meds    Lab Results Reviewed  reviewed     Lab Results Comments  na, k, bun, cr, phos, h/h        Diagnostic Tests/Procedures    Diagnostic Test/Procedure Reviewed  reviewed        Medications    Pertinent Medications Reviewed  reviewed     Pertinent Medications Comments  lovenox, insulin, protonix, propofol, levo         Physical Findings     Row Name 07/01/20 1012          Physical Findings    Overall Physical Appearance  on ventilator support     Skin  pressure injury;poor skin integrity/turgor right posterior greater trochanter PI, right knee abrasion, left knee blisters         Estimated/Assessed Needs     Row Name 07/01/20 1014 07/01/20 1010       Calculation Measurements    Weight Used For Calculations  89.1 kg (196 lb 6.9 oz)  --    Height  --  " "182.9 cm (72.01\")       Estimated/Assessed Needs    Additional Documentation  KCAL/KG (Group);Protein Requirements (Group);Fluid Requirements (Group)  --       KCAL/KG    KCAL/KG  25 Kcal/Kg (kcal)  --    25 Kcal/Kg (kcal)  2227.5  --       Protein Requirements    Weight Used For Protein Calculations  89.1 kg (196 lb 6.9 oz)  --    Est Protein Requirement Amount (gms/kg)  1.2 gm protein  --    Estimated Protein Requirements (gms/day)  106.92  --       Fluid Requirements    Estimated Fluid Requirements (mL/day)  2225  --    RDA Method (mL)  2225  --        Nutrition Prescription Ordered     Row Name 07/01/20 1015          Nutrition Prescription PO    Current PO Diet  NPO        Propofol Considerations    Propofol (mL/hr)  14.4 mL/hr     Propofol (Kcal/day)  380.16 Kcal/day                 Problem/Interventions:  Problem 1     Row Name 07/01/20 1015          Nutrition Diagnoses Problem 1    Problem 1  Needs Alternate Route     Etiology (related to)  Medical Diagnosis     Pulmonary/Critical Care  Acute respiratory failure;Ventilator     Signs/Symptoms (evidenced by)  NPO               Intervention Goal     Row Name 07/01/20 1019          Intervention Goal    General  Maintain nutrition;Disease management/therapy     TF/PN  Inititiate TF/PN     Weight  No significant weight loss         Nutrition Intervention     Row Name 07/01/20 1019          Nutrition Intervention    RD/Tech Action  Follow Tx progress;Care plan reviewd         Nutrition Prescription     Row Name 07/01/20 1023          Nutrition Prescription EN    Enteral Prescription  Enteral begin/change     Product  Novasource Renal     TF Delivery Method  Continuous     Continuous TF Goal Rate (mL/hr)  40 mL/hr     Water flush (mL)   30 mL     Water Flush Frequency  Every 4 hours         Education/Evaluation     Row Name 07/01/20 1019          Education    Education  Education not appropriate at this time     Please explain  Patient intubated        " Monitor/Evaluation    Monitor  Per protocol           Electronically signed by:  Marina Peters RD  07/01/20 10:24

## 2020-07-01 NOTE — CONSULTS
Referring Provider: Harjinder Garcia MD  Reason for Consultation: Management of patient with end-stage renal disease    Subjective     Chief complaint   Chief Complaint   Patient presents with   • Weakness - Generalized     PT NONCOMPLIANT WITH HIS DIALYSIS SCHEDULE, MISSING 4 APPOINTMENTS; PT REPORTS TO HIS WIFE THAT HE'S READY TO DIE; PT WEARING FACE MASK       History of present illness:   The patient was brought in with confusion and he had nausea vomiting was found to have severe upper kalemia, was subsequently admitted he was evaluated on admission dialysis was ordered emergently.  In that process he had a cardiac arrest initially had regular tachycardia with multiple firing of his AICD then he had V. fib he was resuscitated and circulation was restored the patient currently on the ventilator and sedated he underwent emergent dialysis this morning his potassium is still elevated 6.2.  Patient has end-stage renal disease and known medical noncompliance.  He has history of diabetes mellitus type 2, diabetic foot ulcers, COPD, active tobacco abuse, coronary artery disease, history of recurrent pancreatitis there is an element of noncompliance with dialysis treatment, he has a right upper arm AV fistula.  No information could be obtained from the patient  Past Medical History:   Diagnosis Date   • Anxiety    • Asthma    • Chest pain    • COPD (chronic obstructive pulmonary disease) (CMS/HCC)    • Depression    • Diabetes mellitus (CMS/HCC)     TYPE II   • Fatigue    • Gout    • HOCM (hypertrophic obstructive cardiomyopathy) (CMS/HCC)    • Hypertension    • Hypertriglyceridemia    • Myocardial infarction (CMS/HCC)    • Pancreatitis    • Renal disorder    • Syncope      Past Surgical History:   Procedure Laterality Date   • ABDOMINAL SURGERY     • ARTERIOVENOUS FISTULA/SHUNT SURGERY Right 4/19/2018    Procedure: RT/ ARTERIOVENOUS FISTULA FORMATION;  Surgeon: Chuck Woodward MD;  Location: [facility] OR;   Service: Vascular   • ATHRECTOMY ILIAC, FEMORAL, TIBIAL ARTERY Bilateral 5/2/2018    Procedure: JEFFERY LOWER EXTREMITY ANGIOGRAM, LEFT SFA  AND POPLITEAL LASER ARTHRECTOMY WITH DRUG COATED BALLOON, IVUS X3;  Surgeon: Chuck Woodward MD;  Location: [facility] 18/19;  Service: Vascular   • CARDIAC CATHETERIZATION     • CARDIAC DEFIBRILLATOR PLACEMENT     • CARDIAC DEFIBRILLATOR PLACEMENT     • CHOLECYSTECTOMY     • ENDOSCOPY N/A 8/30/2016    Procedure: ESOPHAGOGASTRODUODENOSCOPY;  Surgeon: Irineo Mercedes MD;  Location: [facility];  Service:    • ERCP     • INSERT / REPLACE / REMOVE PACEMAKER      [facility]   • INSERTION HEMODIALYSIS CATHETER N/A 4/30/2018    Procedure: HEMODIALYSIS CATHETER INSERTION;  Surgeon: Mya Wilkinson Jr., MD;  Location: [facility] 18/19;  Service: Vascular   • LAPAROSCOPIC APPENDECTOMY     • OTHER SURGICAL HISTORY      NO REMOVAL OF APPENDIX  PATIENT HAS A APPENDIX   • UMBILICAL HERNIA REPAIR       Family History   Problem Relation Age of Onset   • Heart block Mother    • Kidney disease Mother    • Diabetes Father    • Diabetes Maternal Grandmother    • Diabetes Maternal Grandfather    • COPD Maternal Grandfather    • Asthma Maternal Grandfather        Social History     Tobacco Use   • Smoking status: Current Some Day Smoker     Packs/day: 0.50     Years: 35.00     Pack years: 17.50     Types: Cigarettes   • Smokeless tobacco: Never Used   Substance Use Topics   • Alcohol use: No   • Drug use: No     Medications Prior to Admission   Medication Sig Dispense Refill Last Dose   • albuterol (PROVENTIL HFA;VENTOLIN HFA) 108 (90 BASE) MCG/ACT inhaler Inhale 2 puffs every 4 (four) hours as needed for wheezing.   Taking   • apixaban (Eliquis) 5 MG tablet tablet Take 1 tablet by mouth 2 (Two) times a day. 60 tablet 0 Taking   • B Complex-C-Folic Acid (TAMI-ALEJANDRO PO) Take 1 tablet by mouth Daily.   Taking   • bisoprolol (ZEBeta) 10 MG tablet Take 10 mg by mouth Daily.   Taking    • busPIRone (BUSPAR) 10 MG tablet Take 10 mg by mouth 2 (two) times a day.      • cadexomer iodine (IODOSORB) 0.9 % gel Apply  topically to the appropriate area as directed on foot wounds every other day. 10 g 3    • cinacalcet (SENSIPAR) 30 MG tablet Take 90 mg by mouth Daily.   Taking   • clopidogrel (PLAVIX) 75 MG tablet Take 1 tablet by mouth Daily. 30 tablet 0 Taking   • cyclobenzaprine (FLEXERIL) 10 MG tablet Take 10 mg by mouth 2 (Two) Times a Day As Needed for Muscle Spasms.   Taking   • dilTIAZem CD (CARDIZEM CD) 180 MG 24 hr capsule Take 1 capsule by mouth Daily. 30 capsule 0 Taking   • DULoxetine (CYMBALTA) 60 MG capsule Take 60 mg by mouth Daily.   Taking   • Ferric Citrate (Auryxia) 1  MG(Fe) tablet Take 1 tablet by mouth 3 (Three) Times a Day With Meals.      • gabapentin (NEURONTIN) 400 MG capsule Take 1 capsule by mouth Every 12 (Twelve) Hours. (Patient taking differently: Take 400 mg by mouth 2 (two) times a day.)   Taking   • hydrOXYzine (ATARAX) 25 MG tablet Take 25 mg by mouth Every 8 (Eight) Hours As Needed for Anxiety.   Taking   • insulin aspart (novoLOG) 100 UNIT/ML injection Inject  under the skin into the appropriate area as directed 3 (Three) Times a Day Before Meals. Sliding scale if 151, give 1 unit, if 152-171 give 2 units, and then for every 20 over 171, give 1 additional unit of insulin   Taking   • nitroglycerin (NITROSTAT) 0.4 MG SL tablet 1 under the tongue as needed for angina, may repeat q5mins for up three doses (Patient taking differently: Place 0.4 mg under the tongue Every 5 (Five) Minutes As Needed. 1 under the tongue as needed for angina, may repeat q5mins for up three doses) 25 tablet 0 Taking   • oxyCODONE (ROXICODONE) 10 MG tablet Take 10 mg by mouth 4 (Four) Times a Day As Needed.   Taking   • pantoprazole (PROTONIX) 40 MG EC tablet Take 40 mg by mouth 2 (Two) Times a Day.   Taking   • promethazine (PHENERGAN) 25 MG tablet Take 25 mg by mouth Every 6 (Six) Hours  "As Needed for Nausea or Vomiting.   Taking   • rosuvastatin (CRESTOR) 40 MG tablet Take 40 mg by mouth Daily.   Taking   • cadexomer iodine (IODOSORB) 0.9 % gel Apply to the left big toe and right 4th metatarsal area as directed three times per week on Tuesday, Thursday, and Saturday. 10 g 4      Allergies:  Patient has no known allergies.    Review of Systems  Not obtainable    Objective     Vital Signs  Temp:  [97.4 °F (36.3 °C)-98.2 °F (36.8 °C)] 97.6 °F (36.4 °C)  Heart Rate:  [] 73  Resp:  [18-30] 27  BP: ()/() 159/87  FiO2 (%):  [40 %-100 %] 40 %    Flowsheet Rows      First Filed Value   Admission Height  182.9 cm (72\") Documented at 06/30/2020 1559   Admission Weight  98.9 kg (218 lb) Documented at 06/30/2020 1559           No intake/output data recorded.  I/O last 3 completed shifts:  In: 245 [I.V.:245]  Out: 5000 [Other:5000]    Intake/Output Summary (Last 24 hours) at 7/1/2020 0749  Last data filed at 7/1/2020 0559  Gross per 24 hour   Intake 245 ml   Output 5000 ml   Net -4755 ml       Physical Exam:  General Appearance: On the ventilator, unresponsive, appears to be chronically ill  Skin: warm and dry, multiple ulceration and few blisters on the lower extremities with erythema and chronic skin changes also has mycotic toenails  HEENT: pupils round and reactive to light, orally intubated, nonicteric sclera  Neck: supple, no JVD, trachea midline, bilateral carotid bruit  Lungs: Bilateral rhonchi, unlabored breathing effort  Heart: RRR, normal S1 and S2, no S3, no rub  Abdomen: soft, no guarding,  present bowel sounds to auscultation  : no palpable bladder,  Extremities: He has what appears to be bilateral chronic woody edema with stasis dermatitis multiple ulcer and 1 or 2 blisters and mycotic toenails, functioning AV fistula in the right upper arm  Joints: No significant deformities noted, no crepitation of the knees.  Lymphatics: No cervical or supraclavicular lymphadenopathy  Neuro: " Unable to assess    Results Review:  Results for orders placed or performed during the hospital encounter of 06/30/20   Comprehensive Metabolic Panel   Result Value Ref Range    Glucose 124 (H) 65 - 99 mg/dL     (H) 6 - 20 mg/dL    Creatinine 15.20 (H) 0.76 - 1.27 mg/dL    Sodium 137 136 - 145 mmol/L    Potassium 7.5 (C) 3.5 - 5.2 mmol/L    Chloride 84 (L) 98 - 107 mmol/L    CO2 23.0 22.0 - 29.0 mmol/L    Calcium 8.0 (L) 8.6 - 10.5 mg/dL    Total Protein 6.9 6.0 - 8.5 g/dL    Albumin 4.00 3.50 - 5.20 g/dL    ALT (SGPT) 14 1 - 41 U/L    AST (SGOT) 11 1 - 40 U/L    Alkaline Phosphatase 163 (H) 39 - 117 U/L    Total Bilirubin 1.0 0.2 - 1.2 mg/dL    eGFR Non African Amer 3 (L) >60 mL/min/1.73    eGFR  African Amer      Globulin 2.9 gm/dL    A/G Ratio 1.4 g/dL    BUN/Creatinine Ratio 11.4 7.0 - 25.0    Anion Gap 30.0 (H) 5.0 - 15.0 mmol/L   Lactic Acid, Plasma   Result Value Ref Range    Lactate 1.3 0.5 - 2.0 mmol/L   Procalcitonin   Result Value Ref Range    Procalcitonin 1.39 (H) 0.10 - 0.25 ng/mL   Troponin   Result Value Ref Range    Troponin T 0.167 (C) 0.000 - 0.030 ng/mL   CBC Auto Differential   Result Value Ref Range    WBC 7.35 3.40 - 10.80 10*3/mm3    RBC 3.72 (L) 4.14 - 5.80 10*6/mm3    Hemoglobin 10.2 (L) 13.0 - 17.7 g/dL    Hematocrit 32.2 (L) 37.5 - 51.0 %    MCV 86.6 79.0 - 97.0 fL    MCH 27.4 26.6 - 33.0 pg    MCHC 31.7 31.5 - 35.7 g/dL    RDW 16.3 (H) 12.3 - 15.4 %    RDW-SD 51.0 37.0 - 54.0 fl    MPV 10.9 6.0 - 12.0 fL    Platelets 159 140 - 450 10*3/mm3    Neutrophil % 83.2 (H) 42.7 - 76.0 %    Lymphocyte % 6.8 (L) 19.6 - 45.3 %    Monocyte % 8.3 5.0 - 12.0 %    Eosinophil % 0.8 0.3 - 6.2 %    Basophil % 0.5 0.0 - 1.5 %    Immature Grans % 0.4 0.0 - 0.5 %    Neutrophils, Absolute 6.11 1.70 - 7.00 10*3/mm3    Lymphocytes, Absolute 0.50 (L) 0.70 - 3.10 10*3/mm3    Monocytes, Absolute 0.61 0.10 - 0.90 10*3/mm3    Eosinophils, Absolute 0.06 0.00 - 0.40 10*3/mm3    Basophils, Absolute 0.04 0.00 -  0.20 10*3/mm3    Immature Grans, Absolute 0.03 0.00 - 0.05 10*3/mm3    nRBC 0.1 0.0 - 0.2 /100 WBC   Blood Gas, Arterial   Result Value Ref Range    Site Arterial: right brachial     Frederic's Test N/A     pH, Arterial 7.305 (L) 7.350 - 7.450 pH units    pCO2, Arterial 50.1 (H) 35.0 - 45.0 mm Hg    pO2, Arterial 189.1 (H) 80.0 - 100.0 mm Hg    HCO3, Arterial 24.9 22.0 - 28.0 mmol/L    Base Excess, Arterial -1.8 (L) 0.0 - 2.0 mmol/L    O2 Saturation Calculated 99.5 (H) 92.0 - 99.0 %    A-a Gradiant 0.4 mmHg    Barometric Pressure for Blood Gas 747.0 mmHg    Modality Adult Vent     FIO2 65 %    Ventilator Mode PC     Set Tidal Volume 554     Set Mech Resp Rate 25     Rate 26 Breaths/minute    PEEP 5    Renal Function Panel   Result Value Ref Range    Glucose 76 65 - 99 mg/dL    BUN 83 (H) 6 - 20 mg/dL    Creatinine 8.94 (H) 0.76 - 1.27 mg/dL    Sodium 133 (L) 136 - 145 mmol/L    Potassium 6.2 (C) 3.5 - 5.2 mmol/L    Chloride 87 (L) 98 - 107 mmol/L    CO2 26.2 22.0 - 29.0 mmol/L    Calcium 8.6 8.6 - 10.5 mg/dL    Albumin 4.10 3.50 - 5.20 g/dL    Phosphorus 7.9 (H) 2.5 - 4.5 mg/dL    Anion Gap 19.8 (H) 5.0 - 15.0 mmol/L    BUN/Creatinine Ratio 9.3 7.0 - 25.0    eGFR Non African Amer 6 (L) >60 mL/min/1.73    eGFR  African Amer     CBC Auto Differential   Result Value Ref Range    WBC 5.92 3.40 - 10.80 10*3/mm3    RBC 3.36 (L) 4.14 - 5.80 10*6/mm3    Hemoglobin 9.1 (L) 13.0 - 17.7 g/dL    Hematocrit 28.9 (L) 37.5 - 51.0 %    MCV 86.0 79.0 - 97.0 fL    MCH 27.1 26.6 - 33.0 pg    MCHC 31.5 31.5 - 35.7 g/dL    RDW 16.1 (H) 12.3 - 15.4 %    RDW-SD 50.3 37.0 - 54.0 fl    MPV 10.8 6.0 - 12.0 fL    Platelets 118 (L) 140 - 450 10*3/mm3   Manual Differential   Result Value Ref Range    Neutrophil % 81.0 (H) 42.7 - 76.0 %    Lymphocyte % 6.0 (L) 19.6 - 45.3 %    Monocyte % 11.0 5.0 - 12.0 %    Eosinophil % 2.0 0.3 - 6.2 %    Neutrophils Absolute 4.80 1.70 - 7.00 10*3/mm3    Lymphocytes Absolute 0.36 (L) 0.70 - 3.10 10*3/mm3     Monocytes Absolute 0.65 0.10 - 0.90 10*3/mm3    Eosinophils Absolute 0.12 0.00 - 0.40 10*3/mm3    Anisocytosis Mod/2+ None Seen    WBC Morphology Normal Normal    Platelet Morphology Normal Normal   POC Glucose Once   Result Value Ref Range    Glucose 181 (H) 70 - 130 mg/dL   POC Glucose Once   Result Value Ref Range    Glucose 104 70 - 130 mg/dL   POC Glucose Once   Result Value Ref Range    Glucose 81 70 - 130 mg/dL     Imaging Results (Last 72 Hours)     Procedure Component Value Units Date/Time    XR Chest 1 View [[id number]] Collected:  06/30/20 1808     Updated:  06/30/20 1827    Narrative:       STAT PORTABLE RADIOGRAPHIC VIEW OF THE CHEST     CLINICAL HISTORY: Cough and shortness of air.     Stat portable radiographic view of the chest demonstrates a left  subclavian approach pacer device. Its lead terminates at the expected  location of the right ventricle. This pacer device is unchanged in  appearance when compared to the prior study of 04/28/2018. The  cardiomediastinal silhouette is enlarged. The lungs are clear of acute  infiltrates. The osseous structures are unremarkable.     This report was finalized on 6/30/2020 6:24 PM by Dr. Jose Hou M.D.                 albuterol sulfate HFA 6 puff Vent nebulization Q6H - RT   clopidogrel 75 mg Oral Daily   DULoxetine 60 mg Oral Daily   enoxaparin 30 mg Subcutaneous Q24H   insulin lispro 0-24 Units Subcutaneous Q6H   ipratropium-albuterol 3 mL Nebulization Once   pantoprazole 40 mg Oral BID   sodium chloride 10 mL Intravenous Q12H       norepinephrine 0.02-0.3 mcg/kg/min Last Rate: Stopped (06/30/20 1932)   propofol 5-50 mcg/kg/min Last Rate: 30 mcg/kg/min (07/01/20 0553)       Assessment/Plan   1.  End-stage renal disease on maintenance hemodialysis every Monday, Wednesday and Friday, misses dialysis quite often on presentation his creatinine was around 15 and the potassium 7.5 he underwent emergent dialysis treatment.  2.  Medical noncompliance  3.  Chronic  leg ulcers with venous stasis dermatitis appears to be mild cellulitis  4.  Insulin-dependent diabetes mellitus  5.  COPD with active tobacco abuse  6.  Chronic pancreatitis  7.  Anemia of chronic kidney disease, hemoglobin is 9.1  8.  Cardiac arrest associated with hyperkalemia patient was resuscitated and restored to adequate circulation  9.  Thrombocytopenia, platelet 118,000.  10.  Hyperphosphatemia associated with noncompliance        Plan:  1.  Dialyze again to improve his potassium  2.  Continue the same treatment  3.  Surveillance labs    For asking me to see this patient in consultation.        I discussed the patient's findings and my recommendations with Dr. Garcia and with the nursing staff    Blaise Morgan MD  07/01/20  07:49      Much of this encounter note is an electronic transcription/translation of spoken language to printed text. The electronic translation of spoken language may permit erroneous, or at times, nonsensical words or phrases to be inadvertently transcribed; Although I have reviewed the note for such errors, some may still exist

## 2020-07-01 NOTE — CONSULTS
[facility]  Cardiology Consult Note    Patient Identification:  Name: Sergio Phan  Age: 49 y.o.  Sex: male  :  1970  MRN: [id number]             Requesting Physician: Dr. Liang Rolle    Reason for Consultation / Chief Complaint: cardiac arrest    History of Present Illness:   Sergio Phan is a 49 year male, who is current with our service.  He has a history of COPD, depression, diabetes mellitus, CKD on hemodialysis, depression, tobacco abuse, hypertension, hypertension, hypertriglyceridemia, anxiety, and  hypertrophic obstructive cardiomyopathy with AICD. He presented at [facility] ED on  on with nausea, vomiting,  and confusion. He had a cardiac arrest in the ED. His potassium was 7. He went into Ventricular fibrillation. He was given sodium bicarbonate, calcium and amiodarone. CPR was performed.     EKG on  revealed sinus rhythm with LBBB with prolonged IA interval.  Troponin 0 0.167, glucose 124, potassium 7.5, CO2 23, creatinine 15.20, GFR 3, ALT/AST WNL, hemoglobin 10.2 and platelets 159.       Echo in 3/16/2020 revealed LVH mild to moderate, RV C is mild to moderately dilated, moderate to severe TV regurgitation, mild MV regurgitation, LAC size is dilated, no evidence of pericardial effusion and EF 54%.  In 2018 he had a stress test which indicated a small sized infarct located in the apex with no significant ischemia noted.  Cardiac cath in  revealed normal coronary arteries.           Comorbid cardiac risk factors: COPD, hypertension, hyperlipidemia, and history of MI.    Past Medical History:  Past Medical History:   Diagnosis Date   • Anxiety    • Asthma    • Chest pain    • COPD (chronic obstructive pulmonary disease) (CMS/HCC)    • Depression    • Diabetes mellitus (CMS/HCC)     TYPE II   • Fatigue    • Gout    • HOCM (hypertrophic obstructive cardiomyopathy) (CMS/HCC)    • Hypertension    • Hypertriglyceridemia    • Myocardial infarction (CMS/HCC)    •  Pancreatitis    • Renal disorder    • Syncope      Past Surgical History:  Past Surgical History:   Procedure Laterality Date   • ABDOMINAL SURGERY     • ARTERIOVENOUS FISTULA/SHUNT SURGERY Right 4/19/2018    Procedure: RT/ ARTERIOVENOUS FISTULA FORMATION;  Surgeon: Chuck Woodward MD;  Location: [facility];  Service: Vascular   • ATHRECTOMY ILIAC, FEMORAL, TIBIAL ARTERY Bilateral 5/2/2018    Procedure: JEFFERY LOWER EXTREMITY ANGIOGRAM, LEFT SFA  AND POPLITEAL LASER ARTHRECTOMY WITH DRUG COATED BALLOON, IVUS X3;  Surgeon: Chuck Woodward MD;  Location: [facility] 18/19;  Service: Vascular   • CARDIAC CATHETERIZATION     • CARDIAC DEFIBRILLATOR PLACEMENT     • CARDIAC DEFIBRILLATOR PLACEMENT     • CHOLECYSTECTOMY     • ENDOSCOPY N/A 8/30/2016    Procedure: ESOPHAGOGASTRODUODENOSCOPY;  Surgeon: Irineo Mercedes MD;  Location: [facility];  Service:    • ERCP     • INSERT / REPLACE / REMOVE PACEMAKER      [facility]   • INSERTION HEMODIALYSIS CATHETER N/A 4/30/2018    Procedure: HEMODIALYSIS CATHETER INSERTION;  Surgeon: Mya Wilkinson Jr., MD;  Location: [facility] 18/19;  Service: Vascular   • LAPAROSCOPIC APPENDECTOMY     • OTHER SURGICAL HISTORY      NO REMOVAL OF APPENDIX  PATIENT HAS A APPENDIX   • UMBILICAL HERNIA REPAIR        Allergies:  No Known Allergies  Home Meds:  Medications Prior to Admission   Medication Sig Dispense Refill Last Dose   • albuterol (PROVENTIL HFA;VENTOLIN HFA) 108 (90 BASE) MCG/ACT inhaler Inhale 2 puffs every 4 (four) hours as needed for wheezing.   Taking   • apixaban (Eliquis) 5 MG tablet tablet Take 1 tablet by mouth 2 (Two) times a day. 60 tablet 0 Taking   • B Complex-C-Folic Acid (TAMI-ALEJANDRO PO) Take 1 tablet by mouth Daily.   Taking   • bisoprolol (ZEBeta) 10 MG tablet Take 10 mg by mouth Daily.   Taking   • busPIRone (BUSPAR) 10 MG tablet Take 10 mg by mouth 2 (two) times a day.      • cadexomer iodine (IODOSORB) 0.9 % gel Apply  topically to the  appropriate area as directed on foot wounds every other day. 10 g 3    • cinacalcet (SENSIPAR) 30 MG tablet Take 90 mg by mouth Daily.   Taking   • clopidogrel (PLAVIX) 75 MG tablet Take 1 tablet by mouth Daily. 30 tablet 0 Taking   • cyclobenzaprine (FLEXERIL) 10 MG tablet Take 10 mg by mouth 2 (Two) Times a Day As Needed for Muscle Spasms.   Taking   • dilTIAZem CD (CARDIZEM CD) 180 MG 24 hr capsule Take 1 capsule by mouth Daily. 30 capsule 0 Taking   • DULoxetine (CYMBALTA) 60 MG capsule Take 60 mg by mouth Daily.   Taking   • Ferric Citrate (Auryxia) 1  MG(Fe) tablet Take 1 tablet by mouth 3 (Three) Times a Day With Meals.      • gabapentin (NEURONTIN) 400 MG capsule Take 1 capsule by mouth Every 12 (Twelve) Hours. (Patient taking differently: Take 400 mg by mouth 2 (two) times a day.)   Taking   • hydrOXYzine (ATARAX) 25 MG tablet Take 25 mg by mouth Every 8 (Eight) Hours As Needed for Anxiety.   Taking   • insulin aspart (novoLOG) 100 UNIT/ML injection Inject  under the skin into the appropriate area as directed 3 (Three) Times a Day Before Meals. Sliding scale if 151, give 1 unit, if 152-171 give 2 units, and then for every 20 over 171, give 1 additional unit of insulin   Taking   • nitroglycerin (NITROSTAT) 0.4 MG SL tablet 1 under the tongue as needed for angina, may repeat q5mins for up three doses (Patient taking differently: Place 0.4 mg under the tongue Every 5 (Five) Minutes As Needed. 1 under the tongue as needed for angina, may repeat q5mins for up three doses) 25 tablet 0 Taking   • oxyCODONE (ROXICODONE) 10 MG tablet Take 10 mg by mouth 4 (Four) Times a Day As Needed.   Taking   • pantoprazole (PROTONIX) 40 MG EC tablet Take 40 mg by mouth 2 (Two) Times a Day.   Taking   • promethazine (PHENERGAN) 25 MG tablet Take 25 mg by mouth Every 6 (Six) Hours As Needed for Nausea or Vomiting.   Taking   • rosuvastatin (CRESTOR) 40 MG tablet Take 40 mg by mouth Daily.   Taking   • cadexomer iodine  (IODOSORB) 0.9 % gel Apply to the left big toe and right 4th metatarsal area as directed three times per week on Tuesday, Thursday, and Saturday. 10 g 4      Current Meds:      Scheduled     Medication Dose/Rate, Route, Frequency Last Action   albuterol sulfate HFA (PROVENTIL HFA;VENTOLIN HFA;PROAIR HFA) inhaler 6 puff 6 puff, ventilator, Q6H - RT Given:  1138   castor oil-balsam peru (VENELEX) ointment No Dose/Rate, TOP, Q12H Ordered   chlorhexidine (PERIDEX) 0.12 % solution 15 mL 15 mL, MT, Q12H Ordered   famotidine (PEPCID) injection 20 mg 20 mg, IV, Daily Ordered   heparin (porcine) 5000 UNIT/ML injection 5,000 Units 5,000 Units, SC, Q8H Ordered   insulin lispro (humaLOG) injection 0-24 Units 0-24 Units, SC, Q6H Ordered   ipratropium-albuterol (DUO-NEB) nebulizer solution 3 mL 3 mL, NEBULIZATION, Once Ordered   mineral oil-hydrophilic petrolatum (AQUAPHOR) ointment No Dose/Rate, TOP, Daily Ordered   sodium chloride 0.9 % flush 10 mL 10 mL, IV, Q12H Given:  0820      Continuous     Medication Dose/Rate, Route, Frequency Last Action   norepinephrine (LEVOPHED) 8 mg/250 mL (32 mcg/mL) in sodium chloride 0.9% infusion (premix) 0 mcg/kg/min, IV, Titrated Hold:  193   propofol (DIPRIVAN) infusion 10 mg/mL 100 mL 0 mcg/kg/min, IV, Titrated Stopped:  1206      PRN     Medication Dose/Rate, Route, Frequency Last Action   albumin human 25 % IV SOLN 12.5 g 12.5 g, IV, PRN Ordered   albumin human 25 % IV SOLN 12.5 g 12.5 g, IV, PRN New Ba/30 230   dextrose (D50W) 25 g/ 50mL Intravenous Solution 25 g 25 g, IV, Q15 Min PRN Ordered   dextrose (GLUTOSE) oral gel 15 g 15 g, PO, Q15 Min PRN Ordered   glucagon (human recombinant) (GLUCAGEN DIAGNOSTIC) injection 1 mg 1 mg, SC, Q15 Min PRN Ordered   HYDROmorphone (DILAUDID) injection 1 mg 1 mg, IV, Q3H PRN Given:  1156   ondansetron (ZOFRAN) injection 4 mg 4 mg, IV, Q6H PRN Ordered   ondansetron (ZOFRAN) tablet 4 mg 4 mg, PO, Q6H PRN Ordered   sodium  "chloride 0.9 % bolus 1,000 mL 1,000 mL, IV, PRN Ordered   sodium chloride 0.9 % bolus 1,000 mL 1,000 mL, IV, PRN Ordered   sodium chloride 0.9 % flush 10 mL 10 mL, IV, PRN Ordered   sodium chloride 0.9 % flush 10 mL 10 mL, IV, PRN Ordered          Social History:   Social History     Tobacco Use   • Smoking status: Current Some Day Smoker     Packs/day: 0.50     Years: 35.00     Pack years: 17.50     Types: Cigarettes   • Smokeless tobacco: Never Used   Substance Use Topics   • Alcohol use: No      Family History:  Family History   Problem Relation Age of Onset   • Heart block Mother    • Kidney disease Mother    • Diabetes Father    • Diabetes Maternal Grandmother    • Diabetes Maternal Grandfather    • COPD Maternal Grandfather    • Asthma Maternal Grandfather         Review of Systems  Constitutional: No wt loss, fever   Gastrointestinal: No nausea , abdominal pain  Behavioral/Psych: No insomnia or anxiety   Cardiovascular ----positive for sob. All other systems reviewed and are negative                   Physical Exam  /80   Pulse 82   Temp 98.3 °F (36.8 °C) (Oral)   Resp (!) 29   Ht 182.9 cm (72.01\")   Wt 89.1 kg (196 lb 6.9 oz) Comment: not weighed by RD  SpO2 100%   BMI 26.63 kg/m²     General appearance: No acute changes   Eyes: Sclera conjunctiva normal, pupils reactive   HENT: Atraumatic; oropharynx clear with moist mucous membranes and no mucosal ulcerations;  Neck: Trachea midline; NECK, supple, no thyromegaly or lymphadenopathy   Lungs: Normal size and shape, normal breath sounds, equal distribution of air, no rales and rhonchi   CV: S1-S2 regular, no murmurs, no rub, no gallop   Abdomen: Soft, non-tender; no masses , no abnormal abdominal sounds   Extremities: No deformity , normal color , no peripheral edema   Skin: Normal temperature, turgor and texture; no rash, ulcers  Psych: Appropriate affect, alert and oriented to person, place and time                     Cardiographics  ECG:       "       Comparison ecg        Study Result        Telemetry:  SR  Echocardiogram:   Interpretation Summary     · Left ventricular wall thickness is consistent with mild-to-moderate concentric hypertrophy.  · Right ventricular cavity is mild-to-moderately dilated.  · Moderate to severe tricuspid valve regurgitation is present.  · Mild mitral valve regurgitation is present  · Left atrial cavity size is dilated.  · Calculated EF = 54.0%  · There is no evidence of pericardial effusion     2018  Interpretation Summary     · Technically difficult study.  · Left ventricular ejection fraction is normal (Calculated EF = 52%). There is hypokinesis of the anterior apex.  · Myocardial perfusion imaging indicates a small-sized infarct located in the apex with no significant ischemia noted.     CONCLUSION: Normal coronary arteries with hypertrophic cardiomyopathy.      DISCUSSION: Considering the patient's sudden cardiac death as well as  hypertrophic cardiomyopathy, EP consultation will be obtained for possible  AICD.               Noelle Rubin M.D.*  SRC:beena  D:   06/25/2015 09:25:51  T:   06/25/2015 10:28:45  Job ID:   [id number]  Document ID:   [id number]  Rev:   0  cc:      DO NOT TEXT EDIT THIS LINE :[id number]:  Authenticated by NOELLE RUBIN M.D. On 06/29/2015 03:32:33 PM     Imaging  Chest X-ray:   STAT PORTABLE RADIOGRAPHIC VIEW OF THE CHEST     CLINICAL HISTORY: Cough and shortness of air.     Stat portable radiographic view of the chest demonstrates a left  subclavian approach pacer device. Its lead terminates at the expected  location of the right ventricle. This pacer device is unchanged in  appearance when compared to the prior study of 04/28/2018. The  cardiomediastinal silhouette is enlarged. The lungs are clear of acute  infiltrates. The osseous structures are unremarkable.     This report was finalized on 6/30/2020 6:24 PM by Dr. Jose Hou M.D.       Lab Review   Results from last 7 days   Lab  Units 06/30/20  1615   TROPONIN T ng/mL 0.167*         Results from last 7 days   Lab Units 07/01/20  0538   SODIUM mmol/L 133*   POTASSIUM mmol/L 6.2*   BUN mg/dL 83*   CREATININE mg/dL 8.94*   CALCIUM mg/dL 8.6        Results from last 7 days   Lab Units 07/01/20  0538 06/30/20  1615   WBC 10*3/mm3 5.92 7.35   HEMOGLOBIN g/dL 9.1* 10.2*   HEMATOCRIT % 28.9* 32.2*   PLATELETS 10*3/mm3 118* 159         The following medical decision was discussed in detail with Dr. Rubin    Assessment:  1.  Cardiac arrest  2.  Hypertrophic obstructive cardiomyopathy with AICD  3.  Essential hypertension  4.  Proximal atrial fibrillation anticoagulated with Eliquis at home  5. Tobacco abuse    Recommendations / Plan:   Sergio Phan is a 49-year-old male, who is current with our service.  He presented to [facility] with nausea, vomiting, and shortness of breath.  While in the ED he went to cardiac arrest.  CODE BLUE was called with protocol ACLS.  His potassium was 7 at the time.  He had emergency dialysis to help decrease his potassium levels.  His [place] AICD was in interrogated and changes was made to increase ATP and shock intervals.  Previous echo revealed EF 54%, R VC mild to moderately dilated, LVH mild to moderate, moderate to severe TV regurgitation, mild MV regurgitation, LAC size is dilated, and no evidence of pericardial effusion.  Troponin was elevated. It has been noted he has missed at least 4 dialysis appointments.  He has a history of being noncompliant.  Currently on Levophed and diprivan. Once able to take po will continue rosuvastatin,  Cardizem, and bisoprolol.  Cardiac cath in 2015 revealed normal coronary arteries.  Nurse states per his wife he has not taken his eliquis for at least 2 weeks.  Will discuss further recommendations with MD. Once stable will need an ischemic work-up.       Labs/tests ordered for am:  EKG, troponin, BMP and mag in a.m.        Nory Javier  APRN  7/1/2020, 14:33    Patient personally interviewed and above subjective findings personally confirmed during a face to face contact with patient today  All findings of physical examination confirmed  All pertinent and performed labs, cardiac procedures ,  radiographs of the last 24 hours personally reviewed  Impression and plans discussed/elaborated and implemented jointly as described above     Noelle Rubin MD            EMR Dragon/Transcription:   Dictated utilizing Dragon dictation

## 2020-07-01 NOTE — PLAN OF CARE
Problem: Patient Care Overview  Goal: Plan of Care Review  Outcome: Ongoing (interventions implemented as appropriate)  Flowsheets (Taken 7/1/2020 9449)  Progress: improving  Plan of Care Reviewed With: patient; spouse  Note:   Pt was extubated today. Breathing normally on 2L NC. Currently in normal sinus rhythm. Pt is currently completing dialysis treatment. Combined with the one from yesterday he has had two since his admission. Pt verbalized to this RN and wife (on phone) that he understands the importance of not missing anymore dialysis treatments. Currently stable with intermittent pain associated with coughing. Giving dilaudid to treat. Bedside swallow study failed, speech consult requested. Consulted palliative to discuss goals of care related to non-compliance and previous verbal statements. Cardiology was contacted and want patient to remain on SQ heparin d/t hx of atrial fib.

## 2020-07-01 NOTE — NURSING NOTE
"Spoke with patients wife patient has had three episodes of hospitalization this year. Patient missed four appointments for dialysis despite wife encouraging to go. She states he has verbalized he is \"ready to go\". Patient has not been taking home meds besides his buspar (which is worsening shaking) and Oxycodone \"when he wants to sleep\". Information was discussed at rounds and palliative consult was placed by Dr. Rolle. Plan is to extubate patient today if able and discuss plans further.  "

## 2020-07-01 NOTE — DISCHARGE PLACEMENT REQUEST
"Ankit Mixon (49 y.o. Male)     Date of Birth Social Security Number Address Home Phone MRN    1970  [address]  [address]  [facility] [address] [phone] [id number]    Jew Marital Status          None        Admission Date Admission Type Admitting Provider Attending Provider Department, Room/Bed    6/30/20 Emergency Liang Rolle MD Anaya, Ervin H., MD [facility], [address]324/1    Discharge Date Discharge Disposition Discharge Destination                       Attending Provider:  Liang Rolle MD    Allergies:  No Known Allergies    Isolation:  None   Infection:  None   Code Status:  CPR    Ht:  182.9 cm (72.01\")   Wt:  89.1 kg (196 lb 6.9 oz)    Admission Cmt:  None   Principal Problem:  None                Active Insurance as of 6/30/2020     Primary Coverage     Payor Plan Insurance Group Employer/Plan Group    HUMANA MEDICARE REPLACEMENT HUMANA MEDICARE REPLACEMENT [id number]     Payor Plan Address Payor Plan Phone Number Payor Plan Fax Number Effective Dates    [address] [address] [phone]  3/1/2018 - None Entered    [facility] [id number]       Subscriber Name Subscriber Birth Date Member ID       ANKIT MIXON 1970 [id number]                 Emergency Contacts      (Rel.) Home Phone Work Phone Mobile Phone    SylvesterZoey (Spouse) [phone] -- [phone]    Candido Tanner (Son) -- -- [phone]              "

## 2020-07-01 NOTE — CONSULTS
Paged for code. Pt wife was present. Provided prayer, support, and stayed with wife until she felt stable and able to leave. Will hand off to  Jenny who follows ICU and will check in when able.

## 2020-07-01 NOTE — NURSING NOTE
VSS, Pt received dialysis- Potassium of 7.5 at beginning of shift, at 0538 potassium was 6.2.  Pt does not follow commands, but responds to pain. Propofol weaned down to 30mcg/kg/min. Tolerated ventilator. Continue to monitor.

## 2020-07-02 NOTE — NURSING NOTE
Pt blood pressure remains elevated- most recent bp is 176/102. Spoke with Dr. OTTO De La Rosa, no new orders. Notify physician if systolic is greater than or equal to 200.

## 2020-07-02 NOTE — PROGRESS NOTES
[facility]  Cardiology Progress Note    Patient Identification:  Name: Sergio Phan  Age: 49 y.o.  Sex: male  :  1970  MRN: [id number]                 Follow Up / Chief Complaint: Cardiac arrest    Interval History: I had a long discussion with patient regarding compliance with medication and hemodialysis.       Subjective: Denies shortness of breath, chest pain, and palpitations.      Objective:    Past Medical History:  Past Medical History:   Diagnosis Date   • Anxiety    • Asthma    • Chest pain    • COPD (chronic obstructive pulmonary disease) (CMS/HCC)    • Depression    • Diabetes mellitus (CMS/HCC)     TYPE II   • Fatigue    • Gout    • HOCM (hypertrophic obstructive cardiomyopathy) (CMS/HCC)    • Hypertension    • Hypertriglyceridemia    • Myocardial infarction (CMS/HCC)    • Pancreatitis    • Renal disorder    • Syncope      Past Surgical History:  Past Surgical History:   Procedure Laterality Date   • ABDOMINAL SURGERY     • ARTERIOVENOUS FISTULA/SHUNT SURGERY Right 2018    Procedure: RT/ ARTERIOVENOUS FISTULA FORMATION;  Surgeon: Chuck Woodward MD;  Location: [facility] OR;  Service: Vascular   • ATHRECTOMY ILIAC, FEMORAL, TIBIAL ARTERY Bilateral 2018    Procedure: JEFFERY LOWER EXTREMITY ANGIOGRAM, LEFT SFA  AND POPLITEAL LASER ARTHRECTOMY WITH DRUG COATED BALLOON, IVUS X3;  Surgeon: Chuck Woodward MD;  Location: [facility] OR ;  Service: Vascular   • CARDIAC CATHETERIZATION     • CARDIAC DEFIBRILLATOR PLACEMENT     • CARDIAC DEFIBRILLATOR PLACEMENT     • CHOLECYSTECTOMY     • ENDOSCOPY N/A 2016    Procedure: ESOPHAGOGASTRODUODENOSCOPY;  Surgeon: Irineo Mercedes MD;  Location[facility] OR;  Service:    • ERCP     • INSERT / REPLACE / REMOVE PACEMAKER      ST GEOVANY   • INSERTION HEMODIALYSIS CATHETER N/A 2018    Procedure: HEMODIALYSIS CATHETER INSERTION;  Surgeon: Mya Wilkinson Jr., MD;  Location: [facility] OR ;  Service:  "Vascular   • LAPAROSCOPIC APPENDECTOMY     • OTHER SURGICAL HISTORY      NO REMOVAL OF APPENDIX  PATIENT HAS A APPENDIX   • UMBILICAL HERNIA REPAIR          Social History:   Social History     Tobacco Use   • Smoking status: Current Some Day Smoker     Packs/day: 0.50     Years: 35.00     Pack years: 17.50     Types: Cigarettes   • Smokeless tobacco: Never Used   Substance Use Topics   • Alcohol use: No      Family History:  Family History   Problem Relation Age of Onset   • Heart block Mother    • Kidney disease Mother    • Diabetes Father    • Diabetes Maternal Grandmother    • Diabetes Maternal Grandfather    • COPD Maternal Grandfather    • Asthma Maternal Grandfather           Allergies:  No Known Allergies  Scheduled Meds:    castor oil-balsam peru  Q12H   heparin (porcine) 5,000 Units Q8H   insulin lispro 0-24 Units 4x Daily With Meals & Nightly   ipratropium-albuterol 3 mL Once   mineral oil-hydrophilic petrolatum  Daily   sodium chloride 10 mL Q12H           INTAKE AND OUTPUT:    Intake/Output Summary (Last 24 hours) at 7/2/2020 1421  Last data filed at 7/2/2020 0600  Gross per 24 hour   Intake 50 ml   Output 0 ml   Net 50 ml       ROS  Constitutional: Awake and alert, no fever. No nosebleeds  Abdomen           no abdominal pain   Cardiac              no chest pain  Pulmonary          no shortness of breath      /82   Pulse 90   Temp 98.7 °F (37.1 °C) (Oral)   Resp 18   Ht 182.9 cm (72.01\")   Wt 85.2 kg (187 lb 13.3 oz)   SpO2 94%   BMI 25.47 kg/m²   General appearance: No acute changes   Neck: Trachea midline; NECK, supple, no thyromegaly or lymphadenopathy   Lungs: Normal size and shape, normal breath sounds, equal distribution of air, no rales and rhonchi   CV: S1-S2 regular, no murmurs, no rub, no gallop   Abdomen: Soft, non-tender; no masses , no abnormal abdominal sounds   Extremities: No deformity , normal color , no peripheral edema   Skin: Normal temperature, turgor and texture; no " rash, ulcers            Cardiographics  Telemetry: SR      ECG:           Echocardiogram:     Lab Review   Results from last 7 days   Lab Units 07/02/20  0412 06/30/20  1615   TROPONIN T ng/mL 0.190* 0.167*     Results from last 7 days   Lab Units 07/02/20  0412   MAGNESIUM mg/dL 2.2     Results from last 7 days   Lab Units 07/02/20  0412   SODIUM mmol/L 135*   POTASSIUM mmol/L 5.0   BUN mg/dL 41*   CREATININE mg/dL 5.55*   CALCIUM mg/dL 8.8        Results from last 7 days   Lab Units 07/02/20  0412 07/01/20  0538 06/30/20  1615   WBC 10*3/mm3 7.54 5.92 7.35   HEMOGLOBIN g/dL 10.0* 9.1* 10.2*   HEMATOCRIT % 31.1* 28.9* 32.2*   PLATELETS 10*3/mm3 101* 118* 159         The following medical decision was discussed in detail with Dr. Rubin    Assessment:  1.  Cardiac arrest  2.  Hypertrophic obstructive cardiomyopathy with AICD  3.  Essential hypertension  4.  Proximal atrial fibrillation anticoagulated with Eliquis at home  5.  Tobacco abuse  6.  Thrombocytopenia  7.  Hyperkalemia  8.  End-stage renal disease  9.  Diabetes mellitus type 2  10.  COPD    Plan:  His blood pressure is slightly elevated and heart rate in 90s to 100s.  We will add metoprolol at this time.  Wife states he had run out of his Eliquis about 2 weeks prior and was not able to get a refill.  I had a long discussion with him and his wife reguarding the importance of being compliant with taking medications and hemodialysis.  He is being followed by nephrology and pulmonary.  He is to have hemodialysis tomorrow.  Will discuss further recommendations with MD.  Troponin remains elevated proable due to creatinine. Once stable, we will do ischemic work-up if patient is agreeable.      Labs/tests ordered for am: will add metoprolol tartrate 25 mg bid.       )7/2/2020  ROBSON Austin    Patient personally interviewed and above subjective findings personally confirmed during a face to face contact with patient today  All findings of physical  "examination confirmed  All pertinent and performed labs, cardiac procedures ,  radiographs of the last 24 hours personally reviewed  Impression and plans discussed/elaborated and implemented jointly as described above     Noelle Rubin MD            EMR Dragon/Transcription:   \"Dictated utilizing Dragon dictation\".     "

## 2020-07-02 NOTE — PLAN OF CARE
Problem: Patient Care Overview  Goal: Plan of Care Review  Outcome: Ongoing (interventions implemented as appropriate)  Flowsheets (Taken 7/2/2020 1932)  Progress: improving  Plan of Care Reviewed With: patient; spouse  Outcome Summary: Patient remains in CCU. GI consulted for abdominal pain/emesis intermittently over the last few weeks when eating- patient states it is most common when eating pasta. Vital signs stable, cardiology started beta blocker today for elevated BP. Plan for dialysis tomorrow.

## 2020-07-02 NOTE — PROGRESS NOTES
"Pharmacy Consult - Lovenox    Sergio hPan has a consult for pharmacy to dose enoxaparin for afib requiring full anticoagulation request of Nory CHANCE.       Relevant clinical data and objective history reviewed:  49 y.o. male 182.9 cm (72.01\") 85.2 kg (187 lb 13.3 oz)      Past Medical History:   Diagnosis Date    Anxiety     Asthma     Chest pain     COPD (chronic obstructive pulmonary disease) (CMS/[facility])     Depression     Diabetes mellitus (CMS/[facility])     TYPE II    Fatigue     Gout     HOCM (hypertrophic obstructive cardiomyopathy) (CMS/[facility])     Hypertension     Hypertriglyceridemia     Myocardial infarction (CMS/[facility])     Pancreatitis     Renal disorder     Syncope      has No Known Allergies.    Lab Results   Component Value Date    WBC 7.54 07/02/2020    HGB 10.0 (L) 07/02/2020    HCT 31.1 (L) 07/02/2020    MCV 85.9 07/02/2020     (L) 07/02/2020     Lab Results   Component Value Date    GLUCOSE 60 (L) 07/02/2020    CALCIUM 8.8 07/02/2020     (L) 07/02/2020    K 5.0 07/02/2020    CO2 22.0 07/02/2020    CL 92 (L) 07/02/2020    BUN 41 (H) 07/02/2020    CREATININE 5.55 (H) 07/02/2020    EGFRIFAFRI  07/02/2020      Comment:      <15 Indicative of kidney failure.    EGFRIFNONA 11 (L) 07/02/2020    BCR 7.4 07/02/2020    ANIONGAP 21.0 (H) 07/02/2020       Estimated Creatinine Clearance: 19.2 mL/min (A) (by C-G formula based on SCr of 5.55 mg/dL (H)).    Assessment/Plan    Will start patient on 90 mg (1 mg/kg) subcutaneous every 24 hours, adjusted for renal function (ESRD on HD).      Pharmacy will discontinue the Pharmacy to Dose consult at this time. Renal function will continue to be monitored and dosing adjustments will be made by pharmacy based on renal function if necessary.      Ailyn Bedoya, [facility]    "

## 2020-07-02 NOTE — THERAPY DISCHARGE NOTE
Acute Care - Speech Language Pathology   Swallow Eval/Discharge [facility]     Patient Name: Sergio Phan  : 1970  MRN: [id number]  Today's Date: 2020               Admit Date: 2020    Visit Dx:    ICD-10-CM ICD-9-CM   1. Hyperkalemia E87.5 276.7   2. Encephalopathy acute G93.40 348.30   3. Noncompliance Z91.19 V15.81   4. ESRD (end stage renal disease) on dialysis N18.6 585.6    Z99.2 V45.11   5. Ventricular fibrillation (CMS/[facility]) I49.01 427.41   6. Cardiac arrest (CMS/[facility]) I46.9 427.5   7. Respiratory arrest (CMS/[facility]) R09.2 799.1     Patient Active Problem List   Diagnosis   • AICD (automatic cardioverter/defibrillator) present   • Dizziness   • Fatigue   • Hypertension   • Hypertrophic obstructive cardiomyopathy (CMS/[facility])   • Hypertriglyceridemia   • Kidney disorder   • Type 2 diabetes mellitus (CMS/[facility])   • Syncope   • Vitamin D deficiency   • Weakness   • Idiopathic acute pancreatitis   • VT (ventricular tachycardia) (CMS/[facility])   • Acute pancreatitis   • Pancreatitis, recurrent   • Hypertensive emergency   • Stage 5 chronic kidney disease on chronic dialysis (CMS/[facility])   • Severe diabetic hypoglycemia (CMS/[facility])   • Atherosclerosis of bypass graft of both lower extremities with bilateral ulceration of calves (CMS/[facility])   • Cellulitis of both lower extremities   • ESRD (end stage renal disease) on dialysis   • Tobacco abuse   • Scalded skin syndrome   • Diabetic neuropathy (CMS/[facility])   • COPD (chronic obstructive pulmonary disease) (CMS/[facility])   • Diabetic foot infection (CMS/[facility])   • Osteomyelitis of left foot (CMS/[facility])   • Venous ulcers of both lower extremities (CMS/[facility])   • Pressure injury of right heel, stage 2 (CMS/[facility])   • Hyperkalemia     Past Medical History:   Diagnosis Date   • Anxiety    • Asthma    • Chest pain    • COPD (chronic obstructive pulmonary disease) (CMS/[facility])    • Depression    • Diabetes mellitus (CMS/[facility])     TYPE II   • Fatigue    • Gout    • HOCM (hypertrophic  obstructive cardiomyopathy) (CMS/HCC)    • Hypertension    • Hypertriglyceridemia    • Myocardial infarction (CMS/[facility])    • Pancreatitis    • Renal disorder    • Syncope      Past Surgical History:   Procedure Laterality Date   • ABDOMINAL SURGERY     • ARTERIOVENOUS FISTULA/SHUNT SURGERY Right 4/19/2018    Procedure: RT/ ARTERIOVENOUS FISTULA FORMATION;  Surgeon: Chuck Woodward MD;  Location: [facility];  Service: Vascular   • ATHRECTOMY ILIAC, FEMORAL, TIBIAL ARTERY Bilateral 5/2/2018    Procedure: JEFFERY LOWER EXTREMITY ANGIOGRAM, LEFT SFA  AND POPLITEAL LASER ARTHRECTOMY WITH DRUG COATED BALLOON, IVUS X3;  Surgeon: Chuck Woodward MD;  Location: [facility] 18/19;  Service: Vascular   • CARDIAC CATHETERIZATION     • CARDIAC DEFIBRILLATOR PLACEMENT     • CARDIAC DEFIBRILLATOR PLACEMENT     • CHOLECYSTECTOMY     • ENDOSCOPY N/A 8/30/2016    Procedure: ESOPHAGOGASTRODUODENOSCOPY;  Surgeon: Irineo Mercedes MD;  Location: [facility];  Service:    • ERCP     • INSERT / REPLACE / REMOVE PACEMAKER      ST [place]   • INSERTION HEMODIALYSIS CATHETER N/A 4/30/2018    Procedure: HEMODIALYSIS CATHETER INSERTION;  Surgeon: Mya Wilkinson Jr., MD;  Location: [facility] OR 18/19;  Service: Vascular   • LAPAROSCOPIC APPENDECTOMY     • OTHER SURGICAL HISTORY      NO REMOVAL OF APPENDIX  PATIENT HAS A APPENDIX   • UMBILICAL HERNIA REPAIR            SWALLOW EVALUATION (last 72 hours)      [facility] Adult Swallow Evaluation     Row Name 07/02/20 1030                   Rehab Evaluation    Document Type  evaluation  -SH        Patient Effort  excellent  -SH           General Information    Patient Profile Reviewed  yes  -SH        Pertinent History Of Current Problem  Hyperkalemia, non compliance with meds, cardiac arrest with intubation. Pt with hx of modified diet post extubation, but with advancement to regular/thins.   -SH        Current Method of Nutrition  NPO  -SH        Precautions/Limitations, Vision   WFL;for purposes of eval  -        Precautions/Limitations, Hearing  WFL;for purposes of eval  -        Prior Level of Function-Communication  WFL  -        Prior Level of Function-Swallowing  no diet consistency restrictions  -        Plans/Goals Discussed with  patient;spouse/S.O.;agreed upon  [facility]        Barriers to Rehab  medically complex  -        Patient's Goals for Discharge  return to regular diet  -        Family Goals for Discharge  patient able to return to regular diet  -           Oral Motor and Function    Dentition Assessment  natural, present and adequate  -        Volitional Swallow  WFL  -        Volitional Cough  WF  -           Oral Musculature and Cranial Nerve Assessment    Oral Motor General Assessment  WFL  -           Clinical Swallow Eval    Clinical Swallow Evaluation Summary  Patient seen for a clinical swallow assessment. No overt s/s of aspiration observed across trials of thins, puree, mixed mech soft, or regular. SLP recs regular diet and thins, will sign off at this time. Meds whole as tolerated.  -           Clinical Impression    SLP Swallowing Diagnosis  functional oral phase;functional pharyngeal phase  -        Functional Impact  no impact on function  -        Swallow Criteria for Skilled Therapeutic Interventions Met  no problems identified which require skilled intervention  -           Recommendations    Therapy Frequency (Swallow)  evaluation only  -        SLP Diet Recommendation  regular textures;thin liquids  -        Recommended Precautions and Strategies  upright posture during/after eating  -        SLP Rec. for Method of Medication Administration  meds whole;with thin liquids  [facility]        Monitor for Signs of Aspiration  yes;notify SLP if any concerns  -        Anticipated Dischage Disposition (SLP)  unknown  -          User Key  (r) = Recorded By, (t) = Taken By, (c) = Cosigned By    Initials Name Effective Dates     Cayla  Tyesha HECK MS CCC-SLP 03/07/18 -           EDUCATION  The patient has been educated in the following areas:   Dysphagia (Swallowing Impairment).    SLP Recommendation and Plan  SLP Swallowing Diagnosis: functional oral phase, functional pharyngeal phase  SLP Diet Recommendation: regular textures, thin liquids     Monitor for Signs of Aspiration: yes, notify SLP if any concerns     Swallow Criteria for Skilled Therapeutic Interventions Met: no problems identified which require skilled intervention  Anticipated Dischage Disposition (SLP): unknown     Therapy Frequency (Swallow): evaluation only          Plan of Care Reviewed With: patient  Progress: improving  Outcome Summary: Patient seen for a clinical swallow assessment. No overt s/s of aspiration observed across trials of thins, puree, mixed mech soft, or regular. SLP recs regular diet and thins, will sign off at this time. Meds whole as tolerated.           SLP Outcome Measures (last 72 hours)      SLP Outcome Measures     Row Name 07/02/20 1100             SLP Outcome Measures    Outcome Measure Used?  Adult NOMS  -         Adult FCM Scores    FCM Chosen  Swallowing  -      Swallowing FCM Score  7  -        User Key  (r) = Recorded By, (t) = Taken By, (c) = Cosigned By    Initials Name Effective Dates     Tyesha Kulkarni MS CCC-SLP 03/07/18 -            Time Calculation:   Time Calculation- SLP     Row Name 07/02/20 1126             Time Calculation- SLP    SLP Start Time  1030  -      SLP Received On  07/02/20  -        User Key  (r) = Recorded By, (t) = Taken By, (c) = Cosigned By    Initials Name Provider Type     Tyesha Kulkarni MS CCC-SLP Speech and Language Pathologist          Therapy Charges for Today     Code Description Service Date Service Provider Modifiers Qty    [id number]  ST EVAL ORAL PHARYNG SWALLOW 4 7/2/2020 Tyesha Kulkarni MS CCC-SLP GN 1               SLP Discharge Summary  Anticipated Dischage Disposition (SLP): unknown    Tyesha  Nory Kulkarni, MS CCC-SLP  7/2/2020

## 2020-07-02 NOTE — PROGRESS NOTES
Pharmacy Consult - Lovenox    Clarified with Cardiology. DC lovenox consult and continue heparin 5000 units q8h.      Ailyn Bedoya RPH

## 2020-07-02 NOTE — CONSULTS
Purpose of the visit was to evaluate for: goals of care/advanced care planning, support for patient/family and pain/symptom management. Spoke with RN as well as patient and family and discussed palliative care, goals of care, resuscitation status and [facility].      Assessment:  Patient is palliative care appropriate given ESRD on HD and s/p resuscitated cardiac arrest.    Recommendations/Plan: Continue current plan of care. Patient agrees to complete living will while inpatient.     Other Comments:   Palliative Care spoke to patient and wife at bedside regarding GOC. Patient and family voiced understanding of his current medical condition and overall prognosis. Addressed patient's non-compliance with dialysis. Patient expressed that he did not go due to not feeling well from having pain, diarrhea, and vomiting from his acute pancreatitis. Patient acknowledges the severity of being non-complaint with his dialysis and states a desire to continue at this time even if he is not feeling well.     Further processed patient's goals and wishes regarding heroic life prolonging measures including CPR, intubation, and feeding tube. Provided education and information about CPR. Patient stated that he does want to be resuscitated and continue full interventions if necessary. Patient explained that if after he was  resuscitated and on a ventilator and it was determined that he would not have a meaningful recovery then he would not want any further life prolonging measures such as a trach and peg. Patient is agreeable to completing a living will that will outline his wishes if he were not able to make his own decisions. Wife is agreeable as she stated that she wants to honor patient's decisions.     Advised family of my availability and provided contact information if they have further questions or need to further discuss GOC even after d/c. Provided psychosocial support. Palliative Care will sign off for now. Please re-consult if  patient's needs or goals change. Thank you for the referral.

## 2020-07-02 NOTE — PLAN OF CARE
Problem: Patient Care Overview  Goal: Plan of Care Review  Outcome: Ongoing (interventions implemented as appropriate)  Flowsheets (Taken 7/2/2020 1119)  Progress: improving  Plan of Care Reviewed With: patient  Outcome Summary: Patient seen for a clinical swallow assessment. No overt s/s of aspiration observed across trials of thins, puree, mixed mech soft, or regular. SLP recs regular diet and thins, will sign off at this time. Meds whole as tolerated.

## 2020-07-02 NOTE — PROGRESS NOTES
Dr. SANDRA Rolle    [facility] CORONARY CARE    7/2/2020    Patient ID:  Name:  Sergio Phan  MRN:  [id number]  1970  49 y.o.  male            CC/Reason for visit: s/p resuscitated cardiac arrest due to medical noncompliance, hyperkalemia, life-threatening arrhythmias, end-stage renal disease, acute respiratory failure     Interval hx: Patient was extubated yesterday.  Today he seems to be fully oriented x3.  Complains of some generalized chronic pain.  Otherwise denies any shortness of breath or abdominal pain.  Complains of some chest pain from compressions.    ROS:  No hemoptysis, no diarrhea    Vitals:  Vitals:    07/02/20 0740 07/02/20 0800 07/02/20 0900 07/02/20 1000   BP:  163/80 168/83 168/83   BP Location:  Left arm     Patient Position:  Lying     Pulse:  88 90 89   Resp:  20     Temp: 98.9 °F (37.2 °C)      TempSrc: Oral      SpO2:  95% 93% 92%   Weight:       Height:         FiO2 (%): 39 %     Body mass index is 25.47 kg/m².    Intake/Output Summary (Last 24 hours) at 7/2/2020 1232  Last data filed at 7/2/2020 0600  Gross per 24 hour   Intake 50 ml   Output 0 ml   Net 50 ml       Exam:  GEN:  No distress  Alert, oriented x 3.   LUNGS: Clear breath sounds bilat, no use of accessory muscles  CV:  Normal S1S2, without murmur, 1+ ankle edema  ABD:  Non tender, no enlarged liver or masses  Skin shows multiple abrasions and scabs and superficial skin ulcers over his arms and legs    Scheduled meds:    castor oil-balsam peru  Topical Q12H   heparin (porcine) 5,000 Units Subcutaneous Q8H   insulin lispro 0-24 Units Subcutaneous 4x Daily With Meals & Nightly   ipratropium-albuterol 3 mL Nebulization Once   mineral oil-hydrophilic petrolatum  Topical Daily   sodium chloride 10 mL Intravenous Q12H     IV meds:                           Data Review:   I reviewed the patient's medications and new clinical results.    No results found for: COVID19      Lab Results   Component Value Date     CALCIUM 8.8 07/02/2020    PHOS 6.9 (H) 07/02/2020    MG 2.2 07/02/2020    MG 2.1 03/18/2020    MG 2.1 03/17/2020     Results from last 7 days   Lab Units 07/02/20  0412 07/01/20  0538 06/30/20  1615   SODIUM mmol/L 135* 133* 137   POTASSIUM mmol/L 5.0 6.2* 7.5*   CHLORIDE mmol/L 92* 87* 84*   CO2 mmol/L 22.0 26.2 23.0   BUN mg/dL 41* 83* 174*   CREATININE mg/dL 5.55* 8.94* 15.20*   CALCIUM mg/dL 8.8 8.6 8.0*   BILIRUBIN mg/dL  --   --  1.0   ALK PHOS U/L  --   --  163*   ALT (SGPT) U/L  --   --  14   AST (SGOT) U/L  --   --  11   GLUCOSE mg/dL 60* 76 124*   WBC 10*3/mm3 7.54 5.92 7.35   HEMOGLOBIN g/dL 10.0* 9.1* 10.2*   PLATELETS 10*3/mm3 101* 118* 159   PROCALCITONIN ng/mL  --   --  1.39*     Results from last 7 days   Lab Units 07/01/20  0646 07/01/20  0538   BLOODCX  No growth at 24 hours No growth at 24 hours           Results from last 7 days   Lab Units 07/02/20  0412 06/30/20  1615   TROPONIN T ng/mL 0.190* 0.167*     Results from last 7 days   Lab Units 06/30/20  1835   PH, ARTERIAL pH units 7.305*   PCO2, ARTERIAL mm Hg 50.1*   PO2 ART mm Hg 189.1*   MODALITY  Adult Vent   O2 SATURATION CALC % 99.5*       Estimated Creatinine Clearance: 19.2 mL/min (A) (by C-G formula based on SCr of 5.55 mg/dL (H)).      ASSESSMENT:   Hypoglycemia  Thrombocytopenia  Resuscitated cardiac arrest in the hospital  Life-threatening hyperkalemia with arrhythmia  End-stage renal disease, patient noncompliant with hemodialysis  Diabetes mellitus type 2  Peripheral artery disease  COPD  Smoker  Nonhealing skin ulcers of lower extremities      PLAN:  The patient is awake, alert x3 and off the mechanical ventilator.  I had a conversation with him about medical scope of therapy, goals of care.  We discussed his history of chronic irreversible medical conditions and the fact that he has expressed on numerous occasions that he does not want any medical treatment and he does not like coming to the hospital nor does he want to remain in  the hospital.  We discussed the options of hospice care at home and he is not interested at this time.  He seems to be conflicted by the decision of DNR and palliative care/hospice recommendations.  He wants to speak to his wife and palliative care nurse today.    In the meantime we will repeat renal function surveillance labs.  Continue hemodialysis as per nephrologist.  Treat his chronic pain with narcotics but at a lower dose than he did took at home because at home he was always sleepy and obtunded.    Total time 35 minutes, more than 50% of this time was spent counseling and coordinating care, discussing DNR/palliative decision making with the patient and his wife, reviewing his old medical records and requesting assistance from palliative care consultation services.        Liang Rolle MD  7/2/2020

## 2020-07-02 NOTE — NURSING NOTE
Spouse updated this morning while she was here visiting, I called and had cardiology come up while spouse was here as well as palliative care to facilitate discussion for plan of care.

## 2020-07-02 NOTE — PLAN OF CARE
Problem: Patient Care Overview  Goal: Plan of Care Review  7/2/2020 0654 by Etelvina Harry RN  Outcome: Ongoing (interventions implemented as appropriate)  7/2/2020 0648 by Etelvina Harry RN  Reactivated  7/2/2020 0607 by Etelvina Harry RN  Outcome: Outcome(s) achieved  Goal: Individualization and Mutuality  7/2/2020 0654 by Etelvina Harry RN  Outcome: Ongoing (interventions implemented as appropriate)  7/2/2020 0648 by Etelvina Harry RN  Reactivated  7/2/2020 0607 by Etelvina Harry RN  Outcome: Outcome(s) achieved  Goal: Discharge Needs Assessment  7/2/2020 0654 by Etelvina Harry RN  Outcome: Ongoing (interventions implemented as appropriate)  7/2/2020 0649 by Etelvina Harry RN  Reactivated  7/2/2020 0607 by Etelvina Harry RN  Outcome: Outcome(s) achieved  Goal: Interprofessional Rounds/Family Conf  7/2/2020 0654 by Etelvina Harry RN  Outcome: Ongoing (interventions implemented as appropriate)  7/2/2020 0649 by Etelvina Harry RN  Reactivated  7/2/2020 0607 by Etelvina Harry RN  Outcome: Outcome(s) achieved     Problem: Skin Injury Risk (Adult)  Goal: Identify Related Risk Factors and Signs and Symptoms  7/2/2020 0654 by Etelvina Harry RN  Outcome: Ongoing (interventions implemented as appropriate)  7/2/2020 0649 by Etelvina Harry RN  Reactivated  7/2/2020 0644 by Etelvina Harry RN  Outcome: Outcome(s) achieved  7/2/2020 0607 by Etelvina Harry RN  Outcome: Outcome(s) achieved  Goal: Skin Health and Integrity  7/2/2020 0654 by Etelvina Harry RN  Outcome: Ongoing (interventions implemented as appropriate)  7/2/2020 0649 by Etelvina Harry RN  Reactivated  7/2/2020 0644 by [place], Etelvina, RN  Outcome: Outcome(s) achieved  7/2/2020 0607 by Etelvina Harry, RN  Outcome: Outcome(s) achieved     Problem: Fall Risk (Adult)  Goal: Identify Related Risk Factors and Signs and Symptoms  7/2/2020 0654 by Etelvina Harry,  RN  Outcome: Ongoing (interventions implemented as appropriate)  7/2/2020 0649 by Etelvina Harry RN  Reactivated  7/2/2020 0644 by Etelvina Harry RN  Outcome: Outcome(s) achieved  7/2/2020 0607 by Etelvina Harry RN  Outcome: Outcome(s) achieved  Goal: Absence of Fall  7/2/2020 0654 by Etelvina Harry RN  Outcome: Ongoing (interventions implemented as appropriate)  7/2/2020 0649 by Etelvina Harry RN  Reactivated  7/2/2020 0644 by Etelvina Harry RN  Outcome: Outcome(s) achieved  7/2/2020 0607 by Etelvina Harry RN  Outcome: Outcome(s) achieved  Goal: Identify Related Risk Factors and Signs and Symptoms  7/2/2020 0654 by Etelvina Harry RN  Outcome: Ongoing (interventions implemented as appropriate)  7/2/2020 0649 by Etelvina Harry RN  Reactivated  7/2/2020 0644 by Etelvina Harry RN  Outcome: Outcome(s) achieved  7/2/2020 0607 by Etelvina Harry RN  Outcome: Outcome(s) achieved  Goal: Absence of Fall  7/2/2020 0654 by Etelvina Harry RN  Outcome: Ongoing (interventions implemented as appropriate)  7/2/2020 0649 by Etelvina Harry RN  Reactivated  7/2/2020 0644 by Etelvina Harry RN  Outcome: Outcome(s) achieved  7/2/2020 0607 by Etelvina Harry RN  Outcome: Outcome(s) achieved     Problem: Hemodialysis (Adult)  Goal: Signs and Symptoms of Listed Potential Problems Will be Absent, Minimized or Managed (Hemodialysis)  7/2/2020 0654 by Etelvina Harry RN  Outcome: Ongoing (interventions implemented as appropriate)  7/2/2020 0649 by Etelvina Harry RN  Reactivated  7/2/2020 0644 by Etelvina Harry RN  Outcome: Outcome(s) achieved  7/2/2020 0607 by [place], Etelvina, RN  Outcome: Outcome(s) achieved     Problem: Wound (Includes Pressure Injury) (Adult)  Goal: Signs and Symptoms of Listed Potential Problems Will be Absent, Minimized or Managed (Wound)  7/2/2020 0654 by Etelvina Harry, RN  Outcome: Ongoing (interventions implemented as  appropriate)  7/2/2020 0649 by Etelvina Harry RN  Reactivated  7/2/2020 0644 by Etelvina Harry RN  Outcome: Outcome(s) achieved  7/2/2020 0607 by Etelvina Harry RN  Outcome: Outcome(s) achieved     Problem: Pain, Chronic (Adult)  Goal: Identify Related Risk Factors and Signs and Symptoms  7/2/2020 0654 by Etelvina Harry RN  Outcome: Ongoing (interventions implemented as appropriate)  7/2/2020 0649 by Etelvina Harry RN  Reactivated  7/2/2020 0644 by Etelvina Harry RN  Outcome: Outcome(s) achieved  Goal: Acceptable Pain/Comfort Level and Functional Ability  7/2/2020 0654 by Etelvina Harry RN  Outcome: Ongoing (interventions implemented as appropriate)  7/2/2020 0649 by Etelvina Harry RN  Reactivated  7/2/2020 0644 by Etelivna Harry RN  Outcome: Outcome(s) achieved  7/2/2020 0607 by Etelvina Harry RN  Outcome: Outcome(s) achieved

## 2020-07-02 NOTE — PROGRESS NOTES
Adult Nutrition  Assessment/PES    Patient Name:  Sergio Phan  YOB: 1970  MRN: [id number]  Admit Date:  6/30/2020    Assessment Date:  7/2/2020    Comments:  Follow up note. Pt now extubated. Diet Rx NPO. Speech to evaluate. Will remain available as needed.    Reason for Assessment     Row Name 07/02/20 1024          Reason for Assessment    Reason For Assessment  follow-up protocol     Diagnosis  -- extubated         Nutrition/Diet History     Row Name 07/02/20 1026          Nutrition/Diet History    Typical Food/Fluid Intake  failed bedside swallow eval, speech to evaluate         Anthropometrics     Row Name 07/02/20 0418          Anthropometrics    Weight  85.2 kg (187 lb 13.3 oz)         Labs/Tests/Procedures/Meds     Row Name 07/02/20 1027          Labs/Procedures/Meds    Lab Results Reviewed  reviewed     Lab Results Comments  na, glu, bun, cr, phos, h/h        Diagnostic Tests/Procedures    Diagnostic Test/Procedures Comments  speech eval pending        Medications    Pertinent Medications Reviewed  reviewed     Pertinent Medications Comments  pepcid, heparin, insulin,         Physical Findings     Row Name 07/02/20 1027          Physical Findings    Overall Physical Appearance  -- extubated     Skin  pressure injury;poor skin integrity/turgor right posterior greater trochanter PI, right knee abrasion, left knee blisters           Nutrition Prescription Ordered     Row Name 07/02/20 1028          Nutrition Prescription PO    Current PO Diet  NPO        Propofol Considerations    Propofol (mL/hr)  0 mL/hr                 Problem/Interventions:  Problem 1     Row Name 07/02/20 1029          Nutrition Diagnoses Problem 1    Pulmonary/Critical Care  Extubated     Signs/Symptoms (evidenced by)  SLP/Swallow eval     Swallow eval status  Pending               Intervention Goal     Row Name 07/02/20 1029          Intervention Goal    General  Maintain nutrition;Disease  management/therapy;Reduce/improve symptoms     PO  Initiate feeding     Weight  No significant weight loss         Nutrition Intervention     Row Name 07/02/20 1029          Nutrition Intervention    RD/Tech Action  Follow Tx progress;Care plan reviewd           Education/Evaluation     Row Name 07/02/20 1029          Education    Education  Will Instruct as appropriate        Monitor/Evaluation    Monitor  Per protocol           Electronically signed by:  Marina Peters RD  07/02/20 10:30

## 2020-07-02 NOTE — NURSING NOTE
Pt remains in the CCU. Pt received subcu injection of heparin due to history of AFIB. Currently normal sinus rhythm. Pt weaned off O2 via nasal cannula. Pt remains hypertensive; other VSS. Dilaudid given PRN for pain. Wound care performed (Venelex and Aquaphor). At 0500, Pt had N/V and blood sugar was 60. Pt received Zofran and 1 amp of D50W to treat-Pt symptoms resolved. Continue to monitor.

## 2020-07-03 NOTE — PROGRESS NOTES
Dr. SANDRA Rolle    [facility] CORONARY CARE    7/3/2020    Patient ID:  Name:  Sergio Phan  MRN:  [id number]  1970  49 y.o.  male            CC/Reason for visit: s/p resuscitated cardiac arrest due to medical noncompliance, hyperkalemia, life-threatening arrhythmias, end-stage renal disease, acute respiratory failure     Interval hx: Patient  begging me to be discharged home today even though he does have pending work-up from GI.  Denies any pain currently pain controlled.  Denies any palpitations, nausea or vomiting.  He did speak with palliative care yesterday and he does want to continue fighting and he wants full scope of medical treatment     ROS:  No hemoptysis, no diarrhea    Vitals:  Vitals:    07/03/20 1100 07/03/20 1200 07/03/20 1257 07/03/20 1300   BP: (!) 170/107 (!) 195/99 (!) 182/94 169/89   Pulse: 92 85 90 90   Resp:       Temp: 97.5 °F (36.4 °C)      TempSrc: Oral      SpO2: 94% 96%  95%   Weight:       Height:         FiO2 (%): 39 %     Body mass index is 25.47 kg/m².    Intake/Output Summary (Last 24 hours) at 7/3/2020 1352  Last data filed at 7/2/2020 1730  Gross per 24 hour   Intake 120 ml   Output 0 ml   Net 120 ml       Exam:  GEN:  No distress  Alert, oriented x 3.   LUNGS: Clear breath sounds bilat, no use of accessory muscles  CV:  Normal S1S2, without murmur, 1+ edema legs  ABD:  Non tender, no enlarged liver or masses  Skin: Multiple scabs which are healing on his hands and legs    Scheduled meds:    castor oil-balsam peru  Topical Q12H   heparin (porcine) 5,000 Units Subcutaneous Q8H   insulin lispro 0-24 Units Subcutaneous 4x Daily With Meals & Nightly   ipratropium-albuterol 3 mL Nebulization Once   metoprolol tartrate 25 mg Oral Q12H   mineral oil-hydrophilic petrolatum  Topical Daily   pantoprazole 40 mg Oral Q AM   sodium chloride 10 mL Intravenous Q12H     IV meds:                           Data Review:   I reviewed the patient's medications and new clinical  results.    No results found for: COVID19      Lab Results   Component Value Date    CALCIUM 9.2 07/03/2020    PHOS 8.1 (H) 07/03/2020    MG 2.2 07/02/2020    MG 2.1 03/18/2020    MG 2.1 03/17/2020     Results from last 7 days   Lab Units 07/03/20  0301 07/02/20  0412 07/01/20  0538 06/30/20  1615   SODIUM mmol/L 139 135* 133* 137   POTASSIUM mmol/L 4.7 5.0 6.2* 7.5*   CHLORIDE mmol/L 93* 92* 87* 84*   CO2 mmol/L 23.5 22.0 26.2 23.0   BUN mg/dL 57* 41* 83* 174*   CREATININE mg/dL 7.37* 5.55* 8.94* 15.20*   CALCIUM mg/dL 9.2 8.8 8.6 8.0*   BILIRUBIN mg/dL  --   --   --  1.0   ALK PHOS U/L  --   --   --  163*   ALT (SGPT) U/L  --   --   --  14   AST (SGOT) U/L  --   --   --  11   GLUCOSE mg/dL 84 60* 76 124*   WBC 10*3/mm3 4.97 7.54 5.92 7.35   HEMOGLOBIN g/dL 9.8* 10.0* 9.1* 10.2*   PLATELETS 10*3/mm3 109* 101* 118* 159   PROCALCITONIN ng/mL  --   --   --  1.39*     Results from last 7 days   Lab Units 07/01/20  0646 07/01/20  0538   BLOODCX  No growth at 2 days No growth at 2 days       ASSESSMENT:   Hypoglycemia  Thrombocytopenia  Resuscitated cardiac arrest in the hospital  Life-threatening hyperkalemia with arrhythmia  End-stage renal disease, patient noncompliant with hemodialysis  Diabetes mellitus type 2  Peripheral artery disease  COPD  Smoker  Nonhealing skin ulcers of lower extremities    PLAN:  Patient has improved substantially.  After conversation with palliative care yesterday and with his wife he says he now wants to continue medical treatment, full scope of therapy, wants to continue fighting.  He says he will not consider hospice or palliative care at this time.    Appreciate input from nephrology.  Patient has had hemodialysis and is ready for discharge today although he does have pending work-up from GI.    He will remain in the hospital for gastric emptying study and mesenteric Doppler ultrasound examination due to chronic abdominal pain and heavy history of tobacco use.  I will also add a CT of  the abdomen and pelvis with oral contrast for his chronic abdominal pain.  He may have aortic aneurysm.    Transfer out of ICU      Liang Rolle MD  7/3/2020

## 2020-07-03 NOTE — CONSULTS
[facility]  Initial Inpatient Consult Note    Referring Provider: Dr Rolle    Reason for Consultation: abdominal pain and vomiting    Subjective     History of present illness:      Thank you for asking our opinion regarding this patient    49 y.o. male, pt of Dr Mercedes, that we are asked to see for intermittent abdominal pain and vomiting      Patient was admitted on 6/30 with nausea, vomiting and confusion.  He suffered cardiac arrest in the emergency room.  He was noted to have hyperkalemia on admission with wide-complex tachyarrhythmia.  Patient has a history of medical noncompliance including skipping dialysis in the past and noncompliance with his medications including Eliquis.  He was initially resuscitated and intubated and underwent emergency hemodialysis.  He has since been extubated.    Patient reports he is had longstanding intermittent periumbilical abdominal pain.  He describes this as crampy.  It comes and goes.  There is no obvious precipitant.  It may last from minutes to days.  He reports erratic bowel movements that vary from constipation to diarrhea.  He does not see any blood in the stool.  He has never had a colonoscopy.  He does think the pain is worse with eating.  Nothing seems to make the pain better.  He has intermittent heartburn.  He also complains of bilious vomiting 3-4 times a week.  This is been previously evaluated with a gastric emptying test at [facility] which she reports was normal.  His results were not currently available.  He is status post cholecystectomy.  He reports that his symptoms have been chronic dating back to when he last had an EGD.    EGD 2016 w/chemical gastropathy by biopsy    Past Medical History:  Past Medical History:   Diagnosis Date   • Anxiety    • Asthma    • Chest pain    • COPD (chronic obstructive pulmonary disease) (CMS/[facility])    • Depression    • Diabetes mellitus (CMS/[facility])     TYPE II   • Fatigue    • Gout     • HOCM (hypertrophic obstructive cardiomyopathy) (CMS/HCC)    • Hypertension    • Hypertriglyceridemia    • Myocardial infarction (CMS/HCC)    • Pancreatitis    • Renal disorder    • Syncope      Past Surgical History:  Past Surgical History:   Procedure Laterality Date   • ABDOMINAL SURGERY     • ARTERIOVENOUS FISTULA/SHUNT SURGERY Right 4/19/2018    Procedure: RT/ ARTERIOVENOUS FISTULA FORMATION;  Surgeon: Chuck Woodward MD;  Location: [facility] OR;  Service: Vascular   • ATHRECTOMY ILIAC, FEMORAL, TIBIAL ARTERY Bilateral 5/2/2018    Procedure: JEFFERY LOWER EXTREMITY ANGIOGRAM, LEFT SFA  AND POPLITEAL LASER ARTHRECTOMY WITH DRUG COATED BALLOON, IVUS X3;  Surgeon: Chuck Woodward MD;  Location: [facility] OR 18/19;  Service: Vascular   • CARDIAC CATHETERIZATION     • CARDIAC DEFIBRILLATOR PLACEMENT     • CARDIAC DEFIBRILLATOR PLACEMENT     • CHOLECYSTECTOMY     • ENDOSCOPY N/A 8/30/2016    Procedure: ESOPHAGOGASTRODUODENOSCOPY;  Surgeon: Irineo Mercedes MD;  Location: [facility];  Service:    • ERCP     • INSERT / REPLACE / REMOVE PACEMAKER      ST GE[facility]ANY   • INSERTION HEMODIALYSIS CATHETER N/A 4/30/2018    Procedure: HEMODIALYSIS CATHETER INSERTION;  Surgeon: Mya Wilkinson Jr., MD;  Location: [facility] OR 18/19;  Service: Vascular   • LAPAROSCOPIC APPENDECTOMY     • OTHER SURGICAL HISTORY      NO REMOVAL OF APPENDIX  PATIENT HAS A APPENDIX   • UMBILICAL HERNIA REPAIR        Social History:   Social History     Tobacco Use   • Smoking status: Current Some Day Smoker     Packs/day: 0.50     Years: 35.00     Pack years: 17.50     Types: Cigarettes   • Smokeless tobacco: Never Used   Substance Use Topics   • Alcohol use: No      Family History:  Family History   Problem Relation Age of Onset   • Heart block Mother    • Kidney disease Mother    • Diabetes Father    • Diabetes Maternal Grandmother    • Diabetes Maternal Grandfather    • COPD Maternal Grandfather    • Asthma Maternal Grandfather         Home Meds:  Medications Prior to Admission   Medication Sig Dispense Refill Last Dose   • albuterol (PROVENTIL HFA;VENTOLIN HFA) 108 (90 BASE) MCG/ACT inhaler Inhale 2 puffs every 4 (four) hours as needed for wheezing.   Taking   • apixaban (Eliquis) 5 MG tablet tablet Take 1 tablet by mouth 2 (Two) times a day. 60 tablet 0 Taking   • B Complex-C-Folic Acid (TAMI-ALEJANDRO PO) Take 1 tablet by mouth Daily.   Taking   • bisoprolol (ZEBeta) 10 MG tablet Take 10 mg by mouth Daily.   Taking   • busPIRone (BUSPAR) 10 MG tablet Take 10 mg by mouth 2 (two) times a day.      • cadexomer iodine (IODOSORB) 0.9 % gel Apply  topically to the appropriate area as directed on foot wounds every other day. 10 g 3    • cinacalcet (SENSIPAR) 30 MG tablet Take 90 mg by mouth Daily.   Taking   • clopidogrel (PLAVIX) 75 MG tablet Take 1 tablet by mouth Daily. 30 tablet 0 Taking   • cyclobenzaprine (FLEXERIL) 10 MG tablet Take 10 mg by mouth 2 (Two) Times a Day As Needed for Muscle Spasms.   Taking   • dilTIAZem CD (CARDIZEM CD) 180 MG 24 hr capsule Take 1 capsule by mouth Daily. 30 capsule 0 Taking   • DULoxetine (CYMBALTA) 60 MG capsule Take 60 mg by mouth Daily.   Taking   • Ferric Citrate (Auryxia) 1  MG(Fe) tablet Take 1 tablet by mouth 3 (Three) Times a Day With Meals.      • gabapentin (NEURONTIN) 400 MG capsule Take 1 capsule by mouth Every 12 (Twelve) Hours. (Patient taking differently: Take 400 mg by mouth 2 (two) times a day.)   Taking   • hydrOXYzine (ATARAX) 25 MG tablet Take 25 mg by mouth Every 8 (Eight) Hours As Needed for Anxiety.   Taking   • insulin aspart (novoLOG) 100 UNIT/ML injection Inject  under the skin into the appropriate area as directed 3 (Three) Times a Day Before Meals. Sliding scale if 151, give 1 unit, if 152-171 give 2 units, and then for every 20 over 171, give 1 additional unit of insulin   Taking   • nitroglycerin (NITROSTAT) 0.4 MG SL tablet 1 under the tongue as needed for angina, may repeat  q5mins for up three doses (Patient taking differently: Place 0.4 mg under the tongue Every 5 (Five) Minutes As Needed. 1 under the tongue as needed for angina, may repeat q5mins for up three doses) 25 tablet 0 Taking   • oxyCODONE (ROXICODONE) 10 MG tablet Take 10 mg by mouth 4 (Four) Times a Day As Needed.   Taking   • pantoprazole (PROTONIX) 40 MG EC tablet Take 40 mg by mouth 2 (Two) Times a Day.   Taking   • promethazine (PHENERGAN) 25 MG tablet Take 25 mg by mouth Every 6 (Six) Hours As Needed for Nausea or Vomiting.   Taking   • rosuvastatin (CRESTOR) 40 MG tablet Take 40 mg by mouth Daily.   Taking   • cadexomer iodine (IODOSORB) 0.9 % gel Apply to the left big toe and right 4th metatarsal area as directed three times per week on Tuesday, Thursday, and Saturday. 10 g 4      Current Meds:     castor oil-balsam peru  Topical Q12H   heparin (porcine) 5,000 Units Subcutaneous Q8H   insulin lispro 0-24 Units Subcutaneous 4x Daily With Meals & Nightly   ipratropium-albuterol 3 mL Nebulization Once   metoprolol tartrate 25 mg Oral Q12H   mineral oil-hydrophilic petrolatum  Topical Daily   sodium chloride 10 mL Intravenous Q12H     Allergies:  No Known Allergies  Review of Systems  Pertinent items are noted in HPI, all other systems reviewed and negative     Objective     Vital Signs  Temp:  [98.1 °F (36.7 °C)-98.7 °F (37.1 °C)] 98.5 °F (36.9 °C)  Heart Rate:  [] 88  Resp:  [13-18] 13  BP: (155-179)/() 175/94  Physical Exam:  General Appearance:    Alert, cooperative, in no acute distress   Head:    Normocephalic, without obvious abnormality, atraumatic   Eyes:          conjunctivae and sclerae normal, no   icterus   Throat:   no thrush, oral mucosa moist   Neck:   Supple, no adenopathy   Lungs:     Clear to auscultation bilaterally    Heart:    Regular rhythm and normal rate    Chest Wall:    No abnormalities observed   Abdomen:     Soft, nondistended, nontender; normal bowel sounds   Extremities:   Bilateral lower extremity erythema extending to mid shin, numerous excoriations and scabs over bilateral knees and upper extremities, no edema   Skin:   No bruising or rash   Psychiatric:  normal mood and insight     Results Review:   I reviewed the patient's new clinical results.    Results from last 7 days   Lab Units 07/03/20  0301 07/02/20 0412 07/01/20  0538   WBC 10*3/mm3 4.97 7.54 5.92   HEMOGLOBIN g/dL 9.8* 10.0* 9.1*   HEMATOCRIT % 31.1* 31.1* 28.9*   PLATELETS 10*3/mm3 109* 101* 118*     Results from last 7 days   Lab Units 07/03/20  0301 07/02/20  0412 07/01/20  0538 06/30/20  1615   SODIUM mmol/L 139 135* 133* 137   POTASSIUM mmol/L 4.7 5.0 6.2* 7.5*   CHLORIDE mmol/L 93* 92* 87* 84*   CO2 mmol/L 23.5 22.0 26.2 23.0   BUN mg/dL 57* 41* 83* 174*   CREATININE mg/dL 7.37* 5.55* 8.94* 15.20*   CALCIUM mg/dL 9.2 8.8 8.6 8.0*   BILIRUBIN mg/dL  --   --   --  1.0   ALK PHOS U/L  --   --   --  163*   ALT (SGPT) U/L  --   --   --  14   AST (SGOT) U/L  --   --   --  11   GLUCOSE mg/dL 84 60* 76 124*         Lab Results   Lab Value Date/Time    LIPASE 86 (H) 10/23/2017 2244    LIPASE 123 (H) 06/21/2017 1204    LIPASE 88 (H) 05/23/2017 0002    LIPASE 80 (H) 05/21/2017 0418    LIPASE 61 (H) 05/20/2017 0439    LIPASE 63 (H) 05/18/2017 0356    LIPASE 89 (H) 05/17/2017 0026    LIPASE 86 (H) 03/24/2017 0333    LIPASE 92 (H) 03/23/2017 0349    LIPASE 75 (H) 03/22/2017 0436    LIPASE 65 (H) 03/21/2017 0409    LIPASE 90 (H) 03/20/2017 0615    LIPASE 69 (H) 03/19/2017 1815    LIPASE 84 (H) 09/01/2016 0618    LIPASE 94 (H) 08/30/2016 0400    LIPASE 98 (H) 08/28/2016 0421    LIPASE 105 (H) 08/26/2016 0626    LIPASE 70 (H) 08/25/2016 0431    LIPASE 90 (H) 08/24/2016 1913    LIPASE 96 (H) 07/03/2015 0327    LIPASE 423 (H) 07/02/2015 0428    LIPASE 103 (H) 06/30/2015 0434    LIPASE 191 (H) 06/27/2015 0427    LIPASE 144 (H) 06/24/2015 0248    LIPASE 147 (H) 06/23/2015 0532       Radiology:  XR Chest 1 View   Final Result           Assessment/Plan   Patient Active Problem List   Diagnosis   • AICD (automatic cardioverter/defibrillator) present   • Dizziness   • Fatigue   • Hypertension   • Hypertrophic obstructive cardiomyopathy (CMS/HCC)   • Hypertriglyceridemia   • Kidney disorder   • Type 2 diabetes mellitus (CMS/HCC)   • Syncope   • Vitamin D deficiency   • Weakness   • Idiopathic acute pancreatitis   • VT (ventricular tachycardia) (CMS/HCC)   • Acute pancreatitis   • Pancreatitis, recurrent   • Hypertensive emergency   • Stage 5 chronic kidney disease on chronic dialysis (CMS/HCC)   • Severe diabetic hypoglycemia (CMS/HCC)   • Atherosclerosis of bypass graft of both lower extremities with bilateral ulceration of calves (CMS/HCC)   • Cellulitis of both lower extremities   • ESRD (end stage renal disease) on dialysis   • Tobacco abuse   • Scalded skin syndrome   • Diabetic neuropathy (CMS/HCC)   • COPD (chronic obstructive pulmonary disease) (CMS/HCC)   • Diabetic foot infection (CMS/HCC)   • Osteomyelitis of left foot (CMS/HCC)   • Venous ulcers of both lower extremities (CMS/HCC)   • Pressure injury of right heel, stage 2 (CMS/HCC)   • Hyperkalemia       Assessment:  1. Chronic intermittent periumbilical abdominal pain  2. Irregular bowel movements  3. Intermittent nausea and vomiting  4. Status post cardiac arrest  5. End-stage renal disease on hemodialysis with a history of noncompliance  6. Diabetes mellitus  7. Hypertrophic obstructive cardiomyopathy with AICD  8. Paroxysmal A. fib-on outpatient Eliquis  9. Tobacco abuse    Plan:  · Request gastric emptying study from [facility]  · Check mesenteric Doppler given chronic abdominal pain and history of tobacco abuse, renal failure and diabetes  · Start a fiber supplement daily given erratic bowel movements in the hopes of improving regularity  · Will need a colonoscopy at some point both for screening and for further evaluation of his chronic abdominal pain.  Timing to  be determined based upon the results of other work-ups.  This could also theoretically be done as an outpatient.  · Recommend daily PPI, antiemetics as needed      I discussed the patients findings and my recommendations with patient and nursing staff.    Kimberlee Oreilly MD

## 2020-07-03 NOTE — PROGRESS NOTES
[facility]  Cardiology Progress Note    Patient Identification:  Name: Sergio Phan  Age: 49 y.o.  Sex: male  :  1970  MRN: [id number]                 Follow Up / Chief Complaint: Cardiac arrest    Interval History: I had a long discussion with patient regarding compliance with medication and hemodialysis.       Subjective: Denies shortness of breath, chest pain, and palpitations.      Objective:    Past Medical History:  Past Medical History:   Diagnosis Date   • Anxiety    • Asthma    • Chest pain    • COPD (chronic obstructive pulmonary disease) (CMS/HCC)    • Depression    • Diabetes mellitus (CMS/HCC)     TYPE II   • Fatigue    • Gout    • HOCM (hypertrophic obstructive cardiomyopathy) (CMS/HCC)    • Hypertension    • Hypertriglyceridemia    • Myocardial infarction (CMS/HCC)    • Pancreatitis    • Renal disorder    • Syncope      Past Surgical History:  Past Surgical History:   Procedure Laterality Date   • ABDOMINAL SURGERY     • ARTERIOVENOUS FISTULA/SHUNT SURGERY Right 2018    Procedure: RT/ ARTERIOVENOUS FISTULA FORMATION;  Surgeon: Chuck Woodward MD;  Location: [facility] OR;  Service: Vascular   • ATHRECTOMY ILIAC, FEMORAL, TIBIAL ARTERY Bilateral 2018    Procedure: JEFFERY LOWER EXTREMITY ANGIOGRAM, LEFT SFA  AND POPLITEAL LASER ARTHRECTOMY WITH DRUG COATED BALLOON, IVUS X3;  Surgeon: Chuck Woodward MD;  Location: [facility] OR ;  Service: Vascular   • CARDIAC CATHETERIZATION     • CARDIAC DEFIBRILLATOR PLACEMENT     • CARDIAC DEFIBRILLATOR PLACEMENT     • CHOLECYSTECTOMY     • ENDOSCOPY N/A 2016    Procedure: ESOPHAGOGASTRODUODENOSCOPY;  Surgeon: Irineo Mercedes MD;  Location: [facility] OR;  Service:    • ERCP     • INSERT / REPLACE / REMOVE PACEMAKER      [facility]   • INSERTION HEMODIALYSIS CATHETER N/A 2018    Procedure: HEMODIALYSIS CATHETER INSERTION;  Surgeon: Mya Wilkinson Jr., MD;  Location: [facility] OR ;  Service:  "Vascular   • LAPAROSCOPIC APPENDECTOMY     • OTHER SURGICAL HISTORY      NO REMOVAL OF APPENDIX  PATIENT HAS A APPENDIX   • UMBILICAL HERNIA REPAIR          Social History:   Social History     Tobacco Use   • Smoking status: Current Some Day Smoker     Packs/day: 0.50     Years: 35.00     Pack years: 17.50     Types: Cigarettes   • Smokeless tobacco: Never Used   Substance Use Topics   • Alcohol use: No      Family History:  Family History   Problem Relation Age of Onset   • Heart block Mother    • Kidney disease Mother    • Diabetes Father    • Diabetes Maternal Grandmother    • Diabetes Maternal Grandfather    • COPD Maternal Grandfather    • Asthma Maternal Grandfather           Allergies:  No Known Allergies  Scheduled Meds:    castor oil-balsam peru  Q12H   heparin (porcine) 5,000 Units Q8H   insulin lispro 0-24 Units 4x Daily With Meals & Nightly   ipratropium-albuterol 3 mL Once   metoprolol tartrate 25 mg Q12H   mineral oil-hydrophilic petrolatum  Daily   sodium chloride 10 mL Q12H           INTAKE AND OUTPUT:    Intake/Output Summary (Last 24 hours) at 7/3/2020 0904  Last data filed at 7/2/2020 1730  Gross per 24 hour   Intake 240 ml   Output 0 ml   Net 240 ml       ROS  Constitutional: Awake and alert, no fever. No nosebleeds  Abdomen           no abdominal pain   Cardiac              no chest pain  Pulmonary          no shortness of breath      /94   Pulse 88   Temp 98.5 °F (36.9 °C) (Oral)   Resp 13   Ht 182.9 cm (72.01\")   Wt 85.2 kg (187 lb 13.3 oz)   SpO2 99%   BMI 25.47 kg/m²   General appearance: No acute changes   Neck: Trachea midline; NECK, supple, no thyromegaly or lymphadenopathy   Lungs: Normal size and shape, normal breath sounds, equal distribution of air, no rales and rhonchi   CV: S1-S2 regular, no murmurs, no rub, no gallop   Abdomen: Soft, non-tender; no masses , no abnormal abdominal sounds   Extremities: No deformity , normal color , no peripheral edema   Skin: Normal " "temperature, turgor and texture; no rash, ulcers            Cardiographics  Telemetry: SR      ECG:           Echocardiogram:     Lab Review   Results from last 7 days   Lab Units 07/02/20  0412 06/30/20  1615   TROPONIN T ng/mL 0.190* 0.167*     Results from last 7 days   Lab Units 07/02/20  0412   MAGNESIUM mg/dL 2.2     Results from last 7 days   Lab Units 07/03/20  0301   SODIUM mmol/L 139   POTASSIUM mmol/L 4.7   BUN mg/dL 57*   CREATININE mg/dL 7.37*   CALCIUM mg/dL 9.2        Results from last 7 days   Lab Units 07/03/20  0301 07/02/20  0412 07/01/20  0538   WBC 10*3/mm3 4.97 7.54 5.92   HEMOGLOBIN g/dL 9.8* 10.0* 9.1*   HEMATOCRIT % 31.1* 31.1* 28.9*   PLATELETS 10*3/mm3 109* 101* 118*         The following medical decision was discussed in detail with Dr. Rubin    Assessment:  1.  Cardiac arrest  2.  Hypertrophic obstructive cardiomyopathy with AICD  3.  Essential hypertension  4.  Proximal atrial fibrillation anticoagulated with Eliquis at home  5.  Tobacco abuse  6.  Thrombocytopenia  7.  Hyperkalemia  8.  End-stage renal disease  9.  Diabetes mellitus type 2  10.  COPD    Plan:  AICD shocks due to noncompliance    We will discontinue heparin and restart home anticoagulation of Eliquis    Continue beta-blockers    No further work-up at this point    Noelle Rubin MD          EMR Dragon/Transcription:   \"Dictated utilizing Dragon dictation\".     "

## 2020-07-03 NOTE — PROGRESS NOTES
"   LOS: 3 days    Patient Care Team:  Kimberlee Longoria APRN as PCP - General    Chief Complaint:    Chief Complaint   Patient presents with   • Weakness - Generalized     PT NONCOMPLIANT WITH HIS DIALYSIS SCHEDULE, MISSING 4 APPOINTMENTS; PT REPORTS TO HIS WIFE THAT HE'S READY TO DIE; PT WEARING FACE MASK     Follow-up end-stage renal disease  Subjective     Interval History:   The patient is seen and examined while on dialysis, he is feeling better, he is committed now to have his dialysis regularly as an outpatient.  He denies any chest pain or shortness of air, no orthopnea or PND, no nausea or vomiting..      Review of Systems:   As noted above    Objective     Vital Signs  Temp:  [98.1 °F (36.7 °C)-98.7 °F (37.1 °C)] 98.5 °F (36.9 °C)  Heart Rate:  [] 93  Resp:  [13-18] 13  BP: (155-179)/() 172/123    Flowsheet Rows      First Filed Value   Admission Height  182.9 cm (72\") Documented at 06/30/2020 1559   Admission Weight  98.9 kg (218 lb) Documented at 06/30/2020 1559          No intake/output data recorded.  I/O last 3 completed shifts:  In: 290 [P.O.:240; IV Piggyback:50]  Out: 0     Intake/Output Summary (Last 24 hours) at 7/3/2020 1011  Last data filed at 7/2/2020 1730  Gross per 24 hour   Intake 240 ml   Output 0 ml   Net 240 ml       Physical Exam:  Blood pressure 170/90, ultrafiltration goal 3600 cc,  cc/min  General Appearance:  Awake, alert and oriented, appears to be chronically ill, no acute distress.  Skin: warm and dry, multiple ulceration and few blisters on the lower extremities with erythema and chronic skin changes also has mycotic toenails  HEENT: pupils round and reactive to light,  oral mucosa slightly dry, nonicteric sclera  Neck: supple, no JVD, trachea midline, bilateral carotid bruit  Lungs: Bilateral rhonchi, unlabored breathing effort  Heart: RRR, normal S1 and S2, no S3, no rub  Abdomen: soft, no guarding,  present bowel sounds to auscultation  : no " palpable bladder,  Extremities: He has what appears to be bilateral chronic woody edema with stasis dermatitis multiple ulcer and 1 or 2 blisters and mycotic toenails, functioning AV fistula in the right upper arm  Neuro:  Normal speech and mental status moving all extremities      Results Review:    Results from last 7 days   Lab Units 07/03/20 0301 07/02/20 0412 07/01/20  0538 06/30/20  1615   SODIUM mmol/L 139 135* 133* 137   POTASSIUM mmol/L 4.7 5.0 6.2* 7.5*   CHLORIDE mmol/L 93* 92* 87* 84*   CO2 mmol/L 23.5 22.0 26.2 23.0   BUN mg/dL 57* 41* 83* 174*   CREATININE mg/dL 7.37* 5.55* 8.94* 15.20*   CALCIUM mg/dL 9.2 8.8 8.6 8.0*   BILIRUBIN mg/dL  --   --   --  1.0   ALK PHOS U/L  --   --   --  163*   ALT (SGPT) U/L  --   --   --  14   AST (SGOT) U/L  --   --   --  11   GLUCOSE mg/dL 84 60* 76 124*       Estimated Creatinine Clearance: 14.5 mL/min (A) (by C-G formula based on SCr of 7.37 mg/dL (H)).    Results from last 7 days   Lab Units 07/03/20 0301 07/02/20 0412 07/01/20  0538   MAGNESIUM mg/dL  --  2.2  --    PHOSPHORUS mg/dL 8.1* 6.9* 7.9*             Results from last 7 days   Lab Units 07/03/20 0301 07/02/20 0412 07/01/20  0538 06/30/20  1615   WBC 10*3/mm3 4.97 7.54 5.92 7.35   HEMOGLOBIN g/dL 9.8* 10.0* 9.1* 10.2*   PLATELETS 10*3/mm3 109* 101* 118* 159               Imaging Results (Last 24 Hours)     ** No results found for the last 24 hours. **          castor oil-balsam peru  Topical Q12H   heparin (porcine) 5,000 Units Subcutaneous Q8H   insulin lispro 0-24 Units Subcutaneous 4x Daily With Meals & Nightly   ipratropium-albuterol 3 mL Nebulization Once   metoprolol tartrate 25 mg Oral Q12H   mineral oil-hydrophilic petrolatum  Topical Daily   pantoprazole 40 mg Oral Q AM   sodium chloride 10 mL Intravenous Q12H          Medication Review:   Current Facility-Administered Medications   Medication Dose Route Frequency Provider Last Rate Last Dose   • castor oil-balsam peru (VENELEX) ointment    Topical Q12H Liang Rolle MD   5 g at 07/02/20 2034   • dextrose (D50W) 25 g/ 50mL Intravenous Solution 25 g  25 g Intravenous Q15 Min PRN Liang Rolle MD   25 g at 07/02/20 0537   • dextrose (GLUTOSE) oral gel 15 g  15 g Oral Q15 Min PRN Liang Rolle MD       • glucagon (human recombinant) (GLUCAGEN DIAGNOSTIC) injection 1 mg  1 mg Subcutaneous Q15 Min PRN Linag Rolle MD       • heparin (porcine) 5000 UNIT/ML injection 5,000 Units  5,000 Units Subcutaneous Q8H Liang Rolle MD   5,000 Units at 07/03/20 0623   • HYDROmorphone (DILAUDID) injection 0.5 mg  0.5 mg Intravenous Q4H PRN Liang Rolle MD   0.5 mg at 07/02/20 1307   • insulin lispro (humaLOG) injection 0-24 Units  0-24 Units Subcutaneous 4x Daily With Meals & Nightly Liang Rolle MD   3 Units at 07/02/20 2033   • ipratropium-albuterol (DUO-NEB) nebulizer solution 3 mL  3 mL Nebulization Once RadhaHarjinder comer MD       • metoprolol tartrate (LOPRESSOR) tablet 25 mg  25 mg Oral Q12H Nory Javier APRN   25 mg at 07/02/20 2033   • mineral oil-hydrophilic petrolatum (AQUAPHOR) ointment   Topical Daily Liang Rolle MD   1 application at 07/02/20 0805   • ondansetron (ZOFRAN) tablet 4 mg  4 mg Oral Q6H PRN Liang Rolle MD        Or   • ondansetron (ZOFRAN) injection 4 mg  4 mg Intravenous Q6H PRN Liang Rolle MD   4 mg at 07/02/20 1224   • pantoprazole (PROTONIX) EC tablet 40 mg  40 mg Oral Q AM Kimberlee Oreilly MD       • sodium chloride 0.9 % flush 10 mL  10 mL Intravenous PRN Hajrinder Garcia MD   10 mL at 07/02/20 0806   • sodium chloride 0.9 % flush 10 mL  10 mL Intravenous Q12H Liang Rolle MD   10 mL at 07/02/20 2034   • sodium chloride 0.9 % flush 10 mL  10 mL Intravenous PRN Liang Rolle MD   10 mL at 07/02/20 1309       Assessment/Plan   1.  End-stage renal disease on maintenance hemodialysis every Monday, Wednesday and Friday, misses dialysis quite often on presentation his creatinine was around 15  and the potassium 7.5 he underwent emergent dialysis treatment.  He is currently on dialysis tolerating the treatment, his potassium 4.7 2.  Medical noncompliance  3.  Chronic leg ulcers with venous stasis dermatitis appears to be mild cellulitis  4.  Insulin-dependent diabetes mellitus  5.  COPD with active tobacco abuse  6.  Chronic pancreatitis  7.  Anemia of chronic kidney disease, hemoglobin is 9.8  8.  Cardiac arrest associated with hyperkalemia patient was resuscitated and restored to adequate circulation  9.  Thrombocytopenia, platelet 109,000.  10.  Hyperphosphatemia associated with noncompliance    Plan:.  1.  Continue the same treatment  2.  The patient could be discharged from the renal standpoint and he will resume his dialysis as an outpatient as per his usual schedule he was counseled about the importance of compliance on his survival            Blaise Morgan MD  07/03/20  10:11

## 2020-07-04 NOTE — PLAN OF CARE
Pt cont to scoot on the edge of bed and has been redirected many times to sit back in the bed. Pt is been picking at his scabs on his legs and wife says he picks all the time. Pt wanted to leave AMA today but wife talked him out of it. Pt has a nicotine patch on his left upper arm. Pt is to have some procedures on  Monday and may be discharged then. Pt wife has been at the  bedside today and pt has been better. New order for Narco ordered every 6 hours  Dilaudid discontinued. Will cont to monitor..

## 2020-07-04 NOTE — PROGRESS NOTES
[facility]  Cardiology Progress Note    Patient Identification:  Name: Sergio Phan  Age: 49 y.o.  Sex: male  :  1970  MRN: [id number]                 Follow Up / Chief Complaint: Cardiac arrest    Interval History: AICD shocks probably due to noncompliance       Subjective: Denies shortness of breath, palpitations, and chest pain        Objective:    Past Medical History:  Past Medical History:   Diagnosis Date   • Anxiety    • Asthma    • Chest pain    • COPD (chronic obstructive pulmonary disease) (CMS/HCC)    • Depression    • Diabetes mellitus (CMS/HCC)     TYPE II   • Fatigue    • Gout    • HOCM (hypertrophic obstructive cardiomyopathy) (CMS/HCC)    • Hypertension    • Hypertriglyceridemia    • Myocardial infarction (CMS/HCC)    • Pancreatitis    • Renal disorder    • Syncope      Past Surgical History:  Past Surgical History:   Procedure Laterality Date   • ABDOMINAL SURGERY     • ARTERIOVENOUS FISTULA/SHUNT SURGERY Right 2018    Procedure: RT/ ARTERIOVENOUS FISTULA FORMATION;  Surgeon: Chuck Woodward MD;  Location: [facility];  Service: Vascular   • ATHRECTOMY ILIAC, FEMORAL, TIBIAL ARTERY Bilateral 2018    Procedure: JEFFERY LOWER EXTREMITY ANGIOGRAM, LEFT SFA  AND POPLITEAL LASER ARTHRECTOMY WITH DRUG COATED BALLOON, IVUS X3;  Surgeon: Chuck Woodward MD;  Location: [facility] OR ;  Service: Vascular   • CARDIAC CATHETERIZATION     • CARDIAC DEFIBRILLATOR PLACEMENT     • CARDIAC DEFIBRILLATOR PLACEMENT     • CHOLECYSTECTOMY     • ENDOSCOPY N/A 2016    Procedure: ESOPHAGOGASTRODUODENOSCOPY;  Surgeon: Irineo Mercedes MD;  Location: [facility];  Service:    • ERCP     • INSERT / REPLACE / REMOVE PACEMAKER      [facility] [place]   • INSERTION HEMODIALYSIS CATHETER N/A 2018    Procedure: HEMODIALYSIS CATHETER INSERTION;  Surgeon: Mya Wilkinson Jr., MD;  Location: [facility] OR ;  Service: Vascular   • LAPAROSCOPIC APPENDECTOMY     •  "OTHER SURGICAL HISTORY      NO REMOVAL OF APPENDIX  PATIENT HAS A APPENDIX   • UMBILICAL HERNIA REPAIR          Social History:   Social History     Tobacco Use   • Smoking status: Current Some Day Smoker     Packs/day: 0.50     Years: 35.00     Pack years: 17.50     Types: Cigarettes   • Smokeless tobacco: Never Used   Substance Use Topics   • Alcohol use: No      Family History:  Family History   Problem Relation Age of Onset   • Heart block Mother    • Kidney disease Mother    • Diabetes Father    • Diabetes Maternal Grandmother    • Diabetes Maternal Grandfather    • COPD Maternal Grandfather    • Asthma Maternal Grandfather           Allergies:  No Known Allergies  Scheduled Meds:    apixaban 2.5 mg Q12H   castor oil-balsam peru  Q12H   insulin lispro 0-24 Units 4x Daily With Meals & Nightly   ipratropium-albuterol 3 mL Once   metoprolol tartrate 25 mg Q12H   mineral oil-hydrophilic petrolatum  Daily   nicotine 1 patch Q24H   pantoprazole 40 mg Q AM   sodium chloride 10 mL Q12H           INTAKE AND OUTPUT:    Intake/Output Summary (Last 24 hours) at 7/4/2020 1632  Last data filed at 7/4/2020 1226  Gross per 24 hour   Intake 480 ml   Output --   Net 480 ml       ROS  Constitutional: awake and alert, no fever. No nosebleeds    Abdomen        no abdominal pain   cardiac            no chest pain   pulmonary       no shortness of breath      /81 (BP Location: Left arm, Patient Position: Lying)   Pulse 81   Temp 98.6 °F (37 °C) (Oral)   Resp 16   Ht 182.9 cm (72.01\")   Wt 85.2 kg (187 lb 13.3 oz)   SpO2 97%   BMI 25.47 kg/m²       Physical Exam:  General:  Appears in no acute distress, resting in bed  Eyes: EOM normal no conjunctival drainage  HEENT:  No JVD. Thyroid not visibly enlarged. No mucosal pallor or cyanosis  Respiratory: Respirations regular and unlabored at rest. BBS with good air entry anteriorly and lateraly. No crackles, rubs or wheezes auscultated  Cardiovascular: S1S2 Regular rate and " "rhythm. No murmur, rub or gallop. No carotid bruits. DP/PT pulses  +2   . No pretibial pitting edema  Gastrointestinal: Abdomen soft, flat, non tender. Bowel sounds present. No hepatosplenomegaly. No ascites  Musculoskeletal: NO x4. No abnormal movements   Neuro: AAO x3 CN II-XII grossly intact  Psych: Mood and affect normal, pleasant and cooperative               Cardiographics  Telemetry: unavailable    SR      ECG:           Echocardiogram:     Lab Review   Results from last 7 days   Lab Units 07/02/20  0412 06/30/20  1615   TROPONIN T ng/mL 0.190* 0.167*     Results from last 7 days   Lab Units 07/02/20  0412   MAGNESIUM mg/dL 2.2     Results from last 7 days   Lab Units 07/04/20  0810   SODIUM mmol/L 136   POTASSIUM mmol/L 4.5   BUN mg/dL 30*   CREATININE mg/dL 5.84*   CALCIUM mg/dL 9.2        Results from last 7 days   Lab Units 07/03/20  0301 07/02/20  0412 07/01/20  0538   WBC 10*3/mm3 4.97 7.54 5.92   HEMOGLOBIN g/dL 9.8* 10.0* 9.1*   HEMATOCRIT % 31.1* 31.1* 28.9*   PLATELETS 10*3/mm3 109* 101* 118*         The following medical decision was discussed in detail with Dr. Rubin    Assessment:  1.  Cardiac arrest  2.  Hypertrophic obstructive cardiomyopathy with AICD  3.  Essential hypertension  4.  Proximal atrial fibrillation anticoagulated with Eliquis at home  5.  Tobacco abuse: 3 minutes counseling on importance of quitting smoking.  6.  Thrombocytopenia  7.  Hyperkalemia  8.  End-stage renal disease  9.  Diabetes mellitus type 2  10.  COPD    Plan:  Blood pressure is better than yesterday.  Discussed with patient the importance of taking medications properly.  Per MD VELASCO shocks were due to noncompliance.  He was restarted on Eliquis yesterday.   Continue beta-blocker.  No further work-up at this point.     ROBSON Austin      15 minutes spent reviewing records, educating about compliance and examination of patient.    EMR Dragon/Transcription:   \"Dictated utilizing Dragon dictation\".  "

## 2020-07-04 NOTE — NURSING NOTE
Notified the on call MD twice this shift for this patient and no call returned will call  if no call returned in 30 min

## 2020-07-04 NOTE — NURSING NOTE
Patient able to get a hold of spouse via his cell phone at [facility]. RN spoke with patient's spouse and informed her of falls incident and patient wishing to leave AMA. Spouse able to calm patient and persuade him to stay inpatient until in the morning when she can come to the hospital to see him and discuss discharge plans with healthcare team.

## 2020-07-04 NOTE — PROGRESS NOTES
GI Daily Progress Note    Assessment/Plan:      Hyperkalemia       LOS: 4 days     Sergio Phan is a 49 y.o. male who was admitted with C/P Aresst. He reports the symptoms are improving with treatment. Overall he is much better and now complaining about being in the hospital. His GI issues are chronic and can be w/u as an OP however Pulmonary would like him to stay and get a vascular w/u and he is dorina;ling to stay for this, a GES isnot necessary at this time    Subjective:    Patient expresses Wanting to go home  Patient denies abdominal pain, vomiting and diarrhea    Objective:    Vital signs in last 24 hours:  Temp:  [97.9 °F (36.6 °C)-98.9 °F (37.2 °C)] 98.1 °F (36.7 °C)  Heart Rate:  [78-93] 92  Resp:  [16-20] 16  BP: (164-195)/() 176/90    Intake/Output last 3 shifts:  I/O last 3 completed shifts:  In: 360 [P.O.:360]  Out: -   Intake/Output this shift:  No intake/output data recorded.    Physical Exam:Abdomen  Sounds Normal Active Bowel Sounds   Distension Soft   Tenderness Nontender     Imaging Results (Last 72 Hours)     Procedure Component Value Units Date/Time    CT Abdomen Pelvis Without Contrast [[id number]] Collected:  07/03/20 1810     Updated:  07/03/20 1825    Narrative:       CT OF THE ABDOMEN AND PELVIS WITHOUT CONTRAST 07/03/2020     HISTORY: Abdominal pain.     Spiral images were obtained from the lung bases to the symphysis pubis  after oral contrast. No intravenous contrast was given.     FINDINGS: There are some minimal scar atelectasis or inflammatory change  in the lung bases. Small amount of free fluid is seen around the liver  with small amount of free fluid tracking into the bilateral paracolic  gutters and small-to-moderate amount of free fluid in the pelvis.     Gallbladder has been removed.     The liver, spleen, and adrenals appear unremarkable. There is bilateral  renal atrophy. Pancreatic duct stent is seen.     There is colonic diverticulosis.     No abscess is seen.  Moderate amount of stool seen in the rectum.       Impression:       1. Small amount of ascites with the largest collection in the pelvis.  2. Status post cholecystectomy.  3. Bilateral renal atrophy.  4. Moderate amount of stool in the rectum. There is no evidence of bowel  dilatation or wall thickening.     Radiation dose reduction techniques were utilized, including automated  exposure control and exposure modulation based on body size.     This report was finalized on 7/3/2020 6:22 PM by Dr. Jose Blandon M.D.             WBC   Date Value Ref Range Status   07/03/2020 4.97 3.40 - 10.80 10*3/mm3 Final     RBC   Date Value Ref Range Status   07/03/2020 3.63 (L) 4.14 - 5.80 10*6/mm3 Final     Hemoglobin   Date Value Ref Range Status   07/03/2020 9.8 (L) 13.0 - 17.7 g/dL Final     Hematocrit   Date Value Ref Range Status   07/03/2020 31.1 (L) 37.5 - 51.0 % Final     MCV   Date Value Ref Range Status   07/03/2020 85.7 79.0 - 97.0 fL Final     MCH   Date Value Ref Range Status   07/03/2020 27.0 26.6 - 33.0 pg Final     MCHC   Date Value Ref Range Status   07/03/2020 31.5 31.5 - 35.7 g/dL Final     RDW   Date Value Ref Range Status   07/03/2020 15.6 (H) 12.3 - 15.4 % Final     RDW-SD   Date Value Ref Range Status   07/03/2020 48.9 37.0 - 54.0 fl Final     MPV   Date Value Ref Range Status   07/03/2020 10.8 6.0 - 12.0 fL Final     Platelets   Date Value Ref Range Status   07/03/2020 109 (L) 140 - 450 10*3/mm3 Final     Neutrophil %   Date Value Ref Range Status   07/03/2020 78.3 (H) 42.7 - 76.0 % Final     Comment:     Appended report. These results have been appended to a previously final verified report.     Lymphocyte %   Date Value Ref Range Status   07/03/2020 9.9 (L) 19.6 - 45.3 % Final     Comment:     Appended report. These results have been appended to a previously final verified report.     Monocyte %   Date Value Ref Range Status   07/03/2020 7.8 5.0 - 12.0 % Final     Comment:     Appended report. These  results have been appended to a previously final verified report.     Eosinophil %   Date Value Ref Range Status   07/03/2020 3.0 0.3 - 6.2 % Final     Comment:     Appended report. These results have been appended to a previously final verified report.     Basophil %   Date Value Ref Range Status   07/03/2020 0.6 0.0 - 1.5 % Final     Comment:     Appended report. These results have been appended to a previously final verified report.     Immature Grans %   Date Value Ref Range Status   07/03/2020 0.4 0.0 - 0.5 % Final     Comment:     Appended report. These results have been appended to a previously final verified report.     Neutrophils, Absolute   Date Value Ref Range Status   07/03/2020 3.89 1.70 - 7.00 10*3/mm3 Final     Comment:     Appended report. These results have been appended to a previously final verified report.     Lymphocytes, Absolute   Date Value Ref Range Status   07/03/2020 0.49 (L) 0.70 - 3.10 10*3/mm3 Final     Comment:     Appended report. These results have been appended to a previously final verified report.     Monocytes, Absolute   Date Value Ref Range Status   07/03/2020 0.39 0.10 - 0.90 10*3/mm3 Final     Comment:     Appended report. These results have been appended to a previously final verified report.     Eosinophils, Absolute   Date Value Ref Range Status   07/03/2020 0.15 0.00 - 0.40 10*3/mm3 Final     Comment:     Appended report. These results have been appended to a previously final verified report.     Basophils, Absolute   Date Value Ref Range Status   07/03/2020 0.03 0.00 - 0.20 10*3/mm3 Final     Comment:     Appended report. These results have been appended to a previously final verified report.     Immature Grans, Absolute   Date Value Ref Range Status   07/03/2020 0.02 0.00 - 0.05 10*3/mm3 Final     Comment:     Appended report. These results have been appended to a previously final verified report.     nRBC   Date Value Ref Range Status   07/03/2020 0.0 0.0 - 0.2 /100  WBC Final     Comment:     Appended report. These results have been appended to a previously final verified report.       Glucose   Date Value Ref Range Status   07/04/2020 88 65 - 99 mg/dL Final     Sodium   Date Value Ref Range Status   07/04/2020 136 136 - 145 mmol/L Final     Potassium   Date Value Ref Range Status   07/04/2020 4.5 3.5 - 5.2 mmol/L Final     CO2   Date Value Ref Range Status   07/04/2020 22.2 22.0 - 29.0 mmol/L Final     Chloride   Date Value Ref Range Status   07/04/2020 97 (L) 98 - 107 mmol/L Final     Anion Gap   Date Value Ref Range Status   07/04/2020 16.8 (H) 5.0 - 15.0 mmol/L Final     Creatinine   Date Value Ref Range Status   07/04/2020 5.84 (H) 0.76 - 1.27 mg/dL Final     BUN   Date Value Ref Range Status   07/04/2020 30 (H) 6 - 20 mg/dL Final     BUN/Creatinine Ratio   Date Value Ref Range Status   07/04/2020 5.1 (L) 7.0 - 25.0 Final     Calcium   Date Value Ref Range Status   07/04/2020 9.2 8.6 - 10.5 mg/dL Final     eGFR Non  Amer   Date Value Ref Range Status   07/04/2020 10 (L) >60 mL/min/1.73 Final     Comment:     <15 Indicative of kidney failure.     Alkaline Phosphatase   Date Value Ref Range Status   07/04/2020 165 (H) 39 - 117 U/L Final     Total Protein   Date Value Ref Range Status   07/04/2020 7.0 6.0 - 8.5 g/dL Final     ALT (SGPT)   Date Value Ref Range Status   07/04/2020 13 1 - 41 U/L Final     AST (SGOT)   Date Value Ref Range Status   07/04/2020 12 1 - 40 U/L Final     Total Bilirubin   Date Value Ref Range Status   07/04/2020 1.3 (H) 0.2 - 1.2 mg/dL Final     Albumin   Date Value Ref Range Status   07/04/2020 4.10 3.50 - 5.20 g/dL Final     Globulin   Date Value Ref Range Status   07/04/2020 2.9 gm/dL Final     Hypoglycemia  Thrombocytopenia  Resuscitated cardiac arrest in the hospital  Life-threatening hyperkalemia with arrhythmia  End-stage renal disease, patient noncompliant with hemodialysis  Diabetes mellitus type 2  Peripheral artery  disease  COPD  Smoker  Nonhealing skin ulcers of lower extremities  Chronic Abdominal Pain    Will be glad to see as an OP and will follow along with Pulmonary w/u.

## 2020-07-04 NOTE — DISCHARGE SUMMARY
"                                                                  PHYSICIAN DISCHARGE SUMMARY                                                                          [facility]      Patient Identification:    Name: Sergio Phan  Age: 49 y.o.  Sex: male  :  1970  MRN: [id number]    Admit date: 2020    Discharge date 2020    Discharged Condition: Stable    Discharge Diagnoses:    Resuscitated cardiac arrest  Life-threatening hyperkalemia  Noncompliant with hemodialysis  ESRD on HD  Severe PAD with claudication  Chronic abdominal pain  COPD not in exacerbation  Active tobacco abuse  Nonhealing lower extremity ulcers due to PAD    HPI \"49-year-old male patient who has a strong history of noncompliance, multiple episodes of skipping hemodialysis, multiple admissions in the past for the same reason.  He presents now with nausea, vomiting and confusion.  Symptoms began a few hours ago and still present, severe.  Developed sudden cardiac arrest in the emergency room.  I discussed the case face-to-face with Dr. Garcia in the emergency room.  Dr. Garcia conducted CODE BLUE with ACLS protocol.  Patient's potassium was greater than 7 and he arrested with wide-complex arrhythmia.  He was given intravenous bicarbonate, magnesium and calcium gluconate.  Has not yet received insulin and dextrose.  Is receiving albuterol.  Was intubated and mechanical ventilation was started in the ER.  Patient currently hypotensive.  We are waiting for emergency hemodialysis\" per      Consults:   Patient Care Team:  Kimberlee Longoria APRN as PCP - General    Procedures Performed:         Hospital Course:   Patient admitted to the ICU with cardiac arrest which was thought to be from life-threatening hyperkalemia due to noncompliance with dialysis.  He states that he was \"stupid\" and is interested in continue with treatments and also discussed with palliative care and states that he is not ready " "to \"give up on himself\" and is willing to go ahead with recommendations this time.    Nephrology was consulted and they have dialyzed him and are comfortable with the current electrolytes and feel that he could go back to his prior schedule.  Gastroenterology was consulted due to chronic abdominal pain and they feel that he is doing well enough to be discharged and they would continue outpatient work-up.  His mesenteric ultrasound did not show any significant stenosis of the celiac or superior mesenteric artery.  CT of abdomen showed some constipation changes otherwise no significant abnormality noted.     He does have chronic PAD and he is followed up with vascular surgery as outpatient and was told that he would need surgical correction but this would not be recommended unless he stop smoking.  Patient unfortunately continues to smoke now and states that he is working on it and we will discharge him on nicotine patch.  He states that he will follow-up with his primary care physician, gastroenterologist and vascular surgeon as planned.  Patient will go back to his dialysis plan as scheduled and he is ready for discharge today.    He has been not as active and states that he has been in the bed most of the time over the last 6 months and uses a wheelchair for walking and attributes this to his claudication and states that this is at his baseline.  Wife is at bedside and agrees and states that she is comfortable taking him back home.  Offered rehab but he refused.      Objective:   Temp:  [97.9 °F (36.6 °C)-98.6 °F (37 °C)] 98.6 °F (37 °C)  Heart Rate:  [81-93] 81  Resp:  [16-20] 16  BP: (157-193)/() 157/81   SpO2:  [97 %-100 %] 97 %  on    Device (Oxygen Therapy): room air    Intake/Output Summary (Last 24 hours) at 7/4/2020 6833  Last data filed at 7/4/2020 1226  Gross per 24 hour   Intake 480 ml   Output --   Net 480 ml     Body mass index is 25.47 kg/m².      07/01/20  0700 07/01/20  1010 07/02/20  0418 "   Weight: 89.1 kg (196 lb 6.9 oz) 89.1 kg (196 lb 6.9 oz) (not weighed by RD) 85.2 kg (187 lb 13.3 oz)     Weight change:       Physical Exam:  Constitutional: Middle aged pt in bed -appears much older than stated age, No acute respiratory distress, no accessory muscle use  Head: - NCAT  Eyes: No pallor, Anicteric conjunctiva, EOMI.  ENMT:  Mallampati 3, no oral thrush.  Moist MM.   NECK: Trachea midline, No thyromegaly, no palpable cervical LNpathy  Heart: RRR, no murmur. No pedal edema   Lungs: BRITTNEE +, No wheezes/ crackles heard    Abdomen: Soft.  Obese, no tenderness, guarding or rigidity. No palpable masses  Extremities: Extremities warm and well perfused. No cyanosis/ clubbing.  Multiple wounds in the lower extremities mainly around the knee appears to be old and with scabs.  Nonhealing foot ulcers noted.  Neuro: Conscious, answers appropriately, no gross focal neuro deficits  Psych: Mood and affect -irritable    Significant Discharge Diagnostics     Pertinent Lab Results:  Results from last 7 days   Lab Units 07/04/20  0810 07/03/20  0301 07/02/20 0412 07/01/20  0538 06/30/20  1615   SODIUM mmol/L 136 139 135* 133* 137   POTASSIUM mmol/L 4.5 4.7 5.0 6.2* 7.5*   CHLORIDE mmol/L 97* 93* 92* 87* 84*   CO2 mmol/L 22.2 23.5 22.0 26.2 23.0   BUN mg/dL 30* 57* 41* 83* 174*   CREATININE mg/dL 5.84* 7.37* 5.55* 8.94* 15.20*   GLUCOSE mg/dL 88 84 60* 76 124*   CALCIUM mg/dL 9.2 9.2 8.8 8.6 8.0*   AST (SGOT) U/L 12  --   --   --  11   ALT (SGPT) U/L 13  --   --   --  14     Results from last 7 days   Lab Units 07/02/20  0412 06/30/20  1615   TROPONIN T ng/mL 0.190* 0.167*     Results from last 7 days   Lab Units 07/03/20  0301 07/02/20 0412 07/01/20  0538 06/30/20  1615   WBC 10*3/mm3 4.97 7.54 5.92 7.35   HEMOGLOBIN g/dL 9.8* 10.0* 9.1* 10.2*   HEMATOCRIT % 31.1* 31.1* 28.9* 32.2*   PLATELETS 10*3/mm3 109* 101* 118* 159   MCV fL 85.7 85.9 86.0 86.6   MCH pg 27.0 27.6 27.1 27.4   MCHC g/dL 31.5 32.2 31.5 31.7   RDW %  15.6* 15.9* 16.1* 16.3*   RDW-SD fl 48.9 50.4 50.3 51.0   MPV fL 10.8 10.1 10.8 10.9   NEUTROPHIL % % 78.3* 89.3*  --  83.2*   LYMPHOCYTE % % 9.9* 3.4*  --  6.8*   MONOCYTES % % 7.8 5.4  --  8.3   EOSINOPHIL % % 3.0 1.2  --  0.8   BASOPHIL % % 0.6 0.4  --  0.5   IMM GRAN % % 0.4 0.3  --  0.4   NEUTROS ABS 10*3/mm3 3.89 6.73 4.80 6.11   LYMPHS ABS 10*3/mm3 0.49* 0.26*  --  0.50*   MONOS ABS 10*3/mm3 0.39 0.41  --  0.61   EOS ABS 10*3/mm3 0.15 0.09 0.12 0.06   BASOS ABS 10*3/mm3 0.03 0.03  --  0.04   IMMATURE GRANS (ABS) 10*3/mm3 0.02 0.02  --  0.03   NRBC /100 WBC 0.0 0.0  --  0.1         Results from last 7 days   Lab Units 07/02/20  0412   MAGNESIUM mg/dL 2.2             Results from last 7 days   Lab Units 06/30/20  1835   PH, ARTERIAL pH units 7.305*   PO2 ART mm Hg 189.1*   PCO2, ARTERIAL mm Hg 50.1*   HCO3 ART mmol/L 24.9     Results from last 7 days   Lab Units 06/30/20  1615   PROCALCITONIN ng/mL 1.39*   LACTATE mmol/L 1.3         Results from last 7 days   Lab Units 07/01/20  0646 07/01/20  0538   BLOODCX  No growth at 3 days No growth at 3 days               Imaging Results:  Imaging Results (All)     Procedure Component Value Units Date/Time    CT Abdomen Pelvis Without Contrast [[id number]] Collected:  07/03/20 1810     Updated:  07/03/20 1825    Narrative:       CT OF THE ABDOMEN AND PELVIS WITHOUT CONTRAST 07/03/2020     HISTORY: Abdominal pain.     Spiral images were obtained from the lung bases to the symphysis pubis  after oral contrast. No intravenous contrast was given.     FINDINGS: There are some minimal scar atelectasis or inflammatory change  in the lung bases. Small amount of free fluid is seen around the liver  with small amount of free fluid tracking into the bilateral paracolic  gutters and small-to-moderate amount of free fluid in the pelvis.     Gallbladder has been removed.     The liver, spleen, and adrenals appear unremarkable. There is bilateral  renal atrophy. Pancreatic duct stent is  seen.     There is colonic diverticulosis.     No abscess is seen. Moderate amount of stool seen in the rectum.       Impression:       1. Small amount of ascites with the largest collection in the pelvis.  2. Status post cholecystectomy.  3. Bilateral renal atrophy.  4. Moderate amount of stool in the rectum. There is no evidence of bowel  dilatation or wall thickening.     Radiation dose reduction techniques were utilized, including automated  exposure control and exposure modulation based on body size.     This report was finalized on 7/3/2020 6:22 PM by Dr. Jose Blandon M.D.       XR Chest 1 View [[id number]] Collected:  06/30/20 1808     Updated:  06/30/20 1827    Narrative:       STAT PORTABLE RADIOGRAPHIC VIEW OF THE CHEST     CLINICAL HISTORY: Cough and shortness of air.     Stat portable radiographic view of the chest demonstrates a left  subclavian approach pacer device. Its lead terminates at the expected  location of the right ventricle. This pacer device is unchanged in  appearance when compared to the prior study of 04/28/2018. The  cardiomediastinal silhouette is enlarged. The lungs are clear of acute  infiltrates. The osseous structures are unremarkable.     This report was finalized on 6/30/2020 6:24 PM by Dr. Jose Hou M.D.             Discharge Medications and Instructions:     Discharge Medications     Discharge Medications      New Medications      Instructions Start Date   nicotine 21 MG/24HR patch  Commonly known as:  NICODERM CQ   1 patch, Transdermal, Every 24 Hours Scheduled   Start Date:  July 5, 2020        Changes to Medications      Instructions Start Date   apixaban 2.5 MG tablet tablet  Commonly known as:  Eliquis  What changed:  medication strength   5 mg, Oral, 2 Times Daily      gabapentin 400 MG capsule  Commonly known as:  NEURONTIN  What changed:  when to take this   400 mg, Oral, Every 12 Hours Scheduled      nitroglycerin 0.4 MG SL tablet  Commonly known as:   NITROSTAT  What changed:    · how much to take  · how to take this  · when to take this  · reasons to take this   1 under the tongue as needed for angina, may repeat q5mins for up three doses         Continue These Medications      Instructions Start Date   albuterol sulfate  (90 Base) MCG/ACT inhaler  Commonly known as:  PROVENTIL HFA;VENTOLIN HFA;PROAIR HFA   2 puffs, Inhalation, Every 4 Hours PRN      Auryxia 1  MG(Fe) tablet  Generic drug:  Ferric Citrate   1 tablet, Oral, 3 Times Daily With Meals      bisoprolol 10 MG tablet  Commonly known as:  ZEBeta   10 mg, Oral, Daily      cinacalcet 30 MG tablet  Commonly known as:  SENSIPAR   90 mg, Oral, Daily      clopidogrel 75 MG tablet  Commonly known as:  PLAVIX   75 mg, Oral, Daily      cyclobenzaprine 10 MG tablet  Commonly known as:  FLEXERIL   10 mg, Oral, 2 Times Daily PRN      dilTIAZem  MG 24 hr capsule  Commonly known as:  CARDIZEM CD   180 mg, Oral, Every 24 Hours Scheduled      DULoxetine 60 MG capsule  Commonly known as:  CYMBALTA   60 mg, Oral, Daily      insulin aspart 100 UNIT/ML injection  Commonly known as:  novoLOG   Subcutaneous, 3 Times Daily Before Meals, Sliding scale if 151, give 1 unit, if 152-171 give 2 units, and then for every 20 over 171, give 1 additional unit of insulin      Iodosorb 0.9 % gel  Generic drug:  cadexomer iodine   Topical      Iodosorb 0.9 % gel  Generic drug:  cadexomer iodine   Topical      oxyCODONE 10 MG tablet  Commonly known as:  ROXICODONE   10 mg, Oral, 4 Times Daily PRN      pantoprazole 40 MG EC tablet  Commonly known as:  PROTONIX   40 mg, Oral, 2 Times Daily      promethazine 25 MG tablet  Commonly known as:  PHENERGAN   25 mg, Oral, Every 6 Hours PRN      TAMI-ALEJANDRO PO   1 tablet, Oral, Daily      rosuvastatin 40 MG tablet  Commonly known as:  CRESTOR   40 mg, Oral, Daily         Stop These Medications    busPIRone 10 MG tablet  Commonly known as:  BUSPAR     hydrOXYzine 25 MG  tablet  Commonly known as:  ATARAX            Disposition:  Home     Contact information for follow-up providers     Kimberlee Longoria APRN. Schedule an appointment as soon as possible for a visit in 1 week(s).    Specialty:  Nurse Practitioner  Contact information:  [address] [facility] [address] [phone]             Irineo Mercedes MD. Schedule an appointment as soon as possible for a visit in 1 week(s).    Specialty:  Gastroenterology  Contact information:  [address] [facility] [address]  [facility] [address] [phone]                   Contact information for after-discharge care     Home Medical Care     [facility] .    Service:  Home Health Services  Contact information:  [address]  [facility] [address]  [phone]                             Total time spent discharging patient including evaluation, medication reconciliation, arranging follow up, and post hospitalization instructions and education total time exceeds 30 minutes.    Signed:  Adam Thompson MD  7/4/2020  16:34

## 2020-07-04 NOTE — PROGRESS NOTES
"   LOS: 4 days    Patient Care Team:  Kimberlee Longoria APRN as PCP - General    Chief Complaint:    Chief Complaint   Patient presents with   • Weakness - Generalized     PT NONCOMPLIANT WITH HIS DIALYSIS SCHEDULE, MISSING 4 APPOINTMENTS; PT REPORTS TO HIS WIFE THAT HE'S READY TO DIE; PT WEARING FACE MASK     Follow-up end-stage renal disease  Subjective     Interval History:   He is feeling better, he is committed now to have his dialysis regularly as an outpatient.  He denies any chest pain or shortness of air, no orthopnea or PND, no nausea or vomiting.  Had dialysis yesterday without any difficulty      Review of Systems:   As noted above    Objective     Vital Signs  Temp:  [97.5 °F (36.4 °C)-98.9 °F (37.2 °C)] 98.1 °F (36.7 °C)  Heart Rate:  [78-93] 92  Resp:  [16-20] 16  BP: (164-195)/() 176/90    Flowsheet Rows      First Filed Value   Admission Height  182.9 cm (72\") Documented at 06/30/2020 1559   Admission Weight  98.9 kg (218 lb) Documented at 06/30/2020 1559          No intake/output data recorded.  I/O last 3 completed shifts:  In: 360 [P.O.:360]  Out: -     Intake/Output Summary (Last 24 hours) at 7/4/2020 1015  Last data filed at 7/3/2020 1700  Gross per 24 hour   Intake 360 ml   Output --   Net 360 ml       Physical Exam:  General Appearance:  Awake, alert and oriented, appears to be chronically ill, no acute distress.  Skin: warm and dry, multiple ulceration and few blisters on the lower extremities with erythema and chronic skin changes also has mycotic toenails  HEENT: pupils round and reactive to light,  oral mucosa slightly dry, nonicteric sclera  Neck: supple, no JVD, trachea midline, bilateral carotid bruit  Lungs: Bilateral rhonchi, unlabored breathing effort  Heart: RRR, normal S1 and S2, no S3, no rub  Abdomen: soft, no guarding,  present bowel sounds to auscultation  : no palpable bladder,  Extremities: He has what appears to be bilateral chronic woody edema with stasis " dermatitis multiple ulcer and 1 or 2 blisters and mycotic toenails, functioning AV fistula in the right upper arm  Neuro:  Normal speech and mental status moving all extremities      Results Review:    Results from last 7 days   Lab Units 07/04/20  0810 07/03/20 0301 07/02/20 0412 06/30/20  1615   SODIUM mmol/L 136 139 135*   < > 137   POTASSIUM mmol/L 4.5 4.7 5.0   < > 7.5*   CHLORIDE mmol/L 97* 93* 92*   < > 84*   CO2 mmol/L 22.2 23.5 22.0   < > 23.0   BUN mg/dL 30* 57* 41*   < > 174*   CREATININE mg/dL 5.84* 7.37* 5.55*   < > 15.20*   CALCIUM mg/dL 9.2 9.2 8.8   < > 8.0*   BILIRUBIN mg/dL 1.3*  --   --   --  1.0   ALK PHOS U/L 165*  --   --   --  163*   ALT (SGPT) U/L 13  --   --   --  14   AST (SGOT) U/L 12  --   --   --  11   GLUCOSE mg/dL 88 84 60*   < > 124*    < > = values in this interval not displayed.       Estimated Creatinine Clearance: 18.2 mL/min (A) (by C-G formula based on SCr of 5.84 mg/dL (H)).    Results from last 7 days   Lab Units 07/03/20 0301 07/02/20 0412 07/01/20  0538   MAGNESIUM mg/dL  --  2.2  --    PHOSPHORUS mg/dL 8.1* 6.9* 7.9*             Results from last 7 days   Lab Units 07/03/20  0301 07/02/20 0412 07/01/20  0538 06/30/20  1615   WBC 10*3/mm3 4.97 7.54 5.92 7.35   HEMOGLOBIN g/dL 9.8* 10.0* 9.1* 10.2*   PLATELETS 10*3/mm3 109* 101* 118* 159               Imaging Results (Last 24 Hours)     Procedure Component Value Units Date/Time    CT Abdomen Pelvis Without Contrast [[id number]] Collected:  07/03/20 1810     Updated:  07/03/20 1825    Narrative:       CT OF THE ABDOMEN AND PELVIS WITHOUT CONTRAST 07/03/2020     HISTORY: Abdominal pain.     Spiral images were obtained from the lung bases to the symphysis pubis  after oral contrast. No intravenous contrast was given.     FINDINGS: There are some minimal scar atelectasis or inflammatory change  in the lung bases. Small amount of free fluid is seen around the liver  with small amount of free fluid tracking into the bilateral  paracolic  gutters and small-to-moderate amount of free fluid in the pelvis.     Gallbladder has been removed.     The liver, spleen, and adrenals appear unremarkable. There is bilateral  renal atrophy. Pancreatic duct stent is seen.     There is colonic diverticulosis.     No abscess is seen. Moderate amount of stool seen in the rectum.       Impression:       1. Small amount of ascites with the largest collection in the pelvis.  2. Status post cholecystectomy.  3. Bilateral renal atrophy.  4. Moderate amount of stool in the rectum. There is no evidence of bowel  dilatation or wall thickening.     Radiation dose reduction techniques were utilized, including automated  exposure control and exposure modulation based on body size.     This report was finalized on 7/3/2020 6:22 PM by Dr. Jose Blandon M.D.             apixaban 2.5 mg Oral Q12H   castor oil-balsam peru  Topical Q12H   insulin lispro 0-24 Units Subcutaneous 4x Daily With Meals & Nightly   ipratropium-albuterol 3 mL Nebulization Once   metoprolol tartrate 25 mg Oral Q12H   mineral oil-hydrophilic petrolatum  Topical Daily   pantoprazole 40 mg Oral Q AM   sodium chloride 10 mL Intravenous Q12H          Medication Review:   Current Facility-Administered Medications   Medication Dose Route Frequency Provider Last Rate Last Dose   • albumin human 25 % IV SOLN 12.5 g  12.5 g Intravenous PRN Blaise Morgan MD       • apixaban (ELIQUIS) tablet 2.5 mg  2.5 mg Oral Q12H Noelle Rubin MD   2.5 mg at 07/04/20 0900   • castor oil-balsam peru (VENELEX) ointment   Topical Q12H Liang Rolle MD   5 g at 07/04/20 0901   • dextrose (D50W) 25 g/ 50mL Intravenous Solution 25 g  25 g Intravenous Q15 Min PRN Liang Rolle MD   25 g at 07/02/20 0537   • dextrose (GLUTOSE) oral gel 15 g  15 g Oral Q15 Min PRN Liang Rolle MD       • glucagon (human recombinant) (GLUCAGEN DIAGNOSTIC) injection 1 mg  1 mg Subcutaneous Q15 Min PRN Liang Rolle MD        • HYDROmorphone (DILAUDID) injection 0.5 mg  0.5 mg Intravenous Q4H PRN Liang Rolle MD   0.5 mg at 07/02/20 1307   • insulin lispro (humaLOG) injection 0-24 Units  0-24 Units Subcutaneous 4x Daily With Meals & Nightly Liang Rolle MD   3 Units at 07/02/20 2033   • ipratropium-albuterol (DUO-NEB) nebulizer solution 3 mL  3 mL Nebulization Once Liang Rolle MD       • metoprolol tartrate (LOPRESSOR) tablet 25 mg  25 mg Oral Q12H Liang Rolle MD   25 mg at 07/04/20 0954   • mineral oil-hydrophilic petrolatum (AQUAPHOR) ointment   Topical Daily Liang Rolle MD       • ondansetron (ZOFRAN) tablet 4 mg  4 mg Oral Q6H PRN Liang Rolle MD        Or   • ondansetron (ZOFRAN) injection 4 mg  4 mg Intravenous Q6H PRN Liang Rolle MD   4 mg at 07/02/20 1224   • pantoprazole (PROTONIX) EC tablet 40 mg  40 mg Oral Q AM Liang Rolle MD   40 mg at 07/04/20 0509   • sodium chloride 0.9 % bolus 1,000 mL  1,000 mL Intravenous Once PRN Blaise Morgan MD       • sodium chloride 0.9 % flush 10 mL  10 mL Intravenous PRN Liang Rolle MD   10 mL at 07/02/20 0806   • sodium chloride 0.9 % flush 10 mL  10 mL Intravenous Q12H Liang Rolle MD   10 mL at 07/04/20 0911   • sodium chloride 0.9 % flush 10 mL  10 mL Intravenous PRN Liang Rolle MD   10 mL at 07/02/20 1309       Assessment/Plan   1.  End-stage renal disease on maintenance hemodialysis every Monday, Wednesday and Friday, misses dialysis quite often on presentation his creatinine was around 15 and the potassium 7.5 he underwent emergent dialysis treatment.  He had dialysis yesterday without any difficulties and doing quite well.  Potassium is 4.5  3.  Chronic leg ulcers with venous stasis dermatitis appears to be mild cellulitis  4.  Insulin-dependent diabetes mellitus  5.  COPD with active tobacco abuse  6.  Chronic pancreatitis  7.  Anemia of chronic kidney disease, hemoglobin yesterday was 9.8  8.  Cardiac arrest associated  with hyperkalemia patient was resuscitated and restored to adequate circulation  9.  Thrombocytopenia, platelet yesterday was 109,000.  10.  Hyperphosphatemia associated with noncompliance    Plan:.  1.  Continue the same treatment  2.  Patient is cleared for discharge from the renal standpoint and he will resume his dialysis as an outpatient on Monday            Blaise Morgan MD  07/04/20  10:15

## 2020-07-04 NOTE — PLAN OF CARE
Problem: Patient Care Overview  Goal: Plan of Care Review  Outcome: Ongoing (interventions implemented as appropriate)  Flowsheets  Taken 7/4/2020 0359  Progress: no change  Outcome Summary: Patient alert and oriented with occasional confusion (baseline). Pt up with assist. Pt fell this night shift - please see  nursing note. Pt requesting to leave AMA - pt was persuaded by spouse to stay another night and wait until the morning to discuss discharge when she arrives. Pt refusing some nursing interventions. Pt is cooperating better with staff and directions as the shift progresses. VSS. Will continue to monitor.  Taken 7/4/2020 0005  Plan of Care Reviewed With: patient

## 2020-07-04 NOTE — NURSING NOTE
Patient found on the ground. Assessed and no injuries noted. Patient wishing to leave AMA and wants wife contacted to pick him up. Contacted and spoke with Dr. Al to update on incident - no new orders, but to monitor patient for any changes. RN contacted Zoey (Spouse) and received no answer at both numbers. RN contacted and spoke with son Candido and update him. He is to try to reach out to Zoey to contact unit back. Patient refusing further assessments and nursing interventions at this time including medications.

## 2020-07-05 NOTE — OUTREACH NOTE
Prep Survey      Responses   [facility] facility patient discharged from?  [facility]   Is LACE score < 7 ?  No   Eligibility  Readm Mgmt   Discharge diagnosis  Resuscitated cardiac arrest Hyperkalemia, noncompliant with HD, ESRD, severe PAD with claudication, COPD, nonhealing LE ulcers   COVID-19 Test Status  Not tested   Does the patient have one of the following disease processes/diagnoses(primary or secondary)?  Other   Does the patient have Home health ordered?  Yes   What is the Home health agency?   [facility]   Is there a DME ordered?  No   Comments regarding appointments  Pt to schedule F/U appointments   Medication alerts for this patient  see AVS   Prep survey completed?  Yes          Melanie Hoff RN

## 2020-07-06 NOTE — PROGRESS NOTES
Case Management Discharge Note      Final Note: Patient DC'd home with [facility]             Home Medical Care - Selection Complete      Service Provider Request Status Selected Services Address Phone Number Fax Number    [facility] [address] [place], [place] [id number] [phone]           Transportation Services  Private: Car    Final Discharge Disposition Code: 06 - home with home health care

## 2020-07-06 NOTE — OUTREACH NOTE
Medical Week 1 Survey      Responses   [facility] patient discharged from?  [facility]   COVID-19 Test Status  Not tested   Does the patient have one of the following disease processes/diagnoses(primary or secondary)?  Other   Is there a successful TCM telephone encounter documented?  No   Week 1 attempt successful?  Yes   Call start time  1545   Call end time  1547   General alerts for this patient  COPD and HD   Discharge diagnosis  Resuscitated cardiac arrest Hyperkalemia, noncompliant with HD, ESRD, severe PAD with claudication, COPD, nonhealing LE ulcers   Does the patient have a primary care provider?   Yes   Does the patient have an appointment with their PCP within 7 days of discharge?  Yes   Comments regarding PCP  Patient will see PCP for tomorrw.  Dr. Mercedes in September.  Wound care on 7/16.   Has the patient kept scheduled appointments due by today?  Yes   What is the Home health agency?   [facility]   Pulse Ox monitoring  None   Psychosocial issues?  No   Comments  dialysis mon/wed/fri/   Did the patient receive a copy of their discharge instructions?  Yes   Nursing interventions  Reviewed instructions with patient   What is the patient's perception of their health status since discharge?  Improving   Week 1 call completed?  Yes   Wrap up additional comments  Wife reports patient is napping due to having dialysis today.  she denies any needs at this time.           Shea De La Garza RN

## 2020-07-08 NOTE — TELEPHONE ENCOUNTER
PATIENTS WIFE CALLED RE MEDICATION DISCREPANCY FOR [facility], SHE SAID SHE WOULD WAIT UNTIL HIS APPT BUT WAS CONCERNED ABOUT PATIENT NOT HAVING ANY BLOOD THINNERS, PLEASE ADVISE PATIENT.

## 2020-07-09 PROBLEM — I48.91 ATRIAL FIBRILLATION (HCC): Status: ACTIVE | Noted: 2020-01-01

## 2020-07-09 NOTE — TELEPHONE ENCOUNTER
S/w pt wife  Has several questions LISET Ochoa   Made pt telephone visit for today to discuss medication with Dr ALMEIDA

## 2020-07-09 NOTE — PROGRESS NOTES
You have chosen to receive care through a telephone visit. Do you consent to use a telephone visit for your medical care today? Yes      Medication questions   Subjective:        Sergio Phan is a 49 y.o. 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    The following portions of the patient's history were reviewed and updated as appropriate: allergies, current medications, past family history, past medical history, past social history, past surgical history and problem list.    Past Medical History:   Diagnosis Date   • Anxiety    • Asthma    • Chest pain    • COPD (chronic obstructive pulmonary disease) (CMS/HCC)    • Depression    • Diabetes mellitus (CMS/HCC)     TYPE II   • Fatigue    • Gout    • HOCM (hypertrophic obstructive cardiomyopathy) (CMS/HCC)    • Hypertension    • Hypertriglyceridemia    • Myocardial infarction (CMS/HCC)    • Pancreatitis    • Renal disorder    • Syncope      reports that he has been smoking cigarettes. He has a 17.50 pack-year smoking history. He has never used smokeless tobacco. He reports that he does not drink alcohol or use drugs.   Family History   Problem Relation Age of Onset   • Heart block Mother    • Kidney disease Mother    • Diabetes Father    • Diabetes Maternal Grandmother    • Diabetes Maternal Grandfather    • COPD Maternal Grandfather    • Asthma Maternal Grandfather        Review of Systems  Constitutional: No wt loss, fever, fatigue  Gastrointestinal: No nausea,  abdominal pain  Behavioral/Psych: No insomnia or anxiety   Cardiovascular no chest pains or tightness in the chest  Objective:       Physical Exam  Telephone conferance      Procedures      Echocardiogram:        Current Outpatient Medications:   •  albuterol (PROVENTIL HFA;VENTOLIN HFA) 108 (90 BASE) MCG/ACT inhaler, Inhale 2 puffs every 4 (four) hours as needed for wheezing., Disp: , Rfl:   •  apixaban (ELIQUIS) 2.5 MG tablet tablet, Take 2 tablets by mouth 2 (Two) Times a Day., Disp: 30 tablet, Rfl: 0  •  B Complex-C-Folic Acid (TAMI-ALEJANDRO PO), Take 1 tablet by mouth Daily., Disp: , Rfl:   •  bisoprolol (ZEBeta) 10 MG tablet, Take 10 mg by mouth Daily., Disp: , Rfl:   •  cadexomer iodine (IODOSORB) 0.9 % gel, Apply to the left big toe and right 4th metatarsal area as directed three times per week on Tuesday, Thursday, and Saturday., Disp: 10 g, Rfl: 4  •  clopidogrel (PLAVIX) 75 MG tablet, Take 1 tablet by mouth Daily., Disp: 30 tablet, Rfl: 0  •  cyclobenzaprine (FLEXERIL) 10 MG tablet, Take 10 mg by mouth 2 (Two) Times a Day As Needed for Muscle Spasms., Disp: , Rfl:   •  dilTIAZem CD (CARDIZEM CD) 180 MG 24 hr capsule, Take 1 capsule by mouth Daily., Disp: 30 capsule, Rfl: 0  •  DULoxetine (CYMBALTA) 60 MG capsule, Take 60 mg by mouth Daily., Disp: , Rfl:   •  Ferric Citrate (Auryxia) 1  MG(Fe) tablet, Take 1 tablet by mouth 3 (Three) Times a Day With Meals., Disp: , Rfl:   •  gabapentin (NEURONTIN) 400 MG capsule, Take 1 capsule by mouth Every 12 (Twelve) Hours. (Patient taking differently: Take 400 mg by mouth 2 (two) times a day.), Disp: , Rfl:   •  insulin aspart (novoLOG) 100 UNIT/ML injection, Inject  under the skin into the appropriate area as directed 3 (Three) Times a Day Before Meals. Sliding scale if 151, give 1 unit, if 152-171 give 2 units, and then for every 20 over 171, give 1 additional unit of insulin, Disp: , Rfl:   •  LORazepam (ATIVAN) 1 MG tablet, Take 1 mg by mouth., Disp: , Rfl:   •   nicotine (NICODERM CQ) 21 MG/24HR patch, Place 1 patch on the skin as directed by provider Daily., Disp: 30 each, Rfl: 0  •  nitroglycerin (NITROSTAT) 0.4 MG SL tablet, 1 under the tongue as needed for angina, may repeat q5mins for up three doses (Patient taking differently: Place 0.4 mg under the tongue Every 5 (Five) Minutes As Needed. 1 under the tongue as needed for angina, may repeat q5mins for up three doses), Disp: 25 tablet, Rfl: 0  •  oxyCODONE (ROXICODONE) 10 MG tablet, Take 10 mg by mouth 4 (Four) Times a Day As Needed., Disp: , Rfl:   •  pantoprazole (PROTONIX) 40 MG EC tablet, Take 40 mg by mouth 2 (Two) Times a Day., Disp: , Rfl:   •  promethazine (PHENERGAN) 25 MG tablet, Take 25 mg by mouth Every 6 (Six) Hours As Needed for Nausea or Vomiting., Disp: , Rfl:   •  rosuvastatin (CRESTOR) 40 MG tablet, Take 40 mg by mouth Daily., Disp: , Rfl:    Assessment:        Patient Active Problem List   Diagnosis   • AICD (automatic cardioverter/defibrillator) present   • Dizziness   • Fatigue   • Hypertension   • Hypertrophic obstructive cardiomyopathy (CMS/HCC)   • Hypertriglyceridemia   • Kidney disorder   • Type 2 diabetes mellitus (CMS/HCC)   • Syncope   • Vitamin D deficiency   • Weakness   • Idiopathic acute pancreatitis   • VT (ventricular tachycardia) (CMS/HCC)   • Acute pancreatitis   • Pancreatitis, recurrent   • Hypertensive emergency   • Stage 5 chronic kidney disease on chronic dialysis (CMS/HCC)   • Severe diabetic hypoglycemia (CMS/HCC)   • 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 Essential hypertension I10 401.9   3. Syncope, unspecified syncope type R55 780.2   4. AICD (automatic cardioverter/defibrillator) present Z95.810 V45.02     1. 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 Essential hypertension  Blood pressure controlled    3. Syncope, unspecified syncope type  No more syncope    4. AICD (automatic cardioverter/defibrillator) present  Functioning normally    This patient has consented to a telehealth visit via telephone. The visit was scheduled as a telephone visit to comply with patient safety concerns in accordance with CDC recommendations.  All vitals recorded within this visit are reported by the patient.  I spent 15 minutes in total including but not limited to the 15 minutes spent in direct conversation with this patient.     COUNSELING:    Sergio Clifton was given to patient for the following topics: diagnostic results, risk factor reductions, impressions, risks and benefits of treatment options and importance of treatment compliance .       SMOKING COUNSELING:    [unfilled]    Dictated using Dragon dictation

## 2020-07-14 NOTE — PROGRESS NOTES
Subjective:        Sergio Phan is a 50 y.o. male who here for follow up      HOSPITAL FOLLOW UP  HPI  50-year-old male was recently admitted to the hospital with AICD shock because of noncompliance with a dialysis here for the follow-up still complains of shortness of breath on exertion no different than before no chest pains or tightness in the chest    Problem List Items Addressed This Visit        Cardiovascular and Mediastinum    Hypertrophic obstructive cardiomyopathy (CMS/HCC) (Chronic)    Relevant Medications    dilTIAZem CD (CARDIZEM CD) 180 MG 24 hr capsule    AICD (automatic cardioverter/defibrillator) present    Hypertriglyceridemia    VT (ventricular tachycardia) (CMS/HCC) - Primary    Relevant Medications    dilTIAZem CD (CARDIZEM CD) 180 MG 24 hr capsule        .    The following portions of the patient's history were reviewed and updated as appropriate: allergies, current medications, past family history, past medical history, past social history, past surgical history and problem list.    Past Medical History:   Diagnosis Date   • Anxiety    • Asthma    • Chest pain    • COPD (chronic obstructive pulmonary disease) (CMS/HCC)    • Depression    • Diabetes mellitus (CMS/HCC)     TYPE II   • Fatigue    • Gout    • HOCM (hypertrophic obstructive cardiomyopathy) (CMS/HCC)    • Hypertension    • Hypertriglyceridemia    • Myocardial infarction (CMS/HCC)    • Pancreatitis    • Renal disorder    • Syncope      reports that he has been smoking cigarettes. He has a 17.50 pack-year smoking history. He has never used smokeless tobacco. He reports that he does not drink alcohol or use drugs.   Family History   Problem Relation Age of Onset   • Heart block Mother    • Kidney disease Mother    • Diabetes Father    • Diabetes Maternal Grandmother    • Diabetes Maternal Grandfather    • COPD Maternal Grandfather    • Asthma Maternal Grandfather        Review of Systems  Constitutional: No wt loss, fever,  "fatigue  Gastrointestinal: No nausea, abdominal pain  Behavioral/Psych: No insomnia or anxiety   Cardiovascular shortness of breath on exertion  Objective:       Physical Exam  /69   Pulse 66   Temp 99.8 °F (37.7 °C)   Ht 182.9 cm (72\")   Wt 84.4 kg (186 lb)   BMI 25.23 kg/m²   General appearance: No acute changes   Neck: Trachea midline; NECK, supple, no thyromegaly or lymphadenopathy   Lungs: Normal size and shape, normal breath sounds, equal distribution of air, no rales and rhonchi   CV: S1-S2 regular, no murmurs, no rub, no gallop   Abdomen: Soft, non-tender; no masses , no abnormal abdominal sounds   Extremities: No deformity , normal color , no peripheral edema   Skin: Normal temperature, turgor and texture; no rash, ulcers          Procedures      Echocardiogram:        Current Outpatient Medications:   •  albuterol (PROVENTIL HFA;VENTOLIN HFA) 108 (90 BASE) MCG/ACT inhaler, Inhale 2 puffs every 4 (four) hours as needed for wheezing., Disp: , Rfl:   •  apixaban (ELIQUIS) 2.5 MG tablet tablet, Take 1 tablet by mouth 2 (Two) Times a Day. (Patient taking differently: Take 5 mg by mouth 2 (Two) Times a Day.), Disp: 60 tablet, Rfl: 3  •  bisoprolol (ZEBeta) 10 MG tablet, Take 10 mg by mouth Daily., Disp: , Rfl:   •  cadexomer iodine (IODOSORB) 0.9 % gel, Apply to the left big toe and right 4th metatarsal area as directed three times per week on Tuesday, Thursday, and Saturday., Disp: 10 g, Rfl: 4  •  clopidogrel (PLAVIX) 75 MG tablet, Take 1 tablet by mouth Daily., Disp: 30 tablet, Rfl: 0  •  cyclobenzaprine (FLEXERIL) 10 MG tablet, Take 10 mg by mouth 2 (Two) Times a Day As Needed for Muscle Spasms., Disp: , Rfl:   •  DULoxetine (CYMBALTA) 60 MG capsule, Take 60 mg by mouth Daily., Disp: , Rfl:   •  Ferric Citrate (Auryxia) 1  MG(Fe) tablet, Take 1 tablet by mouth 3 (Three) Times a Day With Meals., Disp: , Rfl:   •  gabapentin (NEURONTIN) 400 MG capsule, Take 1 capsule by mouth Every 12 (Twelve) " Hours. (Patient taking differently: Take 400 mg by mouth 2 (two) times a day.), Disp: , Rfl:   •  insulin aspart (novoLOG) 100 UNIT/ML injection, Inject  under the skin into the appropriate area as directed 3 (Three) Times a Day Before Meals. Sliding scale if 151, give 1 unit, if 152-171 give 2 units, and then for every 20 over 171, give 1 additional unit of insulin, Disp: , Rfl:   •  LORazepam (ATIVAN) 1 MG tablet, Take 1 mg by mouth., Disp: , Rfl:   •  nitroglycerin (NITROSTAT) 0.4 MG SL tablet, 1 under the tongue as needed for angina, may repeat q5mins for up three doses (Patient taking differently: Place 0.4 mg under the tongue Every 5 (Five) Minutes As Needed. 1 under the tongue as needed for angina, may repeat q5mins for up three doses), Disp: 25 tablet, Rfl: 0  •  oxyCODONE (ROXICODONE) 10 MG tablet, Take 10 mg by mouth 4 (Four) Times a Day As Needed., Disp: , Rfl:   •  pantoprazole (PROTONIX) 40 MG EC tablet, Take 40 mg by mouth 2 (Two) Times a Day., Disp: , Rfl:   •  promethazine (PHENERGAN) 25 MG tablet, Take 25 mg by mouth Every 6 (Six) Hours As Needed for Nausea or Vomiting., Disp: , Rfl:   •  rosuvastatin (CRESTOR) 40 MG tablet, Take 40 mg by mouth Daily., Disp: , Rfl:   •  B Complex-C-Folic Acid (TAMI-ALEJANDRO PO), Take 1 tablet by mouth Daily., Disp: , Rfl:   •  dilTIAZem CD (CARDIZEM CD) 180 MG 24 hr capsule, Take 1 capsule by mouth Daily., Disp: 30 capsule, Rfl: 0   Assessment:        Patient Active Problem List   Diagnosis   • AICD (automatic cardioverter/defibrillator) present   • Dizziness   • Fatigue   • Hypertension   • Hypertrophic obstructive cardiomyopathy (CMS/HCC)   • Hypertriglyceridemia   • Kidney disorder   • Type 2 diabetes mellitus (CMS/HCC)   • Syncope   • Vitamin D deficiency   • Weakness   • Idiopathic acute pancreatitis   • VT (ventricular tachycardia) (CMS/HCC)   • Acute pancreatitis   • Pancreatitis, recurrent   • Hypertensive emergency   • Stage 5 chronic kidney disease on chronic  dialysis (CMS/HCC)   • Severe diabetic hypoglycemia (CMS/HCC)   • 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 VT (ventricular tachycardia) (CMS/HCC) I47.2 427.1   2. Hypertrophic obstructive cardiomyopathy (CMS/HCC) I42.1 425.11   3. Hypertriglyceridemia E78.1 272.1   4. AICD (automatic cardioverter/defibrillator) present Z95.810 V45.02     1. VT (ventricular tachycardia) (CMS/[facility])  No more episodes  2. Hypertrophic obstructive cardiomyopathy (CMS/HCC)  No change    3. Hypertriglyceridemia  No change    4. AICD (automatic cardioverter/defibrillator) present  Functioning normally       HOLD OFF STRESS TEST PER PT REQUEST    SEE IN 6 MONTHS  COUNSELING:    Sergio Clifton was given to patient for the following topics: diagnostic results, risk factor reductions, impressions, risks and benefits of treatment options and importance of treatment compliance .       SMOKING COUNSELING:    [unfilled]    Dictated using Dragon dictation

## 2020-07-14 NOTE — OUTREACH NOTE
Medical Week 2 Survey      Responses   [facility] patient discharged from?  [place]   COVID-19 Test Status  Not tested   Does the patient have one of the following disease processes/diagnoses(primary or secondary)?  Other   Week 2 attempt successful?  Yes   Call start time  0719   General alerts for this patient  COPD and HD   Discharge diagnosis  Resuscitated cardiac arrest Hyperkalemia, noncompliant with HD, ESRD, severe PAD with claudication, COPD, nonhealing LE ulcers   Call end time  0725   Person spoke with today (if not patient) and relationship  Zoey, spouse   Meds reviewed with patient/caregiver?  Yes   Is the patient having any side effects they believe may be caused by any medication additions or changes?  No   Does the patient have all medications ordered at discharge?  Yes   Is the patient taking all medications as directed (includes completed medication regime)?  Yes   Does the patient have a primary care provider?   Yes   Has the patient kept scheduled appointments due by today?  Yes   Comments  PCP 07/07/2020  Cardiology today 07/14/2020 Dialysis [facility]   What is the Home health agency?   [facility]   Has home health visited the patient within 72 hours of discharge?  Yes   Home health comments  SN PT coming today   DME comments  Wife says they have all needed equipment.   Pulse Ox monitoring  None   Psychosocial issues?  No   Comments  Dressing himself. Transfers himself. Hes doing really good right now   What is the patient's perception of their health status since discharge?  Improving   Is the patient/caregiver able to teach back the hierarchy of who to call/visit for symptoms/problems? PCP, Specialist, Home health nurse, Urgent Care, ED, 911  Yes   Week 2 Call Completed?  Yes          Lola Whitt RN

## 2020-07-21 NOTE — OUTREACH NOTE
Medical Week 3 Survey      Responses   [facility] patient discharged from?  [place]   COVID-19 Test Status  Not tested   Does the patient have one of the following disease processes/diagnoses(primary or secondary)?  Other   Week 3 attempt successful?  Yes   Call start time  1342   Call end time  1353   General alerts for this patient  COPD and HD   Discharge diagnosis  Resuscitated cardiac arrest Hyperkalemia, noncompliant with HD, ESRD, severe PAD with claudication, COPD, nonhealing LE ulcers   Person spoke with today (if not patient) and relationship  Zoey, spouse   Meds reviewed with patient/caregiver?  Yes   Is the patient taking all medications as directed (includes completed medication regime)?  Yes   Has the patient kept scheduled appointments due by today?  Yes   Has home health visited the patient within 72 hours of discharge?  Yes   Pulse Ox monitoring  Intermittent   Pulse Ox device source  Hospital   O2 Sat comments  95 %   O2 Sat education comments  Room air   Psychosocial issues?  No   Comments  Can stand with cane for short period of time, otherwise in wheelchair.    What is the patient's perception of their health status since discharge?  Improving   Week 3 Call Completed?  Yes   Wrap up additional comments  Wife mentioned issue with IPTH. Talked to her about intact parathyroid hormone, increased levels are seen in renal pts and needs to be monitored.            Hillary Winters, RN

## 2020-07-29 NOTE — OUTREACH NOTE
Medical Week 4 Survey      Responses   [facility] patient discharged from?  [place]   COVID-19 Test Status  Not tested   Does the patient have one of the following disease processes/diagnoses(primary or secondary)?  Other   Week 4 attempt successful?  Yes   Call start time  0725   Call end time  0732   Discharge diagnosis  Resuscitated cardiac arrest Hyperkalemia, noncompliant with HD, ESRD, severe PAD with claudication, COPD, nonhealing LE ulcers   Meds reviewed with patient/caregiver?  Yes   Is the patient taking all medications as directed (includes completed medication regime)?  Yes   Has the patient kept scheduled appointments due by today?  Yes   Is the patient still receiving Home Health Services?  Yes   Pulse Ox monitoring  Intermittent   What is the patient's perception of their health status since discharge?  Improving [Not feeling well today Is nauseated, vomiting. Wife says this happens, he does this. I know how to take care of this.]   Is the patient/caregiver able to teach back the hierarchy of who to call/visit for symptoms/problems? PCP, Specialist, Home health nurse, Urgent Care, ED, 911  Yes   Additional teach back comments  Legs look really good, wounds healing. Going to dialysis treatments and MD appts. Doing really good..   Week 4 Call Completed?  Yes   Would the patient like one additional call?  No   Graduated  Yes   Did the patient feel the follow up calls were helpful during their recovery period?  Yes   Was the number of calls appropriate?  Yes          Lola Whitt RN

## 2020-08-23 PROBLEM — G93.40 ACUTE ENCEPHALOPATHY: Status: ACTIVE | Noted: 2020-01-01

## 2020-08-25 PROBLEM — Z91.199 NONADHERENCE TO MEDICAL TREATMENT: Status: ACTIVE | Noted: 2020-01-01

## 2020-08-25 PROBLEM — I70.249 ATHEROSCLEROSIS OF NATIVE ARTERY OF BOTH LOWER EXTREMITIES WITH BILATERAL ULCERATION (HCC): Status: ACTIVE | Noted: 2020-01-01

## 2020-08-25 PROBLEM — Z79.01 ANTICOAGULATION ADEQUATE: Status: ACTIVE | Noted: 2020-01-01

## 2020-08-25 PROBLEM — I70.239 ATHEROSCLEROSIS OF NATIVE ARTERY OF BOTH LOWER EXTREMITIES WITH BILATERAL ULCERATION (HCC): Status: ACTIVE | Noted: 2020-01-01

## 2020-08-27 PROBLEM — N18.6 ESRF (END STAGE RENAL FAILURE) (HCC): Status: ACTIVE | Noted: 2020-01-01

## 2020-08-28 PROBLEM — Z45.02: Status: ACTIVE | Noted: 2020-01-01

## 2020-08-28 PROBLEM — I27.20 PULMONARY HYPERTENSION (HCC): Status: ACTIVE | Noted: 2020-01-01

## 2020-08-28 PROBLEM — Z51.5 HOSPICE CARE PATIENT: Status: ACTIVE | Noted: 2020-01-01

## 2020-08-28 PROBLEM — Z99.2: Status: ACTIVE | Noted: 2020-01-01

## 2020-08-28 PROBLEM — Z92.89 HISTORY OF HEMODIALYSIS: Status: ACTIVE | Noted: 2020-01-01

## 2020-08-28 PROBLEM — Z51.5: Status: ACTIVE | Noted: 2020-01-01

## 2020-08-28 PROBLEM — Z95.0 PRESENCE OF CARDIAC PACEMAKER: Status: ACTIVE | Noted: 2020-01-01
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
## 2020-09-01 ENCOUNTER — TELEPHONE (OUTPATIENT)
Dept: CARDIOLOGY | Facility: CLINIC | Age: 50
End: 2020-09-01

## 2020-09-01 NOTE — TELEPHONE ENCOUNTER
----- Message from Elva Morris MA sent at 8/31/2020 11:01 AM EDT -----  Regarding: PCM BOX   PATIENT PASSED -WIFE WANTS TO KNOW WHAT TO DO W THE BOX?? CAN SHE BRING IT TO THE [facility] THE NEXT TIME HE IS THERE ?

## 2024-03-18 NOTE — PROGRESS NOTES
"   LOS: 2 days    Patient Care Team:  Kimberlee Longoria APRN as PCP - General    Chief Complaint:    Chief Complaint   Patient presents with   • Weakness - Generalized     PT NONCOMPLIANT WITH HIS DIALYSIS SCHEDULE, MISSING 4 APPOINTMENTS; PT REPORTS TO HIS WIFE THAT HE'S READY TO DIE; PT WEARING FACE MASK     Follow-up end-stage renal disease  Subjective     Interval History:   The patient is awake and alert, he was extubated yesterday after dialysis he denies any chest pain or shortness of air, he recognized that his noncompliance led to his cardiac arrest and or complication related to that.  He stated that he is planning to adhere to his treatment for his not ready to die.  He denies chest pain, no shortness of air, no nausea or vomiting, no abdominal pain.      Review of Systems:   As noted above    Objective     Vital Signs  Temp:  [98 °F (36.7 °C)-98.9 °F (37.2 °C)] 98.9 °F (37.2 °C)  Heart Rate:  [] 97  Resp:  [24-29] 24  BP: (134-183)/() 154/74  FiO2 (%):  [39 %] 39 %    Flowsheet Rows      First Filed Value   Admission Height  182.9 cm (72\") Documented at 06/30/2020 1559   Admission Weight  98.9 kg (218 lb) Documented at 06/30/2020 1559          No intake/output data recorded.  I/O last 3 completed shifts:  In: 366.2 [I.V.:316.2; IV Piggyback:50]  Out: 5000 [Other:5000]    Intake/Output Summary (Last 24 hours) at 7/2/2020 0753  Last data filed at 7/2/2020 0600  Gross per 24 hour   Intake 121.2 ml   Output 0 ml   Net 121.2 ml       Physical Exam:  General Appearance:  Awake, alert and oriented, appears to be chronically ill, no acute distress.  Skin: warm and dry, multiple ulceration and few blisters on the lower extremities with erythema and chronic skin changes also has mycotic toenails  HEENT: pupils round and reactive to light,  oral mucosa slightly dry, nonicteric sclera  Neck: supple, no JVD, trachea midline, bilateral carotid bruit  Lungs: Bilateral rhonchi, unlabored breathing " oriented to person, place and time effort  Heart: RRR, normal S1 and S2, no S3, no rub  Abdomen: soft, no guarding,  present bowel sounds to auscultation  : no palpable bladder,  Extremities: He has what appears to be bilateral chronic woody edema with stasis dermatitis multiple ulcer and 1 or 2 blisters and mycotic toenails, functioning AV fistula in the right upper arm  Neuro:  Normal speech and mental status moving all extremities      Results Review:    Results from last 7 days   Lab Units 07/02/20 0412 07/01/20  0538 06/30/20  1615   SODIUM mmol/L 135* 133* 137   POTASSIUM mmol/L 5.0 6.2* 7.5*   CHLORIDE mmol/L 92* 87* 84*   CO2 mmol/L 22.0 26.2 23.0   BUN mg/dL 41* 83* 174*   CREATININE mg/dL 5.55* 8.94* 15.20*   CALCIUM mg/dL 8.8 8.6 8.0*   BILIRUBIN mg/dL  --   --  1.0   ALK PHOS U/L  --   --  163*   ALT (SGPT) U/L  --   --  14   AST (SGOT) U/L  --   --  11   GLUCOSE mg/dL 60* 76 124*       Estimated Creatinine Clearance: 19.2 mL/min (A) (by C-G formula based on SCr of 5.55 mg/dL (H)).    Results from last 7 days   Lab Units 07/02/20 0412 07/01/20  0538   MAGNESIUM mg/dL 2.2  --    PHOSPHORUS mg/dL 6.9* 7.9*             Results from last 7 days   Lab Units 07/02/20 0412 07/01/20  0538 06/30/20  1615   WBC 10*3/mm3 7.54 5.92 7.35   HEMOGLOBIN g/dL 10.0* 9.1* 10.2*   PLATELETS 10*3/mm3 101* 118* 159               Imaging Results (Last 24 Hours)     ** No results found for the last 24 hours. **          castor oil-balsam peru  Topical Q12H   chlorhexidine 15 mL Mouth/Throat Q12H   famotidine 20 mg Intravenous Daily   heparin (porcine) 5,000 Units Subcutaneous Q8H   insulin lispro 0-24 Units Subcutaneous Q6H   ipratropium-albuterol 3 mL Nebulization Once   mineral oil-hydrophilic petrolatum  Topical Daily   sodium chloride 10 mL Intravenous Q12H       norepinephrine 0.02-0.3 mcg/kg/min Last Rate: Stopped (06/30/20 1932)   propofol 5-50 mcg/kg/min Last Rate: Stopped (07/01/20 1206)       Medication Review:   Current Facility-Administered  Medications   Medication Dose Route Frequency Provider Last Rate Last Dose   • albumin human 25 % IV SOLN 12.5 g  12.5 g Intravenous PRN Blaise Morgan MD       • castor oil-balsam peru (VENELEX) ointment   Topical Q12H Liang Rolle MD   5 g at 07/01/20 2112   • chlorhexidine (PERIDEX) 0.12 % solution 15 mL  15 mL Mouth/Throat Q12H Liang Rolle MD   15 mL at 07/01/20 2110   • dextrose (D50W) 25 g/ 50mL Intravenous Solution 25 g  25 g Intravenous Q15 Min PRN Liang Rolle MD   25 g at 07/02/20 0537   • dextrose (GLUTOSE) oral gel 15 g  15 g Oral Q15 Min PRN Liang Rolle MD       • famotidine (PEPCID) injection 20 mg  20 mg Intravenous Daily Liang Rolle MD   20 mg at 07/01/20 1703   • glucagon (human recombinant) (GLUCAGEN DIAGNOSTIC) injection 1 mg  1 mg Subcutaneous Q15 Min PRN Liang Rlole MD       • heparin (porcine) 5000 UNIT/ML injection 5,000 Units  5,000 Units Subcutaneous Q8H Liang Rolle MD   5,000 Units at 07/01/20 2111   • HYDROmorphone (DILAUDID) injection 0.5 mg  0.5 mg Intravenous Q4H PRN Liang Rolle MD   0.5 mg at 07/02/20 0453   • insulin lispro (humaLOG) injection 0-24 Units  0-24 Units Subcutaneous Q6H Liang Rolle MD       • ipratropium-albuterol (DUO-NEB) nebulizer solution 3 mL  3 mL Nebulization Once Radha, Harjinder GALVEZ MD       • mineral oil-hydrophilic petrolatum (AQUAPHOR) ointment   Topical Daily Liang Rolle MD       • norepinephrine (LEVOPHED) 8 mg/250 mL (32 mcg/mL) in sodium chloride 0.9% infusion (premix)  0.02-0.3 mcg/kg/min Intravenous Titrated Liang Rolle MD   Stopped at 06/30/20 1932   • ondansetron (ZOFRAN) tablet 4 mg  4 mg Oral Q6H PRN Liang Rolle MD        Or   • ondansetron (ZOFRAN) injection 4 mg  4 mg Intravenous Q6H PRN Liang Rolle MD   4 mg at 07/02/20 0501   • propofol (DIPRIVAN) infusion 10 mg/mL 100 mL  5-50 mcg/kg/min Intravenous Titrated Liang Rolle MD   Stopped at 07/01/20 1206   • sodium chloride 0.9 %  bolus 1,000 mL  1,000 mL Intravenous PRN Blaise Morgan MD       • sodium chloride 0.9 % flush 10 mL  10 mL Intravenous PRN Harjinder Garcia MD       • sodium chloride 0.9 % flush 10 mL  10 mL Intravenous Q12H Liang Rolle MD   10 mL at 07/01/20 2129   • sodium chloride 0.9 % flush 10 mL  10 mL Intravenous PRN Liang Rolle MD           Assessment/Plan   1.  End-stage renal disease on maintenance hemodialysis every Monday, Wednesday and Friday, misses dialysis quite often on presentation his creatinine was around 15 and the potassium 7.5 he underwent emergent dialysis treatment.  He was dialyzed emergently on admission then he was dialyzed again yesterday his potassium today down to 5 the patient appears to be euvolemic, phosphorus today is 6.9  2.  Medical noncompliance  3.  Chronic leg ulcers with venous stasis dermatitis appears to be mild cellulitis  4.  Insulin-dependent diabetes mellitus  5.  COPD with active tobacco abuse  6.  Chronic pancreatitis  7.  Anemia of chronic kidney disease, hemoglobin is 10  8.  Cardiac arrest associated with hyperkalemia patient was resuscitated and restored to adequate circulation  9.  Thrombocytopenia, platelet 101,000.  10.  Hyperphosphatemia associated with noncompliance    Plan:.  1.  Plan hemodialysis tomorrow  2.  The patient was counseled about the importance of compliance and he voiced good understanding  3.  Surveillance lab            Blaise Morgan MD  07/02/20  07:53

## (undated) DEVICE — SUT SILK 4/0 TIES 18IN A183H

## (undated) DEVICE — SUT SILK 3/0 SH CR5 18IN C0135

## (undated) DEVICE — CATH IMG IVUS VISIONS PV .018 3.4F

## (undated) DEVICE — DRSNG SURESITE WNDW 4X4.5

## (undated) DEVICE — BIOPATCH™ ANTIMICROBIAL DRESSING WITH CHLORHEXIDINE GLUCONATE IS A HYDROPHILLIC POLYURETHANE ABSORPTIVE FOAM WITH CHLORHEXIDINE GLUCONATE (CHG) WHICH INHIBITS BACTERIAL GROWTH UNDER THE DRESSING. THE DRESSING IS INTENDED TO BE USED TO ABSORB EXUDATE, COVER A WOUND CAUSED BY VASCULAR AND NONVASCULAR PERCUTANEOUS MEDICAL DEVICES DURING SURGERY, AS WELL AS REDUCE LOCAL INFECTION AND COLONIZATION OF MICROORGANISMS.: Brand: BIOPATCH

## (undated) DEVICE — GLV SURG BIOGEL LTX PF 8 1/2

## (undated) DEVICE — PK AV FISTL/SHNT 40

## (undated) DEVICE — SOL NS 500ML

## (undated) DEVICE — GEL ULTRASND HI VISC 20G PACKET STRL

## (undated) DEVICE — NDL HYPO PRECISIONGLIDE REG 25G 1 1/2

## (undated) DEVICE — SUT ETHLN 2/0 PS 18IN 585H

## (undated) DEVICE — ADHS SKIN DERMABOND

## (undated) DEVICE — ST ACC MICROPUNCTURE STFF .018 ECHO/PLDM/TP 4F/10CM 21G/7CM

## (undated) DEVICE — ADHS SKIN DERMABOND TOP ADVANCED

## (undated) DEVICE — HI-TORQUE STEELCORE 18 LT PERIPHERAL GUIDE WIRE 300 CM: Brand: HI-TORQUE STEELCORE

## (undated) DEVICE — Device

## (undated) DEVICE — SYR LL 3CC

## (undated) DEVICE — SUT SILK 3/0 TIES 18IN A184H

## (undated) DEVICE — WIPE INST MEROCEL

## (undated) DEVICE — ISOLATION BAG: Brand: CONVERTORS

## (undated) DEVICE — DECANT BG O JET

## (undated) DEVICE — EXTENSION SET, MALE LUER LOCK ADAPTER WITH RETRACTABLE COLLAR

## (undated) DEVICE — CVR PROB 96IN LF STRL

## (undated) DEVICE — CATH GUIDE SOFTVU SELECT/V HT OMNI .038 5F 65CM

## (undated) DEVICE — ARMADA 35 PTA CATHETER 4 MM X 120 MM X 135 CM / OVER-THE-WIRE: Brand: ARMADA

## (undated) DEVICE — PINNACLE R/O II INTRODUCER SHEATH WITH RADIOPAQUE MARKER: Brand: PINNACLE

## (undated) DEVICE — ANTIBACTERIAL UNDYED BRAIDED (POLYGLACTIN 910), SYNTHETIC ABSORBABLE SUTURE: Brand: COATED VICRYL

## (undated) DEVICE — RADIFOCUS GLIDEWIRE: Brand: GLIDEWIRE

## (undated) DEVICE — TURBO-ELITE™ LASER ATHERECTOMY CATHETER: Brand: TURBO-ELITE™

## (undated) DEVICE — NAVICROSS SUPPORT CATHETER: Brand: NAVICROSS

## (undated) DEVICE — INFLATION DEVICE: Brand: ENCORE™ 26

## (undated) DEVICE — DESTINATION PERIPHERAL GUIDING SHEATH: Brand: DESTINATION

## (undated) DEVICE — SUT SILK 2/0 TIES 18IN A185H

## (undated) DEVICE — TOTAL TRAY, 16FR 10ML SIL FOLEY, URN: Brand: MEDLINE

## (undated) DEVICE — TOWEL,OR,DSP,ST,BLUE,STD,4/PK,20PK/CS: Brand: MEDLINE

## (undated) DEVICE — PINNACLE INTRODUCER SHEATH: Brand: PINNACLE

## (undated) DEVICE — STELLAREX 0.035 OTW DRUG-COATED ANGIOPLASTY BALLOON (5MM X 120MM) X 135CM: Brand: STELLAREX

## (undated) DEVICE — LOU MINOR PROCEDURE: Brand: MEDLINE INDUSTRIES, INC.

## (undated) DEVICE — TBG PRESS HI FLX BR 96IN

## (undated) DEVICE — PK ANGIO CERBRL RAD 40

## (undated) DEVICE — SPNG GZ WOVN 4X4IN 12PLY 10/BX STRL

## (undated) DEVICE — GLV SURG SENSICARE GREEN W/ALOE PF LF 8.5 STRL

## (undated) DEVICE — SUT PROLN 6/0 BV1 D/A 30IN 8709H

## (undated) DEVICE — EYE PAD,OVAL: Brand: CURITY

## (undated) DEVICE — 3M™ IOBAN™ 2 ANTIMICROBIAL INCISE DRAPE 6650EZ: Brand: IOBAN™ 2

## (undated) DEVICE — APPL CHLORAPREP W/TINT 10.5ML PERC STRL

## (undated) DEVICE — SUT PROLN 5/0 RB1 D/A 36IN 8556H

## (undated) DEVICE — GOWN,PREVENTION PLUS,XLONG/XLARGE,STRL: Brand: MEDLINE

## (undated) DEVICE — GLIDEPATH HEMODIALYSIS CATH, ST, DL, 14.5 FR. 23CM: Brand: GLIDEPATH LONG-TERM HEMODIALYSIS CATHETER WITH PRELOADED STYLET

## (undated) DEVICE — RADIFOCUS TORQUE DEVICE MULTI-TORQUE VISE: Brand: RADIFOCUS TORQUE DEVICE

## (undated) DEVICE — SYR SLP TP 10ML DISP

## (undated) DEVICE — CVR EQ IMG 48X27

## (undated) DEVICE — SYR LUERLOK 5CC

## (undated) DEVICE — SOL NACL 0.9PCT 1000ML

## (undated) DEVICE — 200 ML FASTURN SYRINGE WITH QUICK FILL TUBE(ANGIO-SET,PKG,200ML FASTURN SYR W/QFT,MC)(60729695): Brand: MEDRAD® MARK V PROVIS 200ML FASTURN STERILE DISPOSABLE SYRINGE & QFT

## (undated) DEVICE — MYNXGRIP 6F/7F: Brand: MYNXGRIP